# Patient Record
Sex: MALE | Race: WHITE | Employment: OTHER | URBAN - METROPOLITAN AREA
[De-identification: names, ages, dates, MRNs, and addresses within clinical notes are randomized per-mention and may not be internally consistent; named-entity substitution may affect disease eponyms.]

---

## 2017-05-08 ENCOUNTER — OFFICE VISIT (OUTPATIENT)
Dept: FAMILY MEDICINE | Facility: CLINIC | Age: 62
End: 2017-05-08
Payer: COMMERCIAL

## 2017-05-08 VITALS
SYSTOLIC BLOOD PRESSURE: 124 MMHG | HEART RATE: 92 BPM | BODY MASS INDEX: 31.83 KG/M2 | TEMPERATURE: 99.4 F | OXYGEN SATURATION: 97 % | WEIGHT: 228.25 LBS | DIASTOLIC BLOOD PRESSURE: 88 MMHG

## 2017-05-08 DIAGNOSIS — E11.42 DIABETIC POLYNEUROPATHY ASSOCIATED WITH TYPE 2 DIABETES MELLITUS (H): ICD-10-CM

## 2017-05-08 DIAGNOSIS — Z79.4 TYPE 2 DIABETES MELLITUS WITH DIABETIC NEUROPATHY, WITH LONG-TERM CURRENT USE OF INSULIN (H): ICD-10-CM

## 2017-05-08 DIAGNOSIS — Z72.0 TOBACCO ABUSE: ICD-10-CM

## 2017-05-08 DIAGNOSIS — Z00.00 ROUTINE GENERAL MEDICAL EXAMINATION AT A HEALTH CARE FACILITY: Primary | ICD-10-CM

## 2017-05-08 DIAGNOSIS — E11.40 TYPE 2 DIABETES MELLITUS WITH DIABETIC NEUROPATHY, WITH LONG-TERM CURRENT USE OF INSULIN (H): ICD-10-CM

## 2017-05-08 DIAGNOSIS — I10 HYPERTENSION GOAL BP (BLOOD PRESSURE) < 140/80: ICD-10-CM

## 2017-05-08 LAB — HBA1C MFR BLD: 14.3 % (ref 4.3–6)

## 2017-05-08 PROCEDURE — 83036 HEMOGLOBIN GLYCOSYLATED A1C: CPT | Performed by: FAMILY MEDICINE

## 2017-05-08 PROCEDURE — 36415 COLL VENOUS BLD VENIPUNCTURE: CPT | Performed by: FAMILY MEDICINE

## 2017-05-08 PROCEDURE — 99396 PREV VISIT EST AGE 40-64: CPT | Performed by: FAMILY MEDICINE

## 2017-05-08 PROCEDURE — 99214 OFFICE O/P EST MOD 30 MIN: CPT | Mod: 25 | Performed by: FAMILY MEDICINE

## 2017-05-08 PROCEDURE — 80061 LIPID PANEL: CPT | Performed by: FAMILY MEDICINE

## 2017-05-08 RX ORDER — METOPROLOL SUCCINATE 100 MG/1
100 TABLET, EXTENDED RELEASE ORAL DAILY
Qty: 90 TABLET | Refills: 0 | Status: SHIPPED | OUTPATIENT
Start: 2017-05-08 | End: 2017-08-05

## 2017-05-08 NOTE — PATIENT INSTRUCTIONS
Preventive Health Recommendations  Male Ages 50   64    Yearly exam:             See your health care provider every year in order to  o   Review health changes.   o   Discuss preventive care.    o   Review your medicines if your doctor has prescribed any.     Have a cholesterol test every 5 years, or more frequently if you are at risk for high cholesterol/heart disease.     Have a diabetes test (fasting glucose) every three years. If you are at risk for diabetes, you should have this test more often.     Have a colonoscopy at age 50, or have a yearly FIT test (stool test). These exams will check for colon cancer.      Talk with your health care provider about whether or not a prostate cancer screening test (PSA) is right for you.    You should be tested each year for STDs (sexually transmitted diseases), if you re at risk.     Shots: Get a flu shot each year. Get a tetanus shot every 10 years.     Nutrition:    Eat at least 5 servings of fruits and vegetables daily.     Eat whole-grain bread, whole-wheat pasta and brown rice instead of white grains and rice.     Talk to your provider about Calcium and Vitamin D.     Lifestyle    Exercise for at least 150 minutes a week (30 minutes a day, 5 days a week). This will help you control your weight and prevent disease.     Limit alcohol to one drink per day.     No smoking.     Wear sunscreen to prevent skin cancer.     See your dentist every six months for an exam and cleaning.     See your eye doctor every 1 to 2 years.    - please ask if your  if she can come for your appointment with it.  - routine preventative exam (physical) may not be as important at this point than controlling blood sugar. We did your routine physical though.  - your diabetes is really uncontrolled and that is concerning.   - routine use of insulin, trulicity, quitting smoking, diabetic shoes.  - follow up in 2 months.

## 2017-05-08 NOTE — PROGRESS NOTES
SUBJECTIVE:     CC: Cooper Cabral is an 61 year old male who presents for preventative health visit.     Healthy Habits:    Do you get at least three servings of calcium containing foods daily (dairy, green leafy vegetables, etc.)? no    Amount of exercise or daily activities, outside of work: 0 day(s) per week    Problems taking medications regularly Yes does not like taking insulin, afraid of needles. Used past 2 days     Medication side effects: No    Have you had an eye exam in the past two years? yes    Do you see a dentist twice per year? no    Do you have sleep apnea, excessive snoring or daytime drowsiness?yes sleep apnea          Diabetes Follow-up      Patient is checking blood sugars: not at all    Diabetic concerns: None     Symptoms of hypoglycemia (low blood sugar): none     Paresthesias (numbness or burning in feet) or sores: Yes swollen feet, left toe nail came off     Date of last diabetic eye exam: last year      Hyperlipidemia Follow-Up      Rate your low fat/cholesterol diet?: poor    Taking statin?  Yes, no muscle aches from statin    Other lipid medications/supplements?:  none     Hypertension Follow-up      Outpatient blood pressures are being checked at home.  Results are 198/?.    Low Salt Diet: occasional          Today's PHQ-2 Score:   PHQ-2 ( 1999 Pfizer) 5/8/2017 9/12/2016   Q1: Little interest or pleasure in doing things 0 1   Q2: Feeling down, depressed or hopeless 1 1   PHQ-2 Score 1 2       Abuse: Current or Past(Physical, Sexual or Emotional)- No  Do you feel safe in your environment - Yes    Social History   Substance Use Topics     Smoking status: Current Every Day Smoker     Packs/day: 1.00     Years: 20.00     Types: Cigarettes     Smokeless tobacco: Never Used     Alcohol use No     The patient does not drink >3 drinks per day nor >7 drinks per week.    Last PSA:   PSA   Date Value Ref Range Status   09/12/2016 2.11 0 - 4 ug/L Final     Comment:     Assay Method:   Chemiluminescence using Siemens Vista analyzer       Recent Labs   Lab Test  09/12/16   1031  10/14/15   1124  08/13/14   0908   CHOL  139  166  123   HDL  34*  34*  37*   LDL  85  64  63   TRIG  101  339*  117   CHOLHDLRATIO   --   4.9  3.3   NHDL  105   --    --        Reviewed orders with patient. Reviewed health maintenance and updated orders accordingly - Yes    Reviewed and updated as needed this visit by clinical staff  Tobacco  Allergies  Meds  Med Hx  Surg Hx  Fam Hx  Soc Hx      Reviewed and updated as needed this visit by Provider    Restarted insulin 40 units twice per day.   trulicity first time on Sunday.   Out of metformin.   His  wants him to have a physical. MELCHOR mallory.     Has another month worth insulin and trulicity and does not want refill.     Rolling his own tobacco and still smokes. Not ready to quit.     Poor feet sensation. Balance is not great.      ROS: see above.   C: NEGATIVE for fever, chills, change in weight  I: NEGATIVE for worrisome rashes, moles or lesions  E: NEGATIVE for vision changes or irritation  ENT: NEGATIVE for ear, mouth and throat problems  R: NEGATIVE for significant cough or SOB  CV: NEGATIVE for chest pain, palpitations or peripheral edema  GI: NEGATIVE for nausea, abdominal pain, heartburn, or change in bowel habits   male: negative for dysuria, hematuria, decreased urinary stream, erectile dysfunction, urethral discharge  M: NEGATIVE for significant arthralgias or myalgia  N: see above.   E: NEGATIVE for temperature intolerance, skin/hair changes  P: NEGATIVE for changes in mood or affect    Problem list, Medication list, Allergies, and Medical/Social/Surgical histories reviewed in [facility] and updated as appropriate.  OBJECTIVE:     /88 (Cuff Size: Adult Large)  Pulse 92  Temp 99.4  F (37.4  C) (Oral)  Wt 228 lb 4 oz (103.5 kg)  SpO2 97%  BMI 31.83 kg/m2  EXAM:  GENERAL: , alert and no distress  EYES: Eyes grossly normal to inspection,  PERRL and conjunctivae and sclerae normal  HENT: ear canals and TM's normal, nose and mouth without ulcers or lesions  NECK: no adenopathy, no asymmetry, masses, or scars and thyroid normal to palpation  RESP: lungs clear to auscultation - no rales, rhonchi or wheezes  CV: regular rate and rhythm, normal S1 S2, no S3 or S4, no murmur, click or rub, no peripheral edema and peripheral pulses strong  ABDOMEN: soft, nontender, no hepatosplenomegaly, no masses and bowel sounds normal  MS: no gross musculoskeletal defects noted, no edema  SKIN: no suspicious lesions or rashes  NEURO: absent monofilament or fine touch sensation both lower limbs, left great toe - toe nail missing  PSYCH: mentation appears normal, affect normal/bright    ASSESSMENT/PLAN:      (Z00.00) Routine general medical examination at a health care facility  (primary encounter diagnosis)  Comment: we had a discussion about skipping physical with his current uncontrolled health conditions but he was insisting about having routine physical along with his current chronic conditions.   Plan: LIPID REFLEX TO DIRECT LDL PANEL             (E11.40,  Z79.4) Type 2 diabetes mellitus with diabetic neuropathy, with long-term current use of insulin (H)  Comment:  Uncontrolled with A1c over 14.  Just restarted his insulin but previously very poor compliance and I tried to learn from him if there is any concern that prevents his compliance. He does not have any specific answer today. Will resume same medications instead of going up higher on dose.   Plan: Hemoglobin A1c, metFORMIN (GLUCOPHAGE) 1000 MG         tablet, order for DME    (Z72.0) Tobacco abuse  Comment: ongoing. Does not want to quit.   Plan:      (I10) Hypertension goal BP (blood pressure) < 140/80  Comment: Patient is tolerating current medication without any major side effects of concerns and current dose seems reasonable too.  Current medication regime is effective. Continue current treatment without  "any changes.   Plan: metoprolol (TOPROL-XL) 100 MG 24 hr tablet             (E11.42) Diabetic polyneuropathy associated with type 2 diabetes mellitus (H)  Comment:  Feet - very significant neuropathy and minimal or no fine sensation.  Plan: strongly recommend working on his blood sugar and diabetic shoes. He can also see our podiatrist.           COUNSELING:  Reviewed preventive health counseling, as reflected in patient instructions  Special attention given to:        Regular exercise       Healthy diet/nutrition       Vision screening       Hearing screening       Colon cancer screening       Prostate cancer screening         reports that he has been smoking Cigarettes.  He has a 20.00 pack-year smoking history. He has never used smokeless tobacco.  Tobacco Cessation Action Plan: Information offered: Patient not interested at this time  Estimated body mass index is 31.83 kg/(m^2) as calculated from the following:    Height as of 9/12/16: 5' 11\" (1.803 m).    Weight as of this encounter: 228 lb 4 oz (103.5 kg).   Weight management plan: Discussed healthy diet and exercise guidelines and patient will follow up in 2 months in clinic to re-evaluate.    Counseling Resources:  ATP IV Guidelines  Pooled Cohorts Equation Calculator  FRAX Risk Assessment  ICSI Preventive Guidelines  Dietary Guidelines for Americans, 2010  USDA's MyPlate  ASA Prophylaxis  Lung CA Screening    Dani Layton MD  [facility]    Follow up in 2 months for uncontrolled diabetes.   "

## 2017-05-08 NOTE — NURSING NOTE
"Chief Complaint   Patient presents with     Physical     pt is not fasting        Initial /88 (Cuff Size: Adult Large)  Pulse 92  Temp 99.4  F (37.4  C) (Oral)  Wt 228 lb 4 oz (103.5 kg)  SpO2 97%  BMI 31.83 kg/m2 Estimated body mass index is 31.83 kg/(m^2) as calculated from the following:    Height as of 9/12/16: 5' 11\" (1.803 m).    Weight as of this encounter: 228 lb 4 oz (103.5 kg).  Medication Reconciliation: complete     Radha Alfaro, CMA      "

## 2017-05-08 NOTE — MR AVS SNAPSHOT
After Visit Summary   5/8/2017    Cooper Cabral    MRN: [id number]           Patient Information     Date Of Birth          1955        Visit Information        Provider Department      5/8/2017 10:40 AM Dani Layton MD [facility]        Today's Diagnoses     Type 2 diabetes mellitus with diabetic neuropathy, with long-term current use of insulin (H)    -  1    Morbid obesity, unspecified obesity type (H)        Tobacco abuse        Hypertension goal BP (blood pressure) < 140/80        Routine general medical examination at a health care facility        Diabetic polyneuropathy associated with type 2 diabetes mellitus (H)          Care Instructions      Preventive Health Recommendations  Male Ages 50 - 64    Yearly exam:             See your health care provider every year in order to  o   Review health changes.   o   Discuss preventive care.    o   Review your medicines if your doctor has prescribed any.     Have a cholesterol test every 5 years, or more frequently if you are at risk for high cholesterol/heart disease.     Have a diabetes test (fasting glucose) every three years. If you are at risk for diabetes, you should have this test more often.     Have a colonoscopy at age 50, or have a yearly FIT test (stool test). These exams will check for colon cancer.      Talk with your health care provider about whether or not a prostate cancer screening test (PSA) is right for you.    You should be tested each year for STDs (sexually transmitted diseases), if you re at risk.     Shots: Get a flu shot each year. Get a tetanus shot every 10 years.     Nutrition:    Eat at least 5 servings of fruits and vegetables daily.     Eat whole-grain bread, whole-wheat pasta and brown rice instead of white grains and rice.     Talk to your provider about Calcium and Vitamin D.     Lifestyle    Exercise for at least 150 minutes a week (30 minutes a day, 5 days a week). This will  help you control your weight and prevent disease.     Limit alcohol to one drink per day.     No smoking.     Wear sunscreen to prevent skin cancer.     See your dentist every six months for an exam and cleaning.     See your eye doctor every 1 to 2 years.    - please ask if your  if she can come for your appointment with it.  - routine preventative exam (physical) may not be as important at this point than controlling blood sugar. We did your routine physical though.  - your diabetes is really uncontrolled and that is concerning.   - routine use of insulin, trulicity, quitting smoking, diabetic shoes.  - follow up in 2 months.         Follow-ups after your visit        Who to contact     If you have questions or need follow up information about today's clinic visit or your schedule please contact [facility] directly at [phone].  Normal or non-critical lab and imaging results will be communicated to you by [employer]hart, letter or phone within 4 business days after the clinic has received the results. If you do not hear from us within 7 days, please contact the clinic through [employer] or phone. If you have a critical or abnormal lab result, we will notify you by phone as soon as possible.  Submit refill requests through [employer] or call your pharmacy and they will forward the refill request to us. Please allow 3 business days for your refill to be completed.          Additional Information About Your Visit        [employer] Information     [employer] gives you secure access to your electronic health record. If you see a primary care provider, you can also send messages to your care team and make appointments. If you have questions, please call your primary care clinic.  If you do not have a primary care provider, please call [phone] and they will assist you.        Care EveryWhere ID     This is your Care EveryWhere ID. This could be used by other organizations to access your [facility]  records  [id number]        Your Vitals Were     Pulse Temperature Pulse Oximetry BMI (Body Mass Index)          92 99.4  F (37.4  C) (Oral) 97% 31.83 kg/m2         Blood Pressure from Last 3 Encounters:   05/08/17 124/88   09/12/16 114/86   02/03/16 130/88    Weight from Last 3 Encounters:   05/08/17 228 lb 4 oz (103.5 kg)   09/12/16 246 lb (111.6 kg)   02/03/16 247 lb 12 oz (112.4 kg)              We Performed the Following     Hemoglobin A1c     LIPID REFLEX TO DIRECT LDL PANEL          Today's Medication Changes          These changes are accurate as of: 5/8/17 11:32 AM.  If you have any questions, ask your nurse or doctor.               Start taking these medicines.        Dose/Directions    order for DME   Used for:  Type 2 diabetes mellitus with diabetic neuropathy, with long-term current use of insulin (H)   Started by:  Dani Layton MD        Diabetic shoes.   Quantity:  1 Units   Refills:  0            Where to get your medicines      These medications were sent to [facility] [address] [place] [address] AT [facility] & [address] [place] [id number]    Hours:  24-hours Phone:  [phone]     metFORMIN 1000 MG tablet    metoprolol 100 MG 24 hr tablet         Some of these will need a paper prescription and others can be bought over the counter.  Ask your nurse if you have questions.     Bring a paper prescription for each of these medications     order for DME                Primary Care Provider Office Phone # Fax #    Dani Layton [phone]       [facility] [address] [place] [address]        Thank you!     Thank you for choosing [facility]  for your care. Our goal is always to provide you with excellent care. Hearing back from our patients is one way we can continue to improve our services. Please take a few minutes to complete the written survey that you may  receive in the mail after your visit with us. Thank you!             Your Updated Medication List - Protect others around you: Learn how to safely use, store and throw away your medicines at www.disposemymeds.org.          This list is accurate as of: 5/8/17 11:32 AM.  Always use your most recent med list.                   Brand Name Dispense Instructions for use    aspirin 81 MG tablet     100 tablet    Take 1 tablet by mouth daily.       blood glucose monitoring lancets     100    tests twice a day       * blood glucose monitoring test strip    ACCU-CHEK LUISANA    50 each    by In Vitro route 2 times daily (test)       * blood glucose monitoring test strip    ACCU-CHEK LUISANA    50 each    by In Vitro route 2 times daily (test)       * blood glucose monitoring test strip    ACCU-CHEK LUISANA    50 each    by In Vitro route 2 times daily (test)       * blood glucose monitoring test strip    ACCU-CHEK LUISANA    100 strip    by In Vitro route 2 times daily (test)       CALCIUM + D + K PO      Take 1 tablet by mouth daily.       dulaglutide 0.75 MG/0.5ML pen    TRULICITY    2 mL    Inject 0.75 mg Subcutaneous every 7 days       FISH OIL      1 tablet daily Reported on 5/8/2017       insulin pen needle 31G X 8 MM    B-D U/F    90 each    Use once daily or as directed.       LANTUS SOLOSTAR 100 UNIT/ML injection   Generic drug:  insulin glargine     1 Month    Use 42 units every morning and 42 units every evening.       lisinopril-hydrochlorothiazide 20-12.5 MG per tablet    PRINZIDE/ZESTORETIC    60 tablet    Take 2 tablets by mouth daily       metFORMIN 1000 MG tablet    GLUCOPHAGE    180 tablet    Take 1 tablet (1,000 mg) by mouth 2 times daily (with meals)       metoprolol 100 MG 24 hr tablet    TOPROL-XL    90 tablet    Take 1 tablet (100 mg) by mouth daily       MULTI FOR HIM PO      Take 1 capsule by mouth daily.       nicotine 21 MG/24HR 24 hr patch    NICODERM CQ    30    1 PATCH DAILY       NovoLOG FLEXPEN 100  UNIT/ML injection   Generic drug:  insulin aspart      Inject Subcutaneous 3 times daily (with meals)       order for DME     1 Units    Diabetic shoes.       pregabalin 150 MG capsule    LYRICA    90 capsule    Take 1 capsule (150 mg) by mouth 3 times daily       simvastatin 20 MG tablet    ZOCOR    90 tablet    Take 1 tablet (20 mg) by mouth At Bedtime       * Notice:  This list has 4 medication(s) that are the same as other medications prescribed for you. Read the directions carefully, and ask your doctor or other care provider to review them with you.

## 2017-05-09 LAB
CHOLEST SERPL-MCNC: 171 MG/DL
HDLC SERPL-MCNC: 42 MG/DL
LDLC SERPL CALC-MCNC: 104 MG/DL
NONHDLC SERPL-MCNC: 129 MG/DL
TRIGL SERPL-MCNC: 125 MG/DL

## 2017-05-22 ENCOUNTER — TELEPHONE (OUTPATIENT)
Dept: FAMILY MEDICINE | Facility: CLINIC | Age: 62
End: 2017-05-22

## 2017-05-31 RX ORDER — INSULIN GLARGINE 100 [IU]/ML
INJECTION, SOLUTION SUBCUTANEOUS
Qty: 30 ML | Refills: 1 | Status: SHIPPED | OUTPATIENT
Start: 2017-05-31 | End: 2017-09-07

## 2017-05-31 NOTE — TELEPHONE ENCOUNTER
LANTUS SOLOSTAR 100 UNIT/ML soln         Last Written Prescription Date: 9/12/2016  Last Fill Quantity: 1 month, # refills: 0  Last Office Visit with [facility] or [facility] prescribing provider:  5/8/2017        BP Readings from Last 3 Encounters:   05/08/17 124/88   09/12/16 114/86   02/03/16 130/88     Lab Results   Component Value Date    MICROL 324 09/12/2016     Lab Results   Component Value Date    UMALCR 231.43 09/12/2016     Creatinine   Date Value Ref Range Status   09/12/2016 0.82 0.66 - 1.25 mg/dL Final   ]  GFR Estimate   Date Value Ref Range Status   09/12/2016 >90  Non  GFR Calc   >60 mL/min/1.7m2 Final   10/14/2015 80 >60 mL/min/1.7m2 Final     Comment:     Non  GFR Calc   08/13/2014 72 >60 mL/min/1.7m2 Final     Comment:     Non  GFR Calc     GFR Estimate If Black   Date Value Ref Range Status   09/12/2016 >90   GFR Calc   >60 mL/min/1.7m2 Final   10/14/2015 >90   GFR Calc   >60 mL/min/1.7m2 Final   08/13/2014 87 >60 mL/min/1.7m2 Final     Comment:      GFR Calc     Lab Results   Component Value Date    CHOL 171 05/08/2017     Lab Results   Component Value Date    HDL 42 05/08/2017     Lab Results   Component Value Date     05/08/2017     Lab Results   Component Value Date    TRIG 125 05/08/2017     Lab Results   Component Value Date    CHOLHDLRATIO 4.9 10/14/2015     Lab Results   Component Value Date    AST 19 09/12/2016     Lab Results   Component Value Date    ALT 34 09/12/2016     Lab Results   Component Value Date    A1C 14.3 05/08/2017    A1C 12.0 09/12/2016    A1C 14.0 02/03/2016    A1C 13.9 11/10/2015    A1C 14.7 10/14/2015     Potassium   Date Value Ref Range Status   09/12/2016 3.8 3.4 - 5.3 mmol/L Final       Prescription approved per [facility] Refill Protocol.    Signed Prescriptions:                        Disp   Refills    LANTUS SOLOSTAR 100 UNIT/ML soln           30 mL  1        Sig: INJECT  40 UNITS UNDER THE SKIN EVERY MORNING AND 40           UNITS EVERY EVENING  Authorizing Provider: VINNY ALVAREZ  Ordering User: JIMMY FERNANDO    Provider advised 2 month F/U    Closing encounter - no further actions needed at this time    Jimmy Fernando RN

## 2017-06-28 PROCEDURE — G0179 MD RECERTIFICATION HHA PT: HCPCS | Performed by: FAMILY MEDICINE

## 2017-07-14 DIAGNOSIS — I10 HYPERTENSION GOAL BP (BLOOD PRESSURE) < 140/80: ICD-10-CM

## 2017-07-14 RX ORDER — LISINOPRIL AND HYDROCHLOROTHIAZIDE 12.5; 2 MG/1; MG/1
TABLET ORAL
Qty: 60 TABLET | Refills: 0 | Status: SHIPPED | OUTPATIENT
Start: 2017-07-14 | End: 2017-09-07

## 2017-07-14 NOTE — TELEPHONE ENCOUNTER
lisinopril-hydrochlorothiazide (PRINZIDE,ZESTORETIC) 20-12.5 MG per tablet      Last Written Prescription Date: 11/29/2016  Last Fill Quantity: 60, # refills: 0  Last Office Visit with [facility], P or [facility] prescribing provider: 5/8/2017       Potassium   Date Value Ref Range Status   09/12/2016 3.8 3.4 - 5.3 mmol/L Final     Creatinine   Date Value Ref Range Status   09/12/2016 0.82 0.66 - 1.25 mg/dL Final     BP Readings from Last 3 Encounters:   05/08/17 124/88   09/12/16 114/86   02/03/16 130/88       HTN addressed 5/8/2017    Routing refill request to provider for review/approval because:  A break in medication      Thank you,  Anel Sanchez RN

## 2017-08-05 DIAGNOSIS — I10 HYPERTENSION GOAL BP (BLOOD PRESSURE) < 140/80: ICD-10-CM

## 2017-08-05 NOTE — LETTER
[facility]  [address] [place] [address]  Phone: [phone]        August 8, 2017      Cooper Cabral                                                                                                                                [address] [address]            Dear Mr. Cabral,    We are concerned about your health care.  We recently provided you with a medication refill.  Many medications require routine follow-up with your Doctor.      At this time we ask that: You schedule a routine office visit with your physician to follow your diabetes    Your prescription: Has been refilled for 1 month so you may have time for the above noted follow-up.      Thank you,      Dr Layton/ chris

## 2017-08-07 RX ORDER — METOPROLOL SUCCINATE 100 MG/1
TABLET, EXTENDED RELEASE ORAL
Qty: 30 TABLET | Refills: 0 | Status: SHIPPED | OUTPATIENT
Start: 2017-08-07 | End: 2017-09-07

## 2017-08-07 NOTE — TELEPHONE ENCOUNTER
Patient is overdue for diabetes follow up visit.    Patient was contacted on 7/14/17 to schedule diabetes follow up.    Dr. Layton-Please sign if agree.    Team coordinators-Please contact patient to schedule diabetes follow up visit.    Thank you!  VALERI MartinezN, RN

## 2017-08-07 NOTE — TELEPHONE ENCOUNTER
Medication Detail      Disp Refills Start End ANDRES   metoprolol (TOPROL-XL) 100 MG 24 hr tablet 90 tablet 0 5/8/2017  No   Sig: Take 1 tablet (100 mg) by mouth daily       Last Office Visit with [facility], [facility] or [facility] prescribing provider:  5/8/17   Future Office Visit:        BP Readings from Last 3 Encounters:   05/08/17 124/88   09/12/16 114/86   02/03/16 130/88

## 2017-08-08 NOTE — TELEPHONE ENCOUNTER
Patient doesn't check his My Chart messages phone number on file is incorrect so sending patient a letter to schedule his overdue diabetes follow up visit

## 2017-08-16 DIAGNOSIS — I10 HYPERTENSION GOAL BP (BLOOD PRESSURE) < 140/80: ICD-10-CM

## 2017-08-16 NOTE — TELEPHONE ENCOUNTER
Medication Detail      Disp Refills Start End ANDRES   lisinopril-hydrochlorothiazide (PRINZIDE/ZESTORETIC) 20-12.5 MG per tablet 60 tablet 0 7/14/2017  No   Sig: TAKE 2 TABLETS BY MOUTH DAILY       Last Office Visit with [facility], [facility] or [facility] prescribing provider: 2/28/17       Potassium   Date Value Ref Range Status   09/12/2016 3.8 3.4 - 5.3 mmol/L Final     Creatinine   Date Value Ref Range Status   09/12/2016 0.82 0.66 - 1.25 mg/dL Final     BP Readings from Last 3 Encounters:   05/08/17 124/88   09/12/16 114/86   02/03/16 130/88

## 2017-08-17 NOTE — TELEPHONE ENCOUNTER
Last office note from 5/8/17 states:  Follow up in 2 months for uncontrolled diabetes.    No appointment has been scheduled.  1 month refill pended.  Patricia Mortensen RN

## 2017-08-18 RX ORDER — LISINOPRIL AND HYDROCHLOROTHIAZIDE 12.5; 2 MG/1; MG/1
TABLET ORAL
Qty: 60 TABLET | Refills: 0 | Status: SHIPPED | OUTPATIENT
Start: 2017-08-18 | End: 2017-09-07

## 2017-08-29 PROCEDURE — G0179 MD RECERTIFICATION HHA PT: HCPCS | Performed by: FAMILY MEDICINE

## 2017-09-07 ENCOUNTER — OFFICE VISIT (OUTPATIENT)
Dept: INTERNAL MEDICINE | Facility: CLINIC | Age: 62
End: 2017-09-07
Payer: COMMERCIAL

## 2017-09-07 VITALS
DIASTOLIC BLOOD PRESSURE: 76 MMHG | OXYGEN SATURATION: 98 % | SYSTOLIC BLOOD PRESSURE: 118 MMHG | HEIGHT: 71 IN | BODY MASS INDEX: 32 KG/M2 | HEART RATE: 98 BPM | WEIGHT: 228.6 LBS | TEMPERATURE: 98.5 F

## 2017-09-07 DIAGNOSIS — E78.5 HYPERLIPIDEMIA LDL GOAL <100: ICD-10-CM

## 2017-09-07 DIAGNOSIS — Z79.4 TYPE 2 DIABETES MELLITUS WITH DIABETIC NEUROPATHY, WITH LONG-TERM CURRENT USE OF INSULIN (H): ICD-10-CM

## 2017-09-07 DIAGNOSIS — E11.49 OTHER DIABETIC NEUROLOGICAL COMPLICATION ASSOCIATED WITH TYPE 2 DIABETES MELLITUS (H): ICD-10-CM

## 2017-09-07 DIAGNOSIS — Z12.11 COLON CANCER SCREENING: Primary | ICD-10-CM

## 2017-09-07 DIAGNOSIS — I10 HYPERTENSION GOAL BP (BLOOD PRESSURE) < 140/80: ICD-10-CM

## 2017-09-07 DIAGNOSIS — E11.40 TYPE 2 DIABETES MELLITUS WITH DIABETIC NEUROPATHY, WITH LONG-TERM CURRENT USE OF INSULIN (H): ICD-10-CM

## 2017-09-07 LAB
ALBUMIN SERPL-MCNC: 3.6 G/DL (ref 3.4–5)
ALP SERPL-CCNC: 81 U/L (ref 40–150)
ALT SERPL W P-5'-P-CCNC: 42 U/L (ref 0–70)
ANION GAP SERPL CALCULATED.3IONS-SCNC: 8 MMOL/L (ref 3–14)
AST SERPL W P-5'-P-CCNC: 18 U/L (ref 0–45)
BILIRUB SERPL-MCNC: 0.9 MG/DL (ref 0.2–1.3)
BUN SERPL-MCNC: 25 MG/DL (ref 7–30)
CALCIUM SERPL-MCNC: 9.7 MG/DL (ref 8.5–10.1)
CHLORIDE SERPL-SCNC: 101 MMOL/L (ref 94–109)
CHOLEST SERPL-MCNC: 164 MG/DL
CO2 SERPL-SCNC: 29 MMOL/L (ref 20–32)
CREAT SERPL-MCNC: 0.84 MG/DL (ref 0.66–1.25)
GFR SERPL CREATININE-BSD FRML MDRD: >90 ML/MIN/1.7M2
GLUCOSE SERPL-MCNC: 269 MG/DL (ref 70–99)
HBA1C MFR BLD: 11.4 % (ref 4.3–6)
HDLC SERPL-MCNC: 42 MG/DL
LDLC SERPL CALC-MCNC: 99 MG/DL
NONHDLC SERPL-MCNC: 122 MG/DL
POTASSIUM SERPL-SCNC: 4.4 MMOL/L (ref 3.4–5.3)
PROT SERPL-MCNC: 7.3 G/DL (ref 6.8–8.8)
SODIUM SERPL-SCNC: 138 MMOL/L (ref 133–144)
TRIGL SERPL-MCNC: 114 MG/DL
TSH SERPL DL<=0.005 MIU/L-ACNC: 1.21 MU/L (ref 0.4–4)

## 2017-09-07 PROCEDURE — 80053 COMPREHEN METABOLIC PANEL: CPT | Performed by: INTERNAL MEDICINE

## 2017-09-07 PROCEDURE — 83036 HEMOGLOBIN GLYCOSYLATED A1C: CPT | Performed by: INTERNAL MEDICINE

## 2017-09-07 PROCEDURE — 99214 OFFICE O/P EST MOD 30 MIN: CPT | Performed by: INTERNAL MEDICINE

## 2017-09-07 PROCEDURE — 84443 ASSAY THYROID STIM HORMONE: CPT | Performed by: INTERNAL MEDICINE

## 2017-09-07 PROCEDURE — 80061 LIPID PANEL: CPT | Performed by: INTERNAL MEDICINE

## 2017-09-07 PROCEDURE — 36415 COLL VENOUS BLD VENIPUNCTURE: CPT | Performed by: INTERNAL MEDICINE

## 2017-09-07 RX ORDER — METOPROLOL SUCCINATE 100 MG/1
100 TABLET, EXTENDED RELEASE ORAL DAILY
Qty: 90 TABLET | Refills: 3 | Status: ON HOLD | OUTPATIENT
Start: 2017-09-07 | End: 2018-01-29

## 2017-09-07 RX ORDER — PREGABALIN 150 MG/1
150 CAPSULE ORAL 3 TIMES DAILY
Qty: 90 CAPSULE | Refills: 3 | Status: SHIPPED | OUTPATIENT
Start: 2017-09-07 | End: 2017-11-21

## 2017-09-07 RX ORDER — LISINOPRIL AND HYDROCHLOROTHIAZIDE 12.5; 2 MG/1; MG/1
2 TABLET ORAL DAILY
Qty: 180 TABLET | Refills: 3 | Status: ON HOLD | OUTPATIENT
Start: 2017-09-07 | End: 2018-01-29

## 2017-09-07 RX ORDER — SIMVASTATIN 20 MG
20 TABLET ORAL AT BEDTIME
Qty: 90 TABLET | Refills: 3 | Status: ON HOLD | OUTPATIENT
Start: 2017-09-07 | End: 2018-01-29

## 2017-09-07 NOTE — MR AVS SNAPSHOT
After Visit Summary   9/7/2017    Cooper Cabral    MRN: [id number]           Patient Information     Date Of Birth          1955        Visit Information        Provider Department      9/7/2017 1:40 PM Hudson Spring MD [facility]        Today's Diagnoses     Colon cancer screening    -  1    Type 2 diabetes mellitus with diabetic neuropathy, with long-term current use of insulin (H)        Hypertension goal BP (blood pressure) < 140/80        Hyperlipidemia LDL goal <100        Other diabetic neurological complication associated with type 2 diabetes mellitus (H)           Follow-ups after your visit        Additional Services     GASTROENTEROLOGY ADULT REF PROCEDURE ONLY       Last Lab Result: Creatinine (mg/dL)       Date                     Value                 09/12/2016               0.82             ----------  Body mass index is 31.88 kg/(m^2).     Needed:  No  Language:  English    Patient will be contacted to schedule procedure.     Please be aware that coverage of these services is subject to the terms and limitations of your health insurance plan.  Call member services at your health plan with any benefit or coverage questions.  Any procedures must be performed at a [place] facility OR coordinated by your clinic's referral office.    Please bring the following with you to your appointment:    (1) Any X-Rays, CTs or MRIs which have been performed.  Contact the facility where they were done to arrange for  prior to your scheduled appointment.    (2) List of current medications   (3) This referral request   (4) Any documents/labs given to you for this referral                  Follow-up notes from your care team     Return in about 3 months (around 12/7/2017), or if symptoms worsen or fail to improve, for Lab Work, Routine Visit.      Who to contact     If you have questions or need follow up information about today's clinic visit or  "your schedule please contact [facility] directly at [phone].  Normal or non-critical lab and imaging results will be communicated to you by MyChart, letter or phone within 4 business days after the clinic has received the results. If you do not hear from us within 7 days, please contact the clinic through [employer] or phone. If you have a critical or abnormal lab result, we will notify you by phone as soon as possible.  Submit refill requests through [employer] or call your pharmacy and they will forward the refill request to us. Please allow 3 business days for your refill to be completed.          Additional Information About Your Visit        [email or web address] Information     [employer] gives you secure access to your electronic health record. If you see a primary care provider, you can also send messages to your care team and make appointments. If you have questions, please call your primary care clinic.  If you do not have a primary care provider, please call [phone] and they will assist you.        Care EveryWhere ID     This is your Care EveryWhere ID. This could be used by other organizations to access your [place] medical records  [id number]        Your Vitals Were     Pulse Temperature Height Pulse Oximetry BMI (Body Mass Index)       98 98.5  F (36.9  C) (Oral) 5' 11\" (1.803 m) 98% 31.88 kg/m2        Blood Pressure from Last 3 Encounters:   09/07/17 118/76   05/08/17 124/88   09/12/16 114/86    Weight from Last 3 Encounters:   09/07/17 228 lb 9.6 oz (103.7 kg)   05/08/17 228 lb 4 oz (103.5 kg)   09/12/16 246 lb (111.6 kg)              We Performed the Following     Albumin Random Urine Quantitative with Creat Ratio     Comprehensive metabolic panel     GASTROENTEROLOGY ADULT REF PROCEDURE ONLY     Hemoglobin A1c     Lipid Profile     TSH with free T4 reflex          Today's Medication Changes          These changes are accurate as of: 9/7/17  2:21 PM.  If you have any questions, ask " your nurse or doctor.               These medicines have changed or have updated prescriptions.        Dose/Directions    blood glucose monitoring test strip   Commonly known as:  ACCU-CHEK LUISANA   This may have changed:  additional instructions   Used for:  Type 2 diabetes mellitus with diabetic neuropathy, with long-term current use of insulin (H)   Changed by:  Hudson Spring MD        by In Vitro route 1 times daily (test)   Quantity:  100 strip   Refills:  3       dulaglutide 0.75 MG/0.5ML pen   Commonly known as:  TRULICITY   This may have changed:  when to take this   Used for:  Hypertension goal BP (blood pressure) < 140/80        Dose:  0.75 mg   Inject 0.75 mg Subcutaneous every 7 days   Quantity:  6 mL   Refills:  5       insulin glargine 100 UNIT/ML injection   Commonly known as:  LANTUS SOLOSTAR   This may have changed:  See the new instructions.   Used for:  Type 2 diabetes mellitus with diabetic neuropathy, with long-term current use of insulin (H)   Changed by:  Hudson Spring MD        Dose:  42 Units   Inject 42 Units Subcutaneous 2 times daily INJECT 40 UNITS UNDER THE SKIN EVERY MORNING AND 40 UNITS EVERY EVENING   Quantity:  30 mL   Refills:  11       lisinopril-hydrochlorothiazide 20-12.5 MG per tablet   Commonly known as:  PRINZIDE/ZESTORETIC   This may have changed:  See the new instructions.   Used for:  Hypertension goal BP (blood pressure) < 140/80, Type 2 diabetes mellitus with diabetic neuropathy, with long-term current use of insulin (H)   Changed by:  Hudson Spring MD        Dose:  2 tablet   Take 2 tablets by mouth daily   Quantity:  180 tablet   Refills:  3       metoprolol 100 MG 24 hr tablet   Commonly known as:  TOPROL-XL   This may have changed:  See the new instructions.   Used for:  Hypertension goal BP (blood pressure) < 140/80   Changed by:  Hudson Spring MD        Dose:  100 mg   Take 1 tablet (100 mg) by mouth daily   Quantity:  90 tablet   Refills:  3       *  order for DME   This may have changed:  Another medication with the same name was added. Make sure you understand how and when to take each.   Used for:  Type 2 diabetes mellitus with diabetic neuropathy, with long-term current use of insulin (H)   Changed by:  Dani Layton MD        Diabetic shoes.   Quantity:  1 Units   Refills:  0       * order for DME   This may have changed:  You were already taking a medication with the same name, and this prescription was added. Make sure you understand how and when to take each.   Used for:  Type 2 diabetes mellitus with diabetic neuropathy, with long-term current use of insulin (H), Other diabetic neurological complication associated with type 2 diabetes mellitus (H)   Changed by:  Hudson Spring MD        Diabetic shoes due to neuropathy   Quantity:  1 Device   Refills:  0       * Notice:  This list has 2 medication(s) that are the same as other medications prescribed for you. Read the directions carefully, and ask your doctor or other care provider to review them with you.         Where to get your medicines      These medications were sent to [id number]     Phone:  [phone]     blood glucose monitoring test strip    dulaglutide 0.75 MG/0.5ML pen    insulin glargine 100 UNIT/ML injection    lisinopril-hydrochlorothiazide 20-12.5 MG per tablet    metFORMIN 1000 MG tablet    metoprolol 100 MG 24 hr tablet    simvastatin 20 MG tablet         Some of these will need a paper prescription and others can be bought over the counter.  Ask your nurse if you have questions.     Bring a paper prescription for each of these medications     order for DME    pregabalin 150 MG capsule                Primary Care Provider Office Phone # Fax #    Dani Layton [phone] [address] [place] [address]        Equal Access to Services     DANIELLA  GAAR : Hadii aad ku hadlisa Frankali, waaxda luqadaha, qaybta kaalmada adetierney, mark jimboin hayaashital cotto jesse rick rios. So [facility] [phone].    ATENCIÓN: Si habla español, tiene a beltran disposición servicios gratuitos de asistencia lingüística. Llame al [phone].    We comply with applicable federal civil rights laws and [place] laws. We do not discriminate on the basis of race, color, national origin, age, disability sex, sexual orientation or gender identity.            Thank you!     Thank you for choosing [facility]  for your care. Our goal is always to provide you with excellent care. Hearing back from our patients is one way we can continue to improve our services. Please take a few minutes to complete the written survey that you may receive in the mail after your visit with us. Thank you!             Your Updated Medication List - Protect others around you: Learn how to safely use, store and throw away your medicines at www.disposemymeds.org.          This list is accurate as of: 9/7/17  2:21 PM.  Always use your most recent med list.                   Brand Name Dispense Instructions for use Diagnosis    aspirin 81 MG tablet     100 tablet    Take 1 tablet by mouth daily.    Diabetes mellitus, type 2 (H)       blood glucose monitoring lancets     100    tests twice a day    Diabetes mellitus, type 2 (H)       blood glucose monitoring test strip    ACCU-CHEK LUISANA    100 strip    by In Vitro route 1 times daily (test)    Type 2 diabetes mellitus with diabetic neuropathy, with long-term current use of insulin (H)       CALCIUM + D + K PO      Take 1 tablet by mouth daily.        dulaglutide 0.75 MG/0.5ML pen    TRULICITY    6 mL    Inject 0.75 mg Subcutaneous every 7 days    Hypertension goal BP (blood pressure) < 140/80       FISH OIL      1 tablet daily Reported on 5/8/2017        insulin glargine 100 UNIT/ML injection    LANTUS SOLOSTAR    30 mL    Inject 42 Units  Subcutaneous 2 times daily INJECT 40 UNITS UNDER THE SKIN EVERY MORNING AND 40 UNITS EVERY EVENING    Type 2 diabetes mellitus with diabetic neuropathy, with long-term current use of insulin (H)       insulin pen needle 31G X 8 MM    B-D U/F    90 each    Use once daily or as directed.    Uncontrolled diabetes mellitus (H)       lisinopril-hydrochlorothiazide 20-12.5 MG per tablet    PRINZIDE/ZESTORETIC    180 tablet    Take 2 tablets by mouth daily    Hypertension goal BP (blood pressure) < 140/80, Type 2 diabetes mellitus with diabetic neuropathy, with long-term current use of insulin (H)       metFORMIN 1000 MG tablet    GLUCOPHAGE    180 tablet    Take 1 tablet (1,000 mg) by mouth 2 times daily (with meals)    Type 2 diabetes mellitus with diabetic neuropathy, with long-term current use of insulin (H)       metoprolol 100 MG 24 hr tablet    TOPROL-XL    90 tablet    Take 1 tablet (100 mg) by mouth daily    Hypertension goal BP (blood pressure) < 140/80       MULTI FOR HIM PO      Take 1 capsule by mouth daily.        nicotine 21 MG/24HR 24 hr patch    NICODERM CQ    30    1 PATCH DAILY    Tobacco abuse       NovoLOG FLEXPEN 100 UNIT/ML injection   Generic drug:  insulin aspart      Inject Subcutaneous 3 times daily (with meals)        * order for DME     1 Units    Diabetic shoes.    Type 2 diabetes mellitus with diabetic neuropathy, with long-term current use of insulin (H)       * order for DME     1 Device    Diabetic shoes due to neuropathy    Type 2 diabetes mellitus with diabetic neuropathy, with long-term current use of insulin (H), Other diabetic neurological complication associated with type 2 diabetes mellitus (H)       pregabalin 150 MG capsule    LYRICA    90 capsule    Take 1 capsule (150 mg) by mouth 3 times daily    Type 2 diabetes mellitus with diabetic neuropathy, with long-term current use of insulin (H)       simvastatin 20 MG tablet    ZOCOR    90 tablet    Take 1 tablet (20 mg) by mouth At  Bedtime    Hyperlipidemia LDL goal <100       * Notice:  This list has 2 medication(s) that are the same as other medications prescribed for you. Read the directions carefully, and ask your doctor or other care provider to review them with you.

## 2017-09-07 NOTE — NURSING NOTE
"Chief Complaint   Patient presents with     Establish Care       Initial /86  Pulse 98  Temp 98.5  F (36.9  C) (Oral)  Ht 5' 11\" (1.803 m)  Wt 228 lb 9.6 oz (103.7 kg)  SpO2 98%  BMI 31.88 kg/m2 Estimated body mass index is 31.88 kg/(m^2) as calculated from the following:    Height as of this encounter: 5' 11\" (1.803 m).    Weight as of this encounter: 228 lb 9.6 oz (103.7 kg).  Medication Reconciliation: complete   Anais Up, EVIE      "

## 2017-09-07 NOTE — LETTER
[facility]  [address] [address]  [phone]      9/7/2017       Cooper Cabral  [address]  [facility] [address]        Dear Cooper,    Your Hemoglobin A1C is abnormal as we discussed and indicates your blood sugars could be better controlled.  Please follow-up in the clinic to discuss some changes that need to be done after this 3 month period we talked about.    Your cholesterol looks good and I would not change anything at this point but would repeat your labs in 6 months.    In addition, your liver function tests are completely normal and should be rechecked with your next cholesterol level.    Your thyroid function tests also look good and thus I would not change anything at this point.      Sincerely,      Hudson Spring MD  Internal Medicine

## 2017-09-07 NOTE — PROGRESS NOTES
SUBJECTIVE:   Cooper Cabral is a 61 year old male who presents to clinic today for the following health issues:    New patient/ Establish care     Prevnar- DUE, declines     Diabetes Follow-up      Patient is checking blood sugars: rarely.      Diabetic concerns: other - Not using Trulicity weekly and inconsistent with Insulin use. Would like to discuss order for diabetic shoes      Symptoms of hypoglycemia (low blood sugar): none     Paresthesias (numbness or burning in feet) or sores: Yes      Date of last diabetic eye exam: DUE    Hyperlipidemia Follow-Up      Rate your low fat/cholesterol diet?: not monitoring fat    Taking statin?  Yes, no muscle aches from statin    Other lipid medications/supplements?:  none    Hypertension Follow-up      Outpatient blood pressures are not being checked.    Low Salt Diet: not monitoring salt        Amount of exercise or physical activity: None    Problems taking medications regularly: Yes,  Does not like using needles     Medication side effects: none  Diet: regular (no restrictions)      Problem list and histories reviewed & adjusted, as indicated.  Additional history: as documented    Patient Active Problem List   Diagnosis     Hypertension goal BP (blood pressure) < 140/80     Tobacco abuse     Non morbid obesity     Type 2 diabetes mellitus with diabetic neuropathy (H)     Hyperlipidemia LDL goal <100     Advanced directives, counseling/discussion     Severe uncontrolled hypertension     Bell's palsy     History reviewed. No pertinent surgical history.    Social History   Substance Use Topics     Smoking status: Current Every Day Smoker     Packs/day: 1.00     Years: 20.00     Types: Cigarettes     Smokeless tobacco: Never Used     Alcohol use No     Family History   Problem Relation Age of Onset     CANCER Mother      Bone cancer      DIABETES Father      CANCER Father      bone cancer          Current Outpatient Prescriptions   Medication Sig Dispense Refill      lisinopril-hydrochlorothiazide (PRINZIDE/ZESTORETIC) 20-12.5 MG per tablet TAKE 2 TABLETS BY MOUTH DAILY 60 tablet 0     metoprolol (TOPROL-XL) 100 MG 24 hr tablet TAKE 1 TABLET BY MOUTH DAILY 30 tablet 0     metFORMIN (GLUCOPHAGE) 1000 MG tablet Take 1 tablet (1,000 mg) by mouth 2 times daily (with meals) 60 tablet 0     LANTUS SOLOSTAR 100 UNIT/ML soln INJECT 40 UNITS UNDER THE SKIN EVERY MORNING AND 40 UNITS EVERY EVENING (Patient taking differently: INJECT 42 UNITS UNDER THE SKIN EVERY MORNING AND 40 UNITS EVERY EVENING) 30 mL 1     order for DME Diabetic shoes. 1 Units 0     insulin pen needle (B-D U/F) 31G X 8 MM Use once daily or as directed. 90 each 1     Dulaglutide (TRULICITY) 0.75 MG/0.5ML soln Inject 0.75 mg Subcutaneous every 7 days (Patient taking differently: Inject 0.75 mg Subcutaneous every 14 days ) 2 mL 1     simvastatin (ZOCOR) 20 MG tablet Take 1 tablet (20 mg) by mouth At Bedtime 90 tablet 2     glucose blood VI test strips (ACCU-CHEK LUISANA) strip by In Vitro route 2 times daily (test) 100 strip 3     Calcium-Vitamin D-Vitamin K (CALCIUM + D + K PO) Take 1 tablet by mouth daily.       FISH OIL 1 tablet daily Reported on 5/8/2017       Multiple Vitamins-Minerals (MULTI FOR HIM PO) Take 1 capsule by mouth daily.       aspirin 81 MG tablet Take 1 tablet by mouth daily. 100 tablet 3     ACCU-CHEK MULTICLIX LANCETS MISC tests twice a day 100 3     NICOTINE 21 MG/24HR TD PT24 1 PATCH DAILY 30 1     [DISCONTINUED] lisinopril-hydrochlorothiazide (PRINZIDE/ZESTORETIC) 20-12.5 MG per tablet TAKE 2 TABLETS BY MOUTH DAILY 60 tablet 0     pregabalin (LYRICA) 150 MG capsule Take 1 capsule (150 mg) by mouth 3 times daily 90 capsule 5     insulin aspart (NOVOLOG FLEXPEN) 100 UNIT/ML soln Inject Subcutaneous 3 times daily (with meals)       Recent Labs   Lab Test  05/08/17   1102  09/12/16   1031  02/03/16   1444   10/14/15   1124  10/08/14   0937   05/29/13   1117   A1C  14.3*  12.0*  14.0*   < >  14.7*  10.6*  "  < >   --    LDL  104*  85   --    --   64   --    < >   --    HDL  42  34*   --    --   34*   --    < >   --    TRIG  125  101   --    --   339*   --    < >   --    ALT   --   34   --    --   42  84*   < >   --    CR   --   0.82   --    --   0.96   --    < >   --    GFRESTIMATED   --   >90  Non  GFR Calc     --    --   80   --    < >   --    GFRESTBLACK   --   >90   GFR Calc     --    --   >90   GFR Calc     --    < >   --    POTASSIUM   --   3.8   --    --   4.5   --    < >   --    TSH   --    --    --    --   1.59   --    --   1.19    < > = values in this interval not displayed.      BP Readings from Last 3 Encounters:   09/07/17 118/86   05/08/17 124/88   09/12/16 114/86    Wt Readings from Last 3 Encounters:   09/07/17 228 lb 9.6 oz (103.7 kg)   05/08/17 228 lb 4 oz (103.5 kg)   09/12/16 246 lb (111.6 kg)               Reviewed and updated as needed this visit by clinical staffTobacco  Allergies  Med Hx  Surg Hx  Fam Hx  Soc Hx      Reviewed and updated as needed this visit by Provider         ROS:  C: NEGATIVE for fever, chills, change in weight  E/M: NEGATIVE for ear, mouth and throat problems  R: NEGATIVE for significant cough or SOB  CV: NEGATIVE for chest pain, palpitations or peripheral edema  GI: NEGATIVE for nausea, abdominal pain, heartburn, or change in bowel habits  : NEGATIVE for frequency, dysuria, or hematuria  M: NEGATIVE for significant arthralgias or myalgia  H: NEGATIVE for bleeding problems  P: NEGATIVE for changes in mood or affect    OBJECTIVE:                                                    /76  Pulse 98  Temp 98.5  F (36.9  C) (Oral)  Ht 5' 11\" (1.803 m)  Wt 228 lb 9.6 oz (103.7 kg)  SpO2 98%  BMI 31.88 kg/m2  Body mass index is 31.88 kg/(m^2).  GENERAL: healthy, alert and no distress  EYES: Eyes grossly normal to inspection, extraocular movements - intact, and PERRL  HENT: ear canals- normal; TMs- normal; Nose- " normal; Mouth- no ulcers, no lesions  NECK: no tenderness, no adenopathy, no asymmetry, no masses, no stiffness; thyroid- normal to palpation  RESP: lungs clear to auscultation - no rales, no rhonchi, no wheezes  CV: regular rates and rhythm, normal S1 S2, no S3 or S4 and  no click or rub -  ABDOMEN: soft, no tenderness, no  hepatosplenomegaly, no masses, normal bowel sounds  MS: extremities- no gross deformities noted  PSYCH: Alert and oriented times 3; speech- coherent , normal rate and volume; able to articulate logical thoughts, able to abstract reason, no tangential thoughts, no hallucinations or delusions, affect- normal     ASSESSMENT/PLAN:                                                    (Z12.11) Colon cancer screening  (primary encounter diagnosis)  Comment: ADVISED COLONOSCOPY FOR ROUTINE PREVENTATIVE CARE.  Plan: GASTROENTEROLOGY ADULT REF PROCEDURE ONLY            (E11.40,  Z79.4) Type 2 diabetes mellitus with diabetic neuropathy, with long-term current use of insulin (H)  Comment: advised complaince  Plan: metFORMIN (GLUCOPHAGE) 1000 MG tablet,         lisinopril-hydrochlorothiazide         (PRINZIDE/ZESTORETIC) 20-12.5 MG per tablet,         pregabalin (LYRICA) 150 MG capsule, blood         glucose monitoring (ACCU-CHEK LUISANA) test         strip, insulin glargine (LANTUS SOLOSTAR) 100         UNIT/ML injection, Comprehensive metabolic         panel, Hemoglobin A1c, Albumin Random Urine         Quantitative with Creat Ratio, TSH with free T4        reflex, order for DME        Repeat A1c 3 months    (I10) Hypertension goal BP (blood pressure) < 140/80  Comment: stable on therapy  Plan: metoprolol (TOPROL-XL) 100 MG 24 hr tablet,         lisinopril-hydrochlorothiazide         (PRINZIDE/ZESTORETIC) 20-12.5 MG per tablet,         dulaglutide (TRULICITY) 0.75 MG/0.5ML pen,         Comprehensive metabolic panel            (E78.5) Hyperlipidemia LDL goal <100  Comment:   LDL Cholesterol Calculated   Date  Value Ref Range Status   05/08/2017 104 (H) <100 mg/dL Final     Comment:     Above desirable:  100-129 mg/dl   Borderline High:  130-159 mg/dL   High:             160-189 mg/dL   Very high:       >189 mg/dl     ]  Plan: simvastatin (ZOCOR) 20 MG tablet, Lipid Profile        Repeat labs fasting    (E11.49) Other diabetic neurological complication associated with type 2 diabetes mellitus (H)  Comment: if Lyrica not covered as ordered consider Gabapentin trial  Plan: order for DME          See Patient Instructions    Hudson Spring MD  [facility]    THE MEDICATION LIST HAS BEEN FULLY RECONCILED BY THE MRODOLFO AND THE NURSING STAFF.      25 minutes spent with this patient, face to face, discussing treatment options for listed problems above as well as side effects of appropriate medications.  Counseling time extended beyond 50% of the clinic visit.  Medication dosing, treatment plan and follow-up were discussed. Also reviewed need for primary care testing for patient.

## 2017-09-11 ENCOUNTER — TELEPHONE (OUTPATIENT)
Dept: INTERNAL MEDICINE | Facility: CLINIC | Age: 62
End: 2017-09-11

## 2017-09-11 DIAGNOSIS — E11.40 TYPE 2 DIABETES MELLITUS WITH DIABETIC NEUROPATHY, WITH LONG-TERM CURRENT USE OF INSULIN (H): ICD-10-CM

## 2017-09-11 DIAGNOSIS — Z79.4 TYPE 2 DIABETES MELLITUS WITH DIABETIC NEUROPATHY, WITH LONG-TERM CURRENT USE OF INSULIN (H): ICD-10-CM

## 2017-09-11 NOTE — TELEPHONE ENCOUNTER
Please call [facility] with clarification on insulin glargine (LANTUS SOLOSTAR) 100 UNIT/ML injection   Please call [phone]

## 2017-09-11 NOTE — TELEPHONE ENCOUNTER
Patient says he takes 42 units BID of lantus. He was just here 9/7/17 was there any plans to change this? Unsure why sig says both 40 units BID and 42 units BID. He has enough to last him another month.

## 2017-09-11 NOTE — TELEPHONE ENCOUNTER
Order for Lantus prescription reads inject 42 units subcutaneous BID and inject 40 units subcutaneous BID.  Please clarify correct amount for the patient to inject.

## 2017-09-21 ENCOUNTER — TELEPHONE (OUTPATIENT)
Dept: INTERNAL MEDICINE | Facility: CLINIC | Age: 62
End: 2017-09-21

## 2017-09-21 NOTE — TELEPHONE ENCOUNTER
Prior authorization    Medication name lyrica  Insurance medica  Insurance ID number [id number]  Prior authorization faxed through cover my meds

## 2017-10-10 ENCOUNTER — TELEPHONE (OUTPATIENT)
Dept: INTERNAL MEDICINE | Facility: CLINIC | Age: 62
End: 2017-10-10

## 2017-10-10 NOTE — TELEPHONE ENCOUNTER
Reason for call:  Other   Patient called regarding (reason for call): Brother Melissa called wants to get a diabetes pump for patient, as patient is not doing his shots, and brother concerned he will die  Additional comments:Please call melissa at [phone], he says he is his brother caregiver and can k now patients info      Phone number to reach patient:  Other phone number:  [phone], melissa, brothshanon    Best Time:  anytime    Can we leave a detailed message on this number?  YES

## 2017-10-10 NOTE — TELEPHONE ENCOUNTER
No consent to communicate on file. Brother informed to instruct patient to follow up for diabetes and accompany to appointment. Appt scheduled for next week.

## 2017-10-23 DIAGNOSIS — Z53.9 DIAGNOSIS NOT YET DEFINED: Primary | ICD-10-CM

## 2017-10-23 PROCEDURE — G0180 MD CERTIFICATION HHA PATIENT: HCPCS | Performed by: INTERNAL MEDICINE

## 2017-11-21 ENCOUNTER — TELEPHONE (OUTPATIENT)
Dept: INTERNAL MEDICINE | Facility: CLINIC | Age: 62
End: 2017-11-21

## 2017-11-21 ENCOUNTER — OFFICE VISIT (OUTPATIENT)
Dept: INTERNAL MEDICINE | Facility: CLINIC | Age: 62
End: 2017-11-21
Payer: COMMERCIAL

## 2017-11-21 VITALS
TEMPERATURE: 97.4 F | DIASTOLIC BLOOD PRESSURE: 72 MMHG | OXYGEN SATURATION: 96 % | WEIGHT: 232.6 LBS | HEART RATE: 65 BPM | SYSTOLIC BLOOD PRESSURE: 108 MMHG | BODY MASS INDEX: 32.44 KG/M2

## 2017-11-21 DIAGNOSIS — I10 HYPERTENSION GOAL BP (BLOOD PRESSURE) < 140/80: ICD-10-CM

## 2017-11-21 DIAGNOSIS — Z79.4 TYPE 2 DIABETES MELLITUS WITH DIABETIC NEUROPATHY, WITH LONG-TERM CURRENT USE OF INSULIN (H): Primary | ICD-10-CM

## 2017-11-21 DIAGNOSIS — Z72.0 TOBACCO ABUSE: ICD-10-CM

## 2017-11-21 DIAGNOSIS — E11.40 TYPE 2 DIABETES MELLITUS WITH DIABETIC NEUROPATHY, WITH LONG-TERM CURRENT USE OF INSULIN (H): Primary | ICD-10-CM

## 2017-11-21 PROCEDURE — 99214 OFFICE O/P EST MOD 30 MIN: CPT | Performed by: INTERNAL MEDICINE

## 2017-11-21 RX ORDER — PREGABALIN 150 MG/1
150 CAPSULE ORAL 2 TIMES DAILY
Qty: 90 CAPSULE | Refills: 3 | Status: ON HOLD
Start: 2017-11-21 | End: 2018-01-29

## 2017-11-21 NOTE — NURSING NOTE
"Chief Complaint   Patient presents with     Recheck Medication       Initial /72  Pulse 65  Temp 97.4  F (36.3  C) (Oral)  Wt 232 lb 9.6 oz (105.5 kg)  SpO2 96%  BMI 32.44 kg/m2 Estimated body mass index is 32.44 kg/(m^2) as calculated from the following:    Height as of 9/7/17: 5' 11\" (1.803 m).    Weight as of this encounter: 232 lb 9.6 oz (105.5 kg).  Medication Reconciliation: complete   Anais Up CMA      "

## 2017-11-21 NOTE — PROGRESS NOTES
SUBJECTIVE:   Cooper Cabral is a 62 year old male who presents to clinic today for the following health issues:    Reviewed telephone call from brother.    Discussed with patient and he has informed me he has been non-compliant in all respects with his insulin therapy.      Diabetes Follow-up      Patient is checking blood sugars: not at all    Diabetic concerns: See telephone encounter from today. Patient also not using insulin consistently      Symptoms of hypoglycemia (low blood sugar): none     Paresthesias (numbness or burning in feet) or sores: yes      Date of last diabetic eye exam: 2015    Hyperlipidemia Follow-Up      Rate your low fat/cholesterol diet?: not monitoring fat    Taking statin?  Yes, no muscle aches from statin    Other lipid medications/supplements?:  Fish oil/Omega 3, dose without side effects    Hypertension Follow-up      Outpatient blood pressures are being checked at home.  Results are 110/70's.    Low Salt Diet: not monitoring salt      Amount of exercise or physical activity: None    Problems taking medications regularly: Yes,  Does not like to use insulin because of needles     Medication side effects: balance issues with lyrica     Diet: regular (no restrictions)    Other concerns:  1. Patient having balance issues with lyrica use- has had balance issues since restarting medication and has had in past also when used but does help neuropathy.     Problem list and histories reviewed & adjusted, as indicated.  Additional history: as documented    Patient Active Problem List   Diagnosis     Hypertension goal BP (blood pressure) < 140/80     Tobacco abuse     Non morbid obesity     Hyperlipidemia LDL goal <100     Advanced directives, counseling/discussion     Severe uncontrolled hypertension     Bell's palsy     Other diabetic neurological complication associated with type 2 diabetes mellitus (H)     Type 2 diabetes mellitus with diabetic neuropathy, with long-term current use of  insulin (H)     History reviewed. No pertinent surgical history.    Social History   Substance Use Topics     Smoking status: Current Every Day Smoker     Packs/day: 1.00     Years: 20.00     Types: Cigarettes     Smokeless tobacco: Never Used     Alcohol use No     Family History   Problem Relation Age of Onset     CANCER Mother      Bone cancer      DIABETES Father      CANCER Father      bone cancer          Current Outpatient Prescriptions   Medication Sig Dispense Refill     pregabalin (LYRICA) 150 MG capsule Take 1 capsule (150 mg) by mouth 2 times daily 90 capsule 3     insulin glargine (LANTUS SOLOSTAR) 100 UNIT/ML injection Inject 42 Units Subcutaneous 2 times daily 30 mL 11     metFORMIN (GLUCOPHAGE) 1000 MG tablet Take 1 tablet (1,000 mg) by mouth 2 times daily (with meals) 180 tablet 3     metoprolol (TOPROL-XL) 100 MG 24 hr tablet Take 1 tablet (100 mg) by mouth daily 90 tablet 3     lisinopril-hydrochlorothiazide (PRINZIDE/ZESTORETIC) 20-12.5 MG per tablet Take 2 tablets by mouth daily 180 tablet 3     simvastatin (ZOCOR) 20 MG tablet Take 1 tablet (20 mg) by mouth At Bedtime 90 tablet 3     dulaglutide (TRULICITY) 0.75 MG/0.5ML pen Inject 0.75 mg Subcutaneous every 7 days 6 mL 5     blood glucose monitoring (ACCU-CHEK LUISANA) test strip by In Vitro route 1 times daily (test) 100 strip 3     order for DME Diabetic shoes due to neuropathy 1 Device 0     insulin pen needle (B-D U/F) 31G X 8 MM Use once daily or as directed. 90 each 1     Calcium-Vitamin D-Vitamin K (CALCIUM + D + K PO) Take 1 tablet by mouth daily.       FISH OIL 1 tablet daily Reported on 5/8/2017       Multiple Vitamins-Minerals (MULTI FOR HIM PO) Take 1 capsule by mouth daily.       aspirin 81 MG tablet Take 1 tablet by mouth daily. 100 tablet 3     ACCU-CHEK MULTICLIX LANCETS MISC tests twice a day 100 3     NICOTINE 21 MG/24HR TD PT24 1 PATCH DAILY 30 1     [DISCONTINUED] pregabalin (LYRICA) 150 MG capsule Take 1 capsule (150 mg) by  mouth 3 times daily 90 capsule 3     insulin aspart (NOVOLOG FLEXPEN) 100 UNIT/ML soln Inject Subcutaneous 3 times daily (with meals)       No Known Allergies  Recent Labs   Lab Test  09/07/17   1432  05/08/17   1102  09/12/16   1031   10/14/15   1124   A1C  11.4*  14.3*  12.0*   < >  14.7*   LDL  99  104*  85   --   64   HDL  42  42  34*   --   34*   TRIG  114  125  101   --   339*   ALT  42   --   34   --   42   CR  0.84   --   0.82   --   0.96   GFRESTIMATED  >90   --   >90  Non  GFR Calc     --   80   GFRESTBLACK  >90   --   >90   GFR Calc     --   >90   GFR Calc     POTASSIUM  4.4   --   3.8   --   4.5   TSH  1.21   --    --    --   1.59    < > = values in this interval not displayed.      Labs reviewed in EPIC        Reviewed and updated as needed this visit by clinical staffTobacco  Allergies  Med Hx  Surg Hx  Fam Hx  Soc Hx      Reviewed and updated as needed this visit by Provider         ROS:  C: NEGATIVE for fever, chills, change in weight  E/M: NEGATIVE for ear, mouth and throat problems  R: NEGATIVE for significant cough or SOB  CV: NEGATIVE for chest pain, palpitations or peripheral edema  GI: NEGATIVE for nausea, abdominal pain, heartburn, or change in bowel habits  : NEGATIVE for frequency, dysuria, or hematuria  M: NEGATIVE for significant arthralgias or myalgia  H: NEGATIVE for bleeding problems  P: NEGATIVE for changes in mood or affect    OBJECTIVE:                                                    /72  Pulse 65  Temp 97.4  F (36.3  C) (Oral)  Wt 232 lb 9.6 oz (105.5 kg)  SpO2 96%  BMI 32.44 kg/m2  Body mass index is 32.44 kg/(m^2).  GENERAL: alert and no distress  EYES: Eyes grossly normal to inspection, extraocular movements - intact, and PERRL  HENT: ear canals- normal; TMs- normal; Nose- normal; Mouth- no ulcers, no lesions  NECK: no tenderness, no adenopathy, no asymmetry, no masses, no stiffness; thyroid- normal to  palpation  RESP: lungs clear to auscultation - no rales, no rhonchi, no wheezes  CV: regular rates and rhythm, normal S1 S2, no S3 or S4 and no click or rub -  MS: extremities- no gross deformities noted, noted trace edema LE's  NEURO: gait non-ataxic, strength and tone- grossly normal, sensory exam- grossly normal, mentation- intact, speech- normal but slowed  PSYCH: Alert; speech- coherent , able to articulate logical thoughts, able to abstract reason, no tangential thoughts, no hallucinations or delusions, affect- normal     ASSESSMENT/PLAN:                                                      (E11.40,  Z79.4) Type 2 diabetes mellitus with diabetic neuropathy, with long-term current use of insulin (H)  (primary encounter diagnosis)  Comment: Refused Endocrinology referral. Advised compliance with insulin and repeat A1c in 6 weeks  Plan: pregabalin (LYRICA) 150 MG capsule, Hemoglobin         A1c, advised eye exam          (I10) Hypertension goal BP (blood pressure) < 140/80  Comment: stable at goal on therapy, no changes  Plan:     (Z72.0) Tobacco abuse  Comment:   Plan: Smoking cessation was advised and the risks of continued smoking in regards to this patients health history was reiterated. Options of smoking cessation were also discussed. This time extended beyond the routine exam.    Refused colonoscopy, flu and Prevnar vaccination updates and any screening tests today.  Advised to cut back on Lyrica to BID dosing to see if can balance neuropathy symptoms with balance issues.      Hudson Spring MD  [facility]    25 minutes spent with this patient, face to face, discussing treatment options for listed problems above as well as side effects of appropriate medications.  Counseling time extended beyond 50% of the clinic visit.  Medication dosing, treatment plan and follow-up were discussed. Also reviewed need for primary care testing for patient.

## 2017-11-21 NOTE — MR AVS SNAPSHOT
After Visit Summary   11/21/2017    Cooper Cabral    MRN: [id number]           Patient Information     Date Of Birth          1955        Visit Information        Provider Department      11/21/2017 1:40 PM Hudson Spring MD [facility]        Today's Diagnoses     Type 2 diabetes mellitus with diabetic neuropathy, with long-term current use of insulin (H)    -  1    Hypertension goal BP (blood pressure) < 140/80        Tobacco abuse           Follow-ups after your visit        Future tests that were ordered for you today     Open Future Orders        Priority Expected Expires Ordered    Hemoglobin A1c Routine 11/21/2017 2/28/2018 11/21/2017            Who to contact     If you have questions or need follow up information about today's clinic visit or your schedule please contact [facility] directly at [phone].  Normal or non-critical lab and imaging results will be communicated to you by [employer], letter or phone within 4 business days after the clinic has received the results. If you do not hear from us within 7 days, please contact the clinic through [employer] or phone. If you have a critical or abnormal lab result, we will notify you by phone as soon as possible.  Submit refill requests through [employer] or call your pharmacy and they will forward the refill request to us. Please allow 3 business days for your refill to be completed.          Additional Information About Your Visit        MyChart Information     [employer] gives you secure access to your electronic health record. If you see a primary care provider, you can also send messages to your care team and make appointments. If you have questions, please call your primary care clinic.  If you do not have a primary care provider, please call [phone] and they will assist you.        Care EveryWhere ID     This is your Care EveryWhere ID. This could be used by other organizations to  access your [facility] medical records  [id number]        Your Vitals Were     Pulse Temperature Pulse Oximetry BMI (Body Mass Index)          65 97.4  F (36.3  C) (Oral) 96% 32.44 kg/m2         Blood Pressure from Last 3 Encounters:   11/21/17 108/72   09/07/17 118/76   05/08/17 124/88    Weight from Last 3 Encounters:   11/21/17 232 lb 9.6 oz (105.5 kg)   09/07/17 228 lb 9.6 oz (103.7 kg)   05/08/17 228 lb 4 oz (103.5 kg)                 Today's Medication Changes          These changes are accurate as of: 11/21/17  1:59 PM.  If you have any questions, ask your nurse or doctor.               These medicines have changed or have updated prescriptions.        Dose/Directions    pregabalin 150 MG capsule   Commonly known as:  LYRICA   This may have changed:  when to take this   Used for:  Type 2 diabetes mellitus with diabetic neuropathy, with long-term current use of insulin (H)   Changed by:  Hudson Spring MD        Dose:  150 mg   Take 1 capsule (150 mg) by mouth 2 times daily   Quantity:  90 capsule   Refills:  3            Where to get your medicines      Some of these will need a paper prescription and others can be bought over the counter.  Ask your nurse if you have questions.     You don't need a prescription for these medications     pregabalin 150 MG capsule                Primary Care Provider Office Phone # Fax #    Hudson Spring [phone]       [address] [place] [phone]        Equal Access to Services     DANIELLA NATHAN : Hadii aad ku hadasho Soomaali, waaxda luqadaha, qaybta kaalmada adeegyada, waxay idisulma rios. So [facility] [phone].    ATENCIÓN: Si habla español, tiene a beltran disposición servicios gratuitos de asistencia lingüística. Llame al [phone].    We comply with applicable federal civil rights laws and [place] laws. We do not discriminate on the basis of race, color, national origin, age, disability, sex, sexual orientation, or  gender identity.            Thank you!     Thank you for choosing [facility]  for your care. Our goal is always to provide you with excellent care. Hearing back from our patients is one way we can continue to improve our services. Please take a few minutes to complete the written survey that you may receive in the mail after your visit with us. Thank you!             Your Updated Medication List - Protect others around you: Learn how to safely use, store and throw away your medicines at www.disposemymeds.org.          This list is accurate as of: 11/21/17  1:59 PM.  Always use your most recent med list.                   Brand Name Dispense Instructions for use Diagnosis    aspirin 81 MG tablet     100 tablet    Take 1 tablet by mouth daily.    Diabetes mellitus, type 2 (H)       blood glucose monitoring lancets     100    tests twice a day    Diabetes mellitus, type 2 (H)       blood glucose monitoring test strip    ACCU-CHEK LUISANA    100 strip    by In Vitro route 1 times daily (test)    Type 2 diabetes mellitus with diabetic neuropathy, with long-term current use of insulin (H)       CALCIUM + D + K PO      Take 1 tablet by mouth daily.        dulaglutide 0.75 MG/0.5ML pen    TRULICITY    6 mL    Inject 0.75 mg Subcutaneous every 7 days    Hypertension goal BP (blood pressure) < 140/80       FISH OIL      1 tablet daily Reported on 5/8/2017        insulin glargine 100 UNIT/ML injection    LANTUS SOLOSTAR    30 mL    Inject 42 Units Subcutaneous 2 times daily    Type 2 diabetes mellitus with diabetic neuropathy, with long-term current use of insulin (H)       insulin pen needle 31G X 8 MM    B-D U/F    90 each    Use once daily or as directed.    Uncontrolled diabetes mellitus (H)       lisinopril-hydrochlorothiazide 20-12.5 MG per tablet    PRINZIDE/ZESTORETIC    180 tablet    Take 2 tablets by mouth daily    Hypertension goal BP (blood pressure) < 140/80, Type 2 diabetes mellitus with  diabetic neuropathy, with long-term current use of insulin (H)       metFORMIN 1000 MG tablet    GLUCOPHAGE    180 tablet    Take 1 tablet (1,000 mg) by mouth 2 times daily (with meals)    Type 2 diabetes mellitus with diabetic neuropathy, with long-term current use of insulin (H)       metoprolol 100 MG 24 hr tablet    TOPROL-XL    90 tablet    Take 1 tablet (100 mg) by mouth daily    Hypertension goal BP (blood pressure) < 140/80       MULTI FOR HIM PO      Take 1 capsule by mouth daily.        nicotine 21 MG/24HR 24 hr patch    NICODERM CQ    30    1 PATCH DAILY    Tobacco abuse       NovoLOG FLEXPEN 100 UNIT/ML injection   Generic drug:  insulin aspart      Inject Subcutaneous 3 times daily (with meals)        order for DME     1 Device    Diabetic shoes due to neuropathy    Type 2 diabetes mellitus with diabetic neuropathy, with long-term current use of insulin (H), Other diabetic neurological complication associated with type 2 diabetes mellitus (H)       pregabalin 150 MG capsule    LYRICA    90 capsule    Take 1 capsule (150 mg) by mouth 2 times daily    Type 2 diabetes mellitus with diabetic neuropathy, with long-term current use of insulin (H)       simvastatin 20 MG tablet    ZOCOR    90 tablet    Take 1 tablet (20 mg) by mouth At Bedtime    Hyperlipidemia LDL goal <100

## 2017-11-21 NOTE — TELEPHONE ENCOUNTER
Patients brother transferred to nurse line.  Wants to let PCP know that patient is not taking his insulin as prescribed.  The family is wondering pt PCP could talk to patient about compliance and possibly a referral to Endocrine to talk about an insulin pump.  Pt brother is not allowed to come to the appointment with patient today and the patient does not have a consent to communicate.         FYI to PCP.

## 2017-12-21 DIAGNOSIS — Z53.9 DIAGNOSIS NOT YET DEFINED: Primary | ICD-10-CM

## 2017-12-21 PROCEDURE — G0179 MD RECERTIFICATION HHA PT: HCPCS | Performed by: INTERNAL MEDICINE

## 2018-01-12 ENCOUNTER — APPOINTMENT (OUTPATIENT)
Dept: CT IMAGING | Facility: CLINIC | Age: 63
DRG: 065 | End: 2018-01-12
Attending: INTERNAL MEDICINE
Payer: MEDICARE

## 2018-01-12 ENCOUNTER — HOSPITAL ENCOUNTER (INPATIENT)
Facility: CLINIC | Age: 63
LOS: 7 days | Discharge: NURSING, REHAB OR LONG-TERM CARE | DRG: 065 | End: 2018-01-19
Attending: EMERGENCY MEDICINE | Admitting: INTERNAL MEDICINE
Payer: MEDICARE

## 2018-01-12 ENCOUNTER — APPOINTMENT (OUTPATIENT)
Dept: MRI IMAGING | Facility: CLINIC | Age: 63
DRG: 065 | End: 2018-01-12
Attending: INTERNAL MEDICINE
Payer: MEDICARE

## 2018-01-12 ENCOUNTER — APPOINTMENT (OUTPATIENT)
Dept: CT IMAGING | Facility: CLINIC | Age: 63
DRG: 065 | End: 2018-01-12
Attending: EMERGENCY MEDICINE
Payer: MEDICARE

## 2018-01-12 DIAGNOSIS — Z79.4 TYPE 2 DIABETES MELLITUS WITH DIABETIC NEUROPATHY, WITH LONG-TERM CURRENT USE OF INSULIN (H): Primary | ICD-10-CM

## 2018-01-12 DIAGNOSIS — I10 HYPERTENSION GOAL BP (BLOOD PRESSURE) < 140/80: ICD-10-CM

## 2018-01-12 DIAGNOSIS — E11.40 TYPE 2 DIABETES MELLITUS WITH DIABETIC NEUROPATHY, WITH LONG-TERM CURRENT USE OF INSULIN (H): Primary | ICD-10-CM

## 2018-01-12 DIAGNOSIS — R33.8 BENIGN PROSTATIC HYPERPLASIA WITH URINARY RETENTION: ICD-10-CM

## 2018-01-12 DIAGNOSIS — I63.9 CEREBROVASCULAR ACCIDENT (CVA), UNSPECIFIED MECHANISM (H): ICD-10-CM

## 2018-01-12 DIAGNOSIS — R73.9 HYPERGLYCEMIA: ICD-10-CM

## 2018-01-12 DIAGNOSIS — Z72.0 TOBACCO ABUSE: ICD-10-CM

## 2018-01-12 DIAGNOSIS — E87.6 HYPOKALEMIA: ICD-10-CM

## 2018-01-12 DIAGNOSIS — N40.1 BENIGN PROSTATIC HYPERPLASIA WITH URINARY RETENTION: ICD-10-CM

## 2018-01-12 DIAGNOSIS — R53.1 LEFT-SIDED WEAKNESS: ICD-10-CM

## 2018-01-12 PROBLEM — E11.49 OTHER DIABETIC NEUROLOGICAL COMPLICATION ASSOCIATED WITH TYPE 2 DIABETES MELLITUS (H): Status: ACTIVE | Noted: 2017-09-07

## 2018-01-12 LAB
ALBUMIN SERPL-MCNC: 3.3 G/DL (ref 3.4–5)
ALP SERPL-CCNC: 84 U/L (ref 40–150)
ALT SERPL W P-5'-P-CCNC: 33 U/L (ref 0–70)
ANION GAP SERPL CALCULATED.3IONS-SCNC: 9 MMOL/L (ref 3–14)
APTT PPP: 29 SEC (ref 22–37)
AST SERPL W P-5'-P-CCNC: 36 U/L (ref 0–45)
BASOPHILS # BLD AUTO: 0 10E9/L (ref 0–0.2)
BASOPHILS NFR BLD AUTO: 0.2 %
BILIRUB SERPL-MCNC: 1.4 MG/DL (ref 0.2–1.3)
BUN SERPL-MCNC: 30 MG/DL (ref 7–30)
CALCIUM SERPL-MCNC: 8.9 MG/DL (ref 8.5–10.1)
CHLORIDE SERPL-SCNC: 98 MMOL/L (ref 94–109)
CO2 SERPL-SCNC: 28 MMOL/L (ref 20–32)
CREAT SERPL-MCNC: 0.88 MG/DL (ref 0.66–1.25)
CRP SERPL-MCNC: 17.2 MG/L (ref 0–8)
DIFFERENTIAL METHOD BLD: NORMAL
EOSINOPHIL # BLD AUTO: 0 10E9/L (ref 0–0.7)
EOSINOPHIL NFR BLD AUTO: 0.4 %
ERYTHROCYTE [DISTWIDTH] IN BLOOD BY AUTOMATED COUNT: 12.6 % (ref 10–15)
GFR SERPL CREATININE-BSD FRML MDRD: 87 ML/MIN/1.7M2
GLUCOSE BLDC GLUCOMTR-MCNC: 211 MG/DL (ref 70–99)
GLUCOSE SERPL-MCNC: 324 MG/DL (ref 70–99)
HBA1C MFR BLD: 11.9 % (ref 4.3–6)
HCT VFR BLD AUTO: 47 % (ref 40–53)
HGB BLD-MCNC: 16.5 G/DL (ref 13.3–17.7)
IMM GRANULOCYTES # BLD: 0 10E9/L (ref 0–0.4)
IMM GRANULOCYTES NFR BLD: 0.2 %
INR PPP: 1.05 (ref 0.86–1.14)
INTERPRETATION ECG - MUSE: NORMAL
LYMPHOCYTES # BLD AUTO: 1.3 10E9/L (ref 0.8–5.3)
LYMPHOCYTES NFR BLD AUTO: 15.2 %
MCH RBC QN AUTO: 31.8 PG (ref 26.5–33)
MCHC RBC AUTO-ENTMCNC: 35.1 G/DL (ref 31.5–36.5)
MCV RBC AUTO: 91 FL (ref 78–100)
MONOCYTES # BLD AUTO: 0.8 10E9/L (ref 0–1.3)
MONOCYTES NFR BLD AUTO: 9.7 %
NEUTROPHILS # BLD AUTO: 6.3 10E9/L (ref 1.6–8.3)
NEUTROPHILS NFR BLD AUTO: 74.3 %
NRBC # BLD AUTO: 0 10*3/UL
NRBC BLD AUTO-RTO: 0 /100
PLATELET # BLD AUTO: 247 10E9/L (ref 150–450)
POTASSIUM SERPL-SCNC: 3.7 MMOL/L (ref 3.4–5.3)
PROT SERPL-MCNC: 6.6 G/DL (ref 6.8–8.8)
RBC # BLD AUTO: 5.19 10E12/L (ref 4.4–5.9)
SODIUM SERPL-SCNC: 135 MMOL/L (ref 133–144)
TROPONIN I SERPL-MCNC: 0.45 UG/L (ref 0–0.04)
TSH SERPL DL<=0.005 MIU/L-ACNC: 1.07 MU/L (ref 0.4–4)
WBC # BLD AUTO: 8.5 10E9/L (ref 4–11)

## 2018-01-12 PROCEDURE — 85730 THROMBOPLASTIN TIME PARTIAL: CPT | Performed by: EMERGENCY MEDICINE

## 2018-01-12 PROCEDURE — A9585 GADOBUTROL INJECTION: HCPCS | Performed by: INTERNAL MEDICINE

## 2018-01-12 PROCEDURE — 99285 EMERGENCY DEPT VISIT HI MDM: CPT | Mod: 25

## 2018-01-12 PROCEDURE — 83036 HEMOGLOBIN GLYCOSYLATED A1C: CPT | Performed by: EMERGENCY MEDICINE

## 2018-01-12 PROCEDURE — 70553 MRI BRAIN STEM W/O & W/DYE: CPT

## 2018-01-12 PROCEDURE — 25000131 ZZH RX MED GY IP 250 OP 636 PS 637: Performed by: INTERNAL MEDICINE

## 2018-01-12 PROCEDURE — 25000128 H RX IP 250 OP 636: Performed by: INTERNAL MEDICINE

## 2018-01-12 PROCEDURE — 85610 PROTHROMBIN TIME: CPT | Performed by: EMERGENCY MEDICINE

## 2018-01-12 PROCEDURE — 25000132 ZZH RX MED GY IP 250 OP 250 PS 637: Performed by: EMERGENCY MEDICINE

## 2018-01-12 PROCEDURE — 93005 ELECTROCARDIOGRAM TRACING: CPT

## 2018-01-12 PROCEDURE — 80053 COMPREHEN METABOLIC PANEL: CPT | Performed by: EMERGENCY MEDICINE

## 2018-01-12 PROCEDURE — 85025 COMPLETE CBC W/AUTO DIFF WBC: CPT | Performed by: EMERGENCY MEDICINE

## 2018-01-12 PROCEDURE — 86140 C-REACTIVE PROTEIN: CPT | Performed by: EMERGENCY MEDICINE

## 2018-01-12 PROCEDURE — 25000125 ZZHC RX 250: Performed by: INTERNAL MEDICINE

## 2018-01-12 PROCEDURE — 82306 VITAMIN D 25 HYDROXY: CPT | Performed by: EMERGENCY MEDICINE

## 2018-01-12 PROCEDURE — 00000146 ZZHCL STATISTIC GLUCOSE BY METER IP

## 2018-01-12 PROCEDURE — 12000000 ZZH R&B MED SURG/OB

## 2018-01-12 PROCEDURE — 99223 1ST HOSP IP/OBS HIGH 75: CPT | Mod: AI | Performed by: INTERNAL MEDICINE

## 2018-01-12 PROCEDURE — 70498 CT ANGIOGRAPHY NECK: CPT

## 2018-01-12 PROCEDURE — 84484 ASSAY OF TROPONIN QUANT: CPT | Performed by: EMERGENCY MEDICINE

## 2018-01-12 PROCEDURE — 70450 CT HEAD/BRAIN W/O DYE: CPT | Mod: XS

## 2018-01-12 PROCEDURE — 84443 ASSAY THYROID STIM HORMONE: CPT | Performed by: EMERGENCY MEDICINE

## 2018-01-12 PROCEDURE — 25000128 H RX IP 250 OP 636: Performed by: EMERGENCY MEDICINE

## 2018-01-12 RX ORDER — ONDANSETRON 4 MG/1
4 TABLET, ORALLY DISINTEGRATING ORAL EVERY 6 HOURS PRN
Status: DISCONTINUED | OUTPATIENT
Start: 2018-01-12 | End: 2018-01-19 | Stop reason: HOSPADM

## 2018-01-12 RX ORDER — ONDANSETRON 2 MG/ML
4 INJECTION INTRAMUSCULAR; INTRAVENOUS EVERY 6 HOURS PRN
Status: DISCONTINUED | OUTPATIENT
Start: 2018-01-12 | End: 2018-01-19 | Stop reason: HOSPADM

## 2018-01-12 RX ORDER — ACETAMINOPHEN 325 MG/1
650 TABLET ORAL EVERY 4 HOURS PRN
Status: DISCONTINUED | OUTPATIENT
Start: 2018-01-12 | End: 2018-01-19 | Stop reason: HOSPADM

## 2018-01-12 RX ORDER — NICOTINE POLACRILEX 4 MG
15-30 LOZENGE BUCCAL
Status: DISCONTINUED | OUTPATIENT
Start: 2018-01-12 | End: 2018-01-19 | Stop reason: HOSPADM

## 2018-01-12 RX ORDER — BISACODYL 5 MG
10 TABLET, DELAYED RELEASE (ENTERIC COATED) ORAL DAILY PRN
Status: DISCONTINUED | OUTPATIENT
Start: 2018-01-12 | End: 2018-01-19 | Stop reason: HOSPADM

## 2018-01-12 RX ORDER — GADOBUTROL 604.72 MG/ML
11 INJECTION INTRAVENOUS ONCE
Status: COMPLETED | OUTPATIENT
Start: 2018-01-13 | End: 2018-01-12

## 2018-01-12 RX ORDER — BISACODYL 10 MG
10 SUPPOSITORY, RECTAL RECTAL DAILY PRN
Status: DISCONTINUED | OUTPATIENT
Start: 2018-01-12 | End: 2018-01-19 | Stop reason: HOSPADM

## 2018-01-12 RX ORDER — POTASSIUM CL/LIDO/0.9 % NACL 10MEQ/0.1L
10 INTRAVENOUS SOLUTION, PIGGYBACK (ML) INTRAVENOUS
Status: DISCONTINUED | OUTPATIENT
Start: 2018-01-12 | End: 2018-01-19 | Stop reason: HOSPADM

## 2018-01-12 RX ORDER — BISACODYL 5 MG
5 TABLET, DELAYED RELEASE (ENTERIC COATED) ORAL DAILY PRN
Status: DISCONTINUED | OUTPATIENT
Start: 2018-01-12 | End: 2018-01-19 | Stop reason: HOSPADM

## 2018-01-12 RX ORDER — DEXTROSE MONOHYDRATE 25 G/50ML
25-50 INJECTION, SOLUTION INTRAVENOUS
Status: DISCONTINUED | OUTPATIENT
Start: 2018-01-12 | End: 2018-01-19 | Stop reason: HOSPADM

## 2018-01-12 RX ORDER — OXYCODONE HYDROCHLORIDE 5 MG/1
5 TABLET ORAL
Status: DISCONTINUED | OUTPATIENT
Start: 2018-01-12 | End: 2018-01-19 | Stop reason: HOSPADM

## 2018-01-12 RX ORDER — POTASSIUM CHLORIDE 1.5 G/1.58G
20-40 POWDER, FOR SOLUTION ORAL
Status: DISCONTINUED | OUTPATIENT
Start: 2018-01-12 | End: 2018-01-19 | Stop reason: HOSPADM

## 2018-01-12 RX ORDER — LABETALOL HYDROCHLORIDE 5 MG/ML
10-40 INJECTION, SOLUTION INTRAVENOUS EVERY 10 MIN PRN
Status: DISCONTINUED | OUTPATIENT
Start: 2018-01-12 | End: 2018-01-19 | Stop reason: HOSPADM

## 2018-01-12 RX ORDER — ASPIRIN 300 MG/1
300 SUPPOSITORY RECTAL DAILY
Status: DISCONTINUED | OUTPATIENT
Start: 2018-01-13 | End: 2018-01-19 | Stop reason: HOSPADM

## 2018-01-12 RX ORDER — HYDRALAZINE HYDROCHLORIDE 20 MG/ML
10-20 INJECTION INTRAMUSCULAR; INTRAVENOUS
Status: DISCONTINUED | OUTPATIENT
Start: 2018-01-12 | End: 2018-01-19 | Stop reason: HOSPADM

## 2018-01-12 RX ORDER — PREGABALIN 75 MG/1
150 CAPSULE ORAL 2 TIMES DAILY
Status: DISCONTINUED | OUTPATIENT
Start: 2018-01-12 | End: 2018-01-19 | Stop reason: HOSPADM

## 2018-01-12 RX ORDER — SIMVASTATIN 20 MG
20 TABLET ORAL AT BEDTIME
Status: DISCONTINUED | OUTPATIENT
Start: 2018-01-12 | End: 2018-01-19 | Stop reason: HOSPADM

## 2018-01-12 RX ORDER — ASPIRIN 300 MG/1
300 SUPPOSITORY RECTAL ONCE
Status: COMPLETED | OUTPATIENT
Start: 2018-01-12 | End: 2018-01-12

## 2018-01-12 RX ORDER — NALOXONE HYDROCHLORIDE 0.4 MG/ML
.1-.4 INJECTION, SOLUTION INTRAMUSCULAR; INTRAVENOUS; SUBCUTANEOUS
Status: DISCONTINUED | OUTPATIENT
Start: 2018-01-12 | End: 2018-01-19 | Stop reason: HOSPADM

## 2018-01-12 RX ORDER — DOCUSATE SODIUM 100 MG/1
100 CAPSULE, LIQUID FILLED ORAL 2 TIMES DAILY
Status: DISCONTINUED | OUTPATIENT
Start: 2018-01-12 | End: 2018-01-19 | Stop reason: HOSPADM

## 2018-01-12 RX ORDER — POTASSIUM CHLORIDE 7.45 MG/ML
10 INJECTION INTRAVENOUS
Status: DISCONTINUED | OUTPATIENT
Start: 2018-01-12 | End: 2018-01-19 | Stop reason: HOSPADM

## 2018-01-12 RX ORDER — BISACODYL 5 MG
15 TABLET, DELAYED RELEASE (ENTERIC COATED) ORAL DAILY PRN
Status: DISCONTINUED | OUTPATIENT
Start: 2018-01-12 | End: 2018-01-19 | Stop reason: HOSPADM

## 2018-01-12 RX ORDER — MAGNESIUM SULFATE HEPTAHYDRATE 40 MG/ML
4 INJECTION, SOLUTION INTRAVENOUS EVERY 4 HOURS PRN
Status: DISCONTINUED | OUTPATIENT
Start: 2018-01-12 | End: 2018-01-19 | Stop reason: HOSPADM

## 2018-01-12 RX ORDER — SODIUM CHLORIDE 9 MG/ML
INJECTION, SOLUTION INTRAVENOUS CONTINUOUS
Status: DISCONTINUED | OUTPATIENT
Start: 2018-01-12 | End: 2018-01-16

## 2018-01-12 RX ORDER — POTASSIUM CHLORIDE 29.8 MG/ML
20 INJECTION INTRAVENOUS
Status: DISCONTINUED | OUTPATIENT
Start: 2018-01-12 | End: 2018-01-19 | Stop reason: HOSPADM

## 2018-01-12 RX ORDER — POTASSIUM CHLORIDE 1500 MG/1
20-40 TABLET, EXTENDED RELEASE ORAL
Status: DISCONTINUED | OUTPATIENT
Start: 2018-01-12 | End: 2018-01-19 | Stop reason: HOSPADM

## 2018-01-12 RX ORDER — IOPAMIDOL 755 MG/ML
70 INJECTION, SOLUTION INTRAVASCULAR ONCE
Status: COMPLETED | OUTPATIENT
Start: 2018-01-12 | End: 2018-01-12

## 2018-01-12 RX ADMIN — SODIUM CHLORIDE: 9 INJECTION, SOLUTION INTRAVENOUS at 16:16

## 2018-01-12 RX ADMIN — SODIUM CHLORIDE, PRESERVATIVE FREE 100 ML: 5 INJECTION INTRAVENOUS at 16:51

## 2018-01-12 RX ADMIN — ASPIRIN 300 MG: 300 SUPPOSITORY RECTAL at 13:44

## 2018-01-12 RX ADMIN — INSULIN GLARGINE 25 UNITS: 100 INJECTION, SOLUTION SUBCUTANEOUS at 20:38

## 2018-01-12 RX ADMIN — GADOBUTROL 11 ML: 604.72 INJECTION INTRAVENOUS at 23:47

## 2018-01-12 RX ADMIN — SODIUM CHLORIDE 500 ML: 9 INJECTION, SOLUTION INTRAVENOUS at 13:44

## 2018-01-12 RX ADMIN — IOPAMIDOL 70 ML: 755 INJECTION, SOLUTION INTRAVENOUS at 16:52

## 2018-01-12 ASSESSMENT — ENCOUNTER SYMPTOMS
HEADACHES: 0
WEAKNESS: 1
SPEECH DIFFICULTY: 1
FEVER: 0
SHORTNESS OF BREATH: 0
FACIAL ASYMMETRY: 1

## 2018-01-12 ASSESSMENT — VISUAL ACUITY
OU: BLURRED VISION;BASELINE
OU: BLURRED VISION

## 2018-01-12 ASSESSMENT — ACTIVITIES OF DAILY LIVING (ADL)
ADLS_ACUITY_SCORE: 17
ADLS_ACUITY_SCORE: 21

## 2018-01-12 NOTE — ED NOTES
"[facility]  ED Nurse Handoff Report    ED Chief complaint: Fall (multiple recent falls, states \"left leg gives out\" Home health nurse called to have patient evaluated, h/o previous CVA's with L&R sided deficits )      ED Diagnosis:   Final diagnoses:   Cerebrovascular accident (CVA), unspecified mechanism (H)   Left-sided weakness   Hyperglycemia       Code Status: Full Code    Allergies: No Known Allergies    Activity level - Baseline/Home:  Stand with Assist    Activity Level - Current:   Stand with Assist of 2     Needed?: No    Isolation: No  Infection: Not Applicable    Bariatric?: No    Vital Signs:   Vitals:    01/12/18 1109   BP: (!) 139/92   Pulse: 117   Resp: 16   Temp: 98.1  F (36.7  C)   TempSrc: Oral   SpO2: 98%   Weight: 113.4 kg (250 lb)   Height: 1.829 m (6')       Cardiac Rhythm: ,        Pain level: 0-10 Pain Scale: 0    Is this patient confused?: No    Patient Report: Initial Complaint: Patient lives alone and has had multiple recent falls. Patient states falls are d/t his left leg giving out. Patient has h/o CVA's with L&R deficits but states his left leg has not been week. Per EMS- home care RN states patient's BG's are normally 300+  Focused Assessment: disheveled man, incontinent of urine, c/o weakness  Tests Performed: labs, ecg, CT  Abnormal Results: see epic  Treatments provided: NS @125mL/hr, 300mg ASA supp    Family Comments: here alone    OBS brochure/video discussed/provided to patient: N/A    ED Medications:   Medications   0.9% sodium chloride BOLUS (not administered)   aspirin Suppository 300 mg (not administered)       Drips infusing?:  No      ED NURSE PHONE NUMBER: *[phone]         "

## 2018-01-12 NOTE — PHARMACY-ADMISSION MEDICATION HISTORY
Admission medication history interview status for the 1/12/2018  admission is complete. See EPIC admission navigator for prior to admission medications     Medication history source reliability:Moderate    Actions taken by pharmacist (provider contacted, etc): spoke to pt     Additional medication history information not noted on PTA med list :None    Medication reconciliation/reorder completed by provider prior to medication history? No    Time spent in this activity: 15 minutes    Prior to Admission medications    Medication Sig Last Dose Taking? Auth Provider   pregabalin (LYRICA) 150 MG capsule Take 1 capsule (150 mg) by mouth 2 times daily 1/10/2018 Yes Hudson Spring MD   insulin glargine (LANTUS SOLOSTAR) 100 UNIT/ML injection Inject 42 Units Subcutaneous 2 times daily 5 weeks ago Yes Hudson Spring MD   metFORMIN (GLUCOPHAGE) 1000 MG tablet Take 1 tablet (1,000 mg) by mouth 2 times daily (with meals) 1/10/2018 Yes Hudson Spring MD   metoprolol (TOPROL-XL) 100 MG 24 hr tablet Take 1 tablet (100 mg) by mouth daily 1/10/2018 Yes Hudson Spring MD   lisinopril-hydrochlorothiazide (PRINZIDE/ZESTORETIC) 20-12.5 MG per tablet Take 2 tablets by mouth daily 1/10/2018 Yes Hudson Spring MD   simvastatin (ZOCOR) 20 MG tablet Take 1 tablet (20 mg) by mouth At Bedtime 1/10/2018 Yes Hudson Spring MD   dulaglutide (TRULICITY) 0.75 MG/0.5ML pen Inject 0.75 mg Subcutaneous every 7 days Past Month at Unknown time Yes Hudson Spring MD   Calcium-Vitamin D-Vitamin K (CALCIUM + D + K PO) Take 1 tablet by mouth daily. 1/10/2018 Yes Reported, Patient   FISH OIL 1 tablet daily Reported on 5/8/2017 1/10/2018 Yes Reported, Patient   Multiple Vitamins-Minerals (MULTI FOR HIM PO) Take 1 capsule by mouth daily. 1/10/2018 Yes Reported, Patient   aspirin 81 MG tablet Take 1 tablet by mouth daily. 1/10/2018 Yes Dani Layton MD   blood glucose monitoring (ACCU-CHEK LUISANA) test strip by In Vitro route 1 times  daily (test)   Hudson Spring MD   order for DME Diabetic shoes due to neuropathy   Hudson Spring MD   insulin pen needle (B-D U/F) 31G X 8 MM Use once daily or as directed.   Dani Layton MD   insulin aspart (NOVOLOG FLEXPEN) 100 UNIT/ML soln Inject Subcutaneous 3 times daily (with meals)   Reported, Patient   ACCU-CHEK MULTICLIX LANCETS MISC tests twice a day   Félix Flores MD Beth Mulder, PharmD

## 2018-01-12 NOTE — ED NOTES
Bed: ED11  Expected date:   Expected time:   Means of arrival:   Comments:  mark - 518 - 62 M weakness eta 1100

## 2018-01-12 NOTE — PROVIDER NOTIFICATION
Text page to hospitalist regarding critical lab value call of trops 0.45. Awaiting call back/orders.    Physician phoned back. As pt has no complaints of chest pain ok to continue to monitor. Monitor serial trop and for any chest pain. No additional intervention or orders.

## 2018-01-12 NOTE — PROVIDER NOTIFICATION
RECEIVING UNIT ED HANDOFF REVIEW    ED Nurse Handoff Report was reviewed by: Chrissy Hartman on January 12, 2018 at 3:07 PM

## 2018-01-12 NOTE — H&P
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 Patient was not a TPA candidate he was given aspirin and is admitted for further evaluation and management.    Assessment & Plan     Principal Problem:    Ischemic stroke (H) (1/12/2018): currently patient is taking aspirin 81 mg PO daily, no hemoglobin A1c is available to evaluate how well controlled his diabetes is home RN is concerned about patient not able to take care of himself at home CT scan of the head is concerning for right parietal/occipital hyper density concerning for acute stroke.  Active Problems:    Hypertension goal BP (blood pressure) < 140/80 (9/30/2008): currently his home medications include lisinopril hydrochlorothiazide 20/12.5 mg PO daily and Toprol  mg PO daily.    Tobacco abuse (9/30/2008): patient is starting to smoking since the age of 16, told me that he rolls his own cigarettes and has been a smoking for a long time    Non morbid obesity (9/30/2008)    Hyperlipidemia LDL goal <100 (5/9/2010): currently patient is on 20 mg of simvastatin at home.    Other diabetic neurological complication associated with type 2 diabetes mellitus (H) (9/7/2017): patient is currently on Lantus 42 units b.i.d., he is also supposed to be taking NovoLog three times a day which he has not been taking, he is also taking Trulicity 0.75 mg every week. He is also on metformin 1000 mg PO BID    Type 2 diabetes mellitus with diabetic neuropathy,  with long-term current use of insulin (H) (10/23/2017): currently patient is taking Lyrica 150 mg PO BID for his diabetic neuropathy.    --Admit patient to general noodle floor with telemetry  --activity orders with assist, with fall precautions  --NPO and this time until bedside swallow evaluation is done, if patient is able to pass evaluation then probably can be started on moderate carb consistent diet.  --Continue patient on aspirin 325 mg PO daily. Will follow up on neurology recommendation if patient is sharif will check lipid panel, -- hemoglobin A1c Ling aspirin and needs to be on a different anticoagulation.  --will check lipid panel, hemoglobin A1c and TSH in the morning along with BMP and CBC.  --Physical, occupational and speech therapy evaluation.  --Neurology evaluation  --CT scan of the head results were reviewed which are concerning for possible) right to/occipital ischemic stroke, will order MRI of the brain with and without contrast.  --Will also order CTA of head and neck.   --2D echo of the heart with Bubble study  --will monitor patient on telemetry, patient might need Holter monitoring after the discharge to make sure he does not have asymptomatic atrial fibrillation due to his previous history of cerebrovascular disease also.  --From his diabetes point of view, I will start him on Lantus 25 units b.i.d. instead of 42 units b.i.d. which has his home dose. I would hold metformin and Trulicity at this point. Will also start him on meal coverage at 1:10 units and medium sliding scale insulin.  Continue patient simvastatin, lipid panel is ordered for tomorrow morning.  Will hold his home antihypertensive medications, will allow permissive hypertension on the patient symptoms a started 48 hours ago probably his blood pressure medications can be started from tomorrow, will give him normal saline at 100 mL per hour.   --Patient might need  consultation for transitional care unit as patient  is unable to take care of himself at this point and is also at risk of falls.    DVT Prophylaxis: Pneumatic Compression Devices, probably can be started on subq heparin from tomorrow if ok with neurology   Code Status: Full Code    Disposition: Expected discharge in next couple of day , might need TCU on discharge     Clemencia Martino MD,FACP    Primary Care Physician   [place]    Chief Complaint   Multiple falls, left sided weakness going on for couple of days     History is obtained from the patient    Patient Active Problem List   Diagnosis     Hypertension goal BP (blood pressure) < 140/80     Tobacco abuse     Non morbid obesity     Hyperlipidemia LDL goal <100     Advanced directives, counseling/discussion     Bell's palsy     Other diabetic neurological complication associated with type 2 diabetes mellitus (H)     Type 2 diabetes mellitus with diabetic neuropathy, with long-term current use of insulin (H)     Ischemic stroke (H)       History of Present Illness   Cooper Cabral is a 62 year old male with past medical history significant for essential hypertension, non-morbid obesity, hyperlipidemia, type II diabetes mellitus, insulin-dependent diabetic neuropathy and previous history of cerebrovascular disease who presented to the emergency room today with multiple falls and left-sided weakness going on for the last couple of days.  Patient was seen by her home healthcare this morning at home to home he reported about having multiple falls in the last two days and the home healthcare notice that he also had some facial droop and his his speech was take her than usual. He has also noticed some weakness on the left side going on for the last two days which has resulted into 5-6 falls his last fall was this morning.  Unfortunately patient did not seek any help two days ago when the symptoms started. According to the home care and there is concern for patient not able to take care of himself very well at  home. Patient is also diabetic and is currently using Lantus 42 units twice a day and oral hypoglycemic spy told me that he is prescribed NovoLog to take with meals which he has not been taking. He is compliant with his aspirin and statin. Patient otherwise is denying any chest pain or palpitations at this point. He thinks that his left side might be somewhat improved and two days ago. Patient was evaluated in the emergency room where CT scan of the head was concerning for right-sided political/occipital hypo density concerning for possible stroke as patient was outside the TPA window. Aspirin was administered and patient is admitted for further evaluation and management.      Past Medical History    I have reviewed this patient's medical history and updated it with pertinent information if needed.   Past Medical History:   Diagnosis Date     Diabetes (H)      HTN (hypertension)        Past Surgical History   I have reviewed this patient's surgical history and updated it with pertinent information if needed.  History reviewed. No pertinent surgical history.    Prior to Admission Medications   Prior to Admission Medications   Prescriptions Last Dose Informant Patient Reported? Taking?   ACCU-CHEK MULTICLIX LANCETS MISC   No No   Sig: tests twice a day   Calcium-Vitamin D-Vitamin K (CALCIUM + D + K PO)   Yes No   Sig: Take 1 tablet by mouth daily.   FISH OIL   Yes No   Si tablet daily Reported on 2017   Multiple Vitamins-Minerals (MULTI FOR HIM PO)   Yes No   Sig: Take 1 capsule by mouth daily.   NICOTINE 21 MG/24HR TD PT24   No No   Si PATCH DAILY   aspirin 81 MG tablet   No No   Sig: Take 1 tablet by mouth daily.   blood glucose monitoring (ACCU-CHEK LUISANA) test strip   No No   Sig: by In Vitro route 1 times daily (test)   dulaglutide (TRULICITY) 0.75 MG/0.5ML pen   No No   Sig: Inject 0.75 mg Subcutaneous every 7 days   insulin aspart (NOVOLOG FLEXPEN) 100 UNIT/ML soln   Yes No   Sig: Inject  Subcutaneous 3 times daily (with meals)   insulin glargine (LANTUS SOLOSTAR) 100 UNIT/ML injection   No No   Sig: Inject 42 Units Subcutaneous 2 times daily   insulin pen needle (B-D U/F) 31G X 8 MM   No No   Sig: Use once daily or as directed.   lisinopril-hydrochlorothiazide (PRINZIDE/ZESTORETIC) 20-12.5 MG per tablet   No No   Sig: Take 2 tablets by mouth daily   metFORMIN (GLUCOPHAGE) 1000 MG tablet   No No   Sig: Take 1 tablet (1,000 mg) by mouth 2 times daily (with meals)   metoprolol (TOPROL-XL) 100 MG 24 hr tablet   No No   Sig: Take 1 tablet (100 mg) by mouth daily   order for DME   No No   Sig: Diabetic shoes due to neuropathy   pregabalin (LYRICA) 150 MG capsule   No No   Sig: Take 1 capsule (150 mg) by mouth 2 times daily   simvastatin (ZOCOR) 20 MG tablet   No No   Sig: Take 1 tablet (20 mg) by mouth At Bedtime      Facility-Administered Medications: None     Allergies   No Known Allergies    Social History   I have reviewed this patient's social history and updated it with pertinent information if needed. Cooper Cabral  reports that he has been smoking Cigarettes.  He has a 40.00 pack-year smoking history. He has never used smokeless tobacco. He reports that he does not drink alcohol or use illicit drugs.    Family History   I have reviewed this patient's family history and updated it with pertinent information if needed.   Family History   Problem Relation Age of Onset     CANCER Mother      Bone cancer      DIABETES Father      CANCER Father      bone cancer      Hypertension Brother      Hypertension Brother        Review of Systems   CONSTITUTIONAL:  positive for fatigue and generalized weakness.  EYES:  negative for blurred vision and visual disturbance  HEENT:  negative for hoarseness and voice change  RESPIRATORY:  negative for cough with sputum, dyspnea, wheezing and chest pain  CARDIOVASCULAR:  negative for  chest pain, palpitations, orthopnea, exertional chest  pressure/discomfort  GASTROINTESTINAL: Negative for abd pain, nausea , vomiting ,constipation and abdominal pain  GENITOURINARY: Negative for burning /urgency and frequency.  HEMATOLOGIC/LYMPHATIC:  negative  ALLERGIC/IMMUNOLOGIC:  negative for drug reactions  ENDOCRINE:  negative for diabetic symptoms including polyuria, polydipsia and weight loss  MUSCULOSKELETAL:  positive for arthralgias  NEUROLOGICAL: past history of CVD, left sided weakness today   BEHAVIOR/PSYCH: negative    Physical Exam   Temp: 98.1  F (36.7  C) Temp src: Oral BP: (!) 139/92 Pulse: 117 Heart Rate: 102 Resp: 16 SpO2: 98 %      Vital Signs with Ranges  Temp:  [98.1  F (36.7  C)] 98.1  F (36.7  C)  Pulse:  [117] 117  Heart Rate:  [102-117] 102  Resp:  [16] 16  BP: (139)/(92) 139/92  SpO2:  [98 %] 98 %  250 lbs 0 oz    Constitutional: Awake, alert,oriented to time, place and person , cooperative, no apparent distress.Pleasent and cooperative   Eyes: Conjunctiva and pupils examined and normal.  HEENT: Moist mucous membranes, normal dentition.  Respiratory: Clear to auscultation bilaterally, no crackles or wheezing.  Cardiovascular: Regular rate and rhythm, normal S1 and S2, and no murmur noted.  GI: Soft, non-distended, non-tender, normal bowel sounds.  Lymph/Hematologic: No anterior cervical or supraclavicular adenopathy.  Skin: dry flaky skin in bilateral lower extremities.mutiple abrasions and wounds in different stages of healing on lower extremities   Musculoskeletal: No joint swelling, erythema or tenderness.  Neurologic: Cranial nerves 2-12 intact except slight right sided droop , 4/5 stregnt in LLE and LUE ,normal strength on right side , normal sensation  and sensation.gait was not checked, decreased pinprick sensation in both lower extremities , normal [place]  Psychiatric: Alert, oriented to person, place and time, no obvious anxiety or depression.slightly poor insight regarding his medical illnesses    Data   Data reviewed today:   I personally reviewed the EKG tracing showing sinus tachycardia with PVCs , laft ant fesicular block.    Recent Labs  Lab 01/12/18  1135   WBC 8.5   HGB 16.5   MCV 91      INR 1.05      POTASSIUM 3.7   CHLORIDE 98   CO2 28   BUN 30   CR 0.88   ANIONGAP 9   MAINE 8.9   *   ALBUMIN 3.3*   PROTTOTAL 6.6*   BILITOTAL 1.4*   ALKPHOS 84   ALT 33   AST 36       Imaging:  Recent Results (from the past 24 hour(s))   CT Head w/o Contrast    Narrative    CT SCAN OF THE HEAD WITHOUT CONTRAST  1/12/2018 12:29 PM     HISTORY:  Left-sided weakness frequent falls.    TECHNIQUE:  Axial images of the head and coronal reformations without  IV contrast material. Radiation dose for this scan was reduced using  automated exposure control, adjustment of the mA and/or kV according  to patient size, or iterative reconstruction technique.    COMPARISON: None.    FINDINGS: Area of encephalomalacia with cortical volume loss in the  right parietal lobe, probably due to chronic infarct. There is  hypodensity and loss of gray-white matter differentiation in the right  parietal/occipital region. This is age indeterminate. Area of  encephalomalacia within the paramedian left occipital lobe with ex  vacuo dilatation of the occipital horn of the left lateral ventricle,  probably from chronic infarct. Chronic-appearing lacunar infarct in  the right cerebellum. Chronic-appearing infarcts in the right basal  ganglia and subinsular region. Patchy periventricular white matter  hypodensities are nonspecific but likely related to chronic  microvascular ischemic disease. Mild diffuse parenchymal volume loss.    No evidence of acute intracranial hemorrhage. No mass effect or  midline shift. No abnormal extra-axial fluid collection.    Mild mucosal thickening within the visualized paranasal sinuses. The  bony calvarium and bones of the skull base appear intact.       Impression    IMPRESSION:      1. Hypodensity in the right  parietal/occipital region likely  representing an age-indeterminate infarct. Acute ischemia in this area  would be difficult to exclude. Consider further evaluation with brain  MRI.  2. Chronic-appearing areas of encephalomalacia within the right  parietal lobe and paramedian left occipital lobe, likely due to  previous infarct. Chronic-appearing lacunar infarcts in the right  basal ganglia and subinsular region as well as the right cerebellum.  3. Diffuse parenchymal volume loss and white matter changes, likely  due to chronic microvascular ischemic disease.  4. No evidence of acute intracranial hemorrhage, mass, or herniation.     LION VAZ MD

## 2018-01-12 NOTE — IP AVS SNAPSHOT
MRN:[id number]                      After Visit Summary   1/12/2018    Cooper Cabral    MRN: [id number]           Thank you!     Thank you for choosing [facility] for your care. Our goal is always to provide you with excellent care. Hearing back from our patients is one way we can continue to improve our services. Please take a few minutes to complete the written survey that you may receive in the mail after you visit with us. Thank you!        Patient Information     Date Of Birth          1955        Designated Caregiver       Most Recent Value    Caregiver    Will someone help with your care after discharge? -- [unknown]    Name of designated caregiver unknown    Phone number of caregiver unknown    Caregiver address unknown      About your hospital stay     You were admitted on:  January 12, 2018 You last received care in the:  [address] [facility]    You were discharged on:  January 19, 2018        Reason for your hospital stay       Mr. Cabral: You came in to the hospital due to falling and were found to have another stroke. You still have some weakness in your left leg, less so in your left arm, and will need therapy to improve that. You are not safe in going  Home at this time, therapy hopefully will correct that. Your diabetes in not controlled and you  Have to take insulin.  That will have to be further managed when you are out of the hospital.  The smoking is not good for you but you indicated you have no desire to quit smoking. Please reconsider that. You are on aspirin and clopidogrel to help prevent more strokes. You are on Simvastatin due to the cholesterol being high and being a diabetic and having had strokes and on Pregabalin due to the neuropathy from your diabetes.            Reason for your hospital stay       Mr. Cabral:  You came in due to falling and left sided weakness. You have had strokes in the past and you determined that you had a stroke  and the workup here confirmed that.  Your blood pressure was initially allowed to run a bit higher to prevent a spread of the stroke area, but you are now back on it. You have done reasonably well with not smoking and I wish you would continue to not smoke. Your diabetes has been controlled with insulin and you are doing well there.   You are going to rehab to get better, although you have shown improvement here  You have had difficulty emptying your bladder and you have an urinary catheter and were started on Tamsulosin, the first dose being at bedtime on 1/18/2018. Somewhere between 1/25 to 1/28, a trial of removing the catheter should be undertaken.                  Who to Call     For medical emergencies, please call 911.  For non-urgent questions about your medical care, please call your primary care provider or clinic, [phone]          Attending Provider     Provider Specialty    Trigger, Angel Juarez MD Emergency Medicine    Clemencia Martino MD Internal Medicine       Primary Care Provider Office Phone # Fax #    [facility] [phone]      After Care Instructions     Activity - Up with assistive device           Activity - Up with assistive device           Activity - Up with nursing assistance           Activity - Up with nursing assistance           Advance Diet as Tolerated       Follow this diet upon discharge: Orders Placed This Encounter      Combination Diet Regular Diet Adult; 0113-7522 Calories: Moderate Consistent CHO (4-6 CHO units/meal)            Advance Diet as Tolerated       Follow this diet upon discharge: Orders Placed This Encounter      Combination Diet Regular Diet Adult; 6285-2146 Calories: Moderate Consistent CHO (4-6 CHO units/meal)      Advance Diet as Tolerated            Fall precautions           Fall precautions           Hammond catheter       To straight gravity drainage. He should have a voiding trial between 1/25 and 1/28 and if not able to  successfully void he will need the catheter replaced and he will need to see an Urologist.            General info for SNF       Length of Stay Estimate: Short Term Care: Estimated # of Days <30  Condition at Discharge: Stable  Level of care:skilled   Rehabilitation Potential: Good  Admission H&P remains valid and up-to-date: Yes  Recent Chemotherapy: N/A  Use Nursing Home Standing Orders: Yes            General info for SNF       Length of Stay Estimate: Short Term Care: Estimated # of Days <30  Condition at Discharge: Improving  Level of care:skilled   Rehabilitation Potential: Good  Admission H&P remains valid and up-to-date: Yes  Recent Chemotherapy: N/A  Use Nursing Home Standing Orders: Yes            Glucose monitor nursing POCT       Before meals and at bedtime            Glucose monitor nursing POCT       Before meals and at bedtime            Mantoux instructions       Give two-step Mantoux (PPD) Per Facility Policy Yes            Mantoux instructions       Give two-step Mantoux (PPD) Per Facility Policy Yes                  Follow-up Appointments     Follow Up and recommended labs and tests       Follow up with Dr Rolando Ireland on Friday February 16th at 10:45 am. Please check in at 10:30.  [facility]  [phone]  [address], [address]  [place] [address]                  Additional Services     Occupational Therapy Adult Consult       Evaluate and treat as clinically indicated.    Reason:  Left sided stroke            Occupational Therapy Adult Consult       Evaluate and treat as clinically indicated.    Reason:  stroke            Physical Therapy Adult Consult       Evaluate and treat as clinically indicated.    Reason:  Stroke with residual left sided weakness, leg more than arm.  Previous right homonymous hemianopsia and dysphasia, but no dysphagia. He has seen speech and needs no more speech therapy at this time.            Physical Therapy Adult Consult        Evaluate and treat as clinically indicated.    Reason:  Recent right sided stroke with left sided weakness and unstable gait                  Further instructions from your care team       Your risk factors for stroke or TIA (transient ischemic attack):    Your Risk Factors Your Results Normal Ranges   High blood pressure BP Readings from Last 1 Encounters:   01/19/18 123/84    Less than 120/80   Cholesterol              Total Lab Results   Component Value Date    CHOL 147 01/14/2018      Less than 150    Triglycerides   Lab Results   Component Value Date    TRIG 89 01/14/2018    Less than 150   LDL Lab Results   Component Value Date    LDL 92 01/14/2018       Less than 70   HDL Lab Results   Component Value Date    HDL 37 01/14/2018            Greater than 40 (men)  Greater than 50 (women)   Diabetes   Recent Labs  Lab 01/13/18  0747   *    Fasting blood glucose    Smoking/tobacco use  Quit smoking and tobacco   Overweight  Lose 1-2 pounds a week   Lack of exercise  30 minutes moderate activity each day   Other risk factors include carotid (neck) artery disease, atrial fibrillation and stress. You may be on new medicine to treat high blood pressure, cholesterol, diabetes or atrial fibrillation.    Understanding Stroke Booklet given to patient. Please refer to booklet for further information.    Stroke warning signs and symptoms - CALL 911 right away for:  - Sudden numbness or weakness in the face, arm or leg (often on one side of the body).  - Sudden confusion or trouble understanding what is going on.  - Sudden blurred or decreased vision in one or both eyes.  - Sudden trouble speaking, loss of balance, dizziness or problems with coordination.  - Sudden, severe headache for no reason.  - Fainting or seizures.  - Symptoms may go away then come back suddenly.    Pending Results     No orders found from 1/10/2018 to 1/13/2018.            Statement of Approval     Ordered          01/19/18 1208  I have  reviewed and agree with all the recommendations and orders detailed in this document.  EFFECTIVE NOW     Approved and electronically signed by:  Cornell Jacques MD             Admission Information     Date & Time Provider Department Dept. Phone    1/12/2018 Clemencia Martino MD [address] [facility] [phone]      Your Vitals Were     Blood Pressure Pulse Temperature Respirations Height Weight    123/84 (BP Location: Left arm) 78 97.4  F (36.3  C) (Oral) 16 1.829 m (6') 113.4 kg (250 lb)    Pulse Oximetry BMI (Body Mass Index)                96% 33.91 kg/m2          MyChart Information     [facility] [employer] gives you secure access to your electronic health record. If you see a primary care provider, you can also send messages to your care team and make appointments. If you have questions, please call your primary care clinic.  If you do not have a primary care provider, please call [phone] and they will assist you.        Care EveryWhere ID     This is your Care EveryWhere ID. This could be used by other organizations to access your [facility] medical records  [id number]        Equal Access to Services     [facility] : Hadcourtney Palma, kris stacy, mark patterson. So [facility] [phone].    ATENCIÓN: Si habla español, tiene a beltran disposición servicios gratuitos de asistencia lingüística. Simon al [phone].    We comply with applicable federal civil rights laws and [place] laws. We do not discriminate on the basis of race, color, national origin, age, disability, sex, sexual orientation, or gender identity.               Review of your medicines      START taking        Dose / Directions    aspirin 325 MG EC tablet   Used for:  Cerebrovascular accident (CVA), unspecified mechanism (H)   Replaces:  aspirin 81 MG tablet        Dose:  325 mg   Take 1 tablet (325 mg) by mouth daily   Quantity:  60 tablet   Refills:  0        clopidogrel 75 MG tablet   Commonly known as:  PLAVIX   Used for:  Cerebrovascular accident (CVA), unspecified mechanism (H)        Dose:  75 mg   Take 1 tablet (75 mg) by mouth daily   Quantity:  30 tablet   Refills:  0       nicotine 21 MG/24HR 24 hr patch   Commonly known as:  NICODERM CQ   Used for:  Tobacco abuse        Dose:  1 patch   Place 1 patch onto the skin daily   Quantity:  30 patch   Refills:  0       potassium chloride 20 MEQ Packet   Commonly known as:  KLOR-CON   Used for:  Hypokalemia        Dose:  20 mEq   20 mEq by Oral or Feeding Tube route 2 times daily   Quantity:  60 tablet   Refills:  1       tamsulosin 0.4 MG capsule   Commonly known as:  FLOMAX   Used for:  Benign prostatic hyperplasia with urinary retention        Dose:  0.4 mg   Take 1 capsule (0.4 mg) by mouth At Bedtime   Quantity:  60 capsule   Refills:  0         CONTINUE these medicines which may have CHANGED, or have new prescriptions. If we are uncertain of the size of tablets/capsules you have at home, strength may be listed as something that might have changed.        Dose / Directions    insulin aspart 100 UNIT/ML injection   Commonly known as:  NovoLOG PEN   This may have changed:    - how to take this  - when to take this  - additional instructions   Used for:  Type 2 diabetes mellitus with diabetic neuropathy, with long-term current use of insulin (H)        Dose one unit per ten grams of carbohydrate subq with each meal   Quantity:  10 mL   Refills:  3       insulin glargine 100 UNIT/ML injection   Commonly known as:  LANTUS   This may have changed:  how much to take   Used for:  Type 2 diabetes mellitus with diabetic neuropathy, with long-term current use of insulin (H)        Dose:  22 Units   Inject 22 Units Subcutaneous 2 times daily   Quantity:  15 mL   Refills:  0       * lisinopril-hydrochlorothiazide 20-12.5 MG per tablet   Commonly known as:  PRINZIDE/ZESTORETIC   This may have changed:  Another medication with  the same name was added. Make sure you understand how and when to take each.   Used for:  Hypertension goal BP (blood pressure) < 140/80, Type 2 diabetes mellitus with diabetic neuropathy, with long-term current use of insulin (H)        Dose:  2 tablet   Take 2 tablets by mouth daily   Quantity:  180 tablet   Refills:  3       * lisinopril-hydrochlorothiazide 20-12.5 MG per tablet   Commonly known as:  PRINZIDE/ZESTORETIC   This may have changed:  You were already taking a medication with the same name, and this prescription was added. Make sure you understand how and when to take each.   Used for:  Hypertension goal BP (blood pressure) < 140/80        Dose:  2 tablet   Start taking on:  1/20/2018   Take 2 tablets by mouth every morning   Quantity:  30 tablet   Refills:  0       * metoprolol succinate 100 MG 24 hr tablet   Commonly known as:  TOPROL-XL   This may have changed:  Another medication with the same name was added. Make sure you understand how and when to take each.   Used for:  Hypertension goal BP (blood pressure) < 140/80        Dose:  100 mg   Take 1 tablet (100 mg) by mouth daily   Quantity:  90 tablet   Refills:  3       * metoprolol succinate 100 MG 24 hr tablet   Commonly known as:  TOPROL-XL   This may have changed:  You were already taking a medication with the same name, and this prescription was added. Make sure you understand how and when to take each.   Used for:  Hypertension goal BP (blood pressure) < 140/80        Dose:  100 mg   Start taking on:  1/20/2018   Take 1 tablet (100 mg) by mouth every morning   Quantity:  30 tablet   Refills:  0       * Notice:  This list has 4 medication(s) that are the same as other medications prescribed for you. Read the directions carefully, and ask your doctor or other care provider to review them with you.      CONTINUE these medicines which have NOT CHANGED        Dose / Directions    blood glucose monitoring lancets   Used for:  Diabetes mellitus,  type 2 (H)        tests twice a day   Quantity:  100   Refills:  3       blood glucose monitoring test strip   Commonly known as:  ACCU-CHEK LUISANA   Used for:  Type 2 diabetes mellitus with diabetic neuropathy, with long-term current use of insulin (H)        by In Vitro route 1 times daily (test)   Quantity:  100 strip   Refills:  3       insulin pen needle 31G X 8 MM   Commonly known as:  B-D U/F   Used for:  Uncontrolled diabetes mellitus (H)        Use once daily or as directed.   Quantity:  90 each   Refills:  1       MULTI FOR HIM PO        Dose:  1 capsule   Take 1 capsule by mouth daily.   Refills:  0       order for DME   Used for:  Type 2 diabetes mellitus with diabetic neuropathy, with long-term current use of insulin (H), Other diabetic neurological complication associated with type 2 diabetes mellitus (H)        Diabetic shoes due to neuropathy   Quantity:  1 Device   Refills:  0       pregabalin 150 MG capsule   Commonly known as:  LYRICA   Used for:  Type 2 diabetes mellitus with diabetic neuropathy, with long-term current use of insulin (H)        Dose:  150 mg   Take 1 capsule (150 mg) by mouth 2 times daily   Quantity:  90 capsule   Refills:  3       simvastatin 20 MG tablet   Commonly known as:  ZOCOR   Used for:  Hyperlipidemia LDL goal <100        Dose:  20 mg   Take 1 tablet (20 mg) by mouth At Bedtime   Quantity:  90 tablet   Refills:  3         STOP taking     aspirin 81 MG tablet   Replaced by:  aspirin 325 MG EC tablet           CALCIUM + D + K PO           dulaglutide 0.75 MG/0.5ML pen   Commonly known as:  TRULICITY           FISH OIL           metFORMIN 1000 MG tablet   Commonly known as:  GLUCOPHAGE                Where to get your medicines      Some of these will need a paper prescription and others can be bought over the counter. Ask your nurse if you have questions.     You don't need a prescription for these medications     aspirin 325 MG EC tablet    clopidogrel 75 MG tablet     insulin aspart 100 UNIT/ML injection    insulin glargine 100 UNIT/ML injection    lisinopril-hydrochlorothiazide 20-12.5 MG per tablet    metoprolol succinate 100 MG 24 hr tablet    nicotine 21 MG/24HR 24 hr patch    potassium chloride 20 MEQ Packet    tamsulosin 0.4 MG capsule                Protect others around you: Learn how to safely use, store and throw away your medicines at www.disposemymeds.org.             Medication List: This is a list of all your medications and when to take them. Check marks below indicate your daily home schedule. Keep this list as a reference.      Medications           Morning Afternoon Evening Bedtime As Needed    aspirin 325 MG EC tablet   Take 1 tablet (325 mg) by mouth daily   Last time this was given:  325 mg on 1/19/2018  9:57 AM                                   blood glucose monitoring lancets   tests twice a day                                blood glucose monitoring test strip   Commonly known as:  ACCU-CHEK LUISANA   by In Vitro route 1 times daily (test)                                clopidogrel 75 MG tablet   Commonly known as:  PLAVIX   Take 1 tablet (75 mg) by mouth daily   Last time this was given:  75 mg on 1/19/2018  9:58 AM                                   insulin aspart 100 UNIT/ML injection   Commonly known as:  NovoLOG PEN   Dose one unit per ten grams of carbohydrate subq with each meal   Last time this was given:  6 Units on 1/19/2018 12:09 PM                                insulin glargine 100 UNIT/ML injection   Commonly known as:  LANTUS   Inject 22 Units Subcutaneous 2 times daily   Last time this was given:  22 Units on 1/19/2018 10:02 AM                                insulin pen needle 31G X 8 MM   Commonly known as:  B-D U/F   Use once daily or as directed.                                * lisinopril-hydrochlorothiazide 20-12.5 MG per tablet   Commonly known as:  PRINZIDE/ZESTORETIC   Take 2 tablets by mouth daily   Last time this was given:  2  tablets on 1/19/2018  9:57 AM                                      * lisinopril-hydrochlorothiazide 20-12.5 MG per tablet   Commonly known as:  PRINZIDE/ZESTORETIC   Take 2 tablets by mouth every morning   Start taking on:  1/20/2018   Last time this was given:  2 tablets on 1/19/2018  9:57 AM                                * metoprolol succinate 100 MG 24 hr tablet   Commonly known as:  TOPROL-XL   Take 1 tablet (100 mg) by mouth daily   Last time this was given:  100 mg on 1/19/2018  9:57 AM                                   * metoprolol succinate 100 MG 24 hr tablet   Commonly known as:  TOPROL-XL   Take 1 tablet (100 mg) by mouth every morning   Start taking on:  1/20/2018   Last time this was given:  100 mg on 1/19/2018  9:57 AM                                MULTI FOR HIM PO   Take 1 capsule by mouth daily.   Last time this was given:  resume                                nicotine 21 MG/24HR 24 hr patch   Commonly known as:  NICODERM CQ   Place 1 patch onto the skin daily   Last time this was given:  1 patch on 1/19/2018  2:41 AM                         Currently in place on right  deltoid        order for DME   Diabetic shoes due to neuropathy                                potassium chloride 20 MEQ Packet   Commonly known as:  KLOR-CON   20 mEq by Oral or Feeding Tube route 2 times daily   Last time this was given:  resume                                pregabalin 150 MG capsule   Commonly known as:  LYRICA   Take 1 capsule (150 mg) by mouth 2 times daily   Last time this was given:  150 mg on 1/19/2018  9:57 AM                                      simvastatin 20 MG tablet   Commonly known as:  ZOCOR   Take 1 tablet (20 mg) by mouth At Bedtime   Last time this was given:  20 mg on 1/18/2018  9:27 PM                                   tamsulosin 0.4 MG capsule   Commonly known as:  FLOMAX   Take 1 capsule (0.4 mg) by mouth At Bedtime   Last time this was given:  0.4 mg on 1/18/2018  9:27 PM                                    * Notice:  This list has 4 medication(s) that are the same as other medications prescribed for you. Read the directions carefully, and ask your doctor or other care provider to review them with you.              More Information        Clopidogrel Bisulfate Oral tablet  What is this medicine?  CLOPIDOGREL (kloh PID oh grel) helps to prevent blood clots. This medicine is used to prevent heart attack, stroke, or other vascular events in people who are at high risk.  This medicine may be used for other purposes; ask your health care provider or pharmacist if you have questions.  What should I tell my health care provider before I take this medicine?  They need to know if you have any of the following conditions:    bleeding disorder    bleeding in the brain    planned surgery    stomach or intestinal ulcers    stroke or transient ischemic attack    an unusual or allergic reaction to clopidogrel, other medicines, foods, dyes, or preservatives    pregnant or trying to get pregnant    breast-feeding  How should I use this medicine?  Take this medicine by mouth with a drink of water. Follow the directions on the prescription label. You may take this medicine with or without food. Take your medicine at regular intervals. Do not take your medicine more often than directed.  Talk to your pediatrician regarding the use of this medicine in children. Special care may be needed.  Overdosage: If you think you have taken too much of this medicine contact a poison control center or emergency room at once.  NOTE: This medicine is only for you. Do not share this medicine with others.  What if I miss a dose?  If you miss a dose, take it as soon as you can. If it is almost time for your next dose, take only that dose. Do not take double or extra doses.  What may interact with this medicine?    aspirin    blood thinners like cilostazol, enoxaparin, ticlopidine, and warfarin    certain medicines for depression like  citalopram, fluoxetine, and fluvoxamine    certain medicines for fungal infections like ketoconazole, fluconazole, and voriconazole    certain medicines for HIV infection like delavirdine, efavirenz, and etravirine    certain medicines for seizures like felbamate, oxcarbazepine, and phenytoin    chloramphenicol    fluvastatin    isoniazid, INH    medicines for inflammation like ibuprofen and naproxen    modafinil    nicardipine    over-the counter supplements like echinacea, feverfew, fish oil, garlic, maico, ginkgo, green tea, horse chestnut    quinine    stomach acid blockers like cimetidine, omeprazole, and esomeprazole    tamoxifen    tolbutamide    topiramate    torsemide  This list may not describe all possible interactions. Give your health care provider a list of all the medicines, herbs, non-prescription drugs, or dietary supplements you use. Also tell them if you smoke, drink alcohol, or use illegal drugs. Some items may interact with your medicine.  What should I watch for while using this medicine?  Visit your doctor or health care professional for regular check ups. Do not stop taking your medicine unless your doctor tells you to.  Notify your doctor or health care professional and seek emergency treatment if you develop breathing problems; changes in vision; chest pain; severe, sudden headache; pain, swelling, warmth in the leg; trouble speaking; sudden numbness or weakness of the face, arm or leg. These can be signs that your condition has gotten worse.  If you are going to have surgery or dental work, tell your doctor or health care professional that you are taking this medicine.  Certain genetic factors may reduce the effect of this medicine. Your doctor may use genetic tests to determine treatment.  What side effects may I notice from receiving this medicine?  Side effects that you should report to your doctor or health care professional as soon as possible:    allergic reactions like skin rash,  itching or hives, swelling of the face, lips, or tongue    breathing problems    changes in vision    fever    signs and symptoms of bleeding such as bloody or black, tarry stools; red or dark-brown urine; spitting up blood or brown material that looks like coffee grounds; red spots on the skin; unusual bruising or bleeding from the eye, gums, or nose    sudden weakness    unusual bleeding or bruising  Side effects that usually do not require medical attention (report to your doctor or health care professional if they continue or are bothersome):    constipation or diarrhea    headache    pain in back or joints    stomach upset  This list may not describe all possible side effects. Call your doctor for medical advice about side effects. You may report side effects to FDA at 0-512-FDA-2380.  Where should I keep my medicine?  Keep out of the reach of children.  Store at room temperature of 59 to 86 degrees F (15 to 30 degrees C). Throw away any unused medicine after the expiration date.  NOTE:This sheet is a summary. It may not cover all possible information. If you have questions about this medicine, talk to your doctor, pharmacist, or health care provider. Copyright  2016 Gold Standard

## 2018-01-12 NOTE — ED PROVIDER NOTES
History     Chief Complaint:  Multiple falls    HPI   Cooper Cabral is a 62 year old male smoker with a history of hyperlipidemia, hypertension, CVA and diabetes who presents to the ED for evaluation of multiple falls. He reports that his left leg is weak and has been giving out. His has some facial droop and is speech is thicker than usual. He mentions he hit his head, but has not lost consciousness. He denies any headaches, numbness or tingling. His home care nurse reported to staff that the patient does not take care of himself well at home.    Allergies:  No known drug allergies    Medications:    Lyrica  Insulin  Metformin  Metoprolol  Zocor  Lisinopril  Aspirin  Nicotine  Calcium Vitamin D Vitamin K  Fish Oil  Multivitamins  Insulin     Past Medical History:    Hyperlipidemia  Hypertension  Diabetes  Bell's palsy    Past Surgical History:    The patient does not have any pertinent past surgical history.    Family History:    Bone cancer- mother, father  Diabetes- father    Social History:  The patient was brought to the ED by ambulance.  Smoking Status: Current smoker (20 yrs)  Smokeless Tobacco: Never  Alcohol Use: No  Marital Status:  Single      Review of Systems   Constitutional: Negative for fever.   Respiratory: Negative for shortness of breath.    Cardiovascular: Negative for chest pain.   Musculoskeletal: Positive for gait problem.   Neurological: Positive for facial asymmetry, speech difficulty and weakness. Negative for headaches.   All other systems reviewed and are negative.    Physical Exam   First Vitals:  BP: (!) 139/92  Pulse: 117  Heart Rate: 117  Temp: 98.1  F (36.7  C)  Resp: 16  Height: 182.9 cm (6')  Weight: 113.4 kg (250 lb)  SpO2: 98 %    Physical Exam  General: Alert, interactive in mild distress  Head:  Scalp is atraumatic  Eyes:  No scleral icterus, Right eye decreased vision from previous CVI   ENT:      Nose:  The external nose is normal  Ears:  External ears are  normal  Mouth/Throat: The oropharynx is normal    Mucus membranes are moist       Neck:  Normal range of motion.      There is no rigidity.    Trachea is in the midline         CV:  Regular rate and rhythm    No murmur   Resp:  Breath sounds are clear bilaterally    Non-labored, no retractions or accessory muscle use      GI:  Abdomen is soft, no distension, no tenderness.       MS:  Slight left sided upper and lower extremity weakness compared to the right.     No midline cervical, thoracic, or lumbar tenderness  Skin:  Multiple abrasions on the extremities and scalp.    Warm and dry, No rash noted.  Neuro:    National Institutes of [employer] Stroke Scale (Baseline)  Time Performed: 11:30   Score    Level of consciousness: (0)   Alert, keenly responsive    LOC questions: (0)   Answers both questions correctly    LOC commands: (0)   Performs both tasks correctly    Best gaze: (0)   Normal    Visual: (0)   Partial hemianopia Chronic from old CVI    Facial palsy: (1)   Minor paralysis (flat nasolabial fold, smile asymmetry)    Motor arm (left): (1)   Drift    Motor arm (right): (0)   No drift    Motor leg (left): (1)   Drift    Motor leg (right): (0)   No drift    Limb ataxia: (0)   Absent    Sensory: (0)   Normal- no sensory loss    Best language: (0)   Normal- no aphasia    Dysarthria: (1)   Mild to moderate dysarthria    Extinction and inattention: (0)   No abnormality        Total Score:  4     Psych:  Awake. Alert.  Normal affect.      Appropriate interactions.    Emergency Department Course   ECG done at 1141. ECG read at 1146. Indication: Multiple falls  Rate 103 bpm. NM interval 174. QRS duration 86. QT/QTc 370/484. P-R-T axes 35 -62 73.  Sinus tachycardia with occasional premature ventricular complexes and fusion complexes.   Left anterior fascicular block.  Anteroseptal infarct, age undetermined.  Abnormal ECG.    Imaging:  Radiographic findings were communicated with the patient who voiced understanding of the  findings.    CT Head w/o Contrast:   IMPRESSION:      1. Hypodensity in the right parietal/occipital region likely  representing an age-indeterminate infarct. Acute ischemia in this area  would be difficult to exclude. Consider further evaluation with brain  MRI.  2. Chronic-appearing areas of encephalomalacia within the right  parietal lobe and paramedian left occipital lobe, likely due to  previous infarct. Chronic-appearing lacunar infarcts in the right  basal ganglia and subinsular region as well as the right cerebellum.  3. Diffuse parenchymal volume loss and white matter changes, likely  due to chronic microvascular ischemic disease.  4. No evidence of acute intracranial hemorrhage, mass, or herniation.   Per Radiology.    Laboratory:  CBC: WBC 8.5, HGB 16.5,   CMP: Glucose 324(H), Bilirubin 1.4(H), Albumin 3.3(L), Protein 6.6(L) o/w WNL (Creat 0.88)    INR: 1.05  PTT: 29    Interventions:  1138 NS Bolus IV   mg TX    Emergency Department Course:  Nursing notes and vitals reviewed.  I performed an exam of the patient as documented above.     1254 Findings and plan explained to the patient who consents to admission. Discussed the patient with Dr. Martino, who will admit the patient to a Neuro bed for further monitoring, evaluation, and treatment.    Impression & Plan      Medical Decision Making:  Cooper Cabral is a 62 year old male who presents with left sided weakness and frequent falls for the past 72 hours. The above work up was undertaken here with abnormalities on the right side of his brain on CT scan. I think this likely represents a cerebrovascular infarction. He is well outside the window for thrombolytics or interventional radiology procedures. He received Asprin as well as IV fluids here. I spoke with Dr. Martino of the hospitalist service, who is in agreement with admission. He will need a Neurology consultation. He may well need TCU placement given his frequent falls and inability to  care for himself at home. There are no signs of an acute traumatic injury or ACS.    Diagnosis:    ICD-10-CM    1. Cerebrovascular accident (CVA), unspecified mechanism (H) I63.9    2. Left-sided weakness R53.1    3. Hyperglycemia R73.9      Disposition:  The patient was admitted to the hospital.    1/12/2018    EMERGENCY DEPARTMENT    I, Carline Gonsalves, am serving as a scribe at 1125 on January 12, 2018 to document services personally performed by Dr. Fine based on my observations and the provider's statements to me.         Angel Fine MD  01/12/18 0883

## 2018-01-13 ENCOUNTER — APPOINTMENT (OUTPATIENT)
Dept: OCCUPATIONAL THERAPY | Facility: CLINIC | Age: 63
DRG: 065 | End: 2018-01-13
Attending: INTERNAL MEDICINE
Payer: MEDICARE

## 2018-01-13 ENCOUNTER — APPOINTMENT (OUTPATIENT)
Dept: CARDIOLOGY | Facility: CLINIC | Age: 63
DRG: 065 | End: 2018-01-13
Attending: INTERNAL MEDICINE
Payer: MEDICARE

## 2018-01-13 ENCOUNTER — APPOINTMENT (OUTPATIENT)
Dept: SPEECH THERAPY | Facility: CLINIC | Age: 63
DRG: 065 | End: 2018-01-13
Attending: INTERNAL MEDICINE
Payer: MEDICARE

## 2018-01-13 LAB
ALBUMIN SERPL-MCNC: 3.1 G/DL (ref 3.4–5)
ALP SERPL-CCNC: 79 U/L (ref 40–150)
ALT SERPL W P-5'-P-CCNC: 31 U/L (ref 0–70)
ANION GAP SERPL CALCULATED.3IONS-SCNC: 8 MMOL/L (ref 3–14)
AST SERPL W P-5'-P-CCNC: 29 U/L (ref 0–45)
BILIRUB SERPL-MCNC: 1.5 MG/DL (ref 0.2–1.3)
BUN SERPL-MCNC: 25 MG/DL (ref 7–30)
CALCIUM SERPL-MCNC: 8.8 MG/DL (ref 8.5–10.1)
CHLORIDE SERPL-SCNC: 104 MMOL/L (ref 94–109)
CO2 SERPL-SCNC: 27 MMOL/L (ref 20–32)
CREAT SERPL-MCNC: 0.76 MG/DL (ref 0.66–1.25)
ERYTHROCYTE [DISTWIDTH] IN BLOOD BY AUTOMATED COUNT: 12.7 % (ref 10–15)
GFR SERPL CREATININE-BSD FRML MDRD: >90 ML/MIN/1.7M2
GLUCOSE BLDC GLUCOMTR-MCNC: 160 MG/DL (ref 70–99)
GLUCOSE BLDC GLUCOMTR-MCNC: 164 MG/DL (ref 70–99)
GLUCOSE BLDC GLUCOMTR-MCNC: 166 MG/DL (ref 70–99)
GLUCOSE BLDC GLUCOMTR-MCNC: 182 MG/DL (ref 70–99)
GLUCOSE BLDC GLUCOMTR-MCNC: 185 MG/DL (ref 70–99)
GLUCOSE BLDC GLUCOMTR-MCNC: 242 MG/DL (ref 70–99)
GLUCOSE SERPL-MCNC: 155 MG/DL (ref 70–99)
HCT VFR BLD AUTO: 46.7 % (ref 40–53)
HGB BLD-MCNC: 16.3 G/DL (ref 13.3–17.7)
MCH RBC QN AUTO: 31.7 PG (ref 26.5–33)
MCHC RBC AUTO-ENTMCNC: 34.9 G/DL (ref 31.5–36.5)
MCV RBC AUTO: 91 FL (ref 78–100)
PLATELET # BLD AUTO: 219 10E9/L (ref 150–450)
POTASSIUM SERPL-SCNC: 3.1 MMOL/L (ref 3.4–5.3)
POTASSIUM SERPL-SCNC: 3.6 MMOL/L (ref 3.4–5.3)
PROT SERPL-MCNC: 6.6 G/DL (ref 6.8–8.8)
RBC # BLD AUTO: 5.15 10E12/L (ref 4.4–5.9)
SODIUM SERPL-SCNC: 139 MMOL/L (ref 133–144)
TROPONIN I SERPL-MCNC: 0.49 UG/L (ref 0–0.04)
WBC # BLD AUTO: 7.8 10E9/L (ref 4–11)

## 2018-01-13 PROCEDURE — 25000132 ZZH RX MED GY IP 250 OP 250 PS 637: Mod: GY | Performed by: INTERNAL MEDICINE

## 2018-01-13 PROCEDURE — 36415 COLL VENOUS BLD VENIPUNCTURE: CPT | Performed by: INTERNAL MEDICINE

## 2018-01-13 PROCEDURE — 00000146 ZZHCL STATISTIC GLUCOSE BY METER IP

## 2018-01-13 PROCEDURE — 84484 ASSAY OF TROPONIN QUANT: CPT | Performed by: INTERNAL MEDICINE

## 2018-01-13 PROCEDURE — 97165 OT EVAL LOW COMPLEX 30 MIN: CPT | Mod: GO

## 2018-01-13 PROCEDURE — 93306 TTE W/DOPPLER COMPLETE: CPT | Mod: 26 | Performed by: INTERNAL MEDICINE

## 2018-01-13 PROCEDURE — 25000131 ZZH RX MED GY IP 250 OP 636 PS 637: Performed by: INTERNAL MEDICINE

## 2018-01-13 PROCEDURE — 85027 COMPLETE CBC AUTOMATED: CPT | Performed by: INTERNAL MEDICINE

## 2018-01-13 PROCEDURE — 84132 ASSAY OF SERUM POTASSIUM: CPT | Performed by: INTERNAL MEDICINE

## 2018-01-13 PROCEDURE — 97112 NEUROMUSCULAR REEDUCATION: CPT | Mod: GO

## 2018-01-13 PROCEDURE — A9270 NON-COVERED ITEM OR SERVICE: HCPCS | Mod: GY | Performed by: INTERNAL MEDICINE

## 2018-01-13 PROCEDURE — 99232 SBSQ HOSP IP/OBS MODERATE 35: CPT | Performed by: INTERNAL MEDICINE

## 2018-01-13 PROCEDURE — 40000225 ZZH STATISTIC SLP WARD VISIT

## 2018-01-13 PROCEDURE — 99222 1ST HOSP IP/OBS MODERATE 55: CPT | Performed by: INTERNAL MEDICINE

## 2018-01-13 PROCEDURE — 40000133 ZZH STATISTIC OT WARD VISIT

## 2018-01-13 PROCEDURE — 12000000 ZZH R&B MED SURG/OB

## 2018-01-13 PROCEDURE — 92610 EVALUATE SWALLOWING FUNCTION: CPT | Mod: GN

## 2018-01-13 PROCEDURE — 25000128 H RX IP 250 OP 636: Performed by: INTERNAL MEDICINE

## 2018-01-13 PROCEDURE — 97530 THERAPEUTIC ACTIVITIES: CPT | Mod: GO

## 2018-01-13 PROCEDURE — 93306 TTE W/DOPPLER COMPLETE: CPT

## 2018-01-13 PROCEDURE — 80053 COMPREHEN METABOLIC PANEL: CPT | Performed by: INTERNAL MEDICINE

## 2018-01-13 RX ORDER — CLOPIDOGREL BISULFATE 75 MG/1
75 TABLET ORAL DAILY
Status: DISCONTINUED | OUTPATIENT
Start: 2018-01-13 | End: 2018-01-19 | Stop reason: HOSPADM

## 2018-01-13 RX ADMIN — DOCUSATE SODIUM 100 MG: 100 CAPSULE, LIQUID FILLED ORAL at 19:59

## 2018-01-13 RX ADMIN — CLOPIDOGREL 75 MG: 75 TABLET, FILM COATED ORAL at 15:39

## 2018-01-13 RX ADMIN — POTASSIUM CHLORIDE 40 MEQ: 1500 TABLET, EXTENDED RELEASE ORAL at 13:21

## 2018-01-13 RX ADMIN — SODIUM CHLORIDE: 9 INJECTION, SOLUTION INTRAVENOUS at 10:01

## 2018-01-13 RX ADMIN — POTASSIUM CHLORIDE 20 MEQ: 1500 TABLET, EXTENDED RELEASE ORAL at 15:39

## 2018-01-13 RX ADMIN — INSULIN ASPART 6 UNITS: 100 INJECTION, SOLUTION INTRAVENOUS; SUBCUTANEOUS at 18:47

## 2018-01-13 RX ADMIN — SIMVASTATIN 20 MG: 20 TABLET, FILM COATED ORAL at 22:40

## 2018-01-13 RX ADMIN — INSULIN GLARGINE 25 UNITS: 100 INJECTION, SOLUTION SUBCUTANEOUS at 12:34

## 2018-01-13 RX ADMIN — SODIUM CHLORIDE: 9 INJECTION, SOLUTION INTRAVENOUS at 20:08

## 2018-01-13 RX ADMIN — INSULIN GLARGINE 25 UNITS: 100 INJECTION, SOLUTION SUBCUTANEOUS at 20:00

## 2018-01-13 RX ADMIN — ASPIRIN 325 MG: 325 TABLET, DELAYED RELEASE ORAL at 12:45

## 2018-01-13 RX ADMIN — PREGABALIN 150 MG: 75 CAPSULE ORAL at 12:44

## 2018-01-13 RX ADMIN — PREGABALIN 150 MG: 75 CAPSULE ORAL at 19:59

## 2018-01-13 ASSESSMENT — ACTIVITIES OF DAILY LIVING (ADL)
DRESS: 0-->INDEPENDENT
BATHING: 0-->INDEPENDENT
TOILETING: 0-->INDEPENDENT
WHICH_OF_THE_ABOVE_FUNCTIONAL_RISKS_HAD_A_RECENT_ONSET_OR_CHANGE?: AMBULATION;TRANSFERRING
TRANSFERRING: 0-->INDEPENDENT
NUMBER_OF_TIMES_PATIENT_HAS_FALLEN_WITHIN_LAST_SIX_MONTHS: 6
SWALLOWING: 0-->SWALLOWS FOODS/LIQUIDS WITHOUT DIFFICULTY
COGNITION: 0 - NO COGNITION ISSUES REPORTED
RETIRED_EATING: 0-->INDEPENDENT
FALL_HISTORY_WITHIN_LAST_SIX_MONTHS: YES
ADLS_ACUITY_SCORE: 21
ADLS_ACUITY_SCORE: 21
ADLS_ACUITY_SCORE: 15
ADLS_ACUITY_SCORE: 15
RETIRED_COMMUNICATION: 0-->UNDERSTANDS/COMMUNICATES WITHOUT DIFFICULTY
AMBULATION: 0-->INDEPENDENT
ADLS_ACUITY_SCORE: 15
ADLS_ACUITY_SCORE: 15

## 2018-01-13 ASSESSMENT — VISUAL ACUITY
OU: BASELINE;BLURRED VISION
OU: BASELINE;BLURRED VISION
OU: BLURRED VISION;BASELINE
OU: BASELINE;BLURRED VISION
OU: BLURRED VISION;BASELINE
OU: BASELINE;BLURRED VISION

## 2018-01-13 NOTE — PROVIDER NOTIFICATION
Spoke with Dr. Barros from radiology regarding patient's head CT and CTA. Paged on call neurologist Dr. Cheung regarding abnormal head CT-stenosis of the arteries and aneurysm noted. Per Neurology, continue to monitor patient and notify if any neuro changes occur. Per Dr. Cheung keep SBP less than 160 and notify if below 120.

## 2018-01-13 NOTE — PLAN OF CARE
Problem: Patient Care Overview  Goal: Plan of Care/Patient Progress Review  Outcome: No Change  Arrived from ED at 1500. A&O. Neuros intact except SS, Left facial droop and left sided hemiparesis ( deficits of R blurry vision from previous stroke). VSS. Tele.NPO-failed screen. Bedrest. Incontinent at times. BGM checks. Skin with multiple bruises and scabs,feet dry and flaky. Elevated MD kimberly aware.  Denies pain. Plan for MRI. Continue to monitor.

## 2018-01-13 NOTE — PLAN OF CARE
Problem: Patient Care Overview  Goal: Plan of Care/Patient Progress Review  Discharge Planner SLP   Patient plan for discharge: TBD  Current status: Bedside swallow eval completed. Mild L sided labial droop from previous CVA. Pt reports being at baseline for speech and swallowing. Trials of thins, purees and cracker given with no overt s/sx of aspiration. Min labial residue on the L that pt independently cleared with his tongue. RECS: Regular diet and thins. Standard aspiration precautions. No further ST needs at this time.   Barriers to return to prior living situation: Acuity of illness  Recommendations for discharge: Pending progress from other therapies  Rationale for recommendations: TBD       Entered by: Blanca Reynolds 01/13/2018 12:20 PM

## 2018-01-13 NOTE — PLAN OF CARE
Problem: Patient Care Overview  Goal: Plan of Care/Patient Progress Review  PT: Order received; Per chart review, CT indicated an aneurysm; pending MRI results; patient currently on bedrest; Will await neurology input prior to evaluation.

## 2018-01-13 NOTE — PLAN OF CARE
Problem: Patient Care Overview  Goal: Plan of Care/Patient Progress Review  Discharge Planner OT   Patient plan for discharge: Home  Current status: OT orders received, eval completed.   Pt participated bed mobility training requiring step by step instruction for technique.  Participated in EOB sitting activity for balance.  Pt perception of an activity is that he is unable/too weak to complete.  With step by step instruction, he is more successful.   Barriers to return to prior living situation: Pt lives alone in a mobile home that is not accessible.  His bathroom is too small to accommodate a walker.  His brother can come to assist; however living with him or brother staying with pt is not an option.  Recommendations for discharge: TCU  Rationale for recommendations: Pt needs continued rehab to improve safety and independence.       Entered by: Luis Guillermo 01/13/2018 5:08 PM

## 2018-01-13 NOTE — PROGRESS NOTES
Writer rounded with Hospitalist. Notified of occasional tachy strips on tele monitor, asymtompatic. Patient normally takes metoprolol, allowing for permissive hypertension at this time. Continue to monitor. No new interventions or orders

## 2018-01-13 NOTE — PLAN OF CARE
Problem: Patient Care Overview  Goal: Plan of Care/Patient Progress Review  Outcome: No Change  A&O. Neuros intact except hemiparesis on LLE, LUE very slightly weaker than R. VSS except tachy at times (MD aware). SBP goal of 120-160. Tele NSR. Mod carb diet. BGM covered appropriately. Up to bsc A2 w/gb. Voiding in urinal. Multiple scabs and bruises. K replaced for level 3.1, recheck ordered.  Denies pain.     Addendum: Neuros remain unchanged. K lab draw for 1945.Started on plavix.  Up A2/gb to bsc. Denies pain. Follow up with neuro intervention as OP.

## 2018-01-13 NOTE — CONSULTS
Neurology Stroke Consult    Cooper Cabral  [id number]  1955       ASSESSMENT & RECOMMENDATIONS     62 year old man with multiple uncontrolled vascular risk factors as mentioned below with new punctate left medullary infarct, who also has encephalomalacia from his prior right posterior border zone and left occipital infarcts. His vessel imaging is remarkable for extensive intracranial atherosclerosis. Right M1 stenosis is very high grade with possible fusiform vessel dilation which is post stenotic.     His stroke etiology is likely intracranial atherosclerosis in addition to small vessel disease due to vascular risk factors.    Plan:   - Start patient on ASA 325mg and Plavix 75  - Continue statin and vascular risk factor management   - For right MCA stenosis and post stenotic fusiform dilation patient can follow up with neuro intervention as outpatient. No acute intervention necessary at this time since it is not symptomatic. Discussed with Neurointerventionist on call.        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 He does have history of ischemic strokes in the past and currently he was taking 81 mg aspirin for stroke prevention. Patient denies of any worsening of symptoms since admission.      Pertinent Past Medical/Surgical History    Active Ambulatory Problems     Diagnosis Date Noted     Hypertension goal BP (blood pressure) < 140/80 09/30/2008     Tobacco abuse 09/30/2008     Non morbid obesity 09/30/2008     Hyperlipidemia LDL goal <100 05/09/2010     Advanced directives, counseling/discussion 05/29/2013     Bell's palsy 03/02/2016     Other diabetic neurological complication associated with type 2 diabetes  mellitus (H) 09/07/2017     Type 2 diabetes mellitus with diabetic neuropathy, with long-term current use of insulin (H) 10/23/2017     Resolved Ambulatory Problems     Diagnosis Date Noted     Severe uncontrolled hypertension 05/29/2013     Past Medical History:   Diagnosis Date     Diabetes (H)      HTN (hypertension)      MENTAL STATUS:  Alert, oriented x3.  Speech fluent with normal naming, repetition, comprehension - mild dysarthria.  Good right-left orientation, body part naming Pupils are equal, round, reactive to light.  Extraocular movements full. Narrow right palpebral fissure. Right homonymous hemianopsia.  Facial sensation, movement normal.  Palate moves symmetrically.  Tongue midline.   NEUROLOGIC:  Motor 5/5 RUE RLE, Subtle LUE LLE weakness (4+) .  Reflexes 2/4.  Toe signs downgoing.  Mildly dysmetric on LUE LLE, sensation to light touch, position sense and double simultaneous stimulation.    Medications:   Current Facility-Administered Medications   Medication     sodium chloride (PF) 0.9% PF flush 10 mL     pregabalin (LYRICA) capsule 150 mg     simvastatin (ZOCOR) tablet 20 mg     glucose 40 % gel 15-30 g    Or     dextrose 50 % injection 25-50 mL    Or     glucagon injection 1 mg     naloxone (NARCAN) injection 0.1-0.4 mg     Medication Instruction     insulin glargine (LANTUS) injection 25 Units     insulin aspart (NovoLOG) inj (RAPID ACTING)     insulin aspart (NovoLOG) inj (RAPID ACTING)     insulin aspart (NovoLOG) inj (RAPID ACTING)     0.9% sodium chloride infusion     potassium chloride SA (K-DUR/KLOR-CON M) CR tablet 20-40 mEq     potassium chloride (KLOR-CON) Packet 20-40 mEq     potassium chloride 10 mEq in 100 mL sterile water intermittent infusion (premix)     potassium chloride 10 mEq in 100 mL intermittent infusion with 10 mg lidocaine     potassium chloride 20 mEq in 50 mL intermittent infusion     magnesium sulfate 4 g in 100 mL sterile water (premade)     acetaminophen (TYLENOL)  tablet 650 mg     oxyCODONE IR (ROXICODONE) tablet 5 mg     melatonin tablet 1 mg     docusate sodium (COLACE) capsule 100 mg     bisacodyl (DULCOLAX) EC tablet 5 mg    Or     bisacodyl (DULCOLAX) EC tablet 10 mg    Or     bisacodyl (DULCOLAX) EC tablet 15 mg     magnesium hydroxide (MILK OF MAGNESIA) suspension 30 mL     bisacodyl (DULCOLAX) Suppository 10 mg     ondansetron (ZOFRAN-ODT) ODT tab 4 mg    Or     ondansetron (ZOFRAN) injection 4 mg     aspirin EC EC tablet 325 mg    Or     aspirin Suppository 300 mg     labetalol (NORMODYNE/TRANDATE) injection 10-40 mg     hydrALAZINE (APRESOLINE) injection 10-20 mg   .    Allergies: No Known Allergies.    Family History:   Family History   Problem Relation Age of Onset     CANCER Mother      Bone cancer      DIABETES Father      CANCER Father      bone cancer      Hypertension Brother      Hypertension Brother    .    Social History:   Social History   Substance Use Topics     Smoking status: Current Every Day Smoker     Packs/day: 1.00     Years: 40.00     Types: Cigarettes     Smokeless tobacco: Never Used     Alcohol use No   .      ROS:  The 10 point Review of Systems is negative other than noted in the HPI or here.     PHYSICAL EXAMINATION  Vital Signs:  B/P: 111/67,  T: 97.9,  P: Data Unavailable,  R: 24            Labs  Most Recent 3 CBC's:  Recent Labs   Lab Test  01/13/18   0747  01/12/18   1135   WBC  7.8  8.5   HGB  16.3  16.5   MCV  91  91   PLT  219  247      Most Recent 3 BMP's:  Recent Labs   Lab Test  01/13/18   0747  01/12/18   1135  09/07/17   1432   NA  139  135  138   POTASSIUM  3.1*  3.7  4.4   CHLORIDE  104  98  101   CO2  27  28  29   BUN  25  30  25   CR  0.76  0.88  0.84   ANIONGAP  8  9  8   MAINE  8.8  8.9  9.7   GLC  155*  324*  269*     Most Recent 2 LFT's:  Recent Labs   Lab Test  01/13/18   0747  01/12/18   1135   AST  29  36   ALT  31  33   ALKPHOS  79  84   BILITOTAL  1.5*  1.4*     Most Recent INR's and Anticoagulation Dosing  History:  Anticoagulation Dose History     Recent Dosing and Labs Latest Ref Rng & Units 1/12/2018    INR 0.86 - 1.14 1.05        Most Recent 3 Troponin's:  Recent Labs   Lab Test  01/13/18   0040  01/12/18   1135   TROPI  0.488*  0.452*     Most Recent Cholesterol Panel:  Recent Labs   Lab Test  09/07/17   1432   CHOL  164   LDL  99   HDL  42   TRIG  114     Most Recent 6 Bacteria Isolates From Any Culture (See EPIC Reports for Culture Details):No lab results found.  Most Recent TSH, T4 and A1c Labs:  Recent Labs   Lab Test  01/12/18   1135   TSH  1.07   A1C  11.9*     Results for orders placed or performed during the hospital encounter of 01/12/18   CT Head w/o Contrast    Narrative    CT SCAN OF THE HEAD WITHOUT CONTRAST  1/12/2018 12:29 PM     HISTORY:  Left-sided weakness frequent falls.    TECHNIQUE:  Axial images of the head and coronal reformations without  IV contrast material. Radiation dose for this scan was reduced using  automated exposure control, adjustment of the mA and/or kV according  to patient size, or iterative reconstruction technique.    COMPARISON: None.    FINDINGS: Area of encephalomalacia with cortical volume loss in the  right parietal lobe, probably due to chronic infarct. There is  hypodensity and loss of gray-white matter differentiation in the right  parietal/occipital region. This is age indeterminate. Area of  encephalomalacia within the paramedian left occipital lobe with ex  vacuo dilatation of the occipital horn of the left lateral ventricle,  probably from chronic infarct. Chronic-appearing lacunar infarct in  the right cerebellum. Chronic-appearing infarcts in the right basal  ganglia and subinsular region. Patchy periventricular white matter  hypodensities are nonspecific but likely related to chronic  microvascular ischemic disease. Mild diffuse parenchymal volume loss.    No evidence of acute intracranial hemorrhage. No mass effect or  midline shift. No abnormal extra-axial  fluid collection.    Mild mucosal thickening within the visualized paranasal sinuses. The  bony calvarium and bones of the skull base appear intact.       Impression    IMPRESSION:      1. Hypodensity in the right parietal/occipital region likely  representing an age-indeterminate infarct. Acute ischemia in this area  would be difficult to exclude. Consider further evaluation with brain  MRI.  2. Chronic-appearing areas of encephalomalacia within the right  parietal lobe and paramedian left occipital lobe, likely due to  previous infarct. Chronic-appearing lacunar infarcts in the right  basal ganglia and subinsular region as well as the right cerebellum.  3. Diffuse parenchymal volume loss and white matter changes, likely  due to chronic microvascular ischemic disease.  4. No evidence of acute intracranial hemorrhage, mass, or herniation.     LION VAZ MD   MRI Brain w & w/o contrast    Narrative    MRI BRAIN WITHOUT AND WITH CONTRAST January 13, 2018 12:09 AM    HISTORY: Acute CVI.      TECHNIQUE: Multiplanar, multisequence MRI of the brain without and  with 11 mL Gadavist.    COMPARISON: Head CT from earlier the same day.    FINDINGS: Area of restricted diffusion within the left lateral medulla  measuring 0.5 mm with associated T2 hyperintensity concerning for  acute area of ischemia. No other areas of restricted diffusion. Areas  of encephalomalacia and gliosis within the right frontal lobe,  paramedian left occipital lobe, and right parietal occipital region  are probably due to previous infarcts. Multiple chronic appearing  lacunar infarcts within the cerebellum, brainstem, and basal ganglia.  Diffuse parenchymal volume loss. Patchy periventricular white matter  T2 hyperintensities likely related to chronic microvascular ischemic  disease. There is ex vacuo dilatation of the occipital horn of the  left lateral ventricle. Ventricles are proportional to the cerebral  sulci without evidence of  hydrocephalus.    No abnormal intracranial enhancement.    Mild mucosal thickening in the inferior right maxillary sinus.       Impression    IMPRESSION:    1. Acute infarct within the lateral left medulla without associated  hemorrhagic transformation. No other acute areas of ischemia.  2. Other areas of encephalomalacia and gliosis within the right  frontal lobe, paramedian left occipital lobe, and right  parietal/occipital region presumably due to previous infarcts.  Multiple chronic appearing lacunar infarcts also within the  cerebellum, brainstem, and bilateral basal ganglia.  3. Diffuse parenchymal volume loss and white matter changes likely due  to chronic microvascular ischemic disease.     LION VAZ MD   CT Head Neck Angio w/o & w Contrast    Narrative    CTA ANGIOGRAM HEAD NECK  1/12/2018 8:47 PM     HISTORY: ischemic stroke .;     TECHNIQUE:  Precontrast localizing scans were followed by CT  angiography with an injection of 70 mL Isovue -370 IV contrast with  scans through the head and neck.  Images were transferred to a  separate 3-D workstation where multiplanar reformations and 3-D images  were created.  Estimates of carotid stenoses are made relative to the  distal internal carotid artery diameters except as noted. Radiation  dose for this scan was reduced using automated exposure control,  adjustment of the mA and/or kV according to patient size, or iterative  reconstruction technique.    COMPARISON: None.    FINDINGS: Estimates of stenosis at the carotid bifurcations are  relative to the distal internal carotids.    Arch: There is a small amount of calcified atherosclerotic plaque in  the arch of the aorta. There is normal arch anatomy.    Neck:  Right Carotid:  The right common carotid artery is normal.  The right  carotid bifurcation appears normal.  Right internal carotid artery is  tortuous..     Left Carotid:  The left common carotid artery is unremarkable.  Small  amount of calcified  atherosclerotic plaque is seen at the left carotid  bifurcation. No stenosis.. Left internal carotid artery is tortuous..       Vertebrals:  The vertebral arteries are normal.    Head:  Right Carotid:No occluded vessels are seen. The M1 segment of the  right middle cerebral is abnormal with focal areas of significant,  probably greater than 70% stenosis. Between the areas of stenosis  there is a more focal area of aneurysmal dilatation of the M1 segment  of the right middle cerebral. This focal dilatation measures about 5  mm in diameter.     Left Carotid:  No occluded vessels are identified.  Mild  atherosclerotic disease with mild stenosis at the origin of several M2  branches.    Basilar:  The basilar artery and its branches appear normal.     Miscellaneous: Venous sinuses are patent..      Impression    IMPRESSION: Abnormal M1 segment of the right middle cerebral with a  high-grade stenosis in the M1 segment of the right middle cerebral and  more focal, 5 mm aneurysmal dilatation of the right middle cerebral.  The etiology of this pattern is uncertain. It could be due to  atherosclerotic disease but a mycotic aneurysm could also have this  appearance. Correlate with the patient's clinical symptoms.    Report called to the patient's nurse on the seventh floor at [facility].    Report called to patient's nurse.    GORDO RUBIO MD     Plan discussed with staff Dr. Jonatan Duffy MD   Pager: [phone]

## 2018-01-13 NOTE — PROGRESS NOTES
01/13/18 1100   General Information   Onset Date 01/12/18   Start of Care Date 01/13/18   Referring Physician Clemencia Martino MD   Patient/Family Goals Statement to eat and drink   Swallowing Evaluation Bedside swallow evaluation   Behaviorial Observations WFL (within functional limits)   Mode of current nutrition NPO   Comments Cooper Cabral is a 62 year old male with past medical history significant for essential hypertension, non-morbid obesity, hyperlipidemia, type II diabetes mellitus, insulin-dependent diabetic neuropathy and previous history of cerebrovascular disease who presented to the emergency room today with multiple falls and left-sided weakness going on for the last couple of days.   Clinical Swallow Evaluation   Oral Musculature generally intact   Structural Abnormalities none present   Dentition present and adequate   Mucosal Quality good   Mandibular Strength and Mobility intact   Oral Labial Strength and Mobility impaired seal  (droop L side)   Lingual Strength and Mobility WFL   Velar Elevation intact   Buccal Strength and Mobility impaired   Clinical Swallow Eval: Thin Liquid Texture Trial   Mode of Presentation, Thin Liquids cup   Volume of Liquid or Food Presented 1/4 c   Oral Phase of Swallow WFL   Pharyngeal Phase of Swallow intact   Diagnostic Statement no deficits   Clinical Swallow Eval: Pudding Thick Liquid Texture Trial   Mode of Presentation, Pudding spoon   Volume of Pudding Presented 2 TB   Oral Phase, Pudding WFL   Pharyngeal Phase, Pudding intact   Diagnostic Statement no deficits   Clinical Swallow Eval: Solid Food Texture Trial   Mode of Presentation, Solid fed by clinician   Volume of Solid Food Presented 1 cracker   Oral Phase, Solid WFL   Pharyngeal Phase, Solid intact   Diagnostic Statement no deficits   Esophageal Phase of Swallow   Patient reports or presents with symptoms of esophageal dysphagia No   Swallow Eval: Clinical Impressions   Skilled Criteria for Therapy  Intervention No problems identified which require skilled intervention   Functional Assessment Scale (FAS) 7   Diet texture recommendations Regular diet;Thin liquids   Recommended Feeding/Eating Techniques small sips/bites;maintain upright posture during/after eating for 30 mins   Anticipated Discharge Disposition (pending progress with other therapies)   Risks and Benefits of Treatment have been explained. Yes   Patient, family and/or staff in agreement with Plan of Care Yes   Clinical Impression Comments Bedside swallow eval completed. Mild L sided labial droop from previous CVA. Pt reports being at baseline for speech and swallowing. Trials of thins, purees and cracker given with no overt s/sx of aspiration. Min labial residue on the L that pt independently cleared with his tongue. RECS: Regular diet and thins. Standard aspiration precautions. No further ST needs at this time.    Total Evaluation Time   Total Evaluation Time (Minutes) 15

## 2018-01-13 NOTE — PROGRESS NOTES
[facility]  Hospitalist Progress Note          Assessment and Plan:     Cooper Cabral is a 62 year old male with past medical history significant for insulin-dependent type II diabetes mellitus with neuropathy, essential hypertension, hyperlipidemia, previous two episodes of cerebrovascular accident and tobacco abuse who was admitted today with left sided weakness going on for two days, and CT scan in the emergency room was concerning for right parietal/occipital hyper density concerning for possible stroke. Patient was not a TPA candidate he was given aspirin and is admitted for further evaluation and management.            #Recurrent strokes  -Was on asa at home but unsure how compliant he was  -Initial CT of head hypodensity right parietal/occipital region unsure of age of stroke; chronic appearing encephalomalacia  -MRI brain done showed acute infarct lateral left medulla  -CTA showed 5 mm aneurysmal dilatation of right middle cerebral.    -Plavix added to ASA  -Risk factor modification, although patient is noncompliant to meds and doesn't seem overly motivated to start    #Aneurysmal dilatation  On CTA, 5 mm, right middle cerebral  Neurologist note mentions discussion of [facility] and plan is to follow up as out     #Diabetes with neuropathy:  -Home meds are lantus 42 BID, novolog with meals and trulicity once a week and metformin  -He doesn't like shots and has not been taking any insulin for over 4 weeks  -A1c about 11%  -Discussed importance of compliance  -Considered oral options in him but will need to be addressed as an outpatient. If he's not compliant then glipizide could cause hypoglycemia. He's on social security and unsure if other meds will be covered  -In hospital he's on 25 units lantus BID with good sugar control    # Hypertension goal BP   -currently his home medications include lisinopril hydrochlorothiazide 20/12.5 mg PO daily and Toprol  mg PO daily. These  "are on hold for permissive hypertension.    #Tachycardia  -Tele shows short lived runs of tachycardia.  -It could be from holding the Toprol Xl  -Will just continue to monitor      #Tobacco abuse (9/30/2008): patient is starting to smoking since the age of 16, told me that he rolls his own cigarettes and has been a smoking for a long time    Non morbid obesity (9/30/2008)     # Hyperlipidemia LDL goal <100 (5/9/2010): currently patient is on 20 mg of simvastatin at home.                      Interval History:   Feels well, understands what's been happening. Knows he has to take better care of himself but \"not sure if I'm going to. I have to be honest\"                  Medications:       sodium chloride (PF)  10 mL Intravenous Once     pregabalin  150 mg Oral BID     simvastatin  20 mg Oral At Bedtime     insulin glargine  25 Units Subcutaneous BID     insulin aspart   Subcutaneous TID w/meals     insulin aspart  1-7 Units Subcutaneous TID AC     insulin aspart  1-5 Units Subcutaneous At Bedtime     docusate sodium  100 mg Oral BID     aspirin EC  325 mg Oral Daily    Or     aspirin  300 mg Rectal Daily     glucose **OR** dextrose **OR** glucagon, naloxone, - MEDICATION INSTRUCTIONS -, potassium chloride, potassium chloride, potassium chloride, potassium chloride with lidocaine, potassium chloride, magnesium sulfate, acetaminophen, oxyCODONE IR, melatonin, bisacodyl **OR** bisacodyl **OR** bisacodyl, magnesium hydroxide, bisacodyl, ondansetron **OR** ondansetron, labetalol, hydrALAZINE               Physical Exam:   Blood pressure (!) 149/95, pulse 87, temperature 97.6  F (36.4  C), temperature source Oral, resp. rate 16, height 1.829 m (6'), weight 113.4 kg (250 lb), SpO2 95 %.  Temp:  [97.2  F (36.2  C)-98  F (36.7  C)] 97.6  F (36.4  C)  Pulse:  [87] 87  Heart Rate:  [88-95] 88  Resp:  [14-16] 16  BP: (125-149)/(77-95) 149/95  SpO2:  [92 %-96 %] 95 %    Wt Readings from Last 4 Encounters:   01/12/18 113.4 kg (250 " lb)   11/21/17 105.5 kg (232 lb 9.6 oz)   09/07/17 103.7 kg (228 lb 9.6 oz)   05/08/17 103.5 kg (228 lb 4 oz)         Intake/Output Summary (Last 24 hours) at 01/13/18 1319  Last data filed at 01/13/18 1116   Gross per 24 hour   Intake                0 ml   Output              350 ml   Net             -350 ml       Constitutional:   Appears well; in no apparent distress     Lungs:   Good air entry; Clear to auscultation     Cardiovascular:   S1, S2 heard;     Abdomen:   Soft, nontender, no organomegaly, bowel sounds normal     Neurologic:   Alert and oriented X 3, Decrease in power left side                Data:   Lab Results   Component Value Date     01/13/2018     01/12/2018     09/07/2017    Lab Results   Component Value Date    CHLORIDE 104 01/13/2018    CHLORIDE 98 01/12/2018    CHLORIDE 101 09/07/2017    Lab Results   Component Value Date    BUN 25 01/13/2018    BUN 30 01/12/2018    BUN 25 09/07/2017      Lab Results   Component Value Date    POTASSIUM 3.1 01/13/2018    POTASSIUM 3.7 01/12/2018    POTASSIUM 4.4 09/07/2017    Lab Results   Component Value Date    CO2 27 01/13/2018    CO2 28 01/12/2018    CO2 29 09/07/2017    Lab Results   Component Value Date    CR 0.76 01/13/2018    CR 0.88 01/12/2018    CR 0.84 09/07/2017        Recent Labs   Lab Test  01/13/18   0747  01/12/18   1135   WBC  7.8  8.5   HGB  16.3  16.5   HCT  46.7  47.0   MCV  91  91   PLT  219  247     Recent Labs   Lab Test  01/13/18   0747  01/12/18   1135  09/07/17   1432   GLC  155*  324*  269*

## 2018-01-13 NOTE — PROGRESS NOTES
01/13/18 1400   Quick Adds   Type of Visit Initial Occupational Therapy Evaluation   Living Environment   Lives With alone   Living Arrangements mobile home   Home Accessibility house is not wheelchair accessible;stairs to enter home   Number of Stairs to Enter Home 4   Number of Stairs Within Home 0   Stair Railings at Home outside, present on right side   Transportation Available family or friend will provide  (bus)   Self-Care   Dominant Hand right   Usual Activity Tolerance good   Current Activity Tolerance poor   Regular Exercise no   Equipment Currently Used at Home grab bar   Activity/Exercise/Self-Care Comment states bathroom is very small   Functional Level Prior   Ambulation 0-->independent   Transferring 0-->independent   Toileting 0-->independent   Bathing 0-->independent   Dressing 0-->independent   Eating 0-->independent   Communication 0-->understands/communicates without difficulty   Swallowing 0-->swallows foods/liquids without difficulty   Cognition 0 - no cognition issues reported   Fall history within last six months yes   Number of times patient has fallen within last six months 6   Which of the above functional risks had a recent onset or change? ambulation;transferring   Prior Functional Level Comment falls occurred recently in past week   General Information   Onset of Illness/Injury or Date of Surgery - Date 01/12/18   Referring Physician Clemencia Martino MD   Patient/Family Goals Statement Go home and watch football   Additional Occupational Profile Info/Pertinent History of Current Problem Cooper Cabral is a 62 year old male with past medical history significant for insulin-dependent type II diabetes mellitus with neuropathy, essential hypertension, hyperlipidemia, previous two episodes of cerebrovascular accident and tobacco abuse who was admitted today with left sided weakness going on for two days, and CT scan in the emergency room was concerning for right parietal/occipital hyper  density concerning for possible stroke. Patient was not a TPA candidate he was given aspirin and is admitted for further evaluation and management.   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  4/5 in L   Coordination   Fine Motor Coordination L slower   Functional Limitations Decreased speed   Transfer Skill: Sit to Stand   Level of [place]: Sit/Stand stand-by assist   Transfer Skill: Toilet Transfer   Level of [place]: Toilet contact guard   Toilet Transfer Skill Comments requires step by step cues   Bathing   Level of [place] moderate assist (50% patients effort)   Upper Body Dressing   Level of [place]: Dress Upper Body minimum assist (75% patients effort)   Lower Body Dressing   Level of [place]: Dress Lower Body moderate assist (50% patients effort)   Toileting   Level of [place]: Toilet minimum assist (75% patients effort)   Grooming   Level of [place]: Grooming stand-by assist   Eating/Self Feeding   Level of [place]: Eating stand-by assist   Instrumental Activities of Daily Living (IADL)   Previous Responsibilities housekeeping;meal prep;laundry;shopping;medication management;finances;yardwork   Activities of Daily Living Analysis   Impairments Contributing to Impaired Activities of Daily Living balance impaired;coordination impaired;flexibility decreased;postural  "control impaired;sensory feedback impaired;strength decreased   General Therapy Interventions   Planned Therapy Interventions ADL retraining;IADL retraining;neuromuscular re-education;motor coordination training;fine motor coordination training;bed mobility training;balance training   Clinical Impression   Criteria for Skilled Therapeutic Interventions Met yes, treatment indicated   OT Diagnosis decreased ADLs and IADLs   Assessment of Occupational Performance 3-5 Performance Deficits   Identified Performance Deficits community integration, bathing, dressing, home management, meal prep   Clinical Decision Making (Complexity) Low complexity   Therapy Frequency daily   Predicted Duration of Therapy Intervention (days/wks) 2 weeks   Anticipated Discharge Disposition Transitional Care Facility   Risks and Benefits of Treatment have been explained. Yes   Patient, Family & other staff in agreement with plan of care Yes   [facility] AM-PAC  \"6 Clicks\" Daily Activity Inpatient Short Form   1. Putting on and taking off regular lower body clothing? 2 - A Lot   2. Bathing (including washing, rinsing, drying)? 2 - A Lot   3. Toileting, which includes using toilet, bedpan or urinal? 3 - A Little   4. Putting on and taking off regular upper body clothing? 3 - A Little   5. Taking care of personal grooming such as brushing teeth? 3 - A Little   6. Eating meals? 3 - A Little   Daily Activity Raw Score (Score out of 24.Lower scores equate to lower levels of function) 16   Total Evaluation Time   Total Evaluation Time (Minutes) 5     "

## 2018-01-13 NOTE — PLAN OF CARE
Problem: Patient Care Overview  Goal: Plan of Care/Patient Progress Review  Outcome: No Change  A&Ox4, forgetful. Neuros- R field cut/blurry vision, L arm hemiparesis, slurred speech, L facial droop. Baseline numbness in BLE. VSS. Tele NSR. NPO pending SLP. Up with A2. Denies pain. Abrasion on R arm from fall prior to admission. MRI pending. Plan for Neurology and therapies to see pt today.

## 2018-01-14 ENCOUNTER — APPOINTMENT (OUTPATIENT)
Dept: PHYSICAL THERAPY | Facility: CLINIC | Age: 63
DRG: 065 | End: 2018-01-14
Attending: INTERNAL MEDICINE
Payer: MEDICARE

## 2018-01-14 ENCOUNTER — APPOINTMENT (OUTPATIENT)
Dept: OCCUPATIONAL THERAPY | Facility: CLINIC | Age: 63
DRG: 065 | End: 2018-01-14
Payer: MEDICARE

## 2018-01-14 LAB
CHOLEST SERPL-MCNC: 147 MG/DL
GLUCOSE BLDC GLUCOMTR-MCNC: 113 MG/DL (ref 70–99)
GLUCOSE BLDC GLUCOMTR-MCNC: 125 MG/DL (ref 70–99)
GLUCOSE BLDC GLUCOMTR-MCNC: 151 MG/DL (ref 70–99)
GLUCOSE BLDC GLUCOMTR-MCNC: 153 MG/DL (ref 70–99)
GLUCOSE BLDC GLUCOMTR-MCNC: 178 MG/DL (ref 70–99)
HDLC SERPL-MCNC: 37 MG/DL
LDLC SERPL CALC-MCNC: 92 MG/DL
NONHDLC SERPL-MCNC: 110 MG/DL
POTASSIUM SERPL-SCNC: 3.3 MMOL/L (ref 3.4–5.3)
TRIGL SERPL-MCNC: 89 MG/DL

## 2018-01-14 PROCEDURE — 97112 NEUROMUSCULAR REEDUCATION: CPT | Mod: GP | Performed by: PHYSICAL THERAPIST

## 2018-01-14 PROCEDURE — 12000000 ZZH R&B MED SURG/OB

## 2018-01-14 PROCEDURE — 25000128 H RX IP 250 OP 636: Performed by: INTERNAL MEDICINE

## 2018-01-14 PROCEDURE — 97530 THERAPEUTIC ACTIVITIES: CPT | Mod: GP | Performed by: PHYSICAL THERAPIST

## 2018-01-14 PROCEDURE — 99232 SBSQ HOSP IP/OBS MODERATE 35: CPT | Performed by: INTERNAL MEDICINE

## 2018-01-14 PROCEDURE — A9270 NON-COVERED ITEM OR SERVICE: HCPCS | Mod: GY | Performed by: INTERNAL MEDICINE

## 2018-01-14 PROCEDURE — 40000133 ZZH STATISTIC OT WARD VISIT

## 2018-01-14 PROCEDURE — 97112 NEUROMUSCULAR REEDUCATION: CPT | Mod: GO

## 2018-01-14 PROCEDURE — 00000146 ZZHCL STATISTIC GLUCOSE BY METER IP

## 2018-01-14 PROCEDURE — 25000132 ZZH RX MED GY IP 250 OP 250 PS 637: Mod: GY | Performed by: INTERNAL MEDICINE

## 2018-01-14 PROCEDURE — 40000193 ZZH STATISTIC PT WARD VISIT: Performed by: PHYSICAL THERAPIST

## 2018-01-14 PROCEDURE — 84132 ASSAY OF SERUM POTASSIUM: CPT | Performed by: INTERNAL MEDICINE

## 2018-01-14 PROCEDURE — 97161 PT EVAL LOW COMPLEX 20 MIN: CPT | Mod: GP | Performed by: PHYSICAL THERAPIST

## 2018-01-14 PROCEDURE — 80061 LIPID PANEL: CPT | Performed by: INTERNAL MEDICINE

## 2018-01-14 PROCEDURE — 99406 BEHAV CHNG SMOKING 3-10 MIN: CPT

## 2018-01-14 PROCEDURE — 25000131 ZZH RX MED GY IP 250 OP 636 PS 637: Performed by: INTERNAL MEDICINE

## 2018-01-14 PROCEDURE — 36415 COLL VENOUS BLD VENIPUNCTURE: CPT | Performed by: INTERNAL MEDICINE

## 2018-01-14 RX ORDER — NICOTINE 21 MG/24HR
1 PATCH, TRANSDERMAL 24 HOURS TRANSDERMAL DAILY
Status: DISCONTINUED | OUTPATIENT
Start: 2018-01-14 | End: 2018-01-19 | Stop reason: HOSPADM

## 2018-01-14 RX ADMIN — SODIUM CHLORIDE: 9 INJECTION, SOLUTION INTRAVENOUS at 17:17

## 2018-01-14 RX ADMIN — NICOTINE 1 PATCH: 21 PATCH, EXTENDED RELEASE TRANSDERMAL at 09:58

## 2018-01-14 RX ADMIN — INSULIN ASPART 6 UNITS: 100 INJECTION, SOLUTION INTRAVENOUS; SUBCUTANEOUS at 10:02

## 2018-01-14 RX ADMIN — SIMVASTATIN 20 MG: 20 TABLET, FILM COATED ORAL at 22:28

## 2018-01-14 RX ADMIN — BISACODYL 10 MG: 5 TABLET, COATED ORAL at 09:57

## 2018-01-14 RX ADMIN — INSULIN GLARGINE 25 UNITS: 100 INJECTION, SOLUTION SUBCUTANEOUS at 22:29

## 2018-01-14 RX ADMIN — POTASSIUM CHLORIDE 20 MEQ: 1500 TABLET, EXTENDED RELEASE ORAL at 13:37

## 2018-01-14 RX ADMIN — DOCUSATE SODIUM 100 MG: 100 CAPSULE, LIQUID FILLED ORAL at 09:15

## 2018-01-14 RX ADMIN — PREGABALIN 150 MG: 75 CAPSULE ORAL at 09:14

## 2018-01-14 RX ADMIN — SODIUM CHLORIDE: 9 INJECTION, SOLUTION INTRAVENOUS at 06:06

## 2018-01-14 RX ADMIN — INSULIN ASPART 8 UNITS: 100 INJECTION, SOLUTION INTRAVENOUS; SUBCUTANEOUS at 14:39

## 2018-01-14 RX ADMIN — PREGABALIN 150 MG: 75 CAPSULE ORAL at 22:28

## 2018-01-14 RX ADMIN — ASPIRIN 325 MG: 325 TABLET, DELAYED RELEASE ORAL at 09:15

## 2018-01-14 RX ADMIN — CLOPIDOGREL 75 MG: 75 TABLET, FILM COATED ORAL at 09:15

## 2018-01-14 RX ADMIN — INSULIN ASPART 1 UNITS: 100 INJECTION, SOLUTION INTRAVENOUS; SUBCUTANEOUS at 18:27

## 2018-01-14 RX ADMIN — INSULIN GLARGINE 25 UNITS: 100 INJECTION, SOLUTION SUBCUTANEOUS at 09:15

## 2018-01-14 RX ADMIN — DOCUSATE SODIUM 100 MG: 100 CAPSULE, LIQUID FILLED ORAL at 22:28

## 2018-01-14 RX ADMIN — POTASSIUM CHLORIDE 40 MEQ: 1500 TABLET, EXTENDED RELEASE ORAL at 09:57

## 2018-01-14 ASSESSMENT — VISUAL ACUITY
OU: BASELINE;BLURRED VISION

## 2018-01-14 ASSESSMENT — ACTIVITIES OF DAILY LIVING (ADL)
ADLS_ACUITY_SCORE: 15
ADLS_ACUITY_SCORE: 15
ADLS_ACUITY_SCORE: 14
ADLS_ACUITY_SCORE: 15

## 2018-01-14 NOTE — PLAN OF CARE
Problem: Patient Care Overview  Goal: Plan of Care/Patient Progress Review  Outcome: Improving  A/o4, forgetful at times. Neuros Lside weakness; [place] blurred vision/field cuts is baseline; Baseline neuropathy in feet. VSS. Tele sinus dysrhythmia w/PAC. Voiding in urinal. Scabs/bruising on left arm KATARINA from previous fall(s).  Tolerating MCHO. Denies pain. D/c pending md.

## 2018-01-14 NOTE — PLAN OF CARE
Problem: Patient Care Overview  Goal: Plan of Care/Patient Progress Review  Outcome: Improving  A&Ox4, forgetful. Neuros- Left sided hemiparesis, leg more noticeably weak. Baseline R eye blurry/field cut from previous CVA. Slurred speech. VSS, except tachy. Tele NSR. Diabetic diet. Up with A2. Denies pain. Using urinal at bedside, urinary frequency. Echo results pending. Discharge plan pending.

## 2018-01-14 NOTE — CONSULTS
Neurology Stroke Consult    Cooper Cabral  [id number]  1955       ASSESSMENT & RECOMMENDATIONS     62 year old man with multiple uncontrolled vascular risk factors as mentioned below with new punctate left medullary infarct, who also has encephalomalacia from his prior right posterior border zone and left occipital infarcts. His vessel imaging is remarkable for extensive intracranial atherosclerosis. Right M1 stenosis is very high grade with possible fusiform vessel dilation which is post stenotic.     His stroke etiology is likely intracranial atherosclerosis in addition to small vessel disease due to vascular risk factors.    Recommendations:   - Start patient on ASA 325mg and Plavix 75  - Continue statin and vascular risk factor management   - For right MCA stenosis and post stenotic fusiform dilation patient can follow up with neuro intervention as outpatient. No acute intervention necessary at this time since it is not symptomatic. Discussed with Neurointerventionist on call.    - NO FURTHER RECOMMENDATIONS ---- WE WILL SIGN OFF.       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 He does have history of ischemic strokes in the past and currently he was taking 81 mg aspirin for stroke prevention. Patient denies of any worsening of symptoms since admission.      Pertinent Past Medical/Surgical History    Active Ambulatory Problems     Diagnosis Date Noted     Hypertension goal BP (blood pressure) < 140/80 09/30/2008     Tobacco abuse 09/30/2008     Non morbid obesity 09/30/2008     Hyperlipidemia LDL goal <100 05/09/2010     Advanced directives, counseling/discussion 05/29/2013     Bell's palsy 03/02/2016     Other  diabetic neurological complication associated with type 2 diabetes mellitus (H) 09/07/2017     Type 2 diabetes mellitus with diabetic neuropathy, with long-term current use of insulin (H) 10/23/2017     Resolved Ambulatory Problems     Diagnosis Date Noted     Severe uncontrolled hypertension 05/29/2013     Past Medical History:   Diagnosis Date     Diabetes (H)      HTN (hypertension)      MENTAL STATUS:  Alert, oriented x3.  Speech fluent with normal naming, repetition, comprehension - mild dysarthria.  Good right-left orientation, body part naming Pupils are equal, round, reactive to light.  Extraocular movements full. Narrow right palpebral fissure. Right homonymous hemianopsia.  Facial sensation, movement normal.  Palate moves symmetrically.  Tongue midline.   NEUROLOGIC:  Motor 5/5 RUE RLE, Subtle LUE LLE weakness (4+) .  Reflexes 2/4.  Toe signs downgoing.  Mildly dysmetric on LUE LLE, sensation to light touch, position sense and double simultaneous stimulation.    Medications:   Current Facility-Administered Medications   Medication     nicotine Patch in Place     [START ON 1/15/2018] nicotine patch REMOVAL     nicotine (NICODERM CQ) 21 MG/24HR 24 hr patch 1 patch     sodium chloride (PF) 0.9% PF flush 10 mL     clopidogrel (PLAVIX) tablet 75 mg     pregabalin (LYRICA) capsule 150 mg     simvastatin (ZOCOR) tablet 20 mg     glucose 40 % gel 15-30 g    Or     dextrose 50 % injection 25-50 mL    Or     glucagon injection 1 mg     naloxone (NARCAN) injection 0.1-0.4 mg     Medication Instruction     insulin glargine (LANTUS) injection 25 Units     insulin aspart (NovoLOG) inj (RAPID ACTING)     insulin aspart (NovoLOG) inj (RAPID ACTING)     insulin aspart (NovoLOG) inj (RAPID ACTING)     0.9% sodium chloride infusion     potassium chloride SA (K-DUR/KLOR-CON M) CR tablet 20-40 mEq     potassium chloride (KLOR-CON) Packet 20-40 mEq     potassium chloride 10 mEq in 100 mL sterile water intermittent infusion  (premix)     potassium chloride 10 mEq in 100 mL intermittent infusion with 10 mg lidocaine     potassium chloride 20 mEq in 50 mL intermittent infusion     magnesium sulfate 4 g in 100 mL sterile water (premade)     acetaminophen (TYLENOL) tablet 650 mg     oxyCODONE IR (ROXICODONE) tablet 5 mg     melatonin tablet 1 mg     docusate sodium (COLACE) capsule 100 mg     bisacodyl (DULCOLAX) EC tablet 5 mg    Or     bisacodyl (DULCOLAX) EC tablet 10 mg    Or     bisacodyl (DULCOLAX) EC tablet 15 mg     magnesium hydroxide (MILK OF MAGNESIA) suspension 30 mL     bisacodyl (DULCOLAX) Suppository 10 mg     ondansetron (ZOFRAN-ODT) ODT tab 4 mg    Or     ondansetron (ZOFRAN) injection 4 mg     aspirin EC EC tablet 325 mg    Or     aspirin Suppository 300 mg     labetalol (NORMODYNE/TRANDATE) injection 10-40 mg     hydrALAZINE (APRESOLINE) injection 10-20 mg   .    Allergies: No Known Allergies.    Family History:   Family History   Problem Relation Age of Onset     CANCER Mother      Bone cancer      DIABETES Father      CANCER Father      bone cancer      Hypertension Brother      Hypertension Brother    .    Social History:   Social History   Substance Use Topics     Smoking status: Current Every Day Smoker     Packs/day: 1.00     Years: 40.00     Types: Cigarettes     Smokeless tobacco: Never Used     Alcohol use No   .      ROS:  The 10 point Review of Systems is negative other than noted in the HPI or here.     PHYSICAL EXAMINATION  Vital Signs:  B/P: 111/67,  T: 97.9,  P: Data Unavailable,  R: 24            Labs  Most Recent 3 CBC's:  Recent Labs   Lab Test  01/13/18   0747  01/12/18   1135   WBC  7.8  8.5   HGB  16.3  16.5   MCV  91  91   PLT  219  247      Most Recent 3 BMP's:  Recent Labs   Lab Test  01/14/18   0908  01/13/18   1950  01/13/18   0747  01/12/18   1135  09/07/17   1432   NA   --    --   139  135  138   POTASSIUM  3.3*  3.6  3.1*  3.7  4.4   CHLORIDE   --    --   104  98  101   CO2   --    --   27  28   29   BUN   --    --   25  30  25   CR   --    --   0.76  0.88  0.84   ANIONGAP   --    --   8  9  8   MAINE   --    --   8.8  8.9  9.7   GLC   --    --   155*  324*  269*     Most Recent 2 LFT's:  Recent Labs   Lab Test  01/13/18   0747  01/12/18   1135   AST  29  36   ALT  31  33   ALKPHOS  79  84   BILITOTAL  1.5*  1.4*     Most Recent INR's and Anticoagulation Dosing History:  Anticoagulation Dose History     Recent Dosing and Labs Latest Ref Rng & Units 1/12/2018    INR 0.86 - 1.14 1.05        Most Recent 3 Troponin's:  Recent Labs   Lab Test  01/13/18   0040  01/12/18   1135   TROPI  0.488*  0.452*     Most Recent Cholesterol Panel:  Recent Labs   Lab Test  01/14/18   0908   CHOL  147   LDL  92   HDL  37*   TRIG  89     Most Recent 6 Bacteria Isolates From Any Culture (See EPIC Reports for Culture Details):No lab results found.  Most Recent TSH, T4 and A1c Labs:  Recent Labs   Lab Test  01/12/18   1135   TSH  1.07   A1C  11.9*     Results for orders placed or performed during the hospital encounter of 01/12/18   CT Head w/o Contrast    Narrative    CT SCAN OF THE HEAD WITHOUT CONTRAST  1/12/2018 12:29 PM     HISTORY:  Left-sided weakness frequent falls.    TECHNIQUE:  Axial images of the head and coronal reformations without  IV contrast material. Radiation dose for this scan was reduced using  automated exposure control, adjustment of the mA and/or kV according  to patient size, or iterative reconstruction technique.    COMPARISON: None.    FINDINGS: Area of encephalomalacia with cortical volume loss in the  right parietal lobe, probably due to chronic infarct. There is  hypodensity and loss of gray-white matter differentiation in the right  parietal/occipital region. This is age indeterminate. Area of  encephalomalacia within the paramedian left occipital lobe with ex  vacuo dilatation of the occipital horn of the left lateral ventricle,  probably from chronic infarct. Chronic-appearing lacunar infarct in  the  right cerebellum. Chronic-appearing infarcts in the right basal  ganglia and subinsular region. Patchy periventricular white matter  hypodensities are nonspecific but likely related to chronic  microvascular ischemic disease. Mild diffuse parenchymal volume loss.    No evidence of acute intracranial hemorrhage. No mass effect or  midline shift. No abnormal extra-axial fluid collection.    Mild mucosal thickening within the visualized paranasal sinuses. The  bony calvarium and bones of the skull base appear intact.       Impression    IMPRESSION:      1. Hypodensity in the right parietal/occipital region likely  representing an age-indeterminate infarct. Acute ischemia in this area  would be difficult to exclude. Consider further evaluation with brain  MRI.  2. Chronic-appearing areas of encephalomalacia within the right  parietal lobe and paramedian left occipital lobe, likely due to  previous infarct. Chronic-appearing lacunar infarcts in the right  basal ganglia and subinsular region as well as the right cerebellum.  3. Diffuse parenchymal volume loss and white matter changes, likely  due to chronic microvascular ischemic disease.  4. No evidence of acute intracranial hemorrhage, mass, or herniation.     LION VAZ MD   MRI Brain w & w/o contrast    Narrative    MRI BRAIN WITHOUT AND WITH CONTRAST January 13, 2018 12:09 AM    HISTORY: Acute CVI.      TECHNIQUE: Multiplanar, multisequence MRI of the brain without and  with 11 mL Gadavist.    COMPARISON: Head CT from earlier the same day.    FINDINGS: Area of restricted diffusion within the left lateral medulla  measuring 0.5 mm with associated T2 hyperintensity concerning for  acute area of ischemia. No other areas of restricted diffusion. Areas  of encephalomalacia and gliosis within the right frontal lobe,  paramedian left occipital lobe, and right parietal occipital region  are probably due to previous infarcts. Multiple chronic appearing  lacunar infarcts  within the cerebellum, brainstem, and basal ganglia.  Diffuse parenchymal volume loss. Patchy periventricular white matter  T2 hyperintensities likely related to chronic microvascular ischemic  disease. There is ex vacuo dilatation of the occipital horn of the  left lateral ventricle. Ventricles are proportional to the cerebral  sulci without evidence of hydrocephalus.    No abnormal intracranial enhancement.    Mild mucosal thickening in the inferior right maxillary sinus.       Impression    IMPRESSION:    1. Acute infarct within the lateral left medulla without associated  hemorrhagic transformation. No other acute areas of ischemia.  2. Other areas of encephalomalacia and gliosis within the right  frontal lobe, paramedian left occipital lobe, and right  parietal/occipital region presumably due to previous infarcts.  Multiple chronic appearing lacunar infarcts also within the  cerebellum, brainstem, and bilateral basal ganglia.  3. Diffuse parenchymal volume loss and white matter changes likely due  to chronic microvascular ischemic disease.     LION VAZ MD   CT Head Neck Angio w/o & w Contrast    Narrative    CTA ANGIOGRAM HEAD NECK  1/12/2018 8:47 PM     HISTORY: ischemic stroke .;     TECHNIQUE:  Precontrast localizing scans were followed by CT  angiography with an injection of 70 mL Isovue -370 IV contrast with  scans through the head and neck.  Images were transferred to a  separate 3-D workstation where multiplanar reformations and 3-D images  were created.  Estimates of carotid stenoses are made relative to the  distal internal carotid artery diameters except as noted. Radiation  dose for this scan was reduced using automated exposure control,  adjustment of the mA and/or kV according to patient size, or iterative  reconstruction technique.    COMPARISON: None.    FINDINGS: Estimates of stenosis at the carotid bifurcations are  relative to the distal internal carotids.    Arch: There is a small amount  of calcified atherosclerotic plaque in  the arch of the aorta. There is normal arch anatomy.    Neck:  Right Carotid:  The right common carotid artery is normal.  The right  carotid bifurcation appears normal.  Right internal carotid artery is  tortuous..     Left Carotid:  The left common carotid artery is unremarkable.  Small  amount of calcified atherosclerotic plaque is seen at the left carotid  bifurcation. No stenosis.. Left internal carotid artery is tortuous..       Vertebrals:  The vertebral arteries are normal.    Head:  Right Carotid:No occluded vessels are seen. The M1 segment of the  right middle cerebral is abnormal with focal areas of significant,  probably greater than 70% stenosis. Between the areas of stenosis  there is a more focal area of aneurysmal dilatation of the M1 segment  of the right middle cerebral. This focal dilatation measures about 5  mm in diameter.     Left Carotid:  No occluded vessels are identified.  Mild  atherosclerotic disease with mild stenosis at the origin of several M2  branches.    Basilar:  The basilar artery and its branches appear normal.     Miscellaneous: Venous sinuses are patent..      Impression    IMPRESSION: Abnormal M1 segment of the right middle cerebral with a  high-grade stenosis in the M1 segment of the right middle cerebral and  more focal, 5 mm aneurysmal dilatation of the right middle cerebral.  The etiology of this pattern is uncertain. It could be due to  atherosclerotic disease but a mycotic aneurysm could also have this  appearance. Correlate with the patient's clinical symptoms.    Report called to the patient's nurse on the seventh floor at [facility].    Report called to patient's nurse.    GORDO RUBIO MD     [facility]  Neuro ICU Attending  [phone]  Cell: [phone]

## 2018-01-14 NOTE — PLAN OF CARE
Problem: Patient Care Overview  Goal: Plan of Care/Patient Progress Review  Outcome: Improving  L medulla infract. Pt A&O x4, forgetful. VSS, on RA. LS clear. Tele NSR. BLE baseline numbness, feet, otherwise CMS intact. Neuros: LUE/LLE weakness, patient has baseline slurred speech, slight facial droop, R field cut from previous stroke. Up A1 w/ GB and walker. Denies pain. BUE/BLE bruises, abrasions, scabs, KATARINA. Nicotine patch, L deltoid. Voiding adequately. +BS, passing flatus, oral dulcolax given, no BM. Mod CHO diet. , 178, SSI and 1 unit given for 10 grams of carbs consumed, see MAR. ARU at time of discharge. Nrsg will continue to monitor.

## 2018-01-14 NOTE — PROGRESS NOTES
"   01/14/18 1400   General Information   Patient is receptive to smoking cessation at this time No   Packs Per Day 0.5 PPD   Years Smoked (#) 42 yrs   Cigarette Pack Years 21   Stage of Behavior Change   Patient's Stage of Behavior Change Pre-contemplation \"I won't\"   Processes of Change   The following interventions were used (smoking cessation) Increase awareness of effects of smoking on health   Motivation to Quit Scale (1-10) 1   Confidence to Quit Scale (1-10) 1   Education/Recommendations   Education QUIT PLAN phone line   Treatment Time (Minutes) 5 minutes   Total Evaluation Time   Total Evaluation Time (Minutes) 5     "

## 2018-01-14 NOTE — PROGRESS NOTES
Care Transition Initial Assessment -   Reason For Consult: discharge planning  Met with: Patient    Principal Problem:    Ischemic stroke (H)  Active Problems:    Hypertension goal BP (blood pressure) < 140/80    Tobacco abuse    Non morbid obesity    Hyperlipidemia LDL goal <100    Other diabetic neurological complication associated with type 2 diabetes mellitus (H)    Type 2 diabetes mellitus with diabetic neuropathy, with long-term current use of insulin (H)    Left-sided weakness         DATA  Lives With: alone  Living Arrangements: mobile home  Description of Support System: Supportive, Involved  Who is your support system?: Sibling(s), PCA, Other (specify) (SN visits every biweekly)  Support Assessment: Adequate family and caregiver support. Pt is on a CADI waiver through [facility]. Pt receives [employer] 7 days a week. Pt's sister in law, Rachel, is his PCA and he receives an hour of help every other day. Pt also has nursing visits biweekly for vitals assessment and assistance with medication set-up.   Identified issues/concerns regarding health management: Pt lives in a mobile home with 4 steps leading up to his front door and tight bathroom and pt is unable to shower (this is normal for pt- does on a monthly basis).    Quality Of Family Relationships: supportive, involved  Transportation Available: family or friend will provide, Pt does have medical assistance.    ASSESSMENT  Cognitive Status:  awake, alert and oriented  Concerns to be addressed: Per social service protocol for discharge planning. Patient was admitted 1/12/18 with left sided weakness. The tentative date of discharge is 1/15/18 or 1/16/18. Reviewed chart and spoke with the patient regarding discharge plans. Discussed that an acute rehab referral will be made to assess pt. ARU will review and request prior authorization on Monday, 1/15.      PLAN  Financial costs for the patient includes None assessed at this time. Pt has [employer]  Access ability.  Patient given options and choices for discharge. Explained recommendations for Acute Rehab. Pt was agreeable to the [facility] location. Also, discussed TCU options should that be recommended and pt and pt's brother would prefer the [place] area with choices including, [facility], [facility], and [facility]. No referrals placed will wait ARU response.    Patient/family is agreeable to the plan?  Yes: Pt denies any questions. Pt wanted  to update pt's brother Chris Alvarenga is agreeable with the plan and will be able to support pt upon discharge to home. Pt's brother lives near by and manages pt's bills and owns his mobile home. Very supportive.  Patient Goals and Preferences: Pt would like to rehab quickly and return home.  Patient anticipates discharging to:  ARU .    KRUNAL Elam

## 2018-01-14 NOTE — PLAN OF CARE
"Problem: Patient Care Overview  Goal: Plan of Care/Patient Progress Review  PT: Eval completed and treatment initiated. Pt lives alone in mobile home with 4 stairs to enter, indep without AD for gait and transfers. Has PCA assist \"every couple of days\" for an hour, per pt report.    Discharge Planner PT   Patient plan for discharge: agreeable to rehab at discharge  Current status: requires CGA-min A for supine > sit EOB with cuing. Min A for balance and step by step sequencing for sit <> stand and stand pivot transfers using FWW, several balance checks during transfers requiring min A to correct for LOB. Ambulated forward/backward x 3 ft, 2 reps at EOB requiring min A and heavy verbal and tactile cuing for weight shift to prevent overt LOB due to moderate balance impairments.   Barriers to return to prior living situation: lives alone, high fall risk, stairs to enter, new use of AD  Recommendations for discharge: ARU vs. TCU  Rationale for recommendations: Pt previously indep with functional mobility, significantly below baseline currently and would benefit from continued therapies to address balance, coordination, strength and endurance for return to safe, indep household mobility.        Entered by: Irene Caban 01/14/2018 10:01 AM           "

## 2018-01-14 NOTE — PROGRESS NOTES
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 Patient was not a TPA candidate he was given aspirin and is admitted for further evaluation and management.      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    -Plavix added to ASA  -Risk factor modification, although patient is noncompliant to meds and doesn't seem overly motivated to start     #Cerebral neurysmal dilatation  On CTA, 5 mm, right middle cerebral  Neurologist note mentions discussion of [facility] and plan is to follow up as out    #Aortic dilatation  Moderate dilatation of ascending aorta seen on Echocardiogram.   Can be followed as outpatient      #Diabetes with neuropathy:  -Home meds are lantus 42 BID, novolog with meals and trulicity once a week and metformin  -He doesn't like shots and has not been taking any insulin for over 4 weeks  -A1c about 11%  -Discussed importance of compliance  -Considered oral options in him but will need to be addressed as an outpatient. If he's not compliant then glipizide could cause hypoglycemia. He's on social security and unsure if other meds will be covered  -In hospital he's on 25 units lantus BID  with good sugar control but will have to adjust lantus as he starts to eat well again     # Hypertension goal BP   -currently his home medications include lisinopril hydrochlorothiazide 20/12.5 mg PO daily and Toprol  mg PO daily. These are on hold for permissive hypertension.     #Tachycardia  -Tele shows short lived runs of tachycardia 1/13/18  -It could be from holding the Toprol Xl  -Tele overnight okay         #Tobacco abuse (9/30/2008): patient is starting to smoking since the age of 16, told me that he rolls his own cigarettes and has been a smoking for a long time    Non morbid obesity   Start nicotine patch      # Hyperlipidemia LDL goal <100 (5/9/2010): currently patient is on 20 mg of simvastatin at home    #D/C planning  Per neurologist's recommendation             Interval History:   Had a good night. No pain or discomfort. No BM yet.                   Medications:       sodium chloride (PF)  10 mL Intravenous Once     clopidogrel  75 mg Oral Daily     pregabalin  150 mg Oral BID     simvastatin  20 mg Oral At Bedtime     insulin glargine  25 Units Subcutaneous BID     insulin aspart   Subcutaneous TID w/meals     insulin aspart  1-7 Units Subcutaneous TID AC     insulin aspart  1-5 Units Subcutaneous At Bedtime     docusate sodium  100 mg Oral BID     aspirin EC  325 mg Oral Daily    Or     aspirin  300 mg Rectal Daily     glucose **OR** dextrose **OR** glucagon, naloxone, - MEDICATION INSTRUCTIONS -, potassium chloride, potassium chloride, potassium chloride, potassium chloride with lidocaine, potassium chloride, magnesium sulfate, acetaminophen, oxyCODONE IR, melatonin, bisacodyl **OR** bisacodyl **OR** bisacodyl, magnesium hydroxide, bisacodyl, ondansetron **OR** ondansetron, labetalol, hydrALAZINE               Physical Exam:   Blood pressure 142/88, pulse 87, temperature 97.9  F (36.6  C), temperature source Oral, resp. rate 18, height 1.829 m (6'), weight 113.4 kg (250 lb), SpO2 96  %.  Temp:  [97.6  F (36.4  C)-98.4  F (36.9  C)] 97.9  F (36.6  C)  Heart Rate:  [] 82  Resp:  [14-18] 18  BP: (137-152)/(88-99) 142/88  SpO2:  [91 %-96 %] 96 %    Wt Readings from Last 4 Encounters:   01/12/18 113.4 kg (250 lb)   11/21/17 105.5 kg (232 lb 9.6 oz)   09/07/17 103.7 kg (228 lb 9.6 oz)   05/08/17 103.5 kg (228 lb 4 oz)         Intake/Output Summary (Last 24 hours) at 01/14/18 0913  Last data filed at 01/1955   Gross per 24 hour   Intake              490 ml   Output              275 ml   Net              215 ml       Constitutional:   Appears well; in no apparent distress     Lungs:   Good air entry; Clear to auscultation     Cardiovascular:   S1, S2 heard; no S3 and no murmur     Abdomen:   Soft, nontender, no organomegaly, bowel sounds normal     Neurologic:   Alert and oriented X 3, weakness left side                Data:   Lab Results   Component Value Date     01/13/2018     01/12/2018     09/07/2017    Lab Results   Component Value Date    CHLORIDE 104 01/13/2018    CHLORIDE 98 01/12/2018    CHLORIDE 101 09/07/2017    Lab Results   Component Value Date    BUN 25 01/13/2018    BUN 30 01/12/2018    BUN 25 09/07/2017      Lab Results   Component Value Date    POTASSIUM 3.6 01/13/2018    POTASSIUM 3.1 01/13/2018    POTASSIUM 3.7 01/12/2018    Lab Results   Component Value Date    CO2 27 01/13/2018    CO2 28 01/12/2018    CO2 29 09/07/2017    Lab Results   Component Value Date    CR 0.76 01/13/2018    CR 0.88 01/12/2018    CR 0.84 09/07/2017        Recent Labs   Lab Test  01/13/18   0747  01/12/18   1135   WBC  7.8  8.5   HGB  16.3  16.5   HCT  46.7  47.0   MCV  91  91   PLT  219  247     Recent Labs   Lab Test  01/13/18   0747  01/12/18   1135  09/07/17   1432   GLC  155*  324*  269*

## 2018-01-14 NOTE — PLAN OF CARE
Problem: Patient Care Overview  Goal: Plan of Care/Patient Progress Review  Discharge Planner OT   Patient plan for discharge: home   Current status: Patient seated in bed upon therapist's arrival, agreeable to treatment. Patient transferred sit-stand SBA. transferred from chair-EOB SBA with FWW with x1 cue for hand placement. sit-stand SBA w/ FWW. ambulated in room ~ 5 steps leaning to L with mod assist to correct. returned to chair. standing with chair behind patient, patient completed x2 sets of B UE AROM x10 reps standing with Aby to maintain dynamic standing balance. tolerated well. chair alarm on, call light within reach.   Barriers to return to prior living situation: decreased balance, falls risk, decreased safety awareness, lives alone   Recommendations for discharge: ARU   Rationale for recommendations: Patient would benefit from intense daily therapy to increase independence in ADLs/IADLs.        Entered by: Marybeth Gomez 01/14/2018 3:50 PM

## 2018-01-14 NOTE — PROGRESS NOTES
" 01/14/18 0915   Quick Adds   Type of Visit Initial PT Evaluation   Living Environment   Lives With alone   Living Arrangements mobile home   Home Accessibility stairs to enter home   Number of Stairs to Enter Home 4   Number of Stairs Within Home 0   Stair Railings at Home outside, present on right side   Transportation Available family or friend will provide  (brother typically drives for pt)   Self-Care   Dominant Hand right   Usual Activity Tolerance good   Current Activity Tolerance poor   Regular Exercise no   Equipment Currently Used at Home grab bar   Activity/Exercise/Self-Care Comment Pt reports is mostly homebound and sedentary at baseline. Does not drive due to visual impairment from previous stroke.    Functional Level Prior   Ambulation 0-->independent   Transferring 0-->independent   Toileting 0-->independent   Bathing 0-->independent   Dressing 0-->independent   Eating 0-->independent   Communication 0-->understands/communicates without difficulty   Swallowing 0-->swallows foods/liquids without difficulty   Cognition 0 - no cognition issues reported   Fall history within last six months yes   Number of times patient has fallen within last six months 7  (since last wednesday)   Which of the above functional risks had a recent onset or change? 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, essential hypertension, hyperlipidemia, previous two episodes of cerebrovascular accident and tobacco abuse who was admitted today with left sided weakness going on for two days, and CT scan in the emergency room was concerning for right " "parietal/occipital hyper density concerning for possible stroke.\"   Precautions/Limitations fall precautions   General Info Comments Activity: up with assist   Cognitive Status Examination   Orientation orientation to person, place and time   Level of Consciousness alert   Follows Commands and Answers Questions 100% of the time   Personal Safety and Judgment intact   Memory intact   Pain Assessment   Patient Currently in Pain No   Posture    Posture Forward head position;Protracted shoulders   Range of Motion (ROM)   ROM Comment Grossly WFL in B UEs/LEs with AROM assessment   Strength   Strength Comments LLE 4/5 hip flexion, 3+/5 hip abduction, 4/5 hip adduction, 4+/5 knee extension and ankle DF; RLE 5/5 in hip flexion, knee extension, 4+/5 in hip abduction, 5/5 in ankle DF   Bed Mobility   Bed Mobility Comments CGA-min A for supine > sit EOB with cuing   Transfer Skills   Transfer Comments Min A for balance and step by step sequencing for sit <> stand with FWW   Gait   Gait Comments min A for gait with FWW, lateral and posterior balance checks with wide MICHELE, short step lengths and decreased foot clearance   Balance   Balance Comments Impaired seated balance EOB requiring CGA-min A wtih balance challenges, min A for dyanmic balance in standing and gait with B UE support   Sensory Examination   Sensory Perception Comments reports unchanged from baseline, not formally assessed today   Coordination   Coordination Comments difficulty with rapid foot tap bilaterally, slowed heel on shin with R LE, unable to complete on L due to weakness; difficulty with gross motor coodination for safe gait with AD   General Therapy Interventions   Planned Therapy Interventions balance training;bed mobility training;gait training;neuromuscular re-education;strengthening;transfer training;risk factor education;home program guidelines;progressive activity/exercise;motor coordination training   Clinical Impression   Criteria for Skilled " "Therapeutic Intervention yes, treatment indicated   PT Diagnosis unsteady gait and trasfers   Influenced by the following impairments balance impairments, incoordination, weakness, postural deficits, fatigue   Functional limitations due to impairments unsteady gait and trasfers, high fall risk with functional mobility   Clinical Presentation Stable/Uncomplicated   Clinical Presentation Rationale see above   Clinical Decision Making (Complexity) Low complexity   Therapy Frequency` 2 times/day   Predicted Duration of Therapy Intervention (days/wks) 4 days   Anticipated Discharge Disposition Acute Rehabilitation Facility   Risk & Benefits of therapy have been explained Yes   Patient, Family & other staff in agreement with plan of care Yes   [facility] TM \"6 Clicks\"   2016, Trustees of [facility], under license to [employer].  All rights reserved.   6 Clicks Short Forms Basic Mobility Inpatient Short Form   [facility]  \"6 Clicks\" V.2 Basic Mobility Inpatient Short Form   1. Turning from your back to your side while in a flat bed without using bedrails? 3 - A Little   2. Moving from lying on your back to sitting on the side of a flat bed without using bedrails? 3 - A Little   3. Moving to and from a bed to a chair (including a wheelchair)? 3 - A Little   4. Standing up from a chair using your arms (e.g., wheelchair, or bedside chair)? 3 - A Little   5. To walk in hospital room? 3 - A Little   6. Climbing 3-5 steps with a railing? 2 - A Lot   Basic Mobility Raw Score (Score out of 24.Lower scores equate to lower levels of function) 17   Total Evaluation Time   Total Evaluation Time (Minutes) 13     "

## 2018-01-15 ENCOUNTER — APPOINTMENT (OUTPATIENT)
Dept: OCCUPATIONAL THERAPY | Facility: CLINIC | Age: 63
DRG: 065 | End: 2018-01-15
Payer: MEDICARE

## 2018-01-15 ENCOUNTER — APPOINTMENT (OUTPATIENT)
Dept: PHYSICAL THERAPY | Facility: CLINIC | Age: 63
DRG: 065 | End: 2018-01-15
Payer: MEDICARE

## 2018-01-15 LAB
DEPRECATED CALCIDIOL+CALCIFEROL SERPL-MC: 17 UG/L (ref 20–75)
GLUCOSE BLDC GLUCOMTR-MCNC: 110 MG/DL (ref 70–99)
GLUCOSE BLDC GLUCOMTR-MCNC: 119 MG/DL (ref 70–99)
GLUCOSE BLDC GLUCOMTR-MCNC: 141 MG/DL (ref 70–99)
GLUCOSE BLDC GLUCOMTR-MCNC: 142 MG/DL (ref 70–99)
GLUCOSE BLDC GLUCOMTR-MCNC: 232 MG/DL (ref 70–99)
POTASSIUM SERPL-SCNC: 3.7 MMOL/L (ref 3.4–5.3)

## 2018-01-15 PROCEDURE — 99239 HOSP IP/OBS DSCHRG MGMT >30: CPT | Performed by: INTERNAL MEDICINE

## 2018-01-15 PROCEDURE — 25000131 ZZH RX MED GY IP 250 OP 636 PS 637: Performed by: INTERNAL MEDICINE

## 2018-01-15 PROCEDURE — 97535 SELF CARE MNGMENT TRAINING: CPT | Mod: GO | Performed by: OCCUPATIONAL THERAPY ASSISTANT

## 2018-01-15 PROCEDURE — A9270 NON-COVERED ITEM OR SERVICE: HCPCS | Mod: GY | Performed by: INTERNAL MEDICINE

## 2018-01-15 PROCEDURE — 97530 THERAPEUTIC ACTIVITIES: CPT | Mod: GO | Performed by: OCCUPATIONAL THERAPY ASSISTANT

## 2018-01-15 PROCEDURE — 97530 THERAPEUTIC ACTIVITIES: CPT | Mod: GP | Performed by: PHYSICAL THERAPY ASSISTANT

## 2018-01-15 PROCEDURE — 25000132 ZZH RX MED GY IP 250 OP 250 PS 637: Mod: GY | Performed by: INTERNAL MEDICINE

## 2018-01-15 PROCEDURE — 97116 GAIT TRAINING THERAPY: CPT | Mod: GP | Performed by: PHYSICAL THERAPY ASSISTANT

## 2018-01-15 PROCEDURE — 00000146 ZZHCL STATISTIC GLUCOSE BY METER IP

## 2018-01-15 PROCEDURE — 25000128 H RX IP 250 OP 636: Performed by: INTERNAL MEDICINE

## 2018-01-15 PROCEDURE — 12000000 ZZH R&B MED SURG/OB

## 2018-01-15 PROCEDURE — 40000133 ZZH STATISTIC OT WARD VISIT: Performed by: OCCUPATIONAL THERAPY ASSISTANT

## 2018-01-15 PROCEDURE — 36415 COLL VENOUS BLD VENIPUNCTURE: CPT | Performed by: INTERNAL MEDICINE

## 2018-01-15 PROCEDURE — 99207 ZZC CDG-CODE CATEGORY CHANGED: CPT | Performed by: INTERNAL MEDICINE

## 2018-01-15 PROCEDURE — 84132 ASSAY OF SERUM POTASSIUM: CPT | Performed by: INTERNAL MEDICINE

## 2018-01-15 PROCEDURE — 40000193 ZZH STATISTIC PT WARD VISIT: Performed by: PHYSICAL THERAPY ASSISTANT

## 2018-01-15 RX ORDER — ASPIRIN 325 MG
325 TABLET, DELAYED RELEASE (ENTERIC COATED) ORAL DAILY
Qty: 60 TABLET | Refills: 0 | DISCHARGE
Start: 2018-01-15 | End: 2019-09-06 | Stop reason: DRUGHIGH

## 2018-01-15 RX ORDER — CLOPIDOGREL BISULFATE 75 MG/1
75 TABLET ORAL DAILY
Qty: 30 TABLET | Status: ON HOLD | DISCHARGE
Start: 2018-01-15 | End: 2018-01-29

## 2018-01-15 RX ORDER — NICOTINE 21 MG/24HR
1 PATCH, TRANSDERMAL 24 HOURS TRANSDERMAL DAILY
Qty: 30 PATCH | Refills: 0 | Status: ON HOLD | DISCHARGE
Start: 2018-01-15 | End: 2018-01-29

## 2018-01-15 RX ORDER — POTASSIUM CHLORIDE 1.5 G/1.58G
20 POWDER, FOR SOLUTION ORAL 2 TIMES DAILY
Qty: 60 TABLET | Refills: 1 | Status: ON HOLD | DISCHARGE
Start: 2018-01-15 | End: 2018-01-29

## 2018-01-15 RX ADMIN — NICOTINE 1 PATCH: 21 PATCH, EXTENDED RELEASE TRANSDERMAL at 08:55

## 2018-01-15 RX ADMIN — INSULIN GLARGINE 25 UNITS: 100 INJECTION, SOLUTION SUBCUTANEOUS at 21:38

## 2018-01-15 RX ADMIN — CLOPIDOGREL 75 MG: 75 TABLET, FILM COATED ORAL at 08:51

## 2018-01-15 RX ADMIN — SIMVASTATIN 20 MG: 20 TABLET, FILM COATED ORAL at 21:39

## 2018-01-15 RX ADMIN — INSULIN ASPART 4 UNITS: 100 INJECTION, SOLUTION INTRAVENOUS; SUBCUTANEOUS at 08:51

## 2018-01-15 RX ADMIN — PREGABALIN 150 MG: 75 CAPSULE ORAL at 21:39

## 2018-01-15 RX ADMIN — SODIUM CHLORIDE: 9 INJECTION, SOLUTION INTRAVENOUS at 14:11

## 2018-01-15 RX ADMIN — ASPIRIN 325 MG: 325 TABLET, DELAYED RELEASE ORAL at 08:51

## 2018-01-15 RX ADMIN — INSULIN ASPART 2 UNITS: 100 INJECTION, SOLUTION INTRAVENOUS; SUBCUTANEOUS at 18:07

## 2018-01-15 RX ADMIN — INSULIN ASPART 3 UNITS: 100 INJECTION, SOLUTION INTRAVENOUS; SUBCUTANEOUS at 12:25

## 2018-01-15 RX ADMIN — SODIUM CHLORIDE: 9 INJECTION, SOLUTION INTRAVENOUS at 03:01

## 2018-01-15 RX ADMIN — PREGABALIN 150 MG: 75 CAPSULE ORAL at 08:51

## 2018-01-15 RX ADMIN — INSULIN GLARGINE 25 UNITS: 100 INJECTION, SOLUTION SUBCUTANEOUS at 08:52

## 2018-01-15 ASSESSMENT — ACTIVITIES OF DAILY LIVING (ADL)
ADLS_ACUITY_SCORE: 15
ADLS_ACUITY_SCORE: 11
ADLS_ACUITY_SCORE: 11
ADLS_ACUITY_SCORE: 15
ADLS_ACUITY_SCORE: 15
ADLS_ACUITY_SCORE: 11

## 2018-01-15 ASSESSMENT — VISUAL ACUITY
OU: BASELINE;BLURRED VISION
OU: BASELINE;BLURRED VISION
OU: NORMAL ACUITY
OU: BASELINE;BLURRED VISION

## 2018-01-15 NOTE — PLAN OF CARE
Problem: Patient Care Overview  Goal: Plan of Care/Patient Progress Review  Discharge Planner OT   Patient plan for discharge: home  Current status: Pt completed supine to sit EOB SBA, encouragement needed to amb to bathroom for toileting and ADLS. Pt amb with FWW min/mod A, pt c/o BLE weakness, toilet transfer with Aby, cues needed for walker safety during transfers.  Pt stood at sink for ADL task min A x 4 mins, increase c/o fatigue.   Barriers to return to prior living situation: decreased balance, falls risk, decreased safety awareness, lives alone   Recommendations for discharge: ARU per plan established by the Occupational therapist  Rationale for recommendations: pt would benefit from daily therapy to return to PLOF       Entered by: Marta Hernandez 01/15/2018 10:23 AM

## 2018-01-15 NOTE — PLAN OF CARE
"Problem: Patient Care Overview  Goal: Plan of Care/Patient Progress Review  Outcome: Improving  Pt A&O. neuros at baseline w/ slurred speech, R field cut, Reye blurred vision. L droop. BP elevated but within parameters, all other VSS. Tele NSR. Mod carb diet, SSI given. Stroke education given to pt. Pt verbalizes desire to smoke a cigarette and states \"I've lived long enough, I just want to smoke and watch TV at home.\" Nicotine patch on R deltoid. Plan to d/c to ARU tomorrow.      "

## 2018-01-15 NOTE — PLAN OF CARE
Problem: Patient Care Overview  Goal: Plan of Care/Patient Progress Review  A&O but forgetful. VSS on room air. Up with assist x1 with walker and gait belt, bed alarm on ,No neuro changes from previous shift, few baseline deficiencies. Tele  SR. Denies pain and SOB, CMS intact Discharge plan pending, likely to ARU.

## 2018-01-15 NOTE — PLAN OF CARE
Problem: Patient Care Overview  Goal: Plan of Care/Patient Progress Review  Outcome: Improving  A&O, forgetful. VSS on room air. Up with assist x1 with walker and gait belt. Neuros intact except left side weakness, and baseline right field cut, slurred speech and facial droop. Tele monitored ST with PACs vs. SD. Blood glucose 151, coverage given per orders. Discharge plan pending, likely to ARU.

## 2018-01-15 NOTE — PROVIDER NOTIFICATION
"txt pg to hospitalist, \"FYI: pt vit D level 17, would you like to start a supplement? Thanks!\"  "

## 2018-01-15 NOTE — PLAN OF CARE
Problem: Patient Care Overview  Goal: Plan of Care/Patient Progress Review    Discharge Planner PT   Patient plan for discharge: agreeable to rehab at discharge  Current status: Pt performed bed mobility with min assist and sit to/from stand transfers with min assist.  Pt is unsteady when initial stance and requires assist to prevent falling backwards.  Pt performed gait training x 150 ft using wheeled walker and min assist for balance.  Pt is unsteady during gait.  LE's buckle slightly at times.  Pt intermittently veers to the left. Pt c/o left LE weakness. Had a w/c follow and 2nd person present for safety as pt moves impulsively.  Barriers to return to prior living situation: lives alone, high fall risk, stairs to enter, new use of AD  Recommendations for discharge: ARU per plan established by the PT.   Rationale for recommendations: Pt previously indep with functional mobility, significantly below baseline currently and would benefit from continued therapies to address balance, coordination, strength and endurance for return to safe, indep household mobility.        Entered by: Carline Mckeon 01/15/2018 3:42 PM

## 2018-01-15 NOTE — PROGRESS NOTES
Spoke w/ Dr Jacques and Elizabeth Gr NP. Pt is ready for dc today and felt to be appropriate for [facility]. Discussed with Marg[facility] Rodas [facility] liaison. They need insurance auth before they are able to accept pt but [facility] is not open today d/t Tonio Tetonia Chang day.  They did not have a bed available today at any rate. They will start auth tomorrow. Updated Dr Jacques and bedside RN who will notify pt of delay.

## 2018-01-15 NOTE — PLAN OF CARE
Problem: Patient Care Overview  Goal: Plan of Care/Patient Progress Review  Outcome: Improving  Pt is A&OX4. VSS on room air. Neuros intact from baseline. Pt is up with assist of of 1. Pt does have some neuro deficit  residual form previous stroke Slurred speech, r eye blurred, facial droop, r side field cut. Pt is voiding in urinal small amount. Tele is NSR. .  Pt does have abrasions that are scabbed over from fall. Continue to monitor.

## 2018-01-15 NOTE — DISCHARGE SUMMARY
[facility]    Discharge Summary  Hospitalist    Date of Admission:  1/12/2018  Date of Discharge:  1/15/2018  Discharging Provider: Cornell Jacques MD  Date of Service (when I saw the patient): 01/15/18    Discharge Diagnoses   Left lateral medullary stroke  Right Middle Cerebral Artery stenosis with post stenotic fusiform dilation, needs to follow up with neuro intervention as an outpatient, see Dr. Cheung's note of 1/14/2018  Moderate aortic dilation of ascending aorta, needs long term follow up  Type II Diabetes Mellitus, insulin requiring, not using insulin at home. Needs long term care with his primary MD  Diabetic neuropathy in feet, on Pregabalin  Hyperlipidemia, treated with Simvastatin. An argument can be made for high dose atorvastatin given the most recent lipid guidelines. This can be addressed with his primary MD.  Hypertension, treated with Lisinopril/HCTZ and Metoprolol with good control in the hospital, needs long term follow up  Tobacco Use Disorder, indicates he has no desire at this time to quit  History of tachycardia in the hospital, seems to be resolved and was mild and likely reactive  History of hypokalemia,  Needs a recheck this week.          History of Present Illness   Cooper Cabral is an 62 year old male who presented with multiple episodes of falling to the left due to development of left sided weakness    Hospital Course   Cooper Cabral was admitted on 1/12/2018.  The following problems were addressed during his hospitalization:    Principal Problem:    Ischemic stroke (H)  Active Problems:    Hypertension goal BP (blood pressure) < 140/80    Tobacco abuse    Non morbid obesity    Hyperlipidemia LDL goal <100    Other diabetic neurological complication associated with type 2 diabetes mellitus (H)    Type 2 diabetes mellitus with diabetic neuropathy, with long-term current use of insulin (H)    Left-sided weakness      Cornell Jacques MD    Significant  Results and Procedures   See the Imaging, H&P, consults and labs    Pending Results   These results will be followed up by Dr. Hudson Spring  Unresulted Labs Ordered in the Past 30 Days of this Admission     Date and Time Order Name Status Description    1/15/2018 0019 Potassium In process     1/12/2018 1135 Vitamin D Deficiency In process           Code Status   Full Code       Primary Care Physician   Hudson Spring    Physical Exam   Temp: 97.4  F (36.3  C) Temp src: Oral BP: (!) 166/98   Heart Rate: 94 Resp: 16 SpO2: 93 % O2 Device: None (Room air)    Vitals:    01/12/18 1109   Weight: 113.4 kg (250 lb)     Vital Signs with Ranges  Temp:  [97.4  F (36.3  C)-98.3  F (36.8  C)] 97.4  F (36.3  C)  Heart Rate:  [85-98] 94  Resp:  [16-19] 16  BP: (129-166)/(85-99) 166/98  SpO2:  [93 %-98 %] 93 %  I/O last 3 completed shifts:  In: 1630 [P.O.:840; I.V.:790]  Out: 100 [Urine:100]    Constitutional: alert, cooperative  Eyes: clear  ENT: not examined  Respiratory: clear to A&P  Cardiovascular: Regular rhythm, normal S1 and S2, no S3 or murmurs, no edema, calves not tender  GI: normal bowel sounds, not tender  Lymph/Hematologic: not examined  Genitourinary: not examined  Skin: abrasions on left arm, clean and healing. Less so on left knee  Musculoskeletal: no signs of wasting  Neurologic: right homonymous hemianopsia, some slurring of speech but facial strength seems to be intact, harder to evaluate due to thick beard, Arm and hand strength intact. Left leg weaker than right.   Neuropsychiatric: alert, cooperative, appropriate    Discharge Disposition   Discharged to short-term care facility  Condition at discharge: Stable    Consultations This Hospital Stay   PHYSICAL THERAPY ADULT IP CONSULT  OCCUPATIONAL THERAPY ADULT IP CONSULT  SPEECH LANGUAGE PATH ADULT IP CONSULT  SWALLOW EVAL SPEECH PATH AT BEDSIDE IP CONSULT  SMOKING CESSATION PROGRAM IP CONSULT  NEUROLOGY IP CONSULT  SOCIAL WORK IP CONSULT  PHYSICAL THERAPY  ADULT IP CONSULT  OCCUPATIONAL THERAPY ADULT IP CONSULT    Time Spent on this Encounter   I, Cornell Jacques, personally saw the patient today and spent greater than 30 minutes discharging this patient.    Discharge Orders     General info for SNF   Length of Stay Estimate: Short Term Care: Estimated # of Days <30  Condition at Discharge: Stable  Level of care:skilled   Rehabilitation Potential: Good  Admission H&P remains valid and up-to-date: Yes  Recent Chemotherapy: N/A  Use Nursing Home Standing Orders: Yes     Mantoux instructions   Give two-step Mantoux (PPD) Per Facility Policy Yes     Reason for your hospital stay   Mr. Cabral: You came in to the hospital due to falling and were found to have another stroke. You still have some weakness in your left leg, less so in your left arm, and will need therapy to improve that. You are not safe in going  Home at this time, therapy hopefully will correct that. Your diabetes in not controlled and you  Have to take insulin.  That will have to be further managed when you are out of the hospital.  The smoking is not good for you but you indicated you have no desire to quit smoking. Please reconsider that. You are on aspirin and clopidogrel to help prevent more strokes. You are on Simvastatin due to the cholesterol being high and being a diabetic and having had strokes and on Pregabalin due to the neuropathy from your diabetes.     Glucose monitor nursing POCT   Before meals and at bedtime     Activity - Up with nursing assistance     Activity - Up with assistive device     Physical Therapy Adult Consult   Evaluate and treat as clinically indicated.    Reason:  Stroke with residual left sided weakness, leg more than arm.  Previous right homonymous hemianopsia and dysphasia, but no dysphagia. He has seen speech and needs no more speech therapy at this time.     Occupational Therapy Adult Consult   Evaluate and treat as clinically indicated.    Reason:  Left sided stroke      Fall precautions     Advance Diet as Tolerated   Follow this diet upon discharge: Orders Placed This Encounter     Combination Diet Regular Diet Adult; 2848-8685 Calories: Moderate Consistent CHO (4-6 CHO units/meal)       Discharge Medications   Current Discharge Medication List      START taking these medications    Details   aspirin  MG EC tablet Take 1 tablet (325 mg) by mouth daily  Qty: 60 tablet, Refills: 0    Associated Diagnoses: Cerebrovascular accident (CVA), unspecified mechanism (H)      clopidogrel (PLAVIX) 75 MG tablet Take 1 tablet (75 mg) by mouth daily  Qty: 30 tablet    Associated Diagnoses: Cerebrovascular accident (CVA), unspecified mechanism (H)      nicotine (NICODERM CQ) 21 MG/24HR 24 hr patch Place 1 patch onto the skin daily  Qty: 30 patch, Refills: 0    Associated Diagnoses: Tobacco abuse         CONTINUE these medications which have CHANGED    Details   insulin aspart (NOVOLOG PEN) 100 UNIT/ML injection Dose one unit per ten grams of carbohydrate subq with each meal  Qty: 10 mL, Refills: 3    Associated Diagnoses: Type 2 diabetes mellitus with diabetic neuropathy, with long-term current use of insulin (H)      insulin glargine (LANTUS) 100 UNIT/ML injection Inject 25 Units Subcutaneous 2 times daily  Qty: 30 mL, Refills: 1    Associated Diagnoses: Type 2 diabetes mellitus with diabetic neuropathy, with long-term current use of insulin (H)         CONTINUE these medications which have NOT CHANGED    Details   pregabalin (LYRICA) 150 MG capsule Take 1 capsule (150 mg) by mouth 2 times daily  Qty: 90 capsule, Refills: 3    Associated Diagnoses: Type 2 diabetes mellitus with diabetic neuropathy, with long-term current use of insulin (H)      metoprolol (TOPROL-XL) 100 MG 24 hr tablet Take 1 tablet (100 mg) by mouth daily  Qty: 90 tablet, Refills: 3    Comments: Profile Rx: patient will contact pharmacy when needed  Associated Diagnoses: Hypertension goal BP (blood pressure) < 140/80       lisinopril-hydrochlorothiazide (PRINZIDE/ZESTORETIC) 20-12.5 MG per tablet Take 2 tablets by mouth daily  Qty: 180 tablet, Refills: 3    Comments: Profile Rx: patient will contact pharmacy when needed  Associated Diagnoses: Hypertension goal BP (blood pressure) < 140/80; Type 2 diabetes mellitus with diabetic neuropathy, with long-term current use of insulin (H)      simvastatin (ZOCOR) 20 MG tablet Take 1 tablet (20 mg) by mouth At Bedtime  Qty: 90 tablet, Refills: 3    Comments: Profile Rx: patient will contact pharmacy when needed  Associated Diagnoses: Hyperlipidemia LDL goal <100      Multiple Vitamins-Minerals (MULTI FOR HIM PO) Take 1 capsule by mouth daily.      blood glucose monitoring (ACCU-CHEK LUISANA) test strip by In Vitro route 1 times daily (test)  Qty: 100 strip, Refills: 3    Comments: Profile Rx: patient will contact pharmacy when needed  Associated Diagnoses: Type 2 diabetes mellitus with diabetic neuropathy, with long-term current use of insulin (H)      order for DME Diabetic shoes due to neuropathy  Qty: 1 Device, Refills: 0    Associated Diagnoses: Type 2 diabetes mellitus with diabetic neuropathy, with long-term current use of insulin (H); Other diabetic neurological complication associated with type 2 diabetes mellitus (H)      insulin pen needle (B-D U/F) 31G X 8 MM Use once daily or as directed.  Qty: 90 each, Refills: 1    Associated Diagnoses: Uncontrolled diabetes mellitus (H)      ACCU-CHEK MULTICLIX LANCETS MISC tests twice a day  Qty: 100, Refills: 3    Associated Diagnoses: Diabetes mellitus, type 2 (H)         STOP taking these medications       metFORMIN (GLUCOPHAGE) 1000 MG tablet Comments:   Reason for Stopping:         dulaglutide (TRULICITY) 0.75 MG/0.5ML pen Comments:   Reason for Stopping:         Calcium-Vitamin D-Vitamin K (CALCIUM + D + K PO) Comments:   Reason for Stopping:         FISH OIL Comments:   Reason for Stopping:         aspirin 81 MG tablet Comments:    Reason for Stopping:             Allergies   No Known Allergies  Data   Most Recent 3 CBC's:  Recent Labs   Lab Test  01/13/18   0747  01/12/18   1135   WBC  7.8  8.5   HGB  16.3  16.5   MCV  91  91   PLT  219  247      Most Recent 3 BMP's:  Recent Labs   Lab Test  01/14/18   0908  01/13/18   1950  01/13/18   0747  01/12/18   1135  09/07/17   1432   NA   --    --   139  135  138   POTASSIUM  3.3*  3.6  3.1*  3.7  4.4   CHLORIDE   --    --   104  98  101   CO2   --    --   27  28  29   BUN   --    --   25  30  25   CR   --    --   0.76  0.88  0.84   ANIONGAP   --    --   8  9  8   MAINE   --    --   8.8  8.9  9.7   GLC   --    --   155*  324*  269*     Most Recent 2 LFT's:  Recent Labs   Lab Test  01/13/18   0747  01/12/18   1135   AST  29  36   ALT  31  33   ALKPHOS  79  84   BILITOTAL  1.5*  1.4*     Most Recent INR's and Anticoagulation Dosing History:  Anticoagulation Dose History     Recent Dosing and Labs Latest Ref Rng & Units 1/12/2018    INR 0.86 - 1.14 1.05        Most Recent 3 Troponin's:  Recent Labs   Lab Test  01/13/18   0040  01/12/18   1135   TROPI  0.488*  0.452*     Most Recent Cholesterol Panel:  Recent Labs   Lab Test  01/14/18   0908   CHOL  147   LDL  92   HDL  37*   TRIG  89     Most Recent 6 Bacteria Isolates From Any Culture (See EPIC Reports for Culture Details):No lab results found.  Most Recent TSH, T4 and A1c Labs:  Recent Labs   Lab Test  01/12/18   1135   TSH  1.07   A1C  11.9*     Results for orders placed or performed during the hospital encounter of 01/12/18   CT Head w/o Contrast    Narrative    CT SCAN OF THE HEAD WITHOUT CONTRAST  1/12/2018 12:29 PM     HISTORY:  Left-sided weakness frequent falls.    TECHNIQUE:  Axial images of the head and coronal reformations without  IV contrast material. Radiation dose for this scan was reduced using  automated exposure control, adjustment of the mA and/or kV according  to patient size, or iterative reconstruction technique.    COMPARISON:  None.    FINDINGS: Area of encephalomalacia with cortical volume loss in the  right parietal lobe, probably due to chronic infarct. There is  hypodensity and loss of gray-white matter differentiation in the right  parietal/occipital region. This is age indeterminate. Area of  encephalomalacia within the paramedian left occipital lobe with ex  vacuo dilatation of the occipital horn of the left lateral ventricle,  probably from chronic infarct. Chronic-appearing lacunar infarct in  the right cerebellum. Chronic-appearing infarcts in the right basal  ganglia and subinsular region. Patchy periventricular white matter  hypodensities are nonspecific but likely related to chronic  microvascular ischemic disease. Mild diffuse parenchymal volume loss.    No evidence of acute intracranial hemorrhage. No mass effect or  midline shift. No abnormal extra-axial fluid collection.    Mild mucosal thickening within the visualized paranasal sinuses. The  bony calvarium and bones of the skull base appear intact.       Impression    IMPRESSION:      1. Hypodensity in the right parietal/occipital region likely  representing an age-indeterminate infarct. Acute ischemia in this area  would be difficult to exclude. Consider further evaluation with brain  MRI.  2. Chronic-appearing areas of encephalomalacia within the right  parietal lobe and paramedian left occipital lobe, likely due to  previous infarct. Chronic-appearing lacunar infarcts in the right  basal ganglia and subinsular region as well as the right cerebellum.  3. Diffuse parenchymal volume loss and white matter changes, likely  due to chronic microvascular ischemic disease.  4. No evidence of acute intracranial hemorrhage, mass, or herniation.     LION VAZ MD   MRI Brain w & w/o contrast    Narrative    MRI BRAIN WITHOUT AND WITH CONTRAST January 13, 2018 12:09 AM    HISTORY: Acute CVI.      TECHNIQUE: Multiplanar, multisequence MRI of the brain without and  with 11 mL  Gadavist.    COMPARISON: Head CT from earlier the same day.    FINDINGS: Area of restricted diffusion within the left lateral medulla  measuring 0.5 mm with associated T2 hyperintensity concerning for  acute area of ischemia. No other areas of restricted diffusion. Areas  of encephalomalacia and gliosis within the right frontal lobe,  paramedian left occipital lobe, and right parietal occipital region  are probably due to previous infarcts. Multiple chronic appearing  lacunar infarcts within the cerebellum, brainstem, and basal ganglia.  Diffuse parenchymal volume loss. Patchy periventricular white matter  T2 hyperintensities likely related to chronic microvascular ischemic  disease. There is ex vacuo dilatation of the occipital horn of the  left lateral ventricle. Ventricles are proportional to the cerebral  sulci without evidence of hydrocephalus.    No abnormal intracranial enhancement.    Mild mucosal thickening in the inferior right maxillary sinus.       Impression    IMPRESSION:    1. Acute infarct within the lateral left medulla without associated  hemorrhagic transformation. No other acute areas of ischemia.  2. Other areas of encephalomalacia and gliosis within the right  frontal lobe, paramedian left occipital lobe, and right  parietal/occipital region presumably due to previous infarcts.  Multiple chronic appearing lacunar infarcts also within the  cerebellum, brainstem, and bilateral basal ganglia.  3. Diffuse parenchymal volume loss and white matter changes likely due  to chronic microvascular ischemic disease.     LION VAZ MD   CT Head Neck Angio w/o & w Contrast    Narrative    CTA ANGIOGRAM HEAD NECK  1/12/2018 8:47 PM     HISTORY: ischemic stroke .;     TECHNIQUE:  Precontrast localizing scans were followed by CT  angiography with an injection of 70 mL Isovue -370 IV contrast with  scans through the head and neck.  Images were transferred to a  separate 3-D workstation where multiplanar  reformations and 3-D images  were created.  Estimates of carotid stenoses are made relative to the  distal internal carotid artery diameters except as noted. Radiation  dose for this scan was reduced using automated exposure control,  adjustment of the mA and/or kV according to patient size, or iterative  reconstruction technique.    COMPARISON: None.    FINDINGS: Estimates of stenosis at the carotid bifurcations are  relative to the distal internal carotids.    Arch: There is a small amount of calcified atherosclerotic plaque in  the arch of the aorta. There is normal arch anatomy.    Neck:  Right Carotid:  The right common carotid artery is normal.  The right  carotid bifurcation appears normal.  Right internal carotid artery is  tortuous..     Left Carotid:  The left common carotid artery is unremarkable.  Small  amount of calcified atherosclerotic plaque is seen at the left carotid  bifurcation. No stenosis.. Left internal carotid artery is tortuous..       Vertebrals:  The vertebral arteries are normal.    Head:  Right Carotid:No occluded vessels are seen. The M1 segment of the  right middle cerebral is abnormal with focal areas of significant,  probably greater than 70% stenosis. Between the areas of stenosis  there is a more focal area of aneurysmal dilatation of the M1 segment  of the right middle cerebral. This focal dilatation measures about 5  mm in diameter.     Left Carotid:  No occluded vessels are identified.  Mild  atherosclerotic disease with mild stenosis at the origin of several M2  branches.    Basilar:  The basilar artery and its branches appear normal.     Miscellaneous: Venous sinuses are patent..      Impression    IMPRESSION: Abnormal M1 segment of the right middle cerebral with a  high-grade stenosis in the M1 segment of the right middle cerebral and  more focal, 5 mm aneurysmal dilatation of the right middle cerebral.  The etiology of this pattern is uncertain. It could be due  to  atherosclerotic disease but a mycotic aneurysm could also have this  appearance. Correlate with the patient's clinical symptoms.    Report called to the patient's nurse on the seventh floor at [facility].    Report called to patient's nurse.    GORDO RUBIO MD

## 2018-01-16 ENCOUNTER — APPOINTMENT (OUTPATIENT)
Dept: PHYSICAL THERAPY | Facility: CLINIC | Age: 63
DRG: 065 | End: 2018-01-16
Payer: MEDICARE

## 2018-01-16 LAB
GLUCOSE BLDC GLUCOMTR-MCNC: 112 MG/DL (ref 70–99)
GLUCOSE BLDC GLUCOMTR-MCNC: 119 MG/DL (ref 70–99)
GLUCOSE BLDC GLUCOMTR-MCNC: 149 MG/DL (ref 70–99)
GLUCOSE BLDC GLUCOMTR-MCNC: 165 MG/DL (ref 70–99)

## 2018-01-16 PROCEDURE — 40000193 ZZH STATISTIC PT WARD VISIT: Performed by: PHYSICAL THERAPIST

## 2018-01-16 PROCEDURE — 25000128 H RX IP 250 OP 636: Performed by: INTERNAL MEDICINE

## 2018-01-16 PROCEDURE — A9270 NON-COVERED ITEM OR SERVICE: HCPCS | Mod: GY | Performed by: INTERNAL MEDICINE

## 2018-01-16 PROCEDURE — 97530 THERAPEUTIC ACTIVITIES: CPT | Mod: GP | Performed by: PHYSICAL THERAPIST

## 2018-01-16 PROCEDURE — 25000131 ZZH RX MED GY IP 250 OP 636 PS 637: Performed by: INTERNAL MEDICINE

## 2018-01-16 PROCEDURE — 12000000 ZZH R&B MED SURG/OB

## 2018-01-16 PROCEDURE — 25000132 ZZH RX MED GY IP 250 OP 250 PS 637: Mod: GY | Performed by: INTERNAL MEDICINE

## 2018-01-16 PROCEDURE — 00000146 ZZHCL STATISTIC GLUCOSE BY METER IP

## 2018-01-16 PROCEDURE — 99232 SBSQ HOSP IP/OBS MODERATE 35: CPT | Performed by: INTERNAL MEDICINE

## 2018-01-16 PROCEDURE — 97116 GAIT TRAINING THERAPY: CPT | Mod: GP | Performed by: PHYSICAL THERAPIST

## 2018-01-16 RX ORDER — METOPROLOL SUCCINATE 100 MG/1
100 TABLET, EXTENDED RELEASE ORAL EVERY MORNING
Status: DISCONTINUED | OUTPATIENT
Start: 2018-01-16 | End: 2018-01-19 | Stop reason: HOSPADM

## 2018-01-16 RX ORDER — LISINOPRIL AND HYDROCHLOROTHIAZIDE 12.5; 2 MG/1; MG/1
2 TABLET ORAL EVERY MORNING
Status: DISCONTINUED | OUTPATIENT
Start: 2018-01-16 | End: 2018-01-19 | Stop reason: HOSPADM

## 2018-01-16 RX ADMIN — NICOTINE 1 PATCH: 21 PATCH, EXTENDED RELEASE TRANSDERMAL at 01:14

## 2018-01-16 RX ADMIN — LISINOPRIL AND HYDROCHLOROTHIAZIDE 2 TABLET: 12.5; 2 TABLET ORAL at 09:04

## 2018-01-16 RX ADMIN — METOPROLOL SUCCINATE 100 MG: 100 TABLET, EXTENDED RELEASE ORAL at 09:05

## 2018-01-16 RX ADMIN — INSULIN ASPART 5 UNITS: 100 INJECTION, SOLUTION INTRAVENOUS; SUBCUTANEOUS at 16:34

## 2018-01-16 RX ADMIN — LABETALOL HYDROCHLORIDE 10 MG: 5 INJECTION, SOLUTION INTRAVENOUS at 01:14

## 2018-01-16 RX ADMIN — SIMVASTATIN 20 MG: 20 TABLET, FILM COATED ORAL at 20:31

## 2018-01-16 RX ADMIN — INSULIN GLARGINE 25 UNITS: 100 INJECTION, SOLUTION SUBCUTANEOUS at 20:31

## 2018-01-16 RX ADMIN — PREGABALIN 150 MG: 75 CAPSULE ORAL at 20:31

## 2018-01-16 RX ADMIN — ASPIRIN 325 MG: 325 TABLET, DELAYED RELEASE ORAL at 08:07

## 2018-01-16 RX ADMIN — CLOPIDOGREL 75 MG: 75 TABLET, FILM COATED ORAL at 08:07

## 2018-01-16 RX ADMIN — LABETALOL HYDROCHLORIDE 10 MG: 5 INJECTION, SOLUTION INTRAVENOUS at 08:07

## 2018-01-16 RX ADMIN — PREGABALIN 150 MG: 75 CAPSULE ORAL at 08:07

## 2018-01-16 RX ADMIN — INSULIN GLARGINE 25 UNITS: 100 INJECTION, SOLUTION SUBCUTANEOUS at 09:06

## 2018-01-16 RX ADMIN — INSULIN ASPART 3 UNITS: 100 INJECTION, SOLUTION INTRAVENOUS; SUBCUTANEOUS at 13:18

## 2018-01-16 ASSESSMENT — VISUAL ACUITY
OU: OTHER (SEE COMMENT)
OU: NORMAL ACUITY
OU: NORMAL ACUITY
OU: OTHER (SEE COMMENT)
OU: OTHER (SEE COMMENT)
OU: NORMAL ACUITY

## 2018-01-16 ASSESSMENT — ACTIVITIES OF DAILY LIVING (ADL)
ADLS_ACUITY_SCORE: 10
ADLS_ACUITY_SCORE: 11

## 2018-01-16 NOTE — PROVIDER NOTIFICATION
"Text page sent to hospitalist: Pt's /133 HR 88. \"Pt received prn IV antihypertensive med NOC. Pt requesting to restart PTA antihypertensive medication. Please advise. Thank you.\"     /110 after receiving 10 mg IV labetalol. Pt refusing telemetry monitoring and IVF - RN will update MD.  "

## 2018-01-16 NOTE — PLAN OF CARE
"Problem: Patient Care Overview  Goal: Plan of Care/Patient Progress Review  Outcome: Improving  Pt A/O x4. Up with 1 assist, unsteady gait. Tolerating mod carb diet. Adequate I/O. Refused IVF, demanded for IV fluids to stop. Refused and took tele monitor off. Refused for staff to put it back on. Angry, agitated, and frustrated,Bp elevated to 219/135, refused to let staff recheck BP stated \" give me something now or don't give me anything at all,\"IV Labetolol given, Bp recheck @ 162/102. Neuros: left side weakness, left hand  weak, Right field cut/ neglect, Baseline neuropathy in LE. 95%RA. Education given on why tele is needed as well as IVF,  Told about possible delay of d/c if given bp meds, pt continued to refuse and requested something anyway for bp. Denies pain, d/c to ARU pending.      "

## 2018-01-16 NOTE — PLAN OF CARE
Problem: Patient Care Overview  Goal: Plan of Care/Patient Progress Review  Outcome: No Change  Patient is A & O times four. Vitals stable, except BP run high, however, he did not met the para-meter to give him his PRN med. Speech has been clear this shift. Mild left side weakness and right peripheral vision field cut. Otherwise, neuro intact. Tele SD. BGM was done, and he is on both SS and Lantus insulin. Voids per urinal. Assist of one with care. Continue to monitor.

## 2018-01-16 NOTE — PLAN OF CARE
Problem: Patient Care Overview  Goal: Plan of Care/Patient Progress Review  OT: pt /119, outside parameters, nursing notified, will defer OT at this time

## 2018-01-16 NOTE — PLAN OF CARE
Problem: Patient Care Overview  Goal: Plan of Care/Patient Progress Review  PT: First attempt, pt eating. Second attempt, pt /115. PT deferred until pm.

## 2018-01-16 NOTE — PLAN OF CARE
Problem: Patient Care Overview  Goal: Plan of Care/Patient Progress Review  Discharge Planner PT   Patient plan for discharge: ARU  Current status: Pt continues to have high BP. PT was held in AM. Per nursing, MD requesting movement even with elevated BP in pm,  so Pt was ambulated in pm using walker and Aby with wheelchair following. Pt has ataxia and decreased proprioception and coordination on right LE. Pt is able to converse during gait for 125ft and then seated rest before return. Pt reuqires min/modA for LOB with gait. Pt has some insight into deficits.   Barriers to return to prior living situation: needs to be independent  Recommendations for discharge: ARU  Rationale for recommendations: Pt has deficits resulting in functional mobility below baseilne. Pt has multiple therapy needs and would benefit from skilled therapy to reutrn to independent mobility.       Entered by: Jojo Chow 01/16/2018 2:53 PM

## 2018-01-16 NOTE — PROGRESS NOTES
[facility] admissions: Met with patient per PT/OT recommendations for ARC upon discharge from hospital. Educated the patient in location of unit, as well as benefits of intensive therapies, close medical management, and rehabilitative nursing care. Educated the patient on the goal of ARC to ensure safe return to community living with increased assist/services. The patient reports this is his goal as well. Pt in agreement with ARC upon discharge per report.     Thank you for the referral, we will continue to follow this patient for post acute placement.     Determination of admission is based upon the patient's need for an intensive, interdisciplinary approach to rehabilitation, their ability to progress, their ability to tolerate intensive therapies, their need for daily physician supervision, their need for twenty four hour nursing assistance, and their ability and willingness to participate in such a program.    Ethan Cohen CM  Rehab Liaison/  [facility]  1/16/2018    11:22 AM

## 2018-01-16 NOTE — PROGRESS NOTES
Appt made with Dr Ireland at [facility] for one month f/u.  Entered onto AVS.   Pt's BP out of parameters and received IV PRN meds.  Unable to dc to [facility] today even if they get insurance auth.  Discussed with Ethan Cohen [facility] liaison. He will pursue auth for dc tomorrow.  Bedside RN is in touch with Dr Jacques about BP meds. CM will continue to assist w/ dc planning.

## 2018-01-16 NOTE — PROGRESS NOTES
[facility]    Hospitalist Progress Note    Assessment & Plan   Cooper Cabral is a 62 year old male who was admitted on 1/12/2018. With falling and left sided weakness, leg more than the arm    Problem 1:Left lateral medullary stroke. Stable, will need rehab    Problem 2:Right middle Cerebral Artery Stenosis with post stenotic fusiform dilation, will be addressed post discharge , see Dr. Cheung's note of 1/14/2018    Problem 3: Moderate Aortic dilation, of the ascending thoracic aorta, needs long term follow up    Problem 4: Hypertension, initially not treated due to the stroke and the desire to have permissive hypertension. Now it is no longer needed and his blood pressure is too high for discharge. Will resume his prior to admission antihypertensive.    Problem 5: Tobacco use, he is not even remotely interested in stopping   He says rolling his own saves him large amounts of money and makes the habit affordable    Problem 6: Hyperlipidemia, on Simvastatin, the recent guidelines would favor moderate to high dose Atorvastatin or Rovustatin.  That will be something he can address with his Primary doctor as this is a longer term issue    Problem 7: TIIDM, controlled here, but he doesn't take his insulin at home, again this needs long term manaagement    Problem  8: Anger and emotional lability.  He expresses a lot of anger over the blood pressure alarms and his treatment and the IV site    Problem 9: Diabetic neuropathy, distal, in his feet, on Pregabalin with some success.    DVT Prophylaxis: Pneumatic Compression Devices  Code Status: Full Code    Disposition: Expected discharge in 1-2 days once his blood pressure is lower and acceptable for discharge to a TCU.    Cornell Jacques MD    Interval History   Mr. Cabral is not happy today.  He is upset about the blood pressure alarms and his medicines. He is less angry now and says he now understands.  He denies head, chest or abdominal pain.  He  feels like he needs to have a BM but so far hasn't today. Voiding ok, no dyspnea, nausea or vomiting. Eating alright    -Data reviewed today: I reviewed all new labs and imaging results over the last 24 hours. I personally reviewed no images or EKG's today.    Physical Exam   Temp: 98.1  F (36.7  C) Temp src: Oral BP: (!) 162/98 Pulse: 83 Heart Rate: 90 Resp: 16 SpO2: 97 % O2 Device: None (Room air)    Vitals:    01/12/18 1109   Weight: 113.4 kg (250 lb)     Vital Signs with Ranges  Temp:  [97.1  F (36.2  C)-98.5  F (36.9  C)] 98.1  F (36.7  C)  Pulse:  [83] 83  Heart Rate:  [] 90  Resp:  [16-20] 16  BP: (162-219)/() 162/98  SpO2:  [93 %-97 %] 97 %  I/O last 3 completed shifts:  In: 930 [P.O.:720; I.V.:210]  Out: 1400 [Urine:1400]    Constitutional: Alert, sitting  upright  Respiratory: Clear to A&P  Cardiovascular: normal S1 and  S2, S4 is present, no S3 or murmurs and no edema  GI: normal bowel sounds, not tender  Skin/Integumen: abrasion on the left arm and to a lesser extent, the left knee  Other:  Cranial nerves II  Through XII are intact  , arms equal in strength and no arm drift, normal finger to nose. Left leg weaker than the right, more distal than proximal.  Heel to shin a bit unsteady on the left    Medications     - MEDICATION INSTRUCTIONS -       NaCl Stopped (01/16/18 0000)       lisinopril-hydrochlorothiazide  2 tablet Oral QAM     metoprolol succinate  100 mg Oral QAM     influenza quadrivalent (PF) vacc age 3 yrs and older  0.5 mL Intramuscular Prior to discharge     nicotine   Transdermal Q8H     nicotine   Transdermal Daily     nicotine  1 patch Transdermal Daily     sodium chloride (PF)  10 mL Intravenous Once     clopidogrel  75 mg Oral Daily     pregabalin  150 mg Oral BID     simvastatin  20 mg Oral At Bedtime     insulin glargine  25 Units Subcutaneous BID     insulin aspart   Subcutaneous TID w/meals     insulin aspart  1-7 Units Subcutaneous TID AC     insulin aspart  1-5 Units  Subcutaneous At Bedtime     docusate sodium  100 mg Oral BID     aspirin EC  325 mg Oral Daily    Or     aspirin  300 mg Rectal Daily       Data     Recent Labs  Lab 01/15/18  0825 01/14/18  0908 01/13/18  1950 01/13/18  0747 01/13/18  0040 01/12/18  1135   WBC  --   --   --  7.8  --  8.5   HGB  --   --   --  16.3  --  16.5   MCV  --   --   --  91  --  91   PLT  --   --   --  219  --  247   INR  --   --   --   --   --  1.05   NA  --   --   --  139  --  135   POTASSIUM 3.7 3.3* 3.6 3.1*  --  3.7   CHLORIDE  --   --   --  104  --  98   CO2  --   --   --  27  --  28   BUN  --   --   --  25  --  30   CR  --   --   --  0.76  --  0.88   ANIONGAP  --   --   --  8  --  9   MAINE  --   --   --  8.8  --  8.9   GLC  --   --   --  155*  --  324*   ALBUMIN  --   --   --  3.1*  --  3.3*   PROTTOTAL  --   --   --  6.6*  --  6.6*   BILITOTAL  --   --   --  1.5*  --  1.4*   ALKPHOS  --   --   --  79  --  84   ALT  --   --   --  31  --  33   AST  --   --   --  29  --  36   TROPI  --   --   --   --  0.488* 0.452*       Imaging:   No results found for this or any previous visit (from the past 24 hour(s)).

## 2018-01-17 ENCOUNTER — APPOINTMENT (OUTPATIENT)
Dept: OCCUPATIONAL THERAPY | Facility: CLINIC | Age: 63
DRG: 065 | End: 2018-01-17
Payer: MEDICARE

## 2018-01-17 ENCOUNTER — APPOINTMENT (OUTPATIENT)
Dept: PHYSICAL THERAPY | Facility: CLINIC | Age: 63
DRG: 065 | End: 2018-01-17
Payer: MEDICARE

## 2018-01-17 LAB
GLUCOSE BLDC GLUCOMTR-MCNC: 113 MG/DL (ref 70–99)
GLUCOSE BLDC GLUCOMTR-MCNC: 114 MG/DL (ref 70–99)
GLUCOSE BLDC GLUCOMTR-MCNC: 114 MG/DL (ref 70–99)
GLUCOSE BLDC GLUCOMTR-MCNC: 135 MG/DL (ref 70–99)
GLUCOSE BLDC GLUCOMTR-MCNC: 148 MG/DL (ref 70–99)
GLUCOSE BLDC GLUCOMTR-MCNC: 86 MG/DL (ref 70–99)

## 2018-01-17 PROCEDURE — 12000000 ZZH R&B MED SURG/OB

## 2018-01-17 PROCEDURE — A9270 NON-COVERED ITEM OR SERVICE: HCPCS | Mod: GY | Performed by: INTERNAL MEDICINE

## 2018-01-17 PROCEDURE — 00000146 ZZHCL STATISTIC GLUCOSE BY METER IP

## 2018-01-17 PROCEDURE — 40000133 ZZH STATISTIC OT WARD VISIT: Performed by: OCCUPATIONAL THERAPY ASSISTANT

## 2018-01-17 PROCEDURE — 97116 GAIT TRAINING THERAPY: CPT | Mod: GP

## 2018-01-17 PROCEDURE — 97530 THERAPEUTIC ACTIVITIES: CPT | Mod: GO | Performed by: OCCUPATIONAL THERAPY ASSISTANT

## 2018-01-17 PROCEDURE — 25000132 ZZH RX MED GY IP 250 OP 250 PS 637: Mod: GY | Performed by: INTERNAL MEDICINE

## 2018-01-17 PROCEDURE — 97750 PHYSICAL PERFORMANCE TEST: CPT | Mod: GP

## 2018-01-17 PROCEDURE — 25000131 ZZH RX MED GY IP 250 OP 636 PS 637: Performed by: INTERNAL MEDICINE

## 2018-01-17 PROCEDURE — 97535 SELF CARE MNGMENT TRAINING: CPT | Mod: GO | Performed by: OCCUPATIONAL THERAPY ASSISTANT

## 2018-01-17 PROCEDURE — 99232 SBSQ HOSP IP/OBS MODERATE 35: CPT | Performed by: INTERNAL MEDICINE

## 2018-01-17 PROCEDURE — 97530 THERAPEUTIC ACTIVITIES: CPT | Mod: GP

## 2018-01-17 PROCEDURE — 40000193 ZZH STATISTIC PT WARD VISIT

## 2018-01-17 RX ADMIN — CLOPIDOGREL 75 MG: 75 TABLET, FILM COATED ORAL at 08:33

## 2018-01-17 RX ADMIN — INSULIN ASPART 4 UNITS: 100 INJECTION, SOLUTION INTRAVENOUS; SUBCUTANEOUS at 12:34

## 2018-01-17 RX ADMIN — METOPROLOL SUCCINATE 100 MG: 100 TABLET, EXTENDED RELEASE ORAL at 08:33

## 2018-01-17 RX ADMIN — ASPIRIN 325 MG: 325 TABLET, DELAYED RELEASE ORAL at 08:33

## 2018-01-17 RX ADMIN — SIMVASTATIN 20 MG: 20 TABLET, FILM COATED ORAL at 21:32

## 2018-01-17 RX ADMIN — INSULIN GLARGINE 25 UNITS: 100 INJECTION, SOLUTION SUBCUTANEOUS at 08:58

## 2018-01-17 RX ADMIN — LISINOPRIL AND HYDROCHLOROTHIAZIDE 2 TABLET: 12.5; 2 TABLET ORAL at 08:33

## 2018-01-17 RX ADMIN — INSULIN ASPART 1 UNITS: 100 INJECTION, SOLUTION INTRAVENOUS; SUBCUTANEOUS at 18:01

## 2018-01-17 RX ADMIN — PREGABALIN 150 MG: 75 CAPSULE ORAL at 21:32

## 2018-01-17 RX ADMIN — NICOTINE 1 PATCH: 21 PATCH, EXTENDED RELEASE TRANSDERMAL at 23:49

## 2018-01-17 RX ADMIN — INSULIN ASPART 5 UNITS: 100 INJECTION, SOLUTION INTRAVENOUS; SUBCUTANEOUS at 07:48

## 2018-01-17 RX ADMIN — NICOTINE 1 PATCH: 21 PATCH, EXTENDED RELEASE TRANSDERMAL at 00:18

## 2018-01-17 RX ADMIN — PREGABALIN 150 MG: 75 CAPSULE ORAL at 08:33

## 2018-01-17 ASSESSMENT — ACTIVITIES OF DAILY LIVING (ADL)
ADLS_ACUITY_SCORE: 10

## 2018-01-17 ASSESSMENT — VISUAL ACUITY
OU: NORMAL ACUITY
OU: BASELINE
OU: NORMAL ACUITY
OU: NORMAL ACUITY;OTHER (SEE COMMENT)
OU: NORMAL ACUITY
OU: NORMAL ACUITY;BASELINE

## 2018-01-17 NOTE — PROGRESS NOTES
[facility]    Hospitalist Progress Note    Assessment & Plan   Cooper Cabral is a 62 year old male who was admitted on 1/12/2018. He fell 7 times and injured his left arm with abrasions and noted left leg weakness    Problem 1: Left lateral medullary stroke.  Stable at this time and awaiting Acute Rehab bed availability.  Tentative plan is for tomorrow  - This is one in a number of recurrent strokes, the tendency is exacerbated by his medication compliance and lifestyle-     Problem 2: Right middle Cerebral artery stenosis with post stenotic fusiform ilation, will be addressed post discharge,  Please see Dr. Cheung's note of  1/14/2018  -     Problem 3: Hypertension, initially home blood pressure medicines were held due to the desirability of permissive hypertension  - now back on PTA doses of antihypertensive medicine with fairly good but not great control today  - will monitor while he is here and adjust as needed    Problem 4: Mderate Aortic Dilation of the ascending thoracic aorta, will need long term follow up.    Problem 5: tobacco use, doing well with the nicotine patch but indicates that he will go back to rolling his own when he is back home.    Problem 6: Simvastatin continued for Hyperlipidemia. Recent guidelines would favor Aorvastatin or Rovustatin. That can be addressed with his primary doctor    Problem 7: TIIDM, well controlled here but he doesn't take his insulin regularly at home.     Problem 8: Diabetic peripheral neuropathy, treated with Pregabalin with success    Problem 9: Some problems starting his urinary stream.  Could be due to BPH or a neurogenic bladder due to previous strokes. Tamsulosin could be tried in the future if needed.     DVT Prophylaxis: Pneumatic Compression Devices  Code Status: Full Code    Disposition: Expected discharge tomorrow once Acute Rehab bed is available.    Cornell Jacques MD    Interval History   Mr. Cabral is doing well today.  He has  walked the length if the halls with the walker. He still has the left leg weakness. He has had a shower. No BM in the last two days, but not uncomfortable.  Voiding is slow, but adequate at this time. No dysuria or hematuria.  Breathing is stable, he feels it is compromised by his smoking history. No headache, light headedness, chest or abdominal pains. He slept well last PM    -Data reviewed today: I reviewed all new labs and imaging results over the last 24 hours. I personally reviewed no images or EKG's today.    Physical Exam   Temp: 98.3  F (36.8  C) Temp src: Oral BP: 129/77 Pulse: 87 Heart Rate: 76 Resp: 18 SpO2: 95 % O2 Device: None (Room air)    Vitals:    01/12/18 1109   Weight: 113.4 kg (250 lb)     Vital Signs with Ranges  Temp:  [97.9  F (36.6  C)-98.6  F (37  C)] 98.3  F (36.8  C)  Pulse:  [85-87] 87  Heart Rate:  [76-90] 76  Resp:  [16-20] 18  BP: (114-179)/() 129/77  SpO2:  [94 %-95 %] 95 %  I/O last 3 completed shifts:  In: -   Out: 1750 [Urine:1750]    Constitutional: Alert, cooperative  Respiratory: Clear to A&P  Cardiovascular: regular rhythm, normal S1 and S2, no murmurs or gallops, no edema  GI: normal bowel sounds, not tender  Skin/Integumen: ecchymoses on the left arm and left knee are resolving  Other:  Alert, cooperative. Not ambulated but the left leg weakness is less than 2 days ago. Finger to nose and heel to shin intact. Arm strength is symmetric, no arm drift.     Medications     - MEDICATION INSTRUCTIONS -         lisinopril-hydrochlorothiazide  2 tablet Oral QAM     metoprolol succinate  100 mg Oral QAM     influenza quadrivalent (PF) vacc age 3 yrs and older  0.5 mL Intramuscular Prior to discharge     nicotine   Transdermal Q8H     nicotine   Transdermal Daily     nicotine  1 patch Transdermal Daily     sodium chloride (PF)  10 mL Intravenous Once     clopidogrel  75 mg Oral Daily     pregabalin  150 mg Oral BID     simvastatin  20 mg Oral At Bedtime     insulin glargine  25  Units Subcutaneous BID     insulin aspart   Subcutaneous TID w/meals     insulin aspart  1-7 Units Subcutaneous TID AC     insulin aspart  1-5 Units Subcutaneous At Bedtime     docusate sodium  100 mg Oral BID     aspirin EC  325 mg Oral Daily    Or     aspirin  300 mg Rectal Daily       Data     Recent Labs  Lab 01/15/18  0825 01/14/18  0908 01/13/18  1950 01/13/18  0747 01/13/18  0040 01/12/18  1135   WBC  --   --   --  7.8  --  8.5   HGB  --   --   --  16.3  --  16.5   MCV  --   --   --  91  --  91   PLT  --   --   --  219  --  247   INR  --   --   --   --   --  1.05   NA  --   --   --  139  --  135   POTASSIUM 3.7 3.3* 3.6 3.1*  --  3.7   CHLORIDE  --   --   --  104  --  98   CO2  --   --   --  27  --  28   BUN  --   --   --  25  --  30   CR  --   --   --  0.76  --  0.88   ANIONGAP  --   --   --  8  --  9   MAINE  --   --   --  8.8  --  8.9   GLC  --   --   --  155*  --  324*   ALBUMIN  --   --   --  3.1*  --  3.3*   PROTTOTAL  --   --   --  6.6*  --  6.6*   BILITOTAL  --   --   --  1.5*  --  1.4*   ALKPHOS  --   --   --  79  --  84   ALT  --   --   --  31  --  33   AST  --   --   --  29  --  36   TROPI  --   --   --   --  0.488* 0.452*       Imaging:   No results found for this or any previous visit (from the past 24 hour(s)).

## 2018-01-17 NOTE — PLAN OF CARE
Problem: Patient Care Overview  Goal: Plan of Care/Patient Progress Review  Discharge Planner OT   Patient plan for discharge: home  Current status: Pt BP at rest 106/73 post activity 113/72, pt completed sit to stands CGA, amb with CGA/DIA to/from bathroom, stood at toilet x 2 to urinate, pt had 1 LOB backward when backing away from toilet required min/mod A to correct. Stood at sink for ADLS CGA. Pt fatigues with task.   Barriers to return to prior living situation: decreased balance, falls risk, decreased safety awareness, lives alone   Recommendations for discharge: ARU per plan established by the Occupational Therapist  Rationale for recommendations: Pt would benefit from daily therapy to return to PLOF       Entered by: Marta Hernandez 01/17/2018 10:24 AM

## 2018-01-17 NOTE — PLAN OF CARE
Problem: Patient Care Overview  Goal: Plan of Care/Patient Progress Review  Outcome: Improving  Pt alert and oriented x4. Up with 1 standby assist. VSS ex. Bp elevated but no IV meds needed. LS clear.  Tolerating mod carb diet. Voiding adequately. CMS intact. Neuros: L.side weakness with L. Pronator drift. Some R. Facial drooping,  R. Field cut, speech is garbled at times. IV sL. BGM tolerated with medication.  Will continue to monitor.

## 2018-01-17 NOTE — PLAN OF CARE
Problem: Patient Care Overview  Goal: Plan of Care/Patient Progress Review  Outcome: No Change  Pt alert and oriented x4. BP elevated - received IV metoprolol x1 in the a.m. PO antihypertensives restarted. Pt's BP within ordered parameters when pt sitting or lying still in bed and encouraged not to talk while BP being checked. Tele d/vanda. Neuros stable with baseline R field cut, slight L facial droop and L sided hemiparesis. CMS intact. Lung sounds clear. Tolerating mod carb diet. BG well controlled, with current medication regiment . Ambulating with asst of GB and walker. Plan to d/c to ARU.

## 2018-01-17 NOTE — PROGRESS NOTES
01/17/18 1400   Signing Clinician's Name / Credentials   Signing clinician's name / credentials Patricia Cruz PT   Pagan Balance Scale (PAGAN DAMON, SCARLET S, SAVAGE WILLS, DASIA MALLORY: MEASURING BALANCE IN THE ELDERLY: VALIDATION OF AN INSTRUMENT. CAN. J. PUB. HEALTH, JULY/AUGUST SUPPLEMENT 2:S7-11, 1992.)   Sit To Stand 3   Standing Unsupported 3   Sitting Unsupported 4   Stand to Sit 3   Transfers 1   Standing with Eyes Closed 2   Standing Unsupported, Feet Together 0   Reach Forward With Outstretched Arm 2   Retrieve Object From Floor 0   Turning to Look Behind 1   Turn 360 Degrees 0   Placing Alternate Foot on Stool (4-6 inches) 0   Unsupported Tandem Stand (Demonstrate to Subject) 0   One Leg Stand 0   Total Score (A score of 45 or less has been correlated with an increased risk of falls)   Total Score (out of 56) 19   Pagan Balance Scale (BBS) Cutoff Scores: A score of ? 45/56 indicates an increased risk for falls.   Patient Score: 19/56    The BBS is a measure of static and dynamic standing balance that has been validated in community dwelling elderly individuals and individuals who have Parkinson's Disease, MS, and those who are s/p CVA and TBI. The test is administered without an assistive device. Scores from the Pagan are used to determine the probability of falling based on the patient's previous history of falls and their test performance.     Minimal Detectable Change = 6.5   & Minimal Detectable Change (Parkinson's Disease) = 5 according to Miguel Ángel & Anthonyey 2008    Assessment (rationale for performing, application to patient s function & care plan): Impaired balance  Minutes billed as physical performance test: 10

## 2018-01-17 NOTE — PLAN OF CARE
Problem: Patient Care Overview  Goal: Plan of Care/Patient Progress Review  Discharge Planner PT   Patient plan for discharge: rehab  Current status: Pt was up in a chair upon PT arrival. Seated /77. Pt requires Aby for sit<>stand transfers and to use the toilet in a standing position. Gait training 200' x 2 with a FWW and Aby to correct 2 LOB, modA to correct 2 LOB all to his L side. Seated BP after gait 129/77.  Barriers to return to prior living situation: level of assist required, fall risk, impulsivity, ataxic gait pattern  Recommendations for discharge: ARU  Rationale for recommendations: Pt will benefit from PT at ARU to progress balance and mobility so he can return home. Very motivated to participate and demonstrate good potential for recovery.       Entered by: Patricia Cruz 01/17/2018 10:39 AM

## 2018-01-17 NOTE — PLAN OF CARE
Problem: Patient Care Overview  Goal: Plan of Care/Patient Progress Review  Outcome: Improving  Pt A/Ox4. Up with 1 SBA. VSS. Tolerating mod carb diet. Voiding adequately. CMS intact. Neuros: L.side weakness with L. Pronator drift. Slight L. Facial drooping,  R. Field cut, speech is garbled at times. IV SL. BG were 114 & 148. Denied pain.

## 2018-01-18 ENCOUNTER — APPOINTMENT (OUTPATIENT)
Dept: OCCUPATIONAL THERAPY | Facility: CLINIC | Age: 63
DRG: 065 | End: 2018-01-18
Payer: MEDICARE

## 2018-01-18 ENCOUNTER — APPOINTMENT (OUTPATIENT)
Dept: PHYSICAL THERAPY | Facility: CLINIC | Age: 63
DRG: 065 | End: 2018-01-18
Payer: MEDICARE

## 2018-01-18 LAB
GLUCOSE BLDC GLUCOMTR-MCNC: 108 MG/DL (ref 70–99)
GLUCOSE BLDC GLUCOMTR-MCNC: 124 MG/DL (ref 70–99)
GLUCOSE BLDC GLUCOMTR-MCNC: 138 MG/DL (ref 70–99)
GLUCOSE BLDC GLUCOMTR-MCNC: 163 MG/DL (ref 70–99)
GLUCOSE BLDC GLUCOMTR-MCNC: 204 MG/DL (ref 70–99)
MAGNESIUM SERPL-MCNC: 1.9 MG/DL (ref 1.6–2.3)
POTASSIUM SERPL-SCNC: 4.5 MMOL/L (ref 3.4–5.3)

## 2018-01-18 PROCEDURE — A9270 NON-COVERED ITEM OR SERVICE: HCPCS | Mod: GY | Performed by: INTERNAL MEDICINE

## 2018-01-18 PROCEDURE — 25000132 ZZH RX MED GY IP 250 OP 250 PS 637: Performed by: INTERNAL MEDICINE

## 2018-01-18 PROCEDURE — 40000193 ZZH STATISTIC PT WARD VISIT

## 2018-01-18 PROCEDURE — 25000125 ZZHC RX 250: Performed by: INTERNAL MEDICINE

## 2018-01-18 PROCEDURE — 97530 THERAPEUTIC ACTIVITIES: CPT | Mod: GP

## 2018-01-18 PROCEDURE — 84132 ASSAY OF SERUM POTASSIUM: CPT | Performed by: INTERNAL MEDICINE

## 2018-01-18 PROCEDURE — 83735 ASSAY OF MAGNESIUM: CPT | Performed by: INTERNAL MEDICINE

## 2018-01-18 PROCEDURE — 99232 SBSQ HOSP IP/OBS MODERATE 35: CPT | Performed by: INTERNAL MEDICINE

## 2018-01-18 PROCEDURE — 25000131 ZZH RX MED GY IP 250 OP 636 PS 637: Performed by: INTERNAL MEDICINE

## 2018-01-18 PROCEDURE — 12000000 ZZH R&B MED SURG/OB

## 2018-01-18 PROCEDURE — 97116 GAIT TRAINING THERAPY: CPT | Mod: GP

## 2018-01-18 PROCEDURE — 40000133 ZZH STATISTIC OT WARD VISIT: Performed by: OCCUPATIONAL THERAPY ASSISTANT

## 2018-01-18 PROCEDURE — 97535 SELF CARE MNGMENT TRAINING: CPT | Mod: GO | Performed by: OCCUPATIONAL THERAPY ASSISTANT

## 2018-01-18 PROCEDURE — 25000132 ZZH RX MED GY IP 250 OP 250 PS 637: Mod: GY | Performed by: INTERNAL MEDICINE

## 2018-01-18 PROCEDURE — 25000125 ZZHC RX 250

## 2018-01-18 PROCEDURE — 00000146 ZZHCL STATISTIC GLUCOSE BY METER IP

## 2018-01-18 PROCEDURE — 97530 THERAPEUTIC ACTIVITIES: CPT | Mod: GO | Performed by: OCCUPATIONAL THERAPY ASSISTANT

## 2018-01-18 PROCEDURE — 36415 COLL VENOUS BLD VENIPUNCTURE: CPT | Performed by: INTERNAL MEDICINE

## 2018-01-18 RX ORDER — TAMSULOSIN HYDROCHLORIDE 0.4 MG/1
0.4 CAPSULE ORAL AT BEDTIME
Status: DISCONTINUED | OUTPATIENT
Start: 2018-01-18 | End: 2018-01-19 | Stop reason: HOSPADM

## 2018-01-18 RX ADMIN — LISINOPRIL AND HYDROCHLOROTHIAZIDE 2 TABLET: 12.5; 2 TABLET ORAL at 08:26

## 2018-01-18 RX ADMIN — CLOPIDOGREL 75 MG: 75 TABLET, FILM COATED ORAL at 08:27

## 2018-01-18 RX ADMIN — LIDOCAINE HYDROCHLORIDE 10 ML: 20 JELLY TOPICAL at 14:13

## 2018-01-18 RX ADMIN — PREGABALIN 150 MG: 75 CAPSULE ORAL at 21:27

## 2018-01-18 RX ADMIN — INSULIN ASPART 1 UNITS: 100 INJECTION, SOLUTION INTRAVENOUS; SUBCUTANEOUS at 08:28

## 2018-01-18 RX ADMIN — INSULIN GLARGINE 25 UNITS: 100 INJECTION, SOLUTION SUBCUTANEOUS at 08:27

## 2018-01-18 RX ADMIN — SIMVASTATIN 20 MG: 20 TABLET, FILM COATED ORAL at 21:27

## 2018-01-18 RX ADMIN — LIDOCAINE HYDROCHLORIDE 10 ML: 20 JELLY TOPICAL at 08:28

## 2018-01-18 RX ADMIN — PREGABALIN 150 MG: 75 CAPSULE ORAL at 08:27

## 2018-01-18 RX ADMIN — METOPROLOL SUCCINATE 100 MG: 100 TABLET, EXTENDED RELEASE ORAL at 08:26

## 2018-01-18 RX ADMIN — DOCUSATE SODIUM 100 MG: 100 CAPSULE, LIQUID FILLED ORAL at 08:27

## 2018-01-18 RX ADMIN — TAMSULOSIN HYDROCHLORIDE 0.4 MG: 0.4 CAPSULE ORAL at 21:27

## 2018-01-18 RX ADMIN — INSULIN ASPART 8 UNITS: 100 INJECTION, SOLUTION INTRAVENOUS; SUBCUTANEOUS at 18:04

## 2018-01-18 RX ADMIN — INSULIN GLARGINE 22 UNITS: 100 INJECTION, SOLUTION SUBCUTANEOUS at 21:27

## 2018-01-18 RX ADMIN — INSULIN ASPART 6 UNITS: 100 INJECTION, SOLUTION INTRAVENOUS; SUBCUTANEOUS at 13:25

## 2018-01-18 RX ADMIN — ASPIRIN 325 MG: 325 TABLET, DELAYED RELEASE ORAL at 08:27

## 2018-01-18 ASSESSMENT — VISUAL ACUITY
OU: OTHER (SEE COMMENT)
OU: BASELINE
OU: BASELINE
OU: NORMAL ACUITY;BASELINE
OU: NORMAL ACUITY;BASELINE
OU: BASELINE

## 2018-01-18 ASSESSMENT — ACTIVITIES OF DAILY LIVING (ADL)
ADLS_ACUITY_SCORE: 10
ADLS_ACUITY_SCORE: 10
ADLS_ACUITY_SCORE: 9
ADLS_ACUITY_SCORE: 10
ADLS_ACUITY_SCORE: 9
ADLS_ACUITY_SCORE: 10

## 2018-01-18 NOTE — PLAN OF CARE
Problem: Stroke (Ischemic) (Adult)  Goal: Signs and Symptoms of Listed Potential Problems Will be Absent, Minimized or Managed (Stroke)  Signs and symptoms of listed potential problems will be absent, minimized or managed by discharge/transition of care (reference Stroke (Ischemic) (Adult) CPG).   Outcome: Improving  A&O x4, sleeping through cares, pleasant. Neuros: slightly slurred speech noted at times, L facial droop and field cut, L sided hemiparesis, baseline neuropathies to feet. VSS. Mod carb diet, BGM. Up with 1, GB&W. Denies pain. Plan for PT/OT, probable d/c to [place] [facility] when bed becomes available.

## 2018-01-18 NOTE — PROGRESS NOTES
Informed by [facility] liaison that they are not taking any admits today d/t acuity. They may or may not have a bed for pt tomorrow.  Informed SW and bedside RN.  Will pursue other ARU facilities.    Addendum: Updated Dr Jacques.

## 2018-01-18 NOTE — PLAN OF CARE
Problem: Patient Care Overview  Goal: Plan of Care/Patient Progress Review  Outcome: Improving  Pt alert and oriented x4. VSS. Neuros stable with slight L facial droop, slurred speech and L sided hemiparesis. CMS with baseline neuropathy in BLEs and trace edema on ankles and feet. Lung sounds clear. Ambulating with asst of 1 GB and walker. [facility]

## 2018-01-18 NOTE — PLAN OF CARE
Problem: Patient Care Overview  Goal: Plan of Care/Patient Progress Review  Discharge Planner OT   Patient plan for discharge: rehab  Current status: Pt SBA supine to sit EOB, CGA sit to stand, amb with FWW to/from bathroom CGA, pt had 1 LOB when turning to complete toilet transfer required mod A to stabilize. Stood at sink CGA for ADLS. Pt fatigue with activity.   Barriers to return to prior living situation: decreased balance, falls risk, decreased safety awareness, lives alone   Recommendations for discharge: ARU per plan established by the Occupational Therapist  Rationale for recommendations: Pt would benefit from daily therapy to return to PLOF       Entered by: Marta Hernandez 01/18/2018 11:34 AM

## 2018-01-18 NOTE — PROGRESS NOTES
CM    I:  JUNAID received an update that [email or web address] will not have a bed available today.  Pt is reported as ready for discharge since 1/17 ([email or web address] had no bed available at that time as well).  JNUAID updated patient who requested additional referrals be sent to additional ARU's in [facility] area.  JUNAID left  for [facility] @ [facility] and spoke w/admissions at [facility] who stated they are full today but may have openings tomorrow, 1/19.  SW awaiting their fax sheet which will indicate what information they require.      P:  Continue to assist with discharge planning as needed.    KRUNAL Carmen    UPDATE@6224: Received requested information sheet from [facility].  JUNAID printed info requested and faxed to:  [phone], Attn:  Gisela.    UPDATE@0963:  [facility] has received information and providing it to their physician for review for possible admission tomorrow, 1/19.  Awaiting final confirmation.     UPDATE@3337:  JUNAID received call from [facility], stating they anticipate taking patient tomorrow, asking for a w/c ride set up for 1315 (1:15), which was set up.

## 2018-01-18 NOTE — PROVIDER NOTIFICATION
Pt c/o of sensation of full bladder and difficulty voiding. Encouraged to stand when urinating. Had small unmeasured void. Noted to have soiled (incontinent/urine) brief. Bladder scanned for > 999 ml. Straight cathed for 1700 ml. MD notified. Plan to schedule voiding attempts q 1-2 hrs and will repeat PVRs.

## 2018-01-18 NOTE — PLAN OF CARE
Problem: Patient Care Overview  Goal: Plan of Care/Patient Progress Review  Outcome: No Change  Pt alert and oriented x4. VSS. Neuros stable with baseline R field cut, L facial droop, L hemiparesis and slurred speech. Pt retaining urine. Hammond placed per order. Lung sounds clear. +BS. Ambulating with asst of 1 GB and walker. Tolerating mod carb diet. Plan to DC to ARU when bed is available.

## 2018-01-18 NOTE — PLAN OF CARE
Problem: Patient Care Overview  Goal: Plan of Care/Patient Progress Review  Patient plan for discharge: rehab  Current status: Pt was up in a chair upon PT arrival. Sit to/from stand with FWW and CGA/Aby from lower surfaces. Static standing at toilet to try and urinate with 1 UE support on FWW and CGA for balance. Gait training 250' x 2 with FWW and CGA/Aby to for moderate unsteadiness; no significant LOB this session. Pt needed ~5 minute seated rest break between gait bouts due to fatigue in LEs. Pt returned supine with SBA at end of session. All needs in reach and bed alarm on upon PT exit.  Barriers to return to prior living situation: level of assist required, fall risk, impulsivity, ataxic gait pattern  Recommendations for discharge: ARU per the plan established by the Physical Therapist   Rationale for recommendations: Pt will benefit from PT at ARU to progress balance and mobility so he can return home. Very motivated to participate and demonstrates good potential for recovery.

## 2018-01-18 NOTE — PROGRESS NOTES
[facility]    Hospitalist Progress Note    Assessment & Plan   Cooper Cabral is a 62 year old male who was admitted on 1/12/2018. 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 Walking with the walker, but tends to go to the left and he can correct that  - On appropriate medical therapy of asa and clopidogrel.  - receiving PT and OT  Awaiting placement    Problem 2: Smoker  - unlikely to quit.   - admits it is hard to not smoke but getting by with the nicotine patch      Problem 3: Urinary retension  - is this due to a neurogenic bladder or due to BPH  - he reports nocturia X3/night  - says he had more problems with this in the past, but better control of his diabetes helped  Will add Tamsulosin 0.4mg qhs    Problem 4: Hypertension, better but still not great.   The addition of Tamsulosin at HS may help that    Problem 5: Hyperlipidmeia, treated with Simvastatin, see previous notes    Problem  6: Diabetic peripheral neuropathy, treated    Problem 7: Right middle cerebral artery stenosis with post stenotic fusiform dilation, will be addressed at discharge, see Dr. Cheung's note of  1/14//2018.    Problem  8: TIIDM, does well with a controlled diet and regular insulin  I've reduced his Glargine from 25 to 22 units bid due to glucose in the low 100s.    DVT Prophylaxis: Pneumatic Compression Devices, he is also up a lot and likely low risk due to that  Code Status: Full Code    Disposition: Expected discharge in 1 days once Rehab bed is available.    Cornell Jacques MD    Interval History   Mr. Cabral is feeling better after being cathed for over 1000 ccs of urine. He has noted  Urinary frequency in the past that improved with using his insulin.  He still has nocturia 3 times a night.   No headaches, tolerating not smoking but he finds it hard. The patch helps.  No chest pain, and his breathing is reduced but stable. Eating ok.  No abdominal pain  and no  Headaches. Left arm is healing.      -Data reviewed today: I reviewed all new labs and imaging results over the last 24 hours. I personally reviewed no images or EKG's today.    Physical Exam   Temp: 97.7  F (36.5  C) Temp src: Oral BP: (!) 136/97 Pulse: 78 Heart Rate: 89 Resp: 16 SpO2: 97 % O2 Device: None (Room air)    Vitals:    01/12/18 1109   Weight: 113.4 kg (250 lb)     Vital Signs with Ranges  Temp:  [97.3  F (36.3  C)-98.8  F (37.1  C)] 97.7  F (36.5  C)  Pulse:  [78] 78  Heart Rate:  [] 89  Resp:  [14-18] 16  BP: (120-162)/(63-99) 136/97  SpO2:  [94 %-98 %] 97 %  I/O last 3 completed shifts:  In: -   Out: 1400 [Urine:1400]    Constitutional: Alert,  Walks well with the walker  Respiratory: Clear to A&P  Cardiovascular: Regular rhythm, normal S1 and S2, no murmurs or edema, no S3  GI: not tender, bowel sounds are normal  Skin/Integumen: abrasions on left arm and left scalp are healing  Other:  alert, speech is good, 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  Cranial nerves otherwise intact. Strength in the left and right arms is symmetric, finger to nose intact. Left hip flexor strength is reduced, heel to shin intact bilaterlly.    Medications     - MEDICATION INSTRUCTIONS -         tamsulosin  0.4 mg Oral At Bedtime     insulin glargine  22 Units Subcutaneous BID     lisinopril-hydrochlorothiazide  2 tablet Oral QAM     metoprolol succinate  100 mg Oral QAM     influenza quadrivalent (PF) vacc age 3 yrs and older  0.5 mL Intramuscular Prior to discharge     nicotine   Transdermal Q8H     nicotine   Transdermal Daily     nicotine  1 patch Transdermal Daily     sodium chloride (PF)  10 mL Intravenous Once     clopidogrel  75 mg Oral Daily     pregabalin  150 mg Oral BID     simvastatin  20 mg Oral At Bedtime     insulin aspart   Subcutaneous TID w/meals     insulin aspart  1-7 Units Subcutaneous TID AC     insulin aspart  1-5 Units Subcutaneous At Bedtime     docusate sodium  100 mg Oral BID      aspirin EC  325 mg Oral Daily    Or     aspirin  300 mg Rectal Daily       Data     Recent Labs  Lab 01/15/18  0825 01/14/18  0908 01/13/18  1950 01/13/18  0747 01/13/18  0040 01/12/18  1135   WBC  --   --   --  7.8  --  8.5   HGB  --   --   --  16.3  --  16.5   MCV  --   --   --  91  --  91   PLT  --   --   --  219  --  247   INR  --   --   --   --   --  1.05   NA  --   --   --  139  --  135   POTASSIUM 3.7 3.3* 3.6 3.1*  --  3.7   CHLORIDE  --   --   --  104  --  98   CO2  --   --   --  27  --  28   BUN  --   --   --  25  --  30   CR  --   --   --  0.76  --  0.88   ANIONGAP  --   --   --  8  --  9   MAINE  --   --   --  8.8  --  8.9   GLC  --   --   --  155*  --  324*   ALBUMIN  --   --   --  3.1*  --  3.3*   PROTTOTAL  --   --   --  6.6*  --  6.6*   BILITOTAL  --   --   --  1.5*  --  1.4*   ALKPHOS  --   --   --  79  --  84   ALT  --   --   --  31  --  33   AST  --   --   --  29  --  36   TROPI  --   --   --   --  0.488* 0.452*       Imaging:   No results found for this or any previous visit (from the past 24 hour(s)).

## 2018-01-19 ENCOUNTER — HOSPITAL ENCOUNTER (INPATIENT)
Facility: CLINIC | Age: 63
LOS: 10 days | Discharge: HOME HEALTH | DRG: 057 | End: 2018-01-29
Attending: PHYSICAL MEDICINE & REHABILITATION | Admitting: PHYSICAL MEDICINE & REHABILITATION
Payer: MEDICARE

## 2018-01-19 ENCOUNTER — APPOINTMENT (OUTPATIENT)
Dept: OCCUPATIONAL THERAPY | Facility: CLINIC | Age: 63
DRG: 065 | End: 2018-01-19
Payer: MEDICARE

## 2018-01-19 ENCOUNTER — APPOINTMENT (OUTPATIENT)
Dept: PHYSICAL THERAPY | Facility: CLINIC | Age: 63
DRG: 065 | End: 2018-01-19
Payer: MEDICARE

## 2018-01-19 VITALS
OXYGEN SATURATION: 96 % | DIASTOLIC BLOOD PRESSURE: 84 MMHG | RESPIRATION RATE: 16 BRPM | HEIGHT: 72 IN | HEART RATE: 78 BPM | SYSTOLIC BLOOD PRESSURE: 123 MMHG | TEMPERATURE: 97.4 F | BODY MASS INDEX: 33.86 KG/M2 | WEIGHT: 250 LBS

## 2018-01-19 DIAGNOSIS — E11.40 TYPE 2 DIABETES MELLITUS WITH DIABETIC NEUROPATHY, WITH LONG-TERM CURRENT USE OF INSULIN (H): ICD-10-CM

## 2018-01-19 DIAGNOSIS — I63.9 CEREBROVASCULAR ACCIDENT (CVA), UNSPECIFIED MECHANISM (H): ICD-10-CM

## 2018-01-19 DIAGNOSIS — N40.1 BENIGN PROSTATIC HYPERPLASIA WITH URINARY RETENTION: ICD-10-CM

## 2018-01-19 DIAGNOSIS — R33.8 BENIGN PROSTATIC HYPERPLASIA WITH URINARY RETENTION: ICD-10-CM

## 2018-01-19 DIAGNOSIS — R33.9 URINARY RETENTION: Primary | ICD-10-CM

## 2018-01-19 DIAGNOSIS — I10 HYPERTENSION GOAL BP (BLOOD PRESSURE) < 140/80: ICD-10-CM

## 2018-01-19 DIAGNOSIS — E78.5 HYPERLIPIDEMIA LDL GOAL <100: ICD-10-CM

## 2018-01-19 DIAGNOSIS — Z79.4 TYPE 2 DIABETES MELLITUS WITH DIABETIC NEUROPATHY, WITH LONG-TERM CURRENT USE OF INSULIN (H): ICD-10-CM

## 2018-01-19 LAB
GLUCOSE BLDC GLUCOMTR-MCNC: 127 MG/DL (ref 70–99)
GLUCOSE BLDC GLUCOMTR-MCNC: 130 MG/DL (ref 70–99)
GLUCOSE BLDC GLUCOMTR-MCNC: 134 MG/DL (ref 70–99)
GLUCOSE BLDC GLUCOMTR-MCNC: 184 MG/DL (ref 70–99)
GLUCOSE BLDC GLUCOMTR-MCNC: 84 MG/DL (ref 70–99)

## 2018-01-19 PROCEDURE — 25000132 ZZH RX MED GY IP 250 OP 250 PS 637: Performed by: STUDENT IN AN ORGANIZED HEALTH CARE EDUCATION/TRAINING PROGRAM

## 2018-01-19 PROCEDURE — 25000131 ZZH RX MED GY IP 250 OP 636 PS 637: Performed by: INTERNAL MEDICINE

## 2018-01-19 PROCEDURE — A9270 NON-COVERED ITEM OR SERVICE: HCPCS | Mod: GY | Performed by: STUDENT IN AN ORGANIZED HEALTH CARE EDUCATION/TRAINING PROGRAM

## 2018-01-19 PROCEDURE — A9270 NON-COVERED ITEM OR SERVICE: HCPCS | Mod: GY | Performed by: INTERNAL MEDICINE

## 2018-01-19 PROCEDURE — 40000193 ZZH STATISTIC PT WARD VISIT

## 2018-01-19 PROCEDURE — 00000146 ZZHCL STATISTIC GLUCOSE BY METER IP

## 2018-01-19 PROCEDURE — 25000132 ZZH RX MED GY IP 250 OP 250 PS 637: Mod: GY | Performed by: INTERNAL MEDICINE

## 2018-01-19 PROCEDURE — 97116 GAIT TRAINING THERAPY: CPT | Mod: GP

## 2018-01-19 PROCEDURE — 12800006 ZZH R&B REHAB

## 2018-01-19 PROCEDURE — 97535 SELF CARE MNGMENT TRAINING: CPT | Mod: GO | Performed by: OCCUPATIONAL THERAPY ASSISTANT

## 2018-01-19 PROCEDURE — 25000132 ZZH RX MED GY IP 250 OP 250 PS 637: Performed by: INTERNAL MEDICINE

## 2018-01-19 PROCEDURE — 99232 SBSQ HOSP IP/OBS MODERATE 35: CPT | Performed by: INTERNAL MEDICINE

## 2018-01-19 PROCEDURE — 25000131 ZZH RX MED GY IP 250 OP 636 PS 637: Performed by: STUDENT IN AN ORGANIZED HEALTH CARE EDUCATION/TRAINING PROGRAM

## 2018-01-19 PROCEDURE — 40000133 ZZH STATISTIC OT WARD VISIT: Performed by: OCCUPATIONAL THERAPY ASSISTANT

## 2018-01-19 PROCEDURE — 97530 THERAPEUTIC ACTIVITIES: CPT | Mod: GO | Performed by: OCCUPATIONAL THERAPY ASSISTANT

## 2018-01-19 RX ORDER — PREGABALIN 75 MG/1
150 CAPSULE ORAL 2 TIMES DAILY
Status: DISCONTINUED | OUTPATIENT
Start: 2018-01-19 | End: 2018-01-29 | Stop reason: HOSPADM

## 2018-01-19 RX ORDER — SIMVASTATIN 20 MG
20 TABLET ORAL AT BEDTIME
Status: DISCONTINUED | OUTPATIENT
Start: 2018-01-19 | End: 2018-01-29 | Stop reason: HOSPADM

## 2018-01-19 RX ORDER — NICOTINE POLACRILEX 4 MG
15-30 LOZENGE BUCCAL
Status: DISCONTINUED | OUTPATIENT
Start: 2018-01-19 | End: 2018-01-29 | Stop reason: HOSPADM

## 2018-01-19 RX ORDER — AMOXICILLIN 250 MG
1-2 CAPSULE ORAL 2 TIMES DAILY PRN
Status: DISCONTINUED | OUTPATIENT
Start: 2018-01-19 | End: 2018-01-29 | Stop reason: HOSPADM

## 2018-01-19 RX ORDER — CLOPIDOGREL BISULFATE 75 MG/1
75 TABLET ORAL DAILY
Status: DISCONTINUED | OUTPATIENT
Start: 2018-01-20 | End: 2018-01-29 | Stop reason: HOSPADM

## 2018-01-19 RX ORDER — METOPROLOL SUCCINATE 50 MG/1
100 TABLET, EXTENDED RELEASE ORAL DAILY
Status: DISCONTINUED | OUTPATIENT
Start: 2018-01-20 | End: 2018-01-29 | Stop reason: HOSPADM

## 2018-01-19 RX ORDER — POLYETHYLENE GLYCOL 3350 17 G/17G
17 POWDER, FOR SOLUTION ORAL DAILY PRN
Status: DISCONTINUED | OUTPATIENT
Start: 2018-01-19 | End: 2018-01-29 | Stop reason: HOSPADM

## 2018-01-19 RX ORDER — LISINOPRIL AND HYDROCHLOROTHIAZIDE 12.5; 2 MG/1; MG/1
2 TABLET ORAL DAILY
Status: DISCONTINUED | OUTPATIENT
Start: 2018-01-20 | End: 2018-01-29 | Stop reason: HOSPADM

## 2018-01-19 RX ORDER — TAMSULOSIN HYDROCHLORIDE 0.4 MG/1
0.4 CAPSULE ORAL AT BEDTIME
Status: DISCONTINUED | OUTPATIENT
Start: 2018-01-19 | End: 2018-01-29 | Stop reason: HOSPADM

## 2018-01-19 RX ORDER — METOPROLOL SUCCINATE 100 MG/1
100 TABLET, EXTENDED RELEASE ORAL EVERY MORNING
Qty: 30 TABLET | Status: ON HOLD | DISCHARGE
Start: 2018-01-20 | End: 2018-01-29

## 2018-01-19 RX ORDER — NICOTINE 21 MG/24HR
1 PATCH, TRANSDERMAL 24 HOURS TRANSDERMAL DAILY
Status: DISCONTINUED | OUTPATIENT
Start: 2018-01-19 | End: 2018-01-29 | Stop reason: HOSPADM

## 2018-01-19 RX ORDER — ACETAMINOPHEN 325 MG/1
325-975 TABLET ORAL EVERY 6 HOURS PRN
Status: DISCONTINUED | OUTPATIENT
Start: 2018-01-19 | End: 2018-01-29 | Stop reason: HOSPADM

## 2018-01-19 RX ORDER — TAMSULOSIN HYDROCHLORIDE 0.4 MG/1
0.4 CAPSULE ORAL AT BEDTIME
Qty: 60 CAPSULE | Status: ON HOLD | DISCHARGE
Start: 2018-01-19 | End: 2018-01-29

## 2018-01-19 RX ORDER — DEXTROSE MONOHYDRATE 25 G/50ML
25-50 INJECTION, SOLUTION INTRAVENOUS
Status: DISCONTINUED | OUTPATIENT
Start: 2018-01-19 | End: 2018-01-29 | Stop reason: HOSPADM

## 2018-01-19 RX ORDER — LISINOPRIL AND HYDROCHLOROTHIAZIDE 12.5; 2 MG/1; MG/1
2 TABLET ORAL EVERY MORNING
Qty: 30 TABLET | Status: ON HOLD | DISCHARGE
Start: 2018-01-20 | End: 2018-01-29

## 2018-01-19 RX ADMIN — METOPROLOL SUCCINATE 100 MG: 100 TABLET, EXTENDED RELEASE ORAL at 09:57

## 2018-01-19 RX ADMIN — INSULIN GLARGINE 22 UNITS: 100 INJECTION, SOLUTION SUBCUTANEOUS at 21:59

## 2018-01-19 RX ADMIN — CLOPIDOGREL 75 MG: 75 TABLET, FILM COATED ORAL at 09:58

## 2018-01-19 RX ADMIN — INSULIN ASPART 6 UNITS: 100 INJECTION, SOLUTION INTRAVENOUS; SUBCUTANEOUS at 10:04

## 2018-01-19 RX ADMIN — LISINOPRIL AND HYDROCHLOROTHIAZIDE 2 TABLET: 12.5; 2 TABLET ORAL at 09:57

## 2018-01-19 RX ADMIN — NICOTINE 1 PATCH: 21 PATCH, EXTENDED RELEASE TRANSDERMAL at 02:41

## 2018-01-19 RX ADMIN — PREGABALIN 150 MG: 75 CAPSULE ORAL at 21:01

## 2018-01-19 RX ADMIN — TAMSULOSIN HYDROCHLORIDE 0.4 MG: 0.4 CAPSULE ORAL at 21:01

## 2018-01-19 RX ADMIN — PREGABALIN 150 MG: 75 CAPSULE ORAL at 09:57

## 2018-01-19 RX ADMIN — ASPIRIN 325 MG: 325 TABLET, DELAYED RELEASE ORAL at 09:57

## 2018-01-19 RX ADMIN — INSULIN GLARGINE 22 UNITS: 100 INJECTION, SOLUTION SUBCUTANEOUS at 10:02

## 2018-01-19 RX ADMIN — INSULIN ASPART 5 UNITS: 100 INJECTION, SOLUTION INTRAVENOUS; SUBCUTANEOUS at 12:09

## 2018-01-19 RX ADMIN — SIMVASTATIN 20 MG: 20 TABLET, FILM COATED ORAL at 21:01

## 2018-01-19 ASSESSMENT — ACTIVITIES OF DAILY LIVING (ADL)
ADLS_ACUITY_SCORE: 9

## 2018-01-19 ASSESSMENT — VISUAL ACUITY
OU: OTHER (SEE COMMENT)

## 2018-01-19 NOTE — IP AVS SNAPSHOT
Cooper Cabral #[id number] (CSN: [id number])  (62 year old M)  (Adm: 18)     [id number]               [facility]: [phone]            Patient Demographics     Patient Name Sex          Age SSN Address Phone    Cooper Cabral Male 1955 (62 year old)  [address]  [facility] [address] [phone] (Home)      PCP and Center    Primary Care Provider  Phone [place]     Hudson Spring [phone] [address] [facility] [address]        Emergency Contact(s)     Name Relation Home Work Mobile    CLEVELAND CABRAL Brother [phone]      Kwabena Cabral Brother [phone]      Harrison Cabral Brother [phone]      Lyle Cabral Brother [phone]        Admission Information     Attending Provider Admitting Provider Admission Type Admission Date/Time     Shira Antunez MD Urgent 18  1350    Discharge Date Hospital Service Auth/Cert Status Service Area    18 Acute IP Rehab Incomplete [facility]    Unit Room/Bed Admission Status       [facility] [id number] Discharged (Confirmed)       Admission     Complaint    01.1 Acute infarct lateral left medulla, with L sided weakness, CVA (cerebral vascular accident) (H)      Hospital Account     Name Acct ID Class Status Primary Coverage    Cooper Cabral [id number] Acute Inpatient Rehab Billed MEDICA - MEDICA ACCESS ABILITY [place]            Guarantor Account (for Hospital Account #[id number])     Name Relation to Pt Service Area Active? Acct Type    Cooper Cabral  FCS Yes Personal/Family    Address Phone          [address]  [place] [address] [phone](H)              Coverage Information (for Hospital Account #[id number])     F/O Payor/Plan Precert #    MEDICA/MEDICA ACCESS ABILITY [place]     Subscriber Subscriber #    Cooper Cabral [id number]    Address Phone    [address] [address]  [place] [address] [phone]                                                 INTERAGENCY TRANSFER FORM - PHYSICIAN ORDERS   1/19/2018                       [facility]: [phone]            Attending Provider: (none)    Allergies:  No Known Allergies    Infection:  None   Service:  ACUTE IP JUANITO    Ht:  1.829 m (6')   Wt:  96.6 kg (213 lb)   Admission Wt:  96.6 kg (213 lb)    BMI:  28.89 kg/m 2   BSA:  2.22 m 2            ED Clinical Impression     Diagnosis Description Comment Added By Time Added    Cerebrovascular accident (CVA), unspecified mechanism (H) [I63.9] Cerebrovascular accident (CVA), unspecified mechanism (H) [I63.9]  Ruth Mendieta PA 1/29/2018  9:13 AM    Urinary retention [R33.9] Urinary retention [R33.9]  Ruth Mendieta PA 1/29/2018  9:24 AM    Type 2 diabetes mellitus with diabetic neuropathy, with long-term current use of insulin (H) [E11.40, Z79.4] Type 2 diabetes mellitus with diabetic neuropathy, with long-term current use of insulin (H) [E11.40, Z79.4]  Ruth Mendieta PA 1/29/2018  9:24 AM    Hypertension goal BP (blood pressure) < 140/80 [I10] Hypertension goal BP (blood pressure) < 140/80 [I10]  Ruth Mendieta PA 1/29/2018  9:24 AM    Hyperlipidemia LDL goal <100 [E78.5] Hyperlipidemia LDL goal <100 [E78.5]  Ruth Mendieta PA 1/29/2018  9:25 AM    Benign prostatic hyperplasia with urinary retention [N40.1, R33.8] Benign prostatic hyperplasia with urinary retention [N40.1, R33.8]  Ruth Mendieta PA 1/29/2018  9:25 AM      Hospital Problems as of 1/29/2018              Priority Class Noted POA    CVA (cerebral vascular accident) (H) Medium  1/19/2018 Yes      Non-Hospital Problems as of 1/29/2018              Priority Class Noted    Hypertension goal BP (blood pressure) < 140/80 Medium  9/30/2008    Tobacco abuse Medium  9/30/2008    Non morbid obesity Medium  9/30/2008    Hyperlipidemia LDL goal <100 Medium  5/9/2010    Advanced directives, counseling/discussion  Medium  5/29/2013    Bell's palsy Medium  3/2/2016    Other diabetic neurological complication associated with type 2 diabetes mellitus (H) Medium  9/7/2017    Type 2 diabetes mellitus with diabetic neuropathy, with long-term current use of insulin (H) Medium  10/23/2017    Ischemic stroke (H) High Acute 1/12/2018    Left-sided weakness Medium  1/12/2018      Code Status History     Date Active Date Inactive Code Status Order ID Comments User Context    1/19/2018  1:52 PM 1/29/2018  6:06 PM Full Code [id number]  Macrelina Zaldivar MD Inpatient    1/12/2018  3:54 PM 1/19/2018  1:52 PM Full Code [id number]  Clemencia Martino MD Inpatient      Current Code Status     Date Active Code Status Order ID Comments User Context       Prior      Summary of Visit     Reason for your hospital stay       Admitted to rehab following a stroke.                Medication Review      START taking        Dose / Directions Comments    finasteride 5 MG tablet   Commonly known as:  PROSCAR   Used for:  Urinary retention        Dose:  5 mg   Take 1 tablet (5 mg) by mouth daily   Quantity:  30 tablet   Refills:  0          CONTINUE these medications which may have CHANGED, or have new prescriptions. If we are uncertain of the size of tablets/capsules you have at home, strength may be listed as something that might have changed.        Dose / Directions Comments    insulin glargine 100 UNIT/ML injection   Commonly known as:  LANTUS   This may have changed:  how much to take   Used for:  Type 2 diabetes mellitus with diabetic neuropathy, with long-term current use of insulin (H)        Dose:  20 Units   Inject 20 Units Subcutaneous 2 times daily   Quantity:  15 mL   Refills:  0        lisinopril-hydrochlorothiazide 20-12.5 MG per tablet   Commonly known as:  PRINZIDE/ZESTORETIC   This may have changed:  Another medication with the same name was removed. Continue taking this medication, and follow the directions you see here.   Used for:   Hypertension goal BP (blood pressure) < 140/80, Type 2 diabetes mellitus with diabetic neuropathy, with long-term current use of insulin (H)        Dose:  2 tablet   Take 2 tablets by mouth daily   Quantity:  60 tablet   Refills:  0        metoprolol succinate 100 MG 24 hr tablet   Commonly known as:  TOPROL-XL   This may have changed:  Another medication with the same name was removed. Continue taking this medication, and follow the directions you see here.   Used for:  Hypertension goal BP (blood pressure) < 140/80        Dose:  100 mg   Take 1 tablet (100 mg) by mouth daily   Quantity:  30 tablet   Refills:  0          CONTINUE these medications which have NOT CHANGED        Dose / Directions Comments    aspirin 325 MG EC tablet   Used for:  Cerebrovascular accident (CVA), unspecified mechanism (H)        Dose:  325 mg   Take 1 tablet (325 mg) by mouth daily   Quantity:  60 tablet   Refills:  0        blood glucose monitoring lancets   Used for:  Diabetes mellitus, type 2 (H)        tests twice a day   Quantity:  100   Refills:  3        blood glucose monitoring test strip   Commonly known as:  ACCU-CHEK LUISANA   Used for:  Type 2 diabetes mellitus with diabetic neuropathy, with long-term current use of insulin (H)        by In Vitro route 1 times daily (test)   Quantity:  100 strip   Refills:  3    Profile Rx: patient will contact pharmacy when needed       clopidogrel 75 MG tablet   Commonly known as:  PLAVIX   Used for:  Cerebrovascular accident (CVA), unspecified mechanism (H)        Dose:  75 mg   Take 1 tablet (75 mg) by mouth daily   Quantity:  30 tablet   Refills:  0        insulin pen needle 31G X 8 MM   Commonly known as:  B-D U/F   Used for:  Uncontrolled diabetes mellitus (H)        Use once daily or as directed.   Quantity:  90 each   Refills:  1        MULTI FOR HIM PO        Dose:  1 capsule   Take 1 capsule by mouth daily.   Refills:  0        order for DME   Used for:  Type 2 diabetes mellitus  with diabetic neuropathy, with long-term current use of insulin (H), Other diabetic neurological complication associated with type 2 diabetes mellitus (H)        Diabetic shoes due to neuropathy   Quantity:  1 Device   Refills:  0        pregabalin 150 MG capsule   Commonly known as:  LYRICA   Used for:  Type 2 diabetes mellitus with diabetic neuropathy, with long-term current use of insulin (H)        Dose:  150 mg   Take 1 capsule (150 mg) by mouth 2 times daily   Quantity:  60 capsule   Refills:  0        simvastatin 20 MG tablet   Commonly known as:  ZOCOR   Used for:  Hyperlipidemia LDL goal <100        Dose:  20 mg   Take 1 tablet (20 mg) by mouth At Bedtime   Quantity:  30 tablet   Refills:  0        tamsulosin 0.4 MG capsule   Commonly known as:  FLOMAX   Used for:  Benign prostatic hyperplasia with urinary retention        Dose:  0.4 mg   Take 1 capsule (0.4 mg) by mouth At Bedtime   Quantity:  30 capsule   Refills:  0          STOP taking     insulin aspart 100 UNIT/ML injection   Commonly known as:  NovoLOG PEN           nicotine 21 MG/24HR 24 hr patch   Commonly known as:  NICODERM CQ           potassium chloride 20 MEQ Packet   Commonly known as:  KLOR-CON                   After Care     Activity       Your activity upon discharge: up with walker no driving.       Diet       Follow this diet upon discharge: Please follow a diabetic diet.  Limit sugars, sodas, syrups etc.      Combination Diet Regular Diet Adult; 8589-9610 Calories: Moderate Consistent CHO (4-6 CHO units/meal)       Monitor and record       blood glucose 4 times a day, before meals and at bedtime               Further instructions from your care team       Follow up appointments:    You are scheduled to see your primary provider, Dr. Hudson Spring  February 12th, 2018 at 11:40am    Address  [address]   Phone   [phone]    -- Neurology  "to follow-up stroke   Dr Rolando Ireland on Friday February 16th at 10:45 am. Please check in at 10:30.                         Address  [facility]; [address]    [address]    [place] [address]  Phone             [phone]    You have an appointment with Urology at [facility] [phone]  February 13th,2018 at 8:00am [facility] [address]  [place] [address]    -----------------------------------------------    To reduce the risk of subsequent stroke there are several important factors including optimal management of anticoagulants, blood pressure, cholesterol, diabetes and smoking abstinence.    Anticoagulation:  You are on Aspirin and Clopidogrel    Blood Pressure:  Keeping your blood pressures less than 130/80 has been shown to reduce risk of recurrent stroke. Recording your blood pressure and heart rate once daily in a log book can help you and your providers make decisions on optimal management. You are encouraged to bring your log book with you to your primary physician and/or cardiology doctor visits.    You are currently on metoprolol, lisinopril-hydrochlorothiazide to help control your blood pressure. Several lifestyle modifications have been associated with blood pressure reduction and are an important part of a comprehensive plan. These include: weight loss; a diet low in salt and cholesterol and rich in fruits and vegetables; regular aerobic physical activity and limited alcohol consumption.    Diabetes:  Optimal management of diabetes not only reduces risk of another stroke, it can help with healing process from your recent stroke. Blood glucose levels measure how well you're controlling your diabetes. An additional test \"hemoglobin A1C\" reflects how you have been overall with glucose control in the prior few months. This should be less than 7 though even lower below 6 is considered normal. " "Your recent Hgb A1C was very elevated, 11.9 consistent with the report of not recently taking your insulin.  Remains very important to manage your diabetes is a key risk factor for recurrent stroke, heart disease and many other problems. This should be followed up with your primary provider and/or endocrinologist.    Your present plan is to check blood glucose consistently Twice Daily. These should be recorded along with date/time and any relevant notes such as food consumed, insulin/medication taken etc. This helps you and your providers make decisions on optimal management. You're encouraged to bring you log book with to your primary and/or endocrinology doctor visits.  Your current medications include Insulin Glargine (Lantus) and metformin. Specific doses and frequencies listed elsewhere.     Diet:  Diet and exercise are very important for diabetes regardless of being on medication or not. Be aware of and moderate your carbohydrate intake as instructed by doctor, dietitian or nurse.  Regular physical activity may be more difficult since your stroke though doing what you can on a daily basis is helpful.     Cholesterol:  Traditional target levels for LDL cholesterol or \"bad cholesterol\" is less than 130 however once you have had a stroke, your target LDL level is now less than 70. Additional recommendations such as increasing your HDL or \"good\" cholesterol and lowering your triglyceride level can also be important.    Your most recent lipid panel was   Lab Results   Component Value Date    CHOL 147 01/14/2018     Lab Results   Component Value Date    HDL 37 01/14/2018     Lab Results   Component Value Date    LDL 92 01/14/2018     Lab Results   Component Value Date    TRIG 89 01/14/2018     Lab Results   Component Value Date    CHOLHDLRATIO 4.9 10/14/2015       This should be followed up in 2-3 months with your primary provider.    Smoking:  Finally one of the most important modifiable risk factors is to not " smoke.  Strongly encourage you to reconsider quitting or at least cutting back.  If you are interested in patch, gum or other options, please talk with your primary provider. Support through counseling, nicotine replacement, and oral smoking-cessation medications may all be helpful.       HOME CARE  [facility] [phone] will provide RN, PT, OT, and Home Health Aide. They will call you after you discharge to schedule the first visit.       Referrals     Home Care OT Referral for Hospital Discharge       OT to eval and treat    Your provider has ordered home care - occupational therapy. If you have not been contacted within 2 days of your discharge please call the department phone number listed on the top of this document.       Home Care PT Referral for Hospital Discharge       PT to eval and treat    Your provider has ordered home care - physical therapy. If you have not been contacted within 2 days of your discharge please call the department phone number listed on the top of this document.       Home care nursing referral       RN skilled nursing visit. RN to assess home safety.  RN to teach medication management.  Home health aide to assist with bathing    Your provider has ordered home care nursing services. If you have not been contacted within 2 days of your discharge please call the inpatient department phone number at [phone] .       UROLOGY ADULT REFERRAL       Your provider has referred you to: [facility]: [facility] [phone]   [email or web address]    Please be aware that coverage of these services is subject to the terms and limitations of your health insurance plan.  Call member services at your health plan with any benefit or coverage questions.      Please bring the following with you to your appointment:    (1) Any X-Rays, CTs or MRIs which have been performed.  Contact the facility where they were done  to arrange for  prior to your scheduled appointment.    (2) List of current medications  (3) This referral request   (4) Any documents/labs given to you for this referral             Follow-Up Appointment Instructions     [facility]ult [facility] Follow-up and recommended labs and tests       Follow up with Primary care (follow up hospitalization, diabetes, htn) , Neurology (follow up stroke), Urology (urinary retention).     Appointments on [place] and/or [facility] (with [facility] or [facility] provider or service). Call [phone] if you haven't heard regarding these appointments within 7 days of discharge.             Your next 10 appointments already scheduled     Feb 12, 2018 11:40 AM CST   [facility] with Hudson Spring MD   [facility]    [address] [address] [phone]           Bring a current list of meds and any records pertaining to this visit. For Physicals, please bring immunization records and any forms needing to be filled out. Please arrive 10 minutes early to complete paperwork.            Feb 13, 2018  8:00 AM CST   New Patient Visit with Camilo Castellanos MD   [facility] ([facility])    [address]  [address]  [facility] [address] [phone]               MD face to face encounter       Documentation of Face to Face and Certification for Home Health Services    I certify that patient: Cooper Cabral is under my care and that I, or a nurse practitioner or physician's assistant working with me, had a face-to-face encounter that meets the physician face-to-face encounter requirements with this patient on: 1/29/2018.    This encounter with the patient was in whole, or in part, for the following medical condition, which is the primary reason for home health care: s/p stroke with impaired mobility.    I certify that,  based on my findings, the following services are medically necessary home health services: Nursing, Occupational Therapy and Physical Therapy.    My clinical findings support the need for the above services because: Nurse is needed: To provide assessment and oversight required in the home to assure adherence to the medical plan due to: past medical non complianc,recurrent stroke.., Occupational Therapy Services are needed to assess and treat cognitive ability and address ADL safety due to impairment in functional mobility. and Physical Therapy Services are needed to assess and treat the following functional impairments: balance, mobility.    Further, I certify that my clinical findings support that this patient is homebound (i.e. absences from home require considerable and taxing effort and are for medical reasons or Faith services or infrequently or of short duration when for other reasons) because: Requires assistance of another person or specialized equipment to access medical services because patient requires supervision for safety, transportation.     Based on the above findings. I certify that this patient is confined to the home and needs intermittent skilled nursing care, physical therapy and/or speech therapy.  The patient is under my care, and I have initiated the establishment of the plan of care.  This patient will be followed by a physician who will periodically review the plan of care.  Physician/Provider to provide follow up care: Hudson Spring    Attending hospital physician (the Medicare certified [facility] provider): Guille Acosta MD  Physician Signature: See electronic signature associated with these discharge orders.  Date: 1/29/2018                  Statement of Approval     Ordered          01/29/18 1252  I have reviewed and agree with all the recommendations and orders detailed in this document.  EFFECTIVE NOW     Approved and electronically signed by:  Guille Acosta MD                                                  INTERAGENCY TRANSFER FORM - NURSING   1/19/2018                       [facility]: [phone]            Attending Provider: (none)    Allergies:  No Known Allergies    Infection:  None   Service:  ACUTE IP JUANITO    Ht:  1.829 m (6')   Wt:  96.6 kg (213 lb)   Admission Wt:  96.6 kg (213 lb)    BMI:  28.89 kg/m 2   BSA:  2.22 m 2            Advance Directives        Scanned docmt in ACP Activity?           No        Immunizations     Name Date      Influenza (IIV3) PF 10/15/08     TDAP Vaccine (Adacel) 09/30/08       ASSESSMENT     Discharge Profile Flowsheet     EXPECTED DISCHARGE     COMMUNICATION ASSESSMENT      Expected Discharge Date  01/29/18 01/23/18 1321   Patient's communication style  spoken language (English or Bilingual) 01/19/18 1629    DISCHARGE NEEDS ASSESSMENT     SKIN      Concerns To Be Addressed  discharge planning concerns 01/18/18 1111   Inspection of bony prominences  Full 01/29/18 1006    Concerns Comments  needs prior auth 01/15/18 1022   Full except areas not inspected   -- 01/25/18 1007    Equipment Currently Used at Home  grab bar 01/21/18 2325   Inspection under devices  Full 01/28/18 2316    Transportation Available  family or friend will provide 01/21/18 2325   Skin WDL  ex 01/29/18 1006    # of Referrals Placed by CTS  Post Acute Facilities;Communication hand-offs to next level of Care Providers 01/18/18 1111   Skin Integrity  scab(s) 01/29/18 1006    GASTROINTESTINAL (ADULT,PEDIATRIC,OB)     SAFETY      GI WDL  WDL 01/29/18 1006   Safety WDL  WDL 01/29/18 1006    All Quadrants Bowel Sounds  audible and active in all quadrants 01/28/18 2257   All Alarms  none present 01/29/18 1006    Last Bowel Movement  -- 01/28/18 1554                      Assessment WDL (Within Defined Limits) Definitions           Safety WDL     Effective: 09/28/15    Row Information: <b>WDL Definition:</b> Bed in low position, wheels locked; call light in reach; upper side  "rails up x 2; ID band on<br> <font color=\"gray\"><i>Item=AS safety wdl>>List=AS safety wdl>>Version=F14</i></font>      Skin WDL     Effective: 09/28/15    Row Information: <b>WDL Definition:</b> Warm; dry; intact; elastic; without discoloration; pressure points without redness<br> <font color=\"gray\"><i>Item=AS skin wdl>>List=AS skin wdl>>Version=F14</i></font>      Vitals     Vital Signs Flowsheet     VITAL SIGNS     Height Method  Stated 01/19/18 1412    Temp  96.6  F (35.9  C) 01/29/18 0747   Weight  96.6 kg (213 lb) 01/19/18 1352    Temp src  Oral 01/29/18 0747   Weight Method  Standing scale 01/19/18 1352    Resp  16 01/29/18 0747   BSA (Calculated - sq m)  2.22 01/19/18 1412    Pulse  86 01/29/18 1232   BMI (Calculated)  28.95 01/19/18 1412    Heart Rate  69 01/24/18 1635   POSITIONING      Pulse/Heart Rate Source  Monitor;Brachial 01/29/18 1232   Body Position  side-lying, left 01/29/18 0629    BP  112/69 01/29/18 1232   Head of Bed (HOB)  HOB at 20-30 degrees 01/28/18 2316    BP Location  Left arm 01/29/18 1232   Positioning/Transfer Devices  pillows 01/28/18 2316    OXYGEN THERAPY     Chair  Upright in chair 01/27/18 1515    SpO2  92 % 01/29/18 0747   DAILY CARE      O2 Device  None (Room air) 01/29/18 0747   Activity Management  activity adjusted per tolerance 01/29/18 1006    PAIN/COMFORT     Activity Assistance Provided  independent 01/29/18 1006    Patient Currently in Pain  denies 01/29/18 0756   Assistive Device Utilized  walker 01/29/18 1006    Preferred Pain Scale  CAPA (Clinically Aligned Pain Assessment) ([facility], [facility] Adults Only) 01/29/18 0629   Symptoms Noted During/After Activity  none 01/29/18 1006    HEIGHT AND WEIGHT     Elimination Assistance  up to toilet 01/29/18 1006    Height  1.829 m (6') 01/19/18 1412                 Patient Lines/Drains/Airways Status    Active LINES/DRAINS/AIRWAYS     Name: Placement date: Placement time: Site: Days: Last dressing change:    Urethral " Catheter Coude 14 fr 01/26/18   1400   Coude   9     Wound 01/12/18 Left Elbow Abrasion(s) 01/12/18   1530   Elbow   23     Wound 01/19/18 Left Elbow Abrasion(s) scabbed area 01/19/18 2127   Elbow   16     Wound 01/19/18 Right Foot Abrasion(s) scabbed areas on ankle, big toe, fourth toe, and below knee on R leg 01/19/18 2129   Foot   16     Wound 01/19/18 2013 Left ankle abrasion 01/19/18 2013    16             Patient Lines/Drains/Airways Status    Active PICC/CVC     None            Intake/Output Detail Report     None      Case Management/Discharge Planning     Case Management/Discharge Planning Flowsheet     REFERRAL INFORMATION     Expected Discharge Date  01/29/18 01/23/18 1321    Arrived From  admitted as an inpatient 01/14/18 1058   ASSESSMENT/CONCERNS TO BE ADDRESSED      # of Referrals Placed by CTS  Post Acute Facilities;Communication hand-offs to next level of Care Providers 01/18/18 1111   Concerns To Be Addressed  discharge planning concerns 01/18/18 1111    [facility] Name  [facility] 01/16/18 1048   Concerns Comments  needs prior auth 01/15/18 1022    Primary Care MD Name  [facility] 01/16/18 1048   DISCHARGE PLANNING      LIVING ENVIRONMENT     Transportation Available  family or friend will provide 01/21/18 2325    Lives With  alone 01/21/18 2159   FINAL RESOURCES      Living Arrangements  mobile home 01/21/18 2325   Equipment Currently Used at Home  grab bar 01/21/18 2325    Able to Return to Prior Living Arrangements  yes 01/21/18 2325   ABUSE RISK SCREEN      HOME SAFETY     QUESTION TO PATIENT:  Has a member of your family or a partner(now or in the past) intimidated, hurt, manipulated, or controlled you in any way?  no 01/19/18 1629    Patient Feels Safe Living in Home?  yes 01/21/18 2325   QUESTION TO PATIENT: Do you feel safe going back to the place where you are living?  yes 01/19/18 1629    COPING/STRESS     OBSERVATION: Is there reason to believe there has been maltreatment  of a vulnerable adult (ie. Physical/Sexual/Emotional abuse, self neglect, lack of adequate food, shelter, medical care, or financial exploitation)?  no 01/19/18 1629    Major Change/Loss/Stressor  denies 01/19/18 1629   OTHER      EXPECTED DISCHARGE     Are you depressed or being treated for depression?  No 01/19/18 1632                  [facility]: [id number]            Medication Administration Report for Cooper Cabral as of 02/05/18 1148   No medications to display             INTERAGENCY TRANSFER FORM - NOTES (H&P, Discharge Summary, Consults, Procedures, Therapies)   1/19/2018                       [facility]: [id number]               History & Physicals      H&P by Shira Antunez MD at 1/19/2018  9:14 AM     Author:  Shira Antunez MD Service:  Physical Medicine and Rehabilitation Author Type:  Physician    Filed:  1/19/2018  3:28 PM Date of Service:  1/19/2018  9:14 AM Creation Time:  1/19/2018  8:11 AM    Status:  Signed :  Shira Antunez MD (Physician)         HPI:[KV1.1]  Cooper Cabral[KV1.2] is a 61 yo RHD M with PMH significant for DM II with neuropathy, HTN, HLD, tobacco abuse and 2 prior CVAs who was admitted on 1/12/18 with left sided weakness of 2 days duration and CT scan showing right pareital/occipital hyper density concerning for ischemic CVA. No tPA administered. 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 MRI with acute infarct within the lateral left medulla and multiple chronic lacunar infarcts within the cerebellum, brainstem, and bilateral basal ganglia. Echo with negative bubble study and LVEF of 40-45%. LDL 92. HgbA1c 11.9. CVA etiology believed to be 2/2 intracranial atherosclerosis and small vessel disease. He is currently on ASA and plavix. He has been compliant with smoking cessation recommendations since hospitalized with help of  "nicotine patch.     Post-CVA course has been complicated by urinary retention. He was cathed for 1000cc's of urine on 1/17 and nocturia. Flomax 0.4mg qhs started on 1/18.[KV1.1] Hammond in place.[KV1.3] BPs WNL. Glucose well controlled with range 120s-200s. Glargine recuded from 25-->22units on 1/18 given glucose in the low 100s.[KV1.1] Mild tachycardia, now resolved suspect reactive. Hypokalemia.[KV1.4]    Current function:  OT note on 1/19, \"Pt SBA to Aby for transfers, cues needed to problem solve with LE dressing and being able to thread catheter in pant leg after education and visual demonstration. Toilet transfer with Aby and cues for hand placement.\"  PT note on 1/17, \" Pt was up in a chair upon PT arrival. Seated /77. Pt requires Aby for sit<>stand transfers and to use the toilet in a standing position. Gait training 200' x 2 with a FWW and Aby to correct 2 LOB, modA to correct 2 LOB all to his L side. Seated BP after gait 129/77.\" BBS 19/56 on 1/17.   On regular diet and thins. No further ST needs noted on 1/13.     Prior Function:[KV1.1]   Independent with mobility and ADLs. He was mostly homebound and sedentary at baseline. Not driving 2/2 visual impairment related to prior CVA.[KV1.4]    PMH:[KV1.1]  Active Ambulatory Problems     Diagnosis Date Noted     Hypertension goal BP (blood pressure) < 140/80 09/30/2008     Tobacco abuse 09/30/2008     Non morbid obesity 09/30/2008     Hyperlipidemia LDL goal <100 05/09/2010     Advanced directives, counseling/discussion 05/29/2013     Bell's palsy 03/02/2016     Other diabetic neurological complication associated with type 2 diabetes mellitus (H) 09/07/2017     Type 2 diabetes mellitus with diabetic neuropathy, with long-term current use of insulin (H) 10/23/2017     Ischemic stroke (H) 01/12/2018     Left-sided weakness 01/12/2018     Resolved Ambulatory Problems     Diagnosis Date Noted     Severe uncontrolled hypertension 05/29/2013     Past Medical " History:   Diagnosis Date     Diabetes (H)      HTN (hypertension)[KV1.5]      Social Hx:[KV1.1]  Social History     Social History     Marital status: Single     Spouse name: N/A     Number of children: N/A     Years of education: N/A     Occupational History     Not on file.     Social History Main Topics     Smoking status: Current Every Day Smoker     Packs/day: 1.00     Years: 40.00     Types: Cigarettes     Smokeless tobacco: Never Used     Alcohol use No     Drug use: No     Sexual activity: Yes     Partners: Female     Other Topics Concern     Parent/Sibling W/ Cabg, Mi Or Angioplasty Before 65f 55m? No     Social History Narrative[KV1.5]   Patient lives alone in a mobile home. Home is not wc accessible and has 4 ERNIE.     Family Hx:[KV1.1]    Family History   Problem Relation Age of Onset     CANCER Mother      Bone cancer      DIABETES Father      CANCER Father      bone cancer      Hypertension Brother      Hypertension Brother[KV1.5]        ROS:   General:[KV1.1] denies[KV1.6] fevers, chills, weight loss  Ears:[KV1.1] denies[KV1.6] changes in hearing, tinnitus  Eyes:[KV1.1] denies[KV1.6] changes in vision, diplopia  CV:[KV1.1] denies[KV1.6] chest pain, palpitations  Resp:[KV1.1] denies[KV1.6] roblems breathing, cough  :[KV1.1] riley in place[KV1.6]  GI:[KV1.1] denies[KV1.6] diarrhea, constipation  Skin:[KV1.1] denies[KV1.6] rash, pruritis  Neuro:[KV1.1] denies[KV1.6] headache, weakness, paresthesias.     Medications:[KV1.1]  No current facility-administered medications for this encounter.      Current Outpatient Prescriptions   Medication     aspirin  MG EC tablet     insulin aspart (NOVOLOG PEN) 100 UNIT/ML injection     insulin glargine (LANTUS) 100 UNIT/ML injection     clopidogrel (PLAVIX) 75 MG tablet     nicotine (NICODERM CQ) 21 MG/24HR 24 hr patch     potassium chloride (KLOR-CON) 20 MEQ Packet     Facility-Administered Medications Ordered in Other Encounters   Medication     tamsulosin  (FLOMAX) capsule 0.4 mg     insulin glargine (LANTUS) injection 22 Units     lisinopril-hydrochlorothiazide (PRINZIDE/ZESTORETIC) 20-12.5 MG per tablet 2 tablet     metoprolol succinate (TOPROL-XL) 24 hr tablet 100 mg     influenza quadrivalent (PF) vacc age 3 yrs and older (FLUZONE or Flulaval) injection 0.5 mL     nicotine Patch in Place     nicotine patch REMOVAL     nicotine (NICODERM CQ) 21 MG/24HR 24 hr patch 1 patch     sodium chloride (PF) 0.9% PF flush 10 mL     clopidogrel (PLAVIX) tablet 75 mg     pregabalin (LYRICA) capsule 150 mg     simvastatin (ZOCOR) tablet 20 mg     glucose 40 % gel 15-30 g    Or     dextrose 50 % injection 25-50 mL    Or     glucagon injection 1 mg     naloxone (NARCAN) injection 0.1-0.4 mg     Medication Instruction     insulin aspart (NovoLOG) inj (RAPID ACTING)     insulin aspart (NovoLOG) inj (RAPID ACTING)     insulin aspart (NovoLOG) inj (RAPID ACTING)     potassium chloride SA (K-DUR/KLOR-CON M) CR tablet 20-40 mEq     potassium chloride (KLOR-CON) Packet 20-40 mEq     potassium chloride 10 mEq in 100 mL sterile water intermittent infusion (premix)     potassium chloride 10 mEq in 100 mL intermittent infusion with 10 mg lidocaine     potassium chloride 20 mEq in 50 mL intermittent infusion     magnesium sulfate 4 g in 100 mL sterile water (premade)     acetaminophen (TYLENOL) tablet 650 mg     oxyCODONE IR (ROXICODONE) tablet 5 mg     melatonin tablet 1 mg     docusate sodium (COLACE) capsule 100 mg     bisacodyl (DULCOLAX) EC tablet 5 mg    Or     bisacodyl (DULCOLAX) EC tablet 10 mg    Or     bisacodyl (DULCOLAX) EC tablet 15 mg     magnesium hydroxide (MILK OF MAGNESIA) suspension 30 mL     bisacodyl (DULCOLAX) Suppository 10 mg     ondansetron (ZOFRAN-ODT) ODT tab 4 mg    Or     ondansetron (ZOFRAN) injection 4 mg     aspirin EC EC tablet 325 mg    Or     aspirin Suppository 300 mg     labetalol (NORMODYNE/TRANDATE) injection 10-40 mg     hydrALAZINE (APRESOLINE)  injection 10-20 mg[KV1.2]       Allergies:[KV1.1]   No Known Allergies[KV1.2]    Physical Exam:[KV1.1]  B/P: 120/79, T: 96.9, P: 79, R: 16[KV1.6]  General:[KV1.1] NAD, pleasant and cooperative[KV1.6]  HEENT:[KV1.1] NC/AT, right facial droop[KV1.6]  CV:[KV1.1] RRR[KV1.6]  RESP:[KV1.1] Breathing easily on RA[KV1.6]  :[KV1.1] Hammond in place draining dark yellow urine.[KV1.6]   MSK:[KV1.1] Full AROM/PROM of BUE and BLE[KV1.6]  Neuro:   Mental Status[KV1.1]: Patient is appropriate and able to recall new medications. He is oriented to situation.    Speech: dysarthria, normal rate of speech[KV1.6]   CN:[KV1.1] Right facial droop, EOMI, PERRL, hearing intact to conversation, tongue extends to right, shoulder shrug and SCM intact bilaterally.[KV1.6]    MMT[KV1.1]: 5/5 on right BUE, LUE with 4++/5 with shoulder abduction, elbow flexion/extension, wrist extension, finger flexion and finger abduction. LLE with 4+/5 with hip flexion, knee extension/flexion, dorsiflexion and plantarflexion.[KV1.6]    Reflexes[KV1.1]: symmetric at the patella, achilles, and biceps[KV1.6]   Tone[KV1.1]: NO clonus at the ankles. Normal throughout.[KV1.6]    Coordination[KV1.1]: Ataxia with FNF on left[KV1.6]   Sensation[KV1.1]: intact to light touch throughout.   Ext: No BLE edema. No tenderness with palpation of calf bilaterally  Skin: ecchymosis and superficial abrasions scattered over BUE.[KV1.6]     Labs:   Reviewed[KV1.1]   1/18 K 4.5[KV1.3]    Imaging:[KV1.1]   Reviewed[KV1.4]    Assessment:[KV1.1]  Cooper Cabral[KV1.2] is a 61 yo RHD M with PMH significant for DM II with neuropathy, HTN, HLD, tobacco abuse and 2 prior CVAs who was admitted on 1/12/18 with left sided weakness of 2 days duration[KV1.1] with[KV1.4] MRI with acute infarct within the lateral left medulla and multiple chronic lacunar infarcts within the cerebellum, brainstem, and bilateral basal ganglia[KV1.1]. He presents with new ataxia, decreased balance, impulsivity,  decreased safety awareness 2/2 above resulting in new impairments with mobility and ADLs. He is admitted to the ARU on 1/19/18 for ongoing medical management and PT/OT.[KV1.4]    Plan:  Rehab  --PT for 90 minutes daily to work on neuromuscular re-education focusing on strength, balance, coordination, and endurance.     --OT for 90 minutes daily to work on ADL re-training such as grooming, self cares and bathing. [KV1.1] OT for cognitive eval as well.[KV1.6]   --Rehab RN for med administration, VS monitoring, bowel regimen[KV1.1], and bladder monitoring.[KV1.4]     Medicine[KV1.1]  ##Left medullary ischemic CVA:  -- 325mg ASA and plavix qday  -- PT and OT as above  -- Stroke education  -- Optimization of stroke risk factors including: DM II, HTN, HLD, and tobacco abuse as outlined below.  -- Stat HCT with any focal neuro changes    ##HTN: Goal BP <140/80 long term. Currently well controlled.[KV1.4] Monitor closely for orthostatic hypotension given tamsulosin added recently.[KV1.7]   --lisinopril/HCTZ 40-25mg, qday  --metoprolol 100mg qday    ##HLD:[KV1.4] LDL 92[KV1.1], goal <70.   --20mg simvastatin qday    ##Tobacco Abuse: not interested in quitting at this time  --tobacco cessation education  --21mg/24 hr nicotiene patch[KV1.4]   --patient counseled regarding smoking cessation, patient expresses understanding of CVA risk, not interested in quitting at this time.[KV1.6]     ##DM II with neuropathy: HgbA1c 11.9[KV1.4], not at goal[KV1.8]  --Glargine 22 units bid (recently decreased from 25 units)  --[KV1.4]1 U[KV1.8]Aspart[KV1.4] per 10gm carb tid with meals  --SSI  --Hypoglycemia protocol  --DM education as patient was not using insulin prior to admission[KV1.8]  --150mg lyrica bid    ##Urinary Retention: Suspect 2/2 BPH. Patient with 1L requiring IC on 1/18[KV1.4]. Now with Hammond in place.[KV1.3]   --0.4mg tamsulosin qhs  --PVRs x3 on admission  --[KV1.4]Hammond cares  --Monitor UOP  --Voiding trial around  1/25[KV1.3]    ## Right MCA stenosis with post stenotic fusiform dilation, asymptomatic  --No acute interventions needed at this time  --Follow up with neuro intervention as an outpatient    #Hypokalemia: K[KV1.4] 4.5[KV1.3] on 1/1[KV1.4]8[KV1.3]/18[KV1.4]. Patient is on[KV1.8] HCTZ. Per review of MAR, not getting daily KCl replacement. Will hold on scheduling for now. May need regular supplementation.[KV1.3]   --Recheck on 1/20/18[KV1.4] and 1/22/18[KV1.6] - will have primary team assess whether this needs to be monitored further.[SS1.1]     Discharge Goals:  1. Estimated Length of Stay:[KV1.1] 1-2 weeks[KV1.4]  2. Prognosis:[KV1.1] good[KV1.4]  3. Decision Maker:[KV1.1] self[KV1.4]  4. Code Status:[KV1.1] full[KV1.4]  5. Anticipated D/C Destination and functional outcome:[KV1.1] home with family support, mod I with ambulation and ADLs[KV1.4]  6. Follow up Appointments on Discharge:[KV1.1]   1. Stroke Clinic 2-3 months  2. Neuro intervention after discharge  3. PM&R  4. PCP 1 week[KV1.4]    Patient was seen and examined with[KV1.1] Dr. Antunez[KV1.4]    --------------------    I, Shira Antunez, personally examined and evaluated this patient on 1/19/18. I discussed the patient with my resident Dr. Zaldivar and care team, and agree with the assessment and plan of care as documented in her note.    I personally reviewed the chart (vitals signs, medications, labs and imaging).     My key findings and key management decisions made by me: Patient had a[SS1.1] lateral left medulla and multiple chronic lacunar infarcts within the cerebellum, brainstem, and bilateral basal ganglia[KV1.1]. He presents with new ataxia, decreased balance, impulsivity, decreased safety awareness[KV1.4]. He has a history of 2 prior CVAs in the past, was not managing his stroke risk factors well (HTN, HLD, DM, tobacco abuse). Goal will be for patient to be able to return home, with likely increased support from PCA.       I spent a total  of 120 minutes face-to-face and managing the care of Cooper Cabral. Over 50% of my time on the unit was spent counseling the patient and coordinating care. See note for details.[SS1.1]        Revision History        User Key Date/Time User Provider Type Action    > SS1.1 1/19/2018  3:28 PM Shira Antunez MD Physician Sign     KV1.7 1/19/2018  2:20 PM Marcelina Zaldivar MD Resident Sign     KV1.6 1/19/2018  2:17 PM Marcelina Zaldivar MD Resident Sign     KV1.3 1/19/2018  1:43 PM Marcelina Zaldivar MD Resident      KV1.8 1/19/2018 12:41 PM Marcelina Zaldivar MD Resident      KV1.4 1/19/2018 11:56 AM Marcelina Zaldivar MD Resident      KV1.5 1/19/2018  9:05 AM Marcelina Zaldivar MD Resident      KV1.2 1/19/2018  8:12 AM Marcelina Zaldivar MD Resident      KV1.1 1/19/2018  8:11 AM Marcelina Zaldivar MD Resident             H&P by Shira Antunez MD at 1/19/2018  1:52 PM     Author:  Shira Antunez MD Service:  Physical Medicine and Rehabilitation Author Type:  Physician    Filed:  1/19/2018  2:16 PM Date of Service:  1/19/2018  1:52 PM Creation Time:  1/19/2018  1:52 PM    Status:  Signed :  Shira Antunez MD (Physician)         Post Admission Physician Evaluation:    I have compared Cooper Cabral's condition on admission to acute rehabilitation to that outlined in the preadmission screen. History and physical exam performed by me. No significant differences are identified and the patient remains appropriate for an inpatient rehabilitation facility level of care to manage medical issues and address functional impairments due to left ischemic medullary stroke.    Comorbid medical conditions being managed: Hypertension, hyperlipidemia, tobacco abuse, DM II, diabetic neuropathy, urinary retention, hypokalemia    Prior functional level: Previously independent with mobility without an AD. Had previous right visual field cut and did not drive for this  reason. Had a PCA come in every 2 days to assist with meal prep, cleaning and laundry.     Present function:  OT: Pt SBA to Aby for transfers, cues needed to problem solve with LE dressing and being able to thread catheter in pant leg after education and visual demonstration. Toilet transfer with Aby and cues for hand placement.  PT: Pt was up in a chair upon PT arrival. Seated /77. Pt requires Aby for sit<>stand transfers and to use the toilet in a standing position. Gait training 200' x 2 with a FWW and Aby to correct 2 LOB, modA to correct 2 LOB all to his L side. Seated BP after gait 129/77. BBS 19/56 on .     Anticipated rehabilitation course: Anticipate that he will progress to modified independent with mobility, ADLs and safety awareness so that he can return home safely.     Will benefit from intensive rehabilitation includin minutes each of PT and OT  Rehabilitation nursing  Close management by physiatry    Prognosis: Good rehab potential, although long-term prognosis a bit guarded given his history of non-compliance regarding medication management and smoking cessation, which places him at a higher risk for repeat stroke.      Estimated length of stay: 1-2 weeks[SS1.1]       Revision History        User Key Date/Time User Provider Type Action    > SS1.1 2018  2:16 PM Shira Antunez MD Physician Sign                     Discharge Summaries      Discharge Summaries by Ruth Mendieta PA at 2018  2:00 PM     Author:  Ruth Mendieta PA Service:  Acute IP Rehab Author Type:  Physician Assistant    Filed:  2018  6:05 PM Date of Service:  2018  2:00 PM Creation Time:  2018  5:53 PM    Status:  Attested :  Ruth Mendieta PA (Physician Assistant)    Cosigner:  Guille Acosta MD at 2018  6:24 PM        Attestation signed by Guille Acosta MD at 2018  6:24 PM        Attestation:  Physician Attestation   Guille AMADO  Karla, have reviewed and discussed with the advanced practice provider their discharge plan for Cooper Cabral. I did not participate in a shared visit by interviewing or examining the patient and this should be billed as an advanced practice provider only discharge.    Guille Acosta  Date of Service (when I saw the patient): I did not personally see this patient today.                                   [facility]   Acute Rehabilitation Unit  Discharge summary     Date of Admission: 1/19/2018  Date of Discharge: 1/29/2018  Disposition: home  Primary Care Physician: [facility]  Attending physician: Dr. Guille Acosta        discharge diagnosis  Left medullary ischemic cva  Dm type 2 with hyperglycemia, neuropathy  htn  Tobacco use disorder  Urinary retention      brief summary  Mr. Cooper Cabral is a 63 yo man with PMH significant for DM II with neuropathy, HTN, HLD, tobacco abuse and 2 prior CVAs who was admitted on 1/12/18 with left sided weakness of 2 days duration with MRI with acute infarct within the lateral left medulla and multiple chronic lacunar infarcts within the cerebellum, brainstem, and bilateral basal ganglia. He presents with new ataxia, decreased balance, impulsivity, decreased safety awareness 2/2 above resulting in new impairments with mobility and ADLs. He is admitted to the ARU on 1/19/18 for ongoing medical management and PT/OT.      rehabilitation course  Rehab Diagnosis: stroke     Active Medical Co-morbidities/Prognosis: poorly controlled diabetes, tobacco use disorder with no intent to quit, hypertension, hyperlipidemia, urine retention.     Safety: Modified Independent in the room, using walker to ambulate. Able to call appropriately for help. Alert and oriented. No alarms on.      Pain: Denies any pain.     Medications, Skin, Tubes/Lines: Able to take medications whole with water. Mepilex on left elbow for abrasion. No tubes or lines.       Swallowing/Nutrition:     Bowel/Bladder: BM this morning on 1/26 per pt report. Pt stated it was formed, denies any diarrhea or incontinence of bowel. Bladder scan done at 0445 showed >999cc. Pt stated he had gotten up to the bathroom and voided a couple times during the night but only voided in small amounts. Straight cath'd 1400cc at 0500.      Psychosocial: Pt resides alone. Pts sister provides PCA support. Goal to return home with continued support and ongoing PCA services. Team working towards a safe d/c plan.      ADLs/IADLs: Pt making progress with ADLs. Pt is Mod IND UB/LB dressing, IND standing g/h, and Mod IND toilet transfer/toilet hygiene during day, receiving supervision overnight. Plan to progress to complete Mod IND today pending safety and consistency. Pt requiring SBA TB bathing and shower transfer, recommend initial oversight with HHA. Recommending shower chair, however pt reports that shower is equipt with grab bars and too small for shower chair. Will need to simulate bathroom mobility without AD prior to discharge. Pt needing cues to complete tasks slowly to maintain balance. Pt SBA/Mod IND simple meal prep (microwave meals) and SBA household tasks. Recommend HH OT following discharge.     Mobility: improving balance. Still recommending walker. Working on endurance.     Cognition/Language: Not a barrier to discharge. Pt has PCA support every other day and check in support from family. Pt is minimally involved with IADLs. Pt also has RN support for med admin/BG checks.      Decision maker: self        Fall Precautions: continue     Patient/Family input to goals: insisting he'll not quit smoking. Previously daily support from his sister in law is his PCA and home RN every few weeks.     Overall plan for the patient: Cooper has shown some nice functional gains. Urine retention not looking good with trial of void and will replace indwelling riley and teach him to do cares. Targeting home  "Monday with some initial in home OT, PT and HHA. He already has established PCA but not helping with bathing. Has home RN.     mEDICAL COURSE  Left medullary ischemic CVA: with history of multiple past CVAs.  -- 325mg ASA and plavix qday  -- PT and OT  -- Stroke education  -- Optimization of : DM II, HTN, HLD, and tobacco abuse as outlined below. Patient educated on risk factors and management throughout ARU stay.      DM II with neuropathy: HgbA1c 11.9% non complaint with insulin, trulicity, BG management, reportedly dislikes needles. Glucose last 24 hours . States he  eats less food and more healthy during hospital stay and he will try to do better at home with that but states \"I will not be moving anywhere near this much\".    -continue Metformin 1000 mg bid  -- keep dose same am and pm- continue Lantus 20 units bid  -encouraged qid accuchecks though patient states he will do once \"maybe twice at most\" a day  --continue  lyrica for neuropathy  -follow up with primary care-      HTN: Goal BP <140/80 long term.  Well controlled.   --lisinopril/HCTZ 40-25mg, qday  --metoprolol XL 100mg qday      HLD: LDL 92, goal <70.   --20mg simvastatin qday      Tobacco Use: verbalizing strong opposition to cessation, regular education.  Verbalizes plan to continue to smoke.      Urinary Retention: Suspect 2/2 BPH. Riley initially removed 1/18 with retention up to 1 liter, riley placed started on flomax, removed 1/25 with ongoing retention was replaced and trained on [facility].  -continue flomax, finasteride.   -follow up with urology       Right MCA stenosis- with post stenotic fusiform dilation, asymptomatic  --No acute interventions needed at this time  --Follow up with neuro intervention as an outpatient    dISCHARGE MEDICATIONS  Discharge Medication List as of 1/29/2018  1:18 PM      START taking these medications    Details   finasteride (PROSCAR) 5 MG tablet Take 1 tablet (5 mg) by mouth daily, Disp-30 tablet, R-0, " E-Prescribe         CONTINUE these medications which have CHANGED    Details   insulin glargine (LANTUS) 100 UNIT/ML injection Inject 20 Units Subcutaneous 2 times daily, Disp-15 mL, R-0, E-Prescribe      lisinopril-hydrochlorothiazide (PRINZIDE/ZESTORETIC) 20-12.5 MG per tablet Take 2 tablets by mouth daily, Disp-60 tablet, R-0, E-Prescribe      metoprolol succinate (TOPROL-XL) 100 MG 24 hr tablet Take 1 tablet (100 mg) by mouth daily, Disp-30 tablet, R-0, E-Prescribe      pregabalin (LYRICA) 150 MG capsule Take 1 capsule (150 mg) by mouth 2 times daily, Disp-60 capsule, R-0, Local Print      simvastatin (ZOCOR) 20 MG tablet Take 1 tablet (20 mg) by mouth At Bedtime, Disp-30 tablet, R-0, E-Prescribe      tamsulosin (FLOMAX) 0.4 MG capsule Take 1 capsule (0.4 mg) by mouth At Bedtime, Disp-30 capsule, R-0, E-Prescribe      clopidogrel (PLAVIX) 75 MG tablet Take 1 tablet (75 mg) by mouth daily, Disp-30 tablet, R-0, E-Prescribe         CONTINUE these medications which have NOT CHANGED    Details   aspirin  MG EC tablet Take 1 tablet (325 mg) by mouth daily, Disp-60 tablet, R-0, Transitional      blood glucose monitoring (ACCU-CHEK LUISANA) test strip by In Vitro route 1 times daily (test), Disp-100 strip, R-3, E-PrescribeProfile Rx: patient will contact pharmacy when needed      order for DME Diabetic shoes due to neuropathyDisp-1 Device, R-0, Local Print      insulin pen needle (B-D U/F) 31G X 8 MM Use once daily or as directed.Disp-90 each, [id number]      Multiple Vitamins-Minerals (MULTI FOR HIM PO) Take 1 capsule by mouth daily., 1 capsule, Oral, DAILY, Until Discontinued, Historical      ACCU-CHEK MULTICLIX LANCETS MISC tests twice a day, Does not apply, Disp-100, R-3, Fax         STOP taking these medications       insulin aspart (NOVOLOG PEN) 100 UNIT/ML injection Comments:   Reason for Stopping:         nicotine (NICODERM CQ) 21 MG/24HR 24 hr patch Comments:   Reason for Stopping:         potassium  chloride (KLOR-CON) 20 MEQ Packet Comments:   Reason for Stopping:                 DISCHARGE INSTRUCTIONS AND FOLLOW UP    Discharge Procedure Orders  UROLOGY ADULT REFERRAL   Referral Type: Consultation     Home care nursing referral   Referral Type: Home Health Therapies & Aides     Home Care PT Referral for Hospital Discharge   Referral Type: Home Health Therapies & Aides     Home Care OT Referral for Hospital Discharge     Reason for your hospital stay   Order Comments: Admitted to rehab following a stroke.     Adult [facility] Follow-up and recommended labs and tests   Order Comments: Follow up with Primary care (follow up hospitalization, diabetes, htn) , Neurology (follow up stroke), Urology (urinary retention).     Appointments on [facility] and/or [facility] (with [facility] or [facility] provider or service). Call [phone] if you haven't heard regarding these appointments within 7 days of discharge.     Activity   Order Comments: Your activity upon discharge: up with walker no driving.   Order Specific Question Answer Comments   Is discharge order? Yes      Monitor and record   Order Comments: blood glucose 4 times a day, before meals and at bedtime     MD face to face encounter   Order Comments: Documentation of Face to Face and Certification for Home Health Services    I certify that patient: Cooper Cabral is under my care and that I, or a nurse practitioner or physician's assistant working with me, had a face-to-face encounter that meets the physician face-to-face encounter requirements with this patient on: 1/29/2018.    This encounter with the patient was in whole, or in part, for the following medical condition, which is the primary reason for home health care: s/p stroke with impaired mobility.    I certify that, based on my findings, the following services are medically necessary home health services: Nursing, Occupational Therapy and Physical Therapy.    My clinical findings support the need for  the above services because: Nurse is needed: To provide assessment and oversight required in the home to assure adherence to the medical plan due to: past medical non complianc,recurrent stroke.., Occupational Therapy Services are needed to assess and treat cognitive ability and address ADL safety due to impairment in functional mobility. and Physical Therapy Services are needed to assess and treat the following functional impairments: balance, mobility.    Further, I certify that my clinical findings support that this patient is homebound (i.e. absences from home require considerable and taxing effort and are for medical reasons or [facility] services or infrequently or of short duration when for other reasons) because: Requires assistance of another person or specialized equipment to access medical services because patient requires supervision for safety, transportation.     Based on the above findings. I certify that this patient is confined to the home and needs intermittent skilled nursing care, physical therapy and/or speech therapy.  The patient is under my care, and I have initiated the establishment of the plan of care.  This patient will be followed by a physician who will periodically review the plan of care.  Physician/Provider to provide follow up care: Hudson Spring    Attending hospital physician (the Medicare certified [place] provider): Guille Acosta MD  Physician Signature: See electronic signature associated with these discharge orders.  Date: 1/29/2018     Diet   Order Comments: Follow this diet upon discharge: Please follow a diabetic diet.  Limit sugars, sodas, syrups etc.     Combination Diet Regular Diet Adult; [id number] Calories: Moderate Consistent CHO (4-6 CHO units/meal)   Order Specific Question Answer Comments   Is discharge order? Yes             physical examination    Most recent Vital Signs:   Vitals:    01/28/18 1217 01/28/18 1544 01/29/18 0747 01/29/18 1232   BP: 116/64 118/67  103/47 112/69   BP Location: Right arm Left arm Left arm Left arm   Pulse: 83 76 71 86   Resp:  16 16    Temp:  97.5  F (36.4  C) 96.6  F (35.9  C)    TempSrc:  Oral Oral    SpO2:  97% 92%    Weight:       Height:       General: alert NAD  CV: rrr  Pulm: non labored clear on room air  Ab: soft non distended non tender  Neuro: alert oriented, speech clear, right visual deficit, strength 5/5 throughout.  Ambulating with walker.       50 minutes spent in discharge, including >50% in counseling and coordination of care, medication review and plan of care recommended on follow up.     Patient was evaluated on day of discharge by attending physician, No att. providers found, who agrees with plan of care.    Discharge summary was forwarded to Hudson Spring (PCP) at the time of discharge, so as to bridge from hospital to outpatient care.     It was our pleasure to care for Cooper Cabral during this hospitalization. Please do not hesitate to contact me should there be questions regarding the hospital course or discharge plan.          Ruth Mendieta PA-C   Rehab Medicine Service  [phone][LB1.1]         Revision History        User Key Date/Time User Provider Type Action    > LB1.1 1/29/2018  6:05 PM Ruth Mendieta PA Physician Assistant Sign                  Consult Notes     No notes of this type exist for this encounter.      Progress Notes - Physician (Notes for yesterday and today)     No notes of this type exist for this encounter.      Procedure Notes     No notes of this type exist for this encounter.      Progress Notes - Therapies (Notes from 02/02/18 through 02/05/18)     No notes of this type exist for this encounter.                                          INTERAGENCY TRANSFER FORM - LAB / IMAGING / EKG / EMG RESULTS   1/19/2018                       [facility]: [id number]            Unresulted Labs     None      Encounter-Level Documents:     There are no encounter-level  documents.      Order-Level Documents:     There are no order-level documents.

## 2018-01-19 NOTE — PROGRESS NOTES
BRIEF NUTRITION ASSESSMENT      REASON FOR ASSESSMENT:  LOS    NUTRITION HISTORY:  Pt lives alone. He states he just eats a regular diet, doesn't count carbs.  He gets 7 meals/week delivered, not [employer] but he can't remember the name of the agency/company.  He doesn't cook, these meals are microwavable.    CURRENT DIET AND INTAKE:  Diet:  Mod carb  Pt has a good appetite, eating 100%.              ANTHROPOMETRICS:  Height: 6'  Weight: 113.4 kg  BMI: 33.91 kg/m2  IBW:  81 kg +/- 10%  Weight Status: Obesity Grade I BMI 30-34.9  %IBW: 140%  Weight History:   Wt Readings from Last 10 Encounters:   01/12/18 113.4 kg (250 lb)   11/21/17 105.5 kg (232 lb 9.6 oz)   09/07/17 103.7 kg (228 lb 9.6 oz)   05/08/17 103.5 kg (228 lb 4 oz)   09/12/16 111.6 kg (246 lb)   02/03/16 112.4 kg (247 lb 12 oz)   11/10/15 115.9 kg (255 lb 8 oz)   10/14/15 108.9 kg (240 lb)   10/08/14 125.6 kg (277 lb)   08/13/14 124.7 kg (275 lb)       LABS:  Labs noted    MALNUTRITION:  Patient does not meet two of the following criteria necessary for diagnosing malnutrition: significant weight loss, reduced intake, subcutaneous fat loss, muscle loss or fluid retention    NUTRITION INTERVENTION:  Nutrition Diagnosis:  No nutrition diagnosis at this time.    Implementation:  Nutrition Education:  Per Provider order if indicated    FOLLOW UP/MONITORING:   Will re-evaluate in 7 - 10 days, or sooner, if re-consulted.    Purnima Toussaint RD  Pager [phone] (M-F)            [phone] (W/E & Hol)

## 2018-01-19 NOTE — IP AVS SNAPSHOT
[facility]: [phone]                                              INTERAGENCY TRANSFER FORM - PHYSICIAN ORDERS   2018                    Hospital Admission Date: 2018  YAMILET GIRON   : 1955  Sex: Male        Attending Provider: (none)    Allergies:  No Known Allergies    Infection:  None   Service:  ACUTE IP JUANITO    Ht:  1.829 m (6')   Wt:  96.6 kg (213 lb)   Admission Wt:  96.6 kg (213 lb)    BMI:  28.89 kg/m 2   BSA:  2.22 m 2            Patient PCP Information     Provider PCP Type    Hudson Spring MD General      ED Clinical Impression     Diagnosis Description Comment Added By Time Added    Cerebrovascular accident (CVA), unspecified mechanism (H) [I63.9] Cerebrovascular accident (CVA), unspecified mechanism (H) [I63.9]  Ruth Mendieta PA 2018  9:13 AM    Urinary retention [R33.9] Urinary retention [R33.9]  Ruth Mendieta PA 2018  9:24 AM    Type 2 diabetes mellitus with diabetic neuropathy, with long-term current use of insulin (H) [E11.40, Z79.4] Type 2 diabetes mellitus with diabetic neuropathy, with long-term current use of insulin (H) [E11.40, Z79.4]  Ruth Mendieta PA 2018  9:24 AM    Hypertension goal BP (blood pressure) < 140/80 [I10] Hypertension goal BP (blood pressure) < 140/80 [I10]  Ruth Mendieta PA 2018  9:24 AM    Hyperlipidemia LDL goal <100 [E78.5] Hyperlipidemia LDL goal <100 [E78.5]  Ruth Mendieta PA 2018  9:25 AM    Benign prostatic hyperplasia with urinary retention [N40.1, R33.8] Benign prostatic hyperplasia with urinary retention [N40.1, R33.8]  Ruth Mendieta PA 2018  9:25 AM      Hospital Problems as of 2018              Priority Class Noted POA    CVA (cerebral vascular accident) (H) Medium  2018 Yes      Non-Hospital Problems as of 2018              Priority Class Noted    Hypertension goal BP (blood pressure) < 140/80 Medium   9/30/2008    Tobacco abuse Medium  9/30/2008    Non morbid obesity Medium  9/30/2008    Hyperlipidemia LDL goal <100 Medium  5/9/2010    Advanced directives, counseling/discussion Medium  5/29/2013    Bell's palsy Medium  3/2/2016    Other diabetic neurological complication associated with type 2 diabetes mellitus (H) Medium  9/7/2017    Type 2 diabetes mellitus with diabetic neuropathy, with long-term current use of insulin (H) Medium  10/23/2017    Ischemic stroke (H) High Acute 1/12/2018    Left-sided weakness Medium  1/12/2018      Code Status History     Date Active Date Inactive Code Status Order ID Comments User Context    1/19/2018  1:52 PM 1/29/2018  6:06 PM Full Code [id number]  Marcelina Zaldivar MD Inpatient    1/12/2018  3:54 PM 1/19/2018  1:52 PM Full Code [id number]  Clemencia Martino MD Inpatient         Medication Review      START taking        Dose / Directions Comments    finasteride 5 MG tablet   Commonly known as:  PROSCAR   Used for:  Urinary retention        Dose:  5 mg   Take 1 tablet (5 mg) by mouth daily   Quantity:  30 tablet   Refills:  0          CONTINUE these medications which may have CHANGED, or have new prescriptions. If we are uncertain of the size of tablets/capsules you have at home, strength may be listed as something that might have changed.        Dose / Directions Comments    insulin glargine 100 UNIT/ML injection   Commonly known as:  LANTUS   This may have changed:  how much to take   Used for:  Type 2 diabetes mellitus with diabetic neuropathy, with long-term current use of insulin (H)        Dose:  20 Units   Inject 20 Units Subcutaneous 2 times daily   Quantity:  15 mL   Refills:  0        lisinopril-hydrochlorothiazide 20-12.5 MG per tablet   Commonly known as:  PRINZIDE/ZESTORETIC   This may have changed:  Another medication with the same name was removed. Continue taking this medication, and follow the directions you see here.   Used for:  Hypertension goal BP (blood  pressure) < 140/80, Type 2 diabetes mellitus with diabetic neuropathy, with long-term current use of insulin (H)        Dose:  2 tablet   Take 2 tablets by mouth daily   Quantity:  60 tablet   Refills:  0        metoprolol succinate 100 MG 24 hr tablet   Commonly known as:  TOPROL-XL   This may have changed:  Another medication with the same name was removed. Continue taking this medication, and follow the directions you see here.   Used for:  Hypertension goal BP (blood pressure) < 140/80        Dose:  100 mg   Take 1 tablet (100 mg) by mouth daily   Quantity:  30 tablet   Refills:  0          CONTINUE these medications which have NOT CHANGED        Dose / Directions Comments    aspirin 325 MG EC tablet   Used for:  Cerebrovascular accident (CVA), unspecified mechanism (H)        Dose:  325 mg   Take 1 tablet (325 mg) by mouth daily   Quantity:  60 tablet   Refills:  0        blood glucose monitoring lancets   Used for:  Diabetes mellitus, type 2 (H)        tests twice a day   Quantity:  100   Refills:  3        blood glucose monitoring test strip   Commonly known as:  ACCU-CHEK LUISANA   Used for:  Type 2 diabetes mellitus with diabetic neuropathy, with long-term current use of insulin (H)        by In Vitro route 1 times daily (test)   Quantity:  100 strip   Refills:  3    Profile Rx: patient will contact pharmacy when needed       clopidogrel 75 MG tablet   Commonly known as:  PLAVIX   Used for:  Cerebrovascular accident (CVA), unspecified mechanism (H)        Dose:  75 mg   Take 1 tablet (75 mg) by mouth daily   Quantity:  30 tablet   Refills:  0        insulin pen needle 31G X 8 MM   Commonly known as:  B-D U/F   Used for:  Uncontrolled diabetes mellitus (H)        Use once daily or as directed.   Quantity:  90 each   Refills:  1        MULTI FOR HIM PO        Dose:  1 capsule   Take 1 capsule by mouth daily.   Refills:  0        order for DME   Used for:  Type 2 diabetes mellitus with diabetic neuropathy, with  long-term current use of insulin (H), Other diabetic neurological complication associated with type 2 diabetes mellitus (H)        Diabetic shoes due to neuropathy   Quantity:  1 Device   Refills:  0        pregabalin 150 MG capsule   Commonly known as:  LYRICA   Used for:  Type 2 diabetes mellitus with diabetic neuropathy, with long-term current use of insulin (H)        Dose:  150 mg   Take 1 capsule (150 mg) by mouth 2 times daily   Quantity:  60 capsule   Refills:  0        simvastatin 20 MG tablet   Commonly known as:  ZOCOR   Used for:  Hyperlipidemia LDL goal <100        Dose:  20 mg   Take 1 tablet (20 mg) by mouth At Bedtime   Quantity:  30 tablet   Refills:  0        tamsulosin 0.4 MG capsule   Commonly known as:  FLOMAX   Used for:  Benign prostatic hyperplasia with urinary retention        Dose:  0.4 mg   Take 1 capsule (0.4 mg) by mouth At Bedtime   Quantity:  30 capsule   Refills:  0          STOP taking     insulin aspart 100 UNIT/ML injection   Commonly known as:  NovoLOG PEN           nicotine 21 MG/24HR 24 hr patch   Commonly known as:  NICODERM CQ           potassium chloride 20 MEQ Packet   Commonly known as:  KLOR-CON                     Further instructions from your care team       Follow up appointments:    You are scheduled to see your primary provider, Dr. Hudson Spring  February 12th, 2018 at 11:40am    Address  [address], [place]   Phone   [phone]    -- Neurology to follow-up stroke   Dr Rolando Ireland on Friday February 16th at 10:45 am. Please check in at 10:30.                         Address  [facility]; [address]    [address]    [place] [address]  Phone             [phone]    You have an appointment with Urology at [facility] [phone]  February 13th,2018 at 8:00am with Dr. Zabell [address],  "[address]  [place] [address]    -----------------------------------------------    To reduce the risk of subsequent stroke there are several important factors including optimal management of anticoagulants, blood pressure, cholesterol, diabetes and smoking abstinence.    Anticoagulation:  You are on Aspirin and Clopidogrel    Blood Pressure:  Keeping your blood pressures less than 130/80 has been shown to reduce risk of recurrent stroke. Recording your blood pressure and heart rate once daily in a log book can help you and your providers make decisions on optimal management. You are encouraged to bring your log book with you to your primary physician and/or cardiology doctor visits.    You are currently on metoprolol, lisinopril-hydrochlorothiazide to help control your blood pressure. Several lifestyle modifications have been associated with blood pressure reduction and are an important part of a comprehensive plan. These include: weight loss; a diet low in salt and cholesterol and rich in fruits and vegetables; regular aerobic physical activity and limited alcohol consumption.    Diabetes:  Optimal management of diabetes not only reduces risk of another stroke, it can help with healing process from your recent stroke. Blood glucose levels measure how well you're controlling your diabetes. An additional test \"hemoglobin A1C\" reflects how you have been overall with glucose control in the prior few months. This should be less than 7 though even lower below 6 is considered normal. Your recent Hgb A1C was very elevated, 11.9 consistent with the report of not recently taking your insulin.  Remains very important to manage your diabetes is a key risk factor for recurrent stroke, heart disease and many other problems. This should be followed up with your primary provider and/or endocrinologist.    Your present plan is to check blood glucose consistently Twice Daily. These should be recorded along with date/time and any " "relevant notes such as food consumed, insulin/medication taken etc. This helps you and your providers make decisions on optimal management. You're encouraged to bring you log book with to your primary and/or endocrinology doctor visits.  Your current medications include Insulin Glargine (Lantus) and metformin. Specific doses and frequencies listed elsewhere.     Diet:  Diet and exercise are very important for diabetes regardless of being on medication or not. Be aware of and moderate your carbohydrate intake as instructed by doctor, dietitian or nurse.  Regular physical activity may be more difficult since your stroke though doing what you can on a daily basis is helpful.     Cholesterol:  Traditional target levels for LDL cholesterol or \"bad cholesterol\" is less than 130 however once you have had a stroke, your target LDL level is now less than 70. Additional recommendations such as increasing your HDL or \"good\" cholesterol and lowering your triglyceride level can also be important.    Your most recent lipid panel was   Lab Results   Component Value Date    CHOL 147 01/14/2018     Lab Results   Component Value Date    HDL 37 01/14/2018     Lab Results   Component Value Date    LDL 92 01/14/2018     Lab Results   Component Value Date    TRIG 89 01/14/2018     Lab Results   Component Value Date    CHOLHDLRATIO 4.9 10/14/2015       This should be followed up in 2-3 months with your primary provider.    Smoking:  Finally one of the most important modifiable risk factors is to not smoke.  Strongly encourage you to reconsider quitting or at least cutting back.  If you are interested in patch, gum or other options, please talk with your primary provider. Support through counseling, nicotine replacement, and oral smoking-cessation medications may all be helpful.       HOME CARE  [facility] [phone] will provide RN, PT, OT, and Home Health Aide. They will call you after you discharge to schedule the first visit. "       Summary of Visit     Reason for your hospital stay       Admitted to rehab following a stroke.             After Care     Activity       Your activity upon discharge: up with walker no driving.       Diet       Follow this diet upon discharge: Please follow a diabetic diet.  Limit sugars, sodas, syrups etc.      Combination Diet Regular Diet Adult; 8623-5577 Calories: Moderate Consistent CHO (4-6 CHO units/meal)       Monitor and record       blood glucose 4 times a day, before meals and at bedtime             Referrals     Home Care OT Referral for Hospital Discharge       OT to eval and treat    Your provider has ordered home care - occupational therapy. If you have not been contacted within 2 days of your discharge please call the department phone number listed on the top of this document.       Home Care PT Referral for Hospital Discharge       PT to eval and treat    Your provider has ordered home care - physical therapy. If you have not been contacted within 2 days of your discharge please call the department phone number listed on the top of this document.       Home care nursing referral       RN skilled nursing visit. RN to assess home safety.  RN to teach medication management.  Home health aide to assist with bathing    Your provider has ordered home care nursing services. If you have not been contacted within 2 days of your discharge please call the inpatient department phone number at [phone] .       UROLOGY ADULT REFERRAL       Your provider has referred you to: [facility]: [facility] [phone]   [email or web address]    Please be aware that coverage of these services is subject to the terms and limitations of your health insurance plan.  Call member services at your health plan with any benefit or coverage questions.      Please bring the following with you to your appointment:    (1) Any X-Rays, CTs or  MRIs which have been performed.  Contact the facility where they were done to arrange for  prior to your scheduled appointment.    (2) List of current medications  (3) This referral request   (4) Any documents/labs given to you for this referral              MD face to face encounter       Documentation of Face to Face and Certification for Home Health Services    I certify that patient: Cooper Cabral is under my care and that I, or a nurse practitioner or physician's assistant working with me, had a face-to-face encounter that meets the physician face-to-face encounter requirements with this patient on: 1/29/2018.    This encounter with the patient was in whole, or in part, for the following medical condition, which is the primary reason for home health care: s/p stroke with impaired mobility.    I certify that, based on my findings, the following services are medically necessary home health services: Nursing, Occupational Therapy and Physical Therapy.    My clinical findings support the need for the above services because: Nurse is needed: To provide assessment and oversight required in the home to assure adherence to the medical plan due to: past medical non complianc,recurrent stroke.., Occupational Therapy Services are needed to assess and treat cognitive ability and address ADL safety due to impairment in functional mobility. and Physical Therapy Services are needed to assess and treat the following functional impairments: balance, mobility.    Further, I certify that my clinical findings support that this patient is homebound (i.e. absences from home require considerable and taxing effort and are for medical reasons or [employer] services or infrequently or of short duration when for other reasons) because: Requires assistance of another person or specialized equipment to access medical services because patient requires supervision for safety, transportation.     Based on the above findings. I  certify that this patient is confined to the home and needs intermittent skilled nursing care, physical therapy and/or speech therapy.  The patient is under my care, and I have initiated the establishment of the plan of care.  This patient will be followed by a physician who will periodically review the plan of care.  Physician/Provider to provide follow up care: Hudson Spring    Attending hospital physician (the Medicare certified [facility] provider): Guille Acosta MD  Physician Signature: See electronic signature associated with these discharge orders.  Date: 1/29/2018                  Your next 10 appointments already scheduled     Feb 12, 2018 11:40 AM CST   Office Visit with Hudson Spring MD   [facility]    [address] [address] [phone]           Bring a current list of meds and any records pertaining to this visit. For Physicals, please bring immunization records and any forms needing to be filled out. Please arrive 10 minutes early to complete paperwork.            Feb 13, 2018  8:00 AM CST   New Patient Visit with Camilo Castellanos MD   [facility] ([facility])    [address]  [address]  [facility] [address] [phone]              Follow-Up Appointment Instructions     Future Labs/Procedures    Adult [facility] Follow-up and recommended labs and tests     Comments:    Follow up with Primary care (follow up hospitalization, diabetes, htn) , Neurology (follow up stroke), Urology (urinary retention).     Appointments on [place] and/or [facility] (with [facility] or [facility] provider or service). Call [phone] if you haven't heard regarding these appointments within 7 days of discharge.      Follow-Up Appointment Instructions     Adult [facility] Follow-up and recommended labs and tests       Follow up with Primary care  (follow up hospitalization, diabetes, htn) , Neurology (follow up stroke), Urology (urinary retention).     Appointments on [place] and/or [facility] (with [facility] or [facility] provider or service). Call [phone] if you haven't heard regarding these appointments within 7 days of discharge.             Statement of Approval     Ordered          01/29/18 3846  I have reviewed and agree with all the recommendations and orders detailed in this document.  EFFECTIVE NOW     Approved and electronically signed by:  Guille Acosta MD

## 2018-01-19 NOTE — PLAN OF CARE
Problem: Patient Care Overview  Goal: Plan of Care/Patient Progress Review  Pt a/o x 4, slurred speech. VSS. Denies pain. Neuros intact, R vision cut, slight L facial droop, L hemiparesis at baseline. Hammond placed for retention, was unable to void earlier today.  Tolerating mod CHO diet, up with A1. Plan to d/c once bed available at ARU.

## 2018-01-19 NOTE — PLAN OF CARE
Problem: Patient Care Overview  Goal: Plan of Care/Patient Progress Review  Outcome: No Change  A&Ox4. Neuros slurred speech at times, Right vision cut baseline per pt, slight Left sided facial droop. Left sided hemiparesis baseline per pt. Bruising (Left eye, left arm) and scabs (knees, arm) on skin but skin intact. VSS. Denies pain. Assist of 1 with GB and walker. Hammond in place due to retention. Mod carb diet. Plan: Discharge to ARU once a bed is available.

## 2018-01-19 NOTE — IP AVS SNAPSHOT
MRN:[id number]                      After Visit Summary   1/19/2018    Cooper Cabral    MRN: [id number]           Thank you!     Thank you for choosing [facility] for your care. Our goal is always to provide you with excellent care. Hearing back from our patients is one way we can continue to improve our services. Please take a few minutes to complete the written survey that you may receive in the mail after you visit with us. Thank you!        Patient Information     Date Of Birth          1955        About your hospital stay     You were admitted on:  January 19, 2018 You last received care in the:   [facility]    You were discharged on:  January 29, 2018        Reason for your hospital stay       Admitted to rehab following a stroke.                  Who to Call     For medical emergencies, please call 911.  For non-urgent questions about your medical care, please call your primary care provider or clinic, [phone]          Attending Provider     Provider Specialty    Klaudia Traylor MD Physical Medicine and Rehab    Guille Acosta MD Physical Medicine and Rehab       Primary Care Provider Office Phone # Fax #    H[place] Spring [phone]      After Care Instructions     Activity       Your activity upon discharge: up with walker no driving.            Diet       Follow this diet upon discharge: Please follow a diabetic diet.  Limit sugars, sodas, syrups etc.      Combination Diet Regular Diet Adult; 9081-1709 Calories: Moderate Consistent CHO (4-6 CHO units/meal)            Monitor and record       blood glucose 4 times a day, before meals and at bedtime                  Follow-up Appointments     Adult [facility] Follow-up and recommended labs and tests       Follow up with Primary care (follow up hospitalization, diabetes, htn) , Neurology (follow up stroke), Urology (urinary retention).     Appointments on [place] and/or [facility] (with [facility] or  [facility] provider or service). Call [phone] if you haven't heard regarding these appointments within 7 days of discharge.                  Your next 10 appointments already scheduled     Feb 12, 2018 11:40 AM CST   [facility]    [address] [id number] [phone]           Bring a current list of meds and any records pertaining to this visit. For Physicals, please bring immunization records and any forms needing to be filled out. Please arrive 10 minutes early to complete paperwork.            Feb 13, 2018  8:00 AM CST   New Patient Visit with Camilo Castellanos MD   [facility] ([facility])    [address]  [address]  [facility] [id number] [phone]              Additional Services     Home Care OT Referral for Hospital Discharge       OT to eval and treat    Your provider has ordered home care - occupational therapy. If you have not been contacted within 2 days of your discharge please call the department phone number listed on the top of this document.            Home Care PT Referral for Hospital Discharge       PT to eval and treat    Your provider has ordered home care - physical therapy. If you have not been contacted within 2 days of your discharge please call the department phone number listed on the top of this document.            Home care nursing referral       RN skilled nursing visit. RN to assess home safety.  RN to teach medication management.  Home health aide to assist with bathing    Your provider has ordered home care nursing services. If you have not been contacted within 2 days of your discharge please call the inpatient department phone number at [phone] .            UROLOGY ADULT REFERRAL       Your provider has referred you to: [facility]: [facility] -  [place] [phone]   [email or web address]    Please be aware that coverage of these services is subject to the terms and limitations of your health insurance plan.  Call member services at your health plan with any benefit or coverage questions.      Please bring the following with you to your appointment:    (1) Any X-Rays, CTs or MRIs which have been performed.  Contact the facility where they were done to arrange for  prior to your scheduled appointment.    (2) List of current medications  (3) This referral request   (4) Any documents/labs given to you for this referral                  Further instructions from your care team       Follow up appointments:    You are scheduled to see your primary provider, Dr. Hudson Spring  February 12th, 2018 at 11:40am    Address  [address], [place]   Phone   [phone]    -- Neurology to follow-up stroke   Dr Rolando Ireland on Friday February 16th at 10:45 am. Please check in at 10:30.                         Address  [facility]; [address]    [address]    [place] [address]  Phone             [phone]    You have an appointment with Urology at [facility] [phone]  February 13th,2018 at 8:00am with Dr. Zabell [address] [facility], [address]  [place] [address]    -----------------------------------------------    To reduce the risk of subsequent stroke there are several important factors including optimal management of anticoagulants, blood pressure, cholesterol, diabetes and smoking abstinence.    Anticoagulation:  You are on Aspirin and Clopidogrel    Blood Pressure:  Keeping your blood pressures less than 130/80 has been shown to reduce risk of recurrent stroke. Recording your blood pressure and heart rate once daily in a log  "book can help you and your providers make decisions on optimal management. You are encouraged to bring your log book with you to your primary physician and/or cardiology doctor visits.    You are currently on metoprolol, lisinopril-hydrochlorothiazide to help control your blood pressure. Several lifestyle modifications have been associated with blood pressure reduction and are an important part of a comprehensive plan. These include: weight loss; a diet low in salt and cholesterol and rich in fruits and vegetables; regular aerobic physical activity and limited alcohol consumption.    Diabetes:  Optimal management of diabetes not only reduces risk of another stroke, it can help with healing process from your recent stroke. Blood glucose levels measure how well you're controlling your diabetes. An additional test \"hemoglobin A1C\" reflects how you have been overall with glucose control in the prior few months. This should be less than 7 though even lower below 6 is considered normal. Your recent Hgb A1C was very elevated, 11.9 consistent with the report of not recently taking your insulin.  Remains very important to manage your diabetes is a key risk factor for recurrent stroke, heart disease and many other problems. This should be followed up with your primary provider and/or endocrinologist.    Your present plan is to check blood glucose consistently Twice Daily. These should be recorded along with date/time and any relevant notes such as food consumed, insulin/medication taken etc. This helps you and your providers make decisions on optimal management. You're encouraged to bring you log book with to your primary and/or endocrinology doctor visits.  Your current medications include Insulin Glargine (Lantus) and metformin. Specific doses and frequencies listed elsewhere.     Diet:  Diet and exercise are very important for diabetes regardless of being on medication or not. Be aware of and moderate your carbohydrate " "intake as instructed by doctor, dietitian or nurse.  Regular physical activity may be more difficult since your stroke though doing what you can on a daily basis is helpful.     Cholesterol:  Traditional target levels for LDL cholesterol or \"bad cholesterol\" is less than 130 however once you have had a stroke, your target LDL level is now less than 70. Additional recommendations such as increasing your HDL or \"good\" cholesterol and lowering your triglyceride level can also be important.    Your most recent lipid panel was   Lab Results   Component Value Date    CHOL 147 01/14/2018     Lab Results   Component Value Date    HDL 37 01/14/2018     Lab Results   Component Value Date    LDL 92 01/14/2018     Lab Results   Component Value Date    TRIG 89 01/14/2018     Lab Results   Component Value Date    CHOLHDLRATIO 4.9 10/14/2015       This should be followed up in 2-3 months with your primary provider.    Smoking:  Finally one of the most important modifiable risk factors is to not smoke.  Strongly encourage you to reconsider quitting or at least cutting back.  If you are interested in patch, gum or other options, please talk with your primary provider. Support through counseling, nicotine replacement, and oral smoking-cessation medications may all be helpful.       HOME CARE  [facility] [phone] will provide RN, PT, OT, and Home Health Aide. They will call you after you discharge to schedule the first visit.       Pending Results     No orders found from 1/17/2018 to 1/20/2018.            Statement of Approval     Ordered          01/29/18 1252  I have reviewed and agree with all the recommendations and orders detailed in this document.  EFFECTIVE NOW     Approved and electronically signed by:  Guille Acosta MD             Admission Information     Date & Time Department Dept. Phone    1/19/2018  [facility] [phone]      Your Vitals Were     Blood Pressure Pulse Temperature Respirations Height " Weight    112/69 (BP Location: Left arm) 86 96.6  F (35.9  C) (Oral) 16 1.829 m (6') 96.6 kg (213 lb)    Pulse Oximetry BMI (Body Mass Index)                92% 28.89 kg/m2          [employer] Information     [employer] gives you secure access to your electronic health record. If you see a primary care provider, you can also send messages to your care team and make appointments. If you have questions, please call your primary care clinic.  If you do not have a primary care provider, please call [phone] and they will assist you.        Care EveryWhere ID     This is your Care EveryWhere ID. This could be used by other organizations to access your [facility] medical records  [id number]        Equal Access to Services     DANIELLA NATHAN : Kelly Palma, kris stacy, regan yao, mark rios. So [facility] [phone].    ATENCIÓN: Si habla español, tiene a beltran disposición servicios gratuitos de asistencia lingüística. [facility] [phone].    We comply with applicable federal civil rights laws and [place] laws. We do not discriminate on the basis of race, color, national origin, age, disability, sex, sexual orientation, or gender identity.               Review of your medicines      START taking        Dose / Directions    finasteride 5 MG tablet   Commonly known as:  PROSCAR   Used for:  Urinary retention        Dose:  5 mg   Take 1 tablet (5 mg) by mouth daily   Quantity:  30 tablet   Refills:  0         CONTINUE these medicines which may have CHANGED, or have new prescriptions. If we are uncertain of the size of tablets/capsules you have at home, strength may be listed as something that might have changed.        Dose / Directions    insulin glargine 100 UNIT/ML injection   Commonly known as:  LANTUS   This may have changed:  how much to take   Used for:  Type 2 diabetes mellitus with diabetic neuropathy, with long-term current use of insulin (H)        Dose:   20 Units   Inject 20 Units Subcutaneous 2 times daily   Quantity:  15 mL   Refills:  0       lisinopril-hydrochlorothiazide 20-12.5 MG per tablet   Commonly known as:  PRINZIDE/ZESTORETIC   This may have changed:  Another medication with the same name was removed. Continue taking this medication, and follow the directions you see here.   Used for:  Hypertension goal BP (blood pressure) < 140/80, Type 2 diabetes mellitus with diabetic neuropathy, with long-term current use of insulin (H)        Dose:  2 tablet   Take 2 tablets by mouth daily   Quantity:  60 tablet   Refills:  0       metoprolol succinate 100 MG 24 hr tablet   Commonly known as:  TOPROL-XL   This may have changed:  Another medication with the same name was removed. Continue taking this medication, and follow the directions you see here.   Used for:  Hypertension goal BP (blood pressure) < 140/80        Dose:  100 mg   Take 1 tablet (100 mg) by mouth daily   Quantity:  30 tablet   Refills:  0         CONTINUE these medicines which have NOT CHANGED        Dose / Directions    aspirin 325 MG EC tablet   Used for:  Cerebrovascular accident (CVA), unspecified mechanism (H)        Dose:  325 mg   Take 1 tablet (325 mg) by mouth daily   Quantity:  60 tablet   Refills:  0       blood glucose monitoring lancets   Used for:  Diabetes mellitus, type 2 (H)        tests twice a day   Quantity:  100   Refills:  3       blood glucose monitoring test strip   Commonly known as:  ACCU-CHEK LUISANA   Used for:  Type 2 diabetes mellitus with diabetic neuropathy, with long-term current use of insulin (H)        by In Vitro route 1 times daily (test)   Quantity:  100 strip   Refills:  3       clopidogrel 75 MG tablet   Commonly known as:  PLAVIX   Used for:  Cerebrovascular accident (CVA), unspecified mechanism (H)        Dose:  75 mg   Take 1 tablet (75 mg) by mouth daily   Quantity:  30 tablet   Refills:  0       insulin pen needle 31G X 8 MM   Commonly known as:  B-D U/F    Used for:  Uncontrolled diabetes mellitus (H)        Use once daily or as directed.   Quantity:  90 each   Refills:  1       MULTI FOR HIM PO        Dose:  1 capsule   Take 1 capsule by mouth daily.   Refills:  0       order for DME   Used for:  Type 2 diabetes mellitus with diabetic neuropathy, with long-term current use of insulin (H), Other diabetic neurological complication associated with type 2 diabetes mellitus (H)        Diabetic shoes due to neuropathy   Quantity:  1 Device   Refills:  0       pregabalin 150 MG capsule   Commonly known as:  LYRICA   Used for:  Type 2 diabetes mellitus with diabetic neuropathy, with long-term current use of insulin (H)        Dose:  150 mg   Take 1 capsule (150 mg) by mouth 2 times daily   Quantity:  60 capsule   Refills:  0       simvastatin 20 MG tablet   Commonly known as:  ZOCOR   Used for:  Hyperlipidemia LDL goal <100        Dose:  20 mg   Take 1 tablet (20 mg) by mouth At Bedtime   Quantity:  30 tablet   Refills:  0       tamsulosin 0.4 MG capsule   Commonly known as:  FLOMAX   Used for:  Benign prostatic hyperplasia with urinary retention        Dose:  0.4 mg   Take 1 capsule (0.4 mg) by mouth At Bedtime   Quantity:  30 capsule   Refills:  0         STOP taking     insulin aspart 100 UNIT/ML injection   Commonly known as:  NovoLOG PEN           nicotine 21 MG/24HR 24 hr patch   Commonly known as:  NICODERM CQ           potassium chloride 20 MEQ Packet   Commonly known as:  KLOR-CON                Where to get your medicines      These medications were sent to [facility] [place], [place] - [address]  [address] [address]     Phone:  [phone]     clopidogrel 75 MG tablet    finasteride 5 MG tablet    insulin glargine 100 UNIT/ML injection    lisinopril-hydrochlorothiazide 20-12.5 MG per tablet    metoprolol succinate 100 MG 24 hr tablet    simvastatin 20 MG tablet    tamsulosin 0.4 MG capsule         Some of these  will need a paper prescription and others can be bought over the counter. Ask your nurse if you have questions.     Bring a paper prescription for each of these medications     pregabalin 150 MG capsule                Protect others around you: Learn how to safely use, store and throw away your medicines at www.disposemymeds.org.             Medication List: This is a list of all your medications and when to take them. Check marks below indicate your daily home schedule. Keep this list as a reference.      Medications           Morning Afternoon Evening Bedtime As Needed    aspirin 325 MG EC tablet   Take 1 tablet (325 mg) by mouth daily   Last time this was given:  325 mg on 1/29/2018  8:09 AM                                   blood glucose monitoring lancets   tests twice a day                                blood glucose monitoring test strip   Commonly known as:  ACCU-CHEK LUISANA   by In Vitro route 1 times daily (test)                                clopidogrel 75 MG tablet   Commonly known as:  PLAVIX   Take 1 tablet (75 mg) by mouth daily   Last time this was given:  75 mg on 1/29/2018  8:09 AM                                   finasteride 5 MG tablet   Commonly known as:  PROSCAR   Take 1 tablet (5 mg) by mouth daily   Last time this was given:  5 mg on 1/29/2018  8:14 AM                                   insulin glargine 100 UNIT/ML injection   Commonly known as:  LANTUS   Inject 20 Units Subcutaneous 2 times daily   Last time this was given:  20 Units on 1/29/2018  8:17 AM            8:00am               8:00pm           insulin pen needle 31G X 8 MM   Commonly known as:  B-D U/F   Use once daily or as directed.                                lisinopril-hydrochlorothiazide 20-12.5 MG per tablet   Commonly known as:  PRINZIDE/ZESTORETIC   Take 2 tablets by mouth daily   Last time this was given:  2 tablets on 1/28/2018  8:20 AM                                   metoprolol succinate 100 MG 24 hr tablet    Commonly known as:  TOPROL-XL   Take 1 tablet (100 mg) by mouth daily   Last time this was given:  100 mg on 1/28/2018  8:19 AM                                   MULTI FOR HIM PO   Take 1 capsule by mouth daily.                                   order for DME   Diabetic shoes due to neuropathy                                pregabalin 150 MG capsule   Commonly known as:  LYRICA   Take 1 capsule (150 mg) by mouth 2 times daily   Last time this was given:  150 mg on 1/29/2018  8:07 AM            8:00am               8:00pm           simvastatin 20 MG tablet   Commonly known as:  ZOCOR   Take 1 tablet (20 mg) by mouth At Bedtime   Last time this was given:  20 mg on 1/28/2018  9:44 PM                                   tamsulosin 0.4 MG capsule   Commonly known as:  FLOMAX   Take 1 capsule (0.4 mg) by mouth At Bedtime   Last time this was given:  0.4 mg on 1/28/2018  9:44 PM

## 2018-01-19 NOTE — H&P
"HPI:  Cooper Cabral is a 61 yo RHD M with PMH significant for DM II with neuropathy, HTN, HLD, tobacco abuse and 2 prior CVAs who was admitted on 1/12/18 with left sided weakness of 2 days duration and CT scan showing right pareital/occipital hyper density concerning for ischemic CVA. No tPA administered. 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 MRI with acute infarct within the lateral left medulla and multiple chronic lacunar infarcts within the cerebellum, brainstem, and bilateral basal ganglia. Echo with negative bubble study and LVEF of 40-45%. LDL 92. HgbA1c 11.9. CVA etiology believed to be 2/2 intracranial atherosclerosis and small vessel disease. He is currently on ASA and plavix. He has been compliant with smoking cessation recommendations since hospitalized with help of nicotine patch.     Post-CVA course has been complicated by urinary retention. He was cathed for 1000cc's of urine on 1/17 and nocturia. Flomax 0.4mg qhs started on 1/18. Hammond in place. BPs WNL. Glucose well controlled with range 120s-200s. Glargine recuded from 25-->22units on 1/18 given glucose in the low 100s. Mild tachycardia, now resolved suspect reactive. Hypokalemia.    Current function:  OT note on 1/19, \"Pt SBA to Aby for transfers, cues needed to problem solve with LE dressing and being able to thread catheter in pant leg after education and visual demonstration. Toilet transfer with Aby and cues for hand placement.\"  PT note on 1/17, \" Pt was up in a chair upon PT arrival. Seated /77. Pt requires Aby for sit<>stand transfers and to use the toilet in a standing position. Gait training 200' x 2 with a FWW and Aby to correct 2 LOB, modA to correct 2 LOB all to his L side. Seated BP after gait 129/77.\" BBS 19/56 on 1/17.   On regular diet and thins. No further ST needs noted on 1/13.     Prior Function:   Independent with " mobility and ADLs. He was mostly homebound and sedentary at baseline. Not driving 2/2 visual impairment related to prior CVA.    PMH:  Active Ambulatory Problems     Diagnosis Date Noted     Hypertension goal BP (blood pressure) < 140/80 09/30/2008     Tobacco abuse 09/30/2008     Non morbid obesity 09/30/2008     Hyperlipidemia LDL goal <100 05/09/2010     Advanced directives, counseling/discussion 05/29/2013     Bell's palsy 03/02/2016     Other diabetic neurological complication associated with type 2 diabetes mellitus (H) 09/07/2017     Type 2 diabetes mellitus with diabetic neuropathy, with long-term current use of insulin (H) 10/23/2017     Ischemic stroke (H) 01/12/2018     Left-sided weakness 01/12/2018     Resolved Ambulatory Problems     Diagnosis Date Noted     Severe uncontrolled hypertension 05/29/2013     Past Medical History:   Diagnosis Date     Diabetes (H)      HTN (hypertension)      Social Hx:  Social History     Social History     Marital status: Single     Spouse name: N/A     Number of children: N/A     Years of education: N/A     Occupational History     Not on file.     Social History Main Topics     Smoking status: Current Every Day Smoker     Packs/day: 1.00     Years: 40.00     Types: Cigarettes     Smokeless tobacco: Never Used     Alcohol use No     Drug use: No     Sexual activity: Yes     Partners: Female     Other Topics Concern     Parent/Sibling W/ Cabg, Mi Or Angioplasty Before 65f 55m? No     Social History Narrative   Patient lives alone in a mobile home. Home is not wc accessible and has 4 ERNIE.     Family Hx:    Family History   Problem Relation Age of Onset     CANCER Mother      Bone cancer      DIABETES Father      CANCER Father      bone cancer      Hypertension Brother      Hypertension Brother        ROS:   General: denies fevers, chills, weight loss  Ears: denies changes in hearing, tinnitus  Eyes: denies changes in vision, diplopia  CV: denies chest pain,  palpitations  Resp: denies roblems breathing, cough  : riley in place  GI: denies diarrhea, constipation  Skin: denies rash, pruritis  Neuro: denies headache, weakness, paresthesias.     Medications:  No current facility-administered medications for this encounter.      Current Outpatient Prescriptions   Medication     aspirin  MG EC tablet     insulin aspart (NOVOLOG PEN) 100 UNIT/ML injection     insulin glargine (LANTUS) 100 UNIT/ML injection     clopidogrel (PLAVIX) 75 MG tablet     nicotine (NICODERM CQ) 21 MG/24HR 24 hr patch     potassium chloride (KLOR-CON) 20 MEQ Packet     Facility-Administered Medications Ordered in Other Encounters   Medication     tamsulosin (FLOMAX) capsule 0.4 mg     insulin glargine (LANTUS) injection 22 Units     lisinopril-hydrochlorothiazide (PRINZIDE/ZESTORETIC) 20-12.5 MG per tablet 2 tablet     metoprolol succinate (TOPROL-XL) 24 hr tablet 100 mg     influenza quadrivalent (PF) vacc age 3 yrs and older (FLUZONE or Flulaval) injection 0.5 mL     nicotine Patch in Place     nicotine patch REMOVAL     nicotine (NICODERM CQ) 21 MG/24HR 24 hr patch 1 patch     sodium chloride (PF) 0.9% PF flush 10 mL     clopidogrel (PLAVIX) tablet 75 mg     pregabalin (LYRICA) capsule 150 mg     simvastatin (ZOCOR) tablet 20 mg     glucose 40 % gel 15-30 g    Or     dextrose 50 % injection 25-50 mL    Or     glucagon injection 1 mg     naloxone (NARCAN) injection 0.1-0.4 mg     Medication Instruction     insulin aspart (NovoLOG) inj (RAPID ACTING)     insulin aspart (NovoLOG) inj (RAPID ACTING)     insulin aspart (NovoLOG) inj (RAPID ACTING)     potassium chloride SA (K-DUR/KLOR-CON M) CR tablet 20-40 mEq     potassium chloride (KLOR-CON) Packet 20-40 mEq     potassium chloride 10 mEq in 100 mL sterile water intermittent infusion (premix)     potassium chloride 10 mEq in 100 mL intermittent infusion with 10 mg lidocaine     potassium chloride 20 mEq in 50 mL intermittent infusion      magnesium sulfate 4 g in 100 mL sterile water (premade)     acetaminophen (TYLENOL) tablet 650 mg     oxyCODONE IR (ROXICODONE) tablet 5 mg     melatonin tablet 1 mg     docusate sodium (COLACE) capsule 100 mg     bisacodyl (DULCOLAX) EC tablet 5 mg    Or     bisacodyl (DULCOLAX) EC tablet 10 mg    Or     bisacodyl (DULCOLAX) EC tablet 15 mg     magnesium hydroxide (MILK OF MAGNESIA) suspension 30 mL     bisacodyl (DULCOLAX) Suppository 10 mg     ondansetron (ZOFRAN-ODT) ODT tab 4 mg    Or     ondansetron (ZOFRAN) injection 4 mg     aspirin EC EC tablet 325 mg    Or     aspirin Suppository 300 mg     labetalol (NORMODYNE/TRANDATE) injection 10-40 mg     hydrALAZINE (APRESOLINE) injection 10-20 mg       Allergies:   No Known Allergies    Physical Exam:  B/P: 120/79, T: 96.9, P: 79, R: 16  General: NAD, pleasant and cooperative  HEENT: NC/AT, right facial droop  CV: RRR  RESP: Breathing easily on RA  : Hammond in place draining dark yellow urine.   MSK: Full AROM/PROM of BUE and BLE  Neuro:   Mental Status: Patient is appropriate and able to recall new medications. He is oriented to situation.    Speech: dysarthria, normal rate of speech   CN: Right facial droop, EOMI, PERRL, hearing intact to conversation, tongue extends to right, shoulder shrug and SCM intact bilaterally.    MMT: 5/5 on right BUE, LUE with 4++/5 with shoulder abduction, elbow flexion/extension, wrist extension, finger flexion and finger abduction. LLE with 4+/5 with hip flexion, knee extension/flexion, dorsiflexion and plantarflexion.    Reflexes: symmetric at the patella, achilles, and biceps   Tone: NO clonus at the ankles. Normal throughout.    Coordination: Ataxia with FNF on left   Sensation: intact to light touch throughout.   Ext: No BLE edema. No tenderness with palpation of calf bilaterally  Skin: ecchymosis and superficial abrasions scattered over BUE.     Labs:   Reviewed   1/18 K 4.5    Imaging:   Reviewed    Assessment:  Cooper DE PAZ  Marianna is a 61 yo RHD M with PMH significant for DM II with neuropathy, HTN, HLD, tobacco abuse and 2 prior CVAs who was admitted on 1/12/18 with left sided weakness of 2 days duration with MRI with acute infarct within the lateral left medulla and multiple chronic lacunar infarcts within the cerebellum, brainstem, and bilateral basal ganglia. He presents with new ataxia, decreased balance, impulsivity, decreased safety awareness 2/2 above resulting in new impairments with mobility and ADLs. He is admitted to the ARU on 1/19/18 for ongoing medical management and PT/OT.    Plan:  Rehab  --PT for 90 minutes daily to work on neuromuscular re-education focusing on strength, balance, coordination, and endurance.     --OT for 90 minutes daily to work on ADL re-training such as grooming, self cares and bathing.  OT for cognitive eval as well.   --Rehab RN for med administration, VS monitoring, bowel regimen, and bladder monitoring.     Medicine  ##Left medullary ischemic CVA:  -- 325mg ASA and plavix qday  -- PT and OT as above  -- Stroke education  -- Optimization of stroke risk factors including: DM II, HTN, HLD, and tobacco abuse as outlined below.  -- Stat HCT with any focal neuro changes    ##HTN: Goal BP <140/80 long term. Currently well controlled. Monitor closely for orthostatic hypotension given tamsulosin added recently.   --lisinopril/HCTZ 40-25mg, qday  --metoprolol 100mg qday    ##HLD: LDL 92, goal <70.   --20mg simvastatin qday    ##Tobacco Abuse: not interested in quitting at this time  --tobacco cessation education  --21mg/24 hr nicotiene patch   --patient counseled regarding smoking cessation, patient expresses understanding of CVA risk, not interested in quitting at this time.     ##DM II with neuropathy: HgbA1c 11.9, not at goal  --Glargine 22 units bid (recently decreased from 25 units)  --1 UAspart per 10gm carb tid with meals  --SSI  --Hypoglycemia protocol  --DM education as patient was not using  insulin prior to admission  --150mg lyrica bid    ##Urinary Retention: Suspect 2/2 BPH. Patient with 1L requiring IC on 1/18. Now with Hammond in place.   --0.4mg tamsulosin qhs  --PVRs x3 on admission  --Hammond cares  --Monitor UOP  --Voiding trial around 1/25    ## Right MCA stenosis with post stenotic fusiform dilation, asymptomatic  --No acute interventions needed at this time  --Follow up with neuro intervention as an outpatient    #Hypokalemia: K 4.5 on 1/18/18. Patient is on HCTZ. Per review of MAR, not getting daily KCl replacement. Will hold on scheduling for now. May need regular supplementation.   --Recheck on 1/20/18 and 1/22/18 - will have primary team assess whether this needs to be monitored further.     Discharge Goals:  1. Estimated Length of Stay: 1-2 weeks  2. Prognosis: good  3. Decision Maker: self  4. Code Status: full  5. Anticipated D/C Destination and functional outcome: home with family support, mod I with ambulation and ADLs  6. Follow up Appointments on Discharge:   1. Stroke Clinic 2-3 months  2. Neuro intervention after discharge  3. PM&R  4. PCP 1 week    Patient was seen and examined with Dr. Antunez    --------------------    I, Shira Antunez, personally examined and evaluated this patient on 1/19/18. I discussed the patient with my resident Dr. Zaldivar and care team, and agree with the assessment and plan of care as documented in her note.    I personally reviewed the chart (vitals signs, medications, labs and imaging).     My key findings and key management decisions made by me: Patient had a lateral left medulla and multiple chronic lacunar infarcts within the cerebellum, brainstem, and bilateral basal ganglia. He presents with new ataxia, decreased balance, impulsivity, decreased safety awareness. He has a history of 2 prior CVAs in the past, was not managing his stroke risk factors well (HTN, HLD, DM, tobacco abuse). Goal will be for patient to be able to return home,  with likely increased support from PCA.       I spent a total of 120 minutes face-to-face and managing the care of Cooper Cabral. Over 50% of my time on the unit was spent counseling the patient and coordinating care. See note for details.

## 2018-01-19 NOTE — DISCHARGE INSTRUCTIONS
Your risk factors for stroke or TIA (transient ischemic attack):    Your Risk Factors Your Results Normal Ranges   High blood pressure BP Readings from Last 1 Encounters:   01/19/18 123/84    Less than 120/80   Cholesterol              Total Lab Results   Component Value Date    CHOL 147 01/14/2018      Less than 150    Triglycerides   Lab Results   Component Value Date    TRIG 89 01/14/2018    Less than 150   LDL Lab Results   Component Value Date    LDL 92 01/14/2018       Less than 70   HDL Lab Results   Component Value Date    HDL 37 01/14/2018            Greater than 40 (men)  Greater than 50 (women)   Diabetes   Recent Labs  Lab 01/13/18  0747   *    Fasting blood glucose    Smoking/tobacco use  Quit smoking and tobacco   Overweight  Lose 1-2 pounds a week   Lack of exercise  30 minutes moderate activity each day   Other risk factors include carotid (neck) artery disease, atrial fibrillation and stress. You may be on new medicine to treat high blood pressure, cholesterol, diabetes or atrial fibrillation.    Understanding Stroke Booklet given to patient. Please refer to booklet for further information.    Stroke warning signs and symptoms - CALL 911 right away for:  - Sudden numbness or weakness in the face, arm or leg (often on one side of the body).  - Sudden confusion or trouble understanding what is going on.  - Sudden blurred or decreased vision in one or both eyes.  - Sudden trouble speaking, loss of balance, dizziness or problems with coordination.  - Sudden, severe headache for no reason.  - Fainting or seizures.  - Symptoms may go away then come back suddenly.

## 2018-01-19 NOTE — DISCHARGE SUMMARY
[facility]    Discharge Summary  Hospitalist    Date of Admission:  1/12/2018  Date of Discharge:  1/19/2018  Discharging Provider: Cornell Jacques MD  Date of Service (when I saw the patient): 01/19/18    Discharge Diagnoses   Left lateral medullary stroke  Right middle cerebral artery stenosis, see the discharge summary of 1/15/2018  See the other diagnoses of the discharge summary of 1/15/2018  Additional diagnosis is urinary retention due to  BPH    History of Present Illness   Cooper Cabral is an 62 year old male who presented with left sided weakness and falling and was found to have an left lateral medullary stroke. See the discharge summary of 1/15/2018. The only addition is urinary retention requiring an urinary catheter and the addition of Tamsulosin.  He should have a voiding trial between 1/25 to 1/28/2018    Hospital Course   Cooper Cabral was admitted on 1/12/2018.  The following problems were addressed during his hospitalization:    Principal Problem:    Ischemic stroke (H)  Active Problems:    Hypertension goal BP (blood pressure) < 140/80    Tobacco abuse    Non morbid obesity    Hyperlipidemia LDL goal <100    Other diabetic neurological complication associated with type 2 diabetes mellitus (H)    Type 2 diabetes mellitus with diabetic neuropathy, with long-term current use of insulin (H)    Left-sided weakness      Cornell Jacques MD    Significant Results and Procedures   See the scans and labs, consult notes and the discharge summary of 1/15/2018    Pending Results   These results will be followed up by Dr. Hudson Spring  Unresulted Labs Ordered in the Past 30 Days of this Admission     No orders found from 11/13/2017 to 1/13/2018.          Code Status   Full Code       Primary Care Physician   Hudson Spring    Physical Exam   Temp: 97.4  F (36.3  C) Temp src: Oral BP: 123/84   Heart Rate: 76 Resp: 16 SpO2: 96 % O2 Device: None (Room air)    Vitals:    01/12/18 1109    Weight: 113.4 kg (250 lb)     Vital Signs with Ranges  Temp:  [97.4  F (36.3  C)-98.2  F (36.8  C)] 97.4  F (36.3  C)  Heart Rate:  [67-83] 76  Resp:  [16-18] 16  BP: (121-141)/(67-84) 123/84  SpO2:  [92 %-96 %] 96 %  I/O last 3 completed shifts:  In: 720 [P.O.:720]  Out: 4050 [Urine:4050]    Constitutional: alert, cooperative  Eyes: clear  ENT: not examined  Respiratory: clear to A&P  Cardiovascular: Regular rhythm, normal S1 and S2, no murmurs or gallops  GI: normal bowel sounds, not tender  Lymph/Hematologic: not examined  Genitourinary: urinary catheter in place  Skin: healing ecchymoses and abrasion left scalp, arm and knee areas  Musculoskeletal: no wasting.  Neurologic: right homonymous hemianopsia, Speech slightly slurred, left leg slightly weaker than the right  Walks with the walker, some tendency to deviate to the left  Neuropsychiatric: alert, cooperative and oriented    Discharge Disposition   Discharged to short-term care facility  Condition at discharge: Stable    Consultations This Hospital Stay   PHYSICAL THERAPY ADULT IP CONSULT  OCCUPATIONAL THERAPY ADULT IP CONSULT  SPEECH LANGUAGE PATH ADULT IP CONSULT  SWALLOW EVAL SPEECH PATH AT BEDSIDE IP CONSULT  SMOKING CESSATION PROGRAM IP CONSULT  NEUROLOGY IP CONSULT  SOCIAL WORK IP CONSULT  PHYSICAL THERAPY ADULT IP CONSULT  OCCUPATIONAL THERAPY ADULT IP CONSULT  PHYSICAL THERAPY ADULT IP CONSULT  OCCUPATIONAL THERAPY ADULT IP CONSULT    Time Spent on this Encounter   I, Cornell Jacques, personally saw the patient today and spent less than or equal to 30 minutes discharging this patient.    Discharge Orders     General info for SNF   Length of Stay Estimate: Short Term Care: Estimated # of Days <30  Condition at Discharge: Stable  Level of care:skilled   Rehabilitation Potential: Good  Admission H&P remains valid and up-to-date: Yes  Recent Chemotherapy: N/A  Use Nursing Home Standing Orders: Yes     Mantoux instructions   Give two-step Mantoux (PPD)  Per Facility Policy Yes     Reason for your hospital stay   Mr. Cabral: You came in to the hospital due to falling and were found to have another stroke. You still have some weakness in your left leg, less so in your left arm, and will need therapy to improve that. You are not safe in going  Home at this time, therapy hopefully will correct that. Your diabetes in not controlled and you  Have to take insulin.  That will have to be further managed when you are out of the hospital.  The smoking is not good for you but you indicated you have no desire to quit smoking. Please reconsider that. You are on aspirin and clopidogrel to help prevent more strokes. You are on Simvastatin due to the cholesterol being high and being a diabetic and having had strokes and on Pregabalin due to the neuropathy from your diabetes.     Glucose monitor nursing POCT   Before meals and at bedtime     Activity - Up with nursing assistance     Activity - Up with assistive device     Follow Up and recommended labs and tests   Follow up with Dr Rolando Ireland on Friday February 16th at 10:45 am. Please check in at 10:30.  [facility]  [phone]  [address], [address] [place], [place] [id number]     General info for SNF   Length of Stay Estimate: Short Term Care: Estimated # of Days <30  Condition at Discharge: Improving  Level of care:skilled   Rehabilitation Potential: Good  Admission H&P remains valid and up-to-date: Yes  Recent Chemotherapy: N/A  Use Nursing Home Standing Orders: Yes     Mantoux instructions   Give two-step Mantoux (PPD) Per Facility Policy Yes     Reason for your hospital stay   Mr. Cabral:  You came in due to falling and left sided weakness. You have had strokes in the past and you determined that you had a stroke and the workup here confirmed that.  Your blood pressure was initially allowed to run a bit higher to prevent a spread of the stroke area, but you are now back on it. You have  done reasonably well with not smoking and I wish you would continue to not smoke. Your diabetes has been controlled with insulin and you are doing well there.   You are going to rehab to get better, although you have shown improvement here  You have had difficulty emptying your bladder and you have an urinary catheter and were started on Tamsulosin, the first dose being at bedtime on 1/18/2018. Somewhere between 1/25 to 1/28, a trial of removing the catheter should be undertaken.     Glucose monitor nursing POCT   Before meals and at bedtime     Hammond catheter   To straight gravity drainage. He should have a voiding trial between 1/25 and 1/28 and if not able to successfully void he will need the catheter replaced and he will need to see an Urologist.     Activity - Up with assistive device     Activity - Up with nursing assistance     Physical Therapy Adult Consult   Evaluate and treat as clinically indicated.    Reason:  Stroke with residual left sided weakness, leg more than arm.  Previous right homonymous hemianopsia and dysphasia, but no dysphagia. He has seen speech and needs no more speech therapy at this time.     Occupational Therapy Adult Consult   Evaluate and treat as clinically indicated.    Reason:  Left sided stroke     Physical Therapy Adult Consult   Evaluate and treat as clinically indicated.    Reason:  Recent right sided stroke with left sided weakness and unstable gait     Occupational Therapy Adult Consult   Evaluate and treat as clinically indicated.    Reason:  stroke     Fall precautions     Fall precautions     Advance Diet as Tolerated   Follow this diet upon discharge: Orders Placed This Encounter     Combination Diet Regular Diet Adult; 9950-5898 Calories: Moderate Consistent CHO (4-6 CHO units/meal)     Advance Diet as Tolerated   Follow this diet upon discharge: Orders Placed This Encounter     Combination Diet Regular Diet Adult; 2674-7819 Calories: Moderate Consistent CHO (4-6 CHO  units/meal)     Advance Diet as Tolerated       Discharge Medications   Current Discharge Medication List      START taking these medications    Details   !! lisinopril-hydrochlorothiazide (PRINZIDE/ZESTORETIC) 20-12.5 MG per tablet Take 2 tablets by mouth every morning  Qty: 30 tablet    Associated Diagnoses: Hypertension goal BP (blood pressure) < 140/80      !! metoprolol succinate (TOPROL-XL) 100 MG 24 hr tablet Take 1 tablet (100 mg) by mouth every morning  Qty: 30 tablet    Associated Diagnoses: Hypertension goal BP (blood pressure) < 140/80      tamsulosin (FLOMAX) 0.4 MG capsule Take 1 capsule (0.4 mg) by mouth At Bedtime  Qty: 60 capsule    Associated Diagnoses: Benign prostatic hyperplasia with urinary retention      aspirin  MG EC tablet Take 1 tablet (325 mg) by mouth daily  Qty: 60 tablet, Refills: 0    Associated Diagnoses: Cerebrovascular accident (CVA), unspecified mechanism (H)      clopidogrel (PLAVIX) 75 MG tablet Take 1 tablet (75 mg) by mouth daily  Qty: 30 tablet    Associated Diagnoses: Cerebrovascular accident (CVA), unspecified mechanism (H)      nicotine (NICODERM CQ) 21 MG/24HR 24 hr patch Place 1 patch onto the skin daily  Qty: 30 patch, Refills: 0    Associated Diagnoses: Tobacco abuse      potassium chloride (KLOR-CON) 20 MEQ Packet 20 mEq by Oral or Feeding Tube route 2 times daily  Qty: 60 tablet, Refills: 1    Associated Diagnoses: Hypokalemia       !! - Potential duplicate medications found. Please discuss with provider.      CONTINUE these medications which have CHANGED    Details   insulin glargine (LANTUS) 100 UNIT/ML injection Inject 22 Units Subcutaneous 2 times daily  Qty: 15 mL, Refills: 0    Associated Diagnoses: Type 2 diabetes mellitus with diabetic neuropathy, with long-term current use of insulin (H)      insulin aspart (NOVOLOG PEN) 100 UNIT/ML injection Dose one unit per ten grams of carbohydrate subq with each meal  Qty: 10 mL, Refills: 3    Associated  Diagnoses: Type 2 diabetes mellitus with diabetic neuropathy, with long-term current use of insulin (H)         CONTINUE these medications which have NOT CHANGED    Details   pregabalin (LYRICA) 150 MG capsule Take 1 capsule (150 mg) by mouth 2 times daily  Qty: 90 capsule, Refills: 3    Associated Diagnoses: Type 2 diabetes mellitus with diabetic neuropathy, with long-term current use of insulin (H)      !! metoprolol (TOPROL-XL) 100 MG 24 hr tablet Take 1 tablet (100 mg) by mouth daily  Qty: 90 tablet, Refills: 3    Comments: Profile Rx: patient will contact pharmacy when needed  Associated Diagnoses: Hypertension goal BP (blood pressure) < 140/80      !! lisinopril-hydrochlorothiazide (PRINZIDE/ZESTORETIC) 20-12.5 MG per tablet Take 2 tablets by mouth daily  Qty: 180 tablet, Refills: 3    Comments: Profile Rx: patient will contact pharmacy when needed  Associated Diagnoses: Hypertension goal BP (blood pressure) < 140/80; Type 2 diabetes mellitus with diabetic neuropathy, with long-term current use of insulin (H)      simvastatin (ZOCOR) 20 MG tablet Take 1 tablet (20 mg) by mouth At Bedtime  Qty: 90 tablet, Refills: 3    Comments: Profile Rx: patient will contact pharmacy when needed  Associated Diagnoses: Hyperlipidemia LDL goal <100      Multiple Vitamins-Minerals (MULTI FOR HIM PO) Take 1 capsule by mouth daily.      blood glucose monitoring (ACCU-CHEK LUISANA) test strip by In Vitro route 1 times daily (test)  Qty: 100 strip, Refills: 3    Comments: Profile Rx: patient will contact pharmacy when needed  Associated Diagnoses: Type 2 diabetes mellitus with diabetic neuropathy, with long-term current use of insulin (H)      order for DME Diabetic shoes due to neuropathy  Qty: 1 Device, Refills: 0    Associated Diagnoses: Type 2 diabetes mellitus with diabetic neuropathy, with long-term current use of insulin (H); Other diabetic neurological complication associated with type 2 diabetes mellitus (H)      insulin pen  needle (B-D U/F) 31G X 8 MM Use once daily or as directed.  Qty: 90 each, Refills: 1    Associated Diagnoses: Uncontrolled diabetes mellitus (H)      ACCU-CHEK MULTICLIX LANCETS MISC tests twice a day  Qty: 100, Refills: 3    Associated Diagnoses: Diabetes mellitus, type 2 (H)       !! - Potential duplicate medications found. Please discuss with provider.      STOP taking these medications       metFORMIN (GLUCOPHAGE) 1000 MG tablet Comments:   Reason for Stopping:         dulaglutide (TRULICITY) 0.75 MG/0.5ML pen Comments:   Reason for Stopping:         Calcium-Vitamin D-Vitamin K (CALCIUM + D + K PO) Comments:   Reason for Stopping:         FISH OIL Comments:   Reason for Stopping:         aspirin 81 MG tablet Comments:   Reason for Stopping:             Allergies   No Known Allergies  Data   Most Recent 3 CBC's:  Recent Labs   Lab Test  01/13/18   0747  01/12/18   1135   WBC  7.8  8.5   HGB  16.3  16.5   MCV  91  91   PLT  219  247      Most Recent 3 BMP's:  Recent Labs   Lab Test  01/18/18   1602  01/15/18   0825  01/14/18   0908   01/13/18   0747  01/12/18   1135  09/07/17   1432   NA   --    --    --    --   139  135  138   POTASSIUM  4.5  3.7  3.3*   < >  3.1*  3.7  4.4   CHLORIDE   --    --    --    --   104  98  101   CO2   --    --    --    --   27  28  29   BUN   --    --    --    --   25  30  25   CR   --    --    --    --   0.76  0.88  0.84   ANIONGAP   --    --    --    --   8  9  8   MAINE   --    --    --    --   8.8  8.9  9.7   GLC   --    --    --    --   155*  324*  269*    < > = values in this interval not displayed.     Most Recent 2 LFT's:  Recent Labs   Lab Test  01/13/18   0747  01/12/18   1135   AST  29  36   ALT  31  33   ALKPHOS  79  84   BILITOTAL  1.5*  1.4*     Most Recent INR's and Anticoagulation Dosing History:  Anticoagulation Dose History     Recent Dosing and Labs Latest Ref Rng & Units 1/12/2018    INR 0.86 - 1.14 1.05        Most Recent 3 Troponin's:  Recent Labs   Lab Test   01/13/18   0040  01/12/18   1135   TROPI  0.488*  0.452*     Most Recent Cholesterol Panel:  Recent Labs   Lab Test  01/14/18   0908   CHOL  147   LDL  92   HDL  37*   TRIG  89     Most Recent 6 Bacteria Isolates From Any Culture (See EPIC Reports for Culture Details):No lab results found.  Most Recent TSH, T4 and A1c Labs:  Recent Labs   Lab Test  01/12/18   1135   TSH  1.07   A1C  11.9*     Results for orders placed or performed during the hospital encounter of 01/12/18   CT Head w/o Contrast    Narrative    CT SCAN OF THE HEAD WITHOUT CONTRAST  1/12/2018 12:29 PM     HISTORY:  Left-sided weakness frequent falls.    TECHNIQUE:  Axial images of the head and coronal reformations without  IV contrast material. Radiation dose for this scan was reduced using  automated exposure control, adjustment of the mA and/or kV according  to patient size, or iterative reconstruction technique.    COMPARISON: None.    FINDINGS: Area of encephalomalacia with cortical volume loss in the  right parietal lobe, probably due to chronic infarct. There is  hypodensity and loss of gray-white matter differentiation in the right  parietal/occipital region. This is age indeterminate. Area of  encephalomalacia within the paramedian left occipital lobe with ex  vacuo dilatation of the occipital horn of the left lateral ventricle,  probably from chronic infarct. Chronic-appearing lacunar infarct in  the right cerebellum. Chronic-appearing infarcts in the right basal  ganglia and subinsular region. Patchy periventricular white matter  hypodensities are nonspecific but likely related to chronic  microvascular ischemic disease. Mild diffuse parenchymal volume loss.    No evidence of acute intracranial hemorrhage. No mass effect or  midline shift. No abnormal extra-axial fluid collection.    Mild mucosal thickening within the visualized paranasal sinuses. The  bony calvarium and bones of the skull base appear intact.       Impression    IMPRESSION:       1. Hypodensity in the right parietal/occipital region likely  representing an age-indeterminate infarct. Acute ischemia in this area  would be difficult to exclude. Consider further evaluation with brain  MRI.  2. Chronic-appearing areas of encephalomalacia within the right  parietal lobe and paramedian left occipital lobe, likely due to  previous infarct. Chronic-appearing lacunar infarcts in the right  basal ganglia and subinsular region as well as the right cerebellum.  3. Diffuse parenchymal volume loss and white matter changes, likely  due to chronic microvascular ischemic disease.  4. No evidence of acute intracranial hemorrhage, mass, or herniation.     LION VAZ MD   MRI Brain w & w/o contrast    Narrative    MRI BRAIN WITHOUT AND WITH CONTRAST January 13, 2018 12:09 AM    HISTORY: Acute CVI.      TECHNIQUE: Multiplanar, multisequence MRI of the brain without and  with 11 mL Gadavist.    COMPARISON: Head CT from earlier the same day.    FINDINGS: Area of restricted diffusion within the left lateral medulla  measuring 0.5 mm with associated T2 hyperintensity concerning for  acute area of ischemia. No other areas of restricted diffusion. Areas  of encephalomalacia and gliosis within the right frontal lobe,  paramedian left occipital lobe, and right parietal occipital region  are probably due to previous infarcts. Multiple chronic appearing  lacunar infarcts within the cerebellum, brainstem, and basal ganglia.  Diffuse parenchymal volume loss. Patchy periventricular white matter  T2 hyperintensities likely related to chronic microvascular ischemic  disease. There is ex vacuo dilatation of the occipital horn of the  left lateral ventricle. Ventricles are proportional to the cerebral  sulci without evidence of hydrocephalus.    No abnormal intracranial enhancement.    Mild mucosal thickening in the inferior right maxillary sinus.       Impression    IMPRESSION:    1. Acute infarct within the lateral left  medulla without associated  hemorrhagic transformation. No other acute areas of ischemia.  2. Other areas of encephalomalacia and gliosis within the right  frontal lobe, paramedian left occipital lobe, and right  parietal/occipital region presumably due to previous infarcts.  Multiple chronic appearing lacunar infarcts also within the  cerebellum, brainstem, and bilateral basal ganglia.  3. Diffuse parenchymal volume loss and white matter changes likely due  to chronic microvascular ischemic disease.     LION VAZ MD   CT Head Neck Angio w/o & w Contrast    Narrative    CTA ANGIOGRAM HEAD NECK  1/12/2018 8:47 PM     HISTORY: ischemic stroke .;     TECHNIQUE:  Precontrast localizing scans were followed by CT  angiography with an injection of 70 mL Isovue -370 IV contrast with  scans through the head and neck.  Images were transferred to a  separate 3-D workstation where multiplanar reformations and 3-D images  were created.  Estimates of carotid stenoses are made relative to the  distal internal carotid artery diameters except as noted. Radiation  dose for this scan was reduced using automated exposure control,  adjustment of the mA and/or kV according to patient size, or iterative  reconstruction technique.    COMPARISON: None.    FINDINGS: Estimates of stenosis at the carotid bifurcations are  relative to the distal internal carotids.    Arch: There is a small amount of calcified atherosclerotic plaque in  the arch of the aorta. There is normal arch anatomy.    Neck:  Right Carotid:  The right common carotid artery is normal.  The right  carotid bifurcation appears normal.  Right internal carotid artery is  tortuous..     Left Carotid:  The left common carotid artery is unremarkable.  Small  amount of calcified atherosclerotic plaque is seen at the left carotid  bifurcation. No stenosis.. Left internal carotid artery is tortuous..       Vertebrals:  The vertebral arteries are normal.    Head:  Right Carotid:No  occluded vessels are seen. The M1 segment of the  right middle cerebral is abnormal with focal areas of significant,  probably greater than 70% stenosis. Between the areas of stenosis  there is a more focal area of aneurysmal dilatation of the M1 segment  of the right middle cerebral. This focal dilatation measures about 5  mm in diameter.     Left Carotid:  No occluded vessels are identified.  Mild  atherosclerotic disease with mild stenosis at the origin of several M2  branches.    Basilar:  The basilar artery and its branches appear normal.     Miscellaneous: Venous sinuses are patent..      Impression    IMPRESSION: Abnormal M1 segment of the right middle cerebral with a  high-grade stenosis in the M1 segment of the right middle cerebral and  more focal, 5 mm aneurysmal dilatation of the right middle cerebral.  The etiology of this pattern is uncertain. It could be due to  atherosclerotic disease but a mycotic aneurysm could also have this  appearance. Correlate with the patient's clinical symptoms.    Report called to the patient's nurse on the seventh floor at [facility].    Report called to patient's nurse.    GORDO RUBIO MD

## 2018-01-19 NOTE — PLAN OF CARE
Problem: Patient Care Overview  Goal: Plan of Care/Patient Progress Review  Outcome: No Change  L medulla infract. Pt A&O x4, forgetful. VSS, on RA. LS clear. BLE baseline neuropathy, numbness, feet, otherwise CMS intact. Neuros: Lt facial droop, LUE/LLE weakness, patient has baseline slurred speech, R field cut and neglect from previous stroke. Up SBA w/ GB and walker. Denies pain. BUE/BLE bruises, abrasions, scabs, KATARINA. Nicotine patch, R deltoid.  +BS, passing flatus, BM yesterday per patient, no BM today. Hammond patent, placed d/t retention, adequate output, urine dark jose m, encouraged water intake. Mod CHO diet. , 184, SSI and 1 unit given for 10 grams of carbs consumed, see MAR. Discharging to [place] acute [facility] today at 1315. Nrsg will continue to monitor.

## 2018-01-19 NOTE — PLAN OF CARE
Problem: Patient Care Overview  Goal: Plan of Care/Patient Progress Review  Occupational Therapy Discharge Summary    Reason for therapy discharge:    Discharged to acute rehabilitation facility.    Progress towards therapy goal(s). See goals on Care Plan in [facility] electronic health record for goal details.  Goals not met.  Barriers to achieving goals:   discharge from facility.    Therapy recommendation(s):    Continued therapy is recommended.  Rationale/Recommendations:  Pt would benifit from daily OT to return to PLOF.

## 2018-01-19 NOTE — PLAN OF CARE
Problem: Patient Care Overview  Goal: Plan of Care/Patient Progress Review  Discharge Planner PT   Patient plan for discharge: ARU  Current status: Pt is independent with bed mobility, requires SBA for transfers with a FWW, and Aby for gait 300' x 2 with a FWW and 4 LOB with Aby to correct all to his L side. Aby for negotiating stairs with B railings. BP after amb 110/76.  Barriers to return to prior living situation: level of assist required, fall risk  Recommendations for discharge: ARU  Rationale for recommendations: Pt would benefit from PT at ARU to progress mobility and balance so he can return home. Very motivated and eager to participate.       Entered by: Patricia Cruz 01/19/2018 12:37 PM         Physical Therapy Discharge Summary    Reason for therapy discharge:    Discharged to acute rehabilitation facility.    Progress towards therapy goal(s). See goals on Care Plan in [facility] electronic health record for goal details.  Goals not met.  Barriers to achieving goals:   discharge from facility.    Therapy recommendation(s):    Continued therapy is recommended.  Rationale/Recommendations:  PT at ARU to progress mobility.

## 2018-01-19 NOTE — PLAN OF CARE
Problem: Patient Care Overview  Goal: Plan of Care/Patient Progress Review  Discharge Planner OT   Patient plan for discharge: rehab  Current status: Pt SBA to Aby for transfers, cues needed to problem solve with LE dressing and being able to thread catheter in pant leg after education and visual demonstration. Toilet transfer with Aby and cues for hand placement.   Barriers to return to prior living situation: decreased balance, falls risk, decreased safety awareness, lives alone   Recommendations for discharge: ARU per plan established by the Occupational Therapist  Rationale for recommendations: Pt would benefit from daily therapy to return to PLOF       Entered by: Marta Hernandez 01/19/2018 8:50 AM       Pt has discharged to ARU, GOALS NOT MET, see discharge summary

## 2018-01-19 NOTE — H&P
Post Admission Physician Evaluation:    I have compared Cooper Cabral's condition on admission to acute rehabilitation to that outlined in the preadmission screen. History and physical exam performed by me. No significant differences are identified and the patient remains appropriate for an inpatient rehabilitation facility level of care to manage medical issues and address functional impairments due to left ischemic medullary stroke.    Comorbid medical conditions being managed: Hypertension, hyperlipidemia, tobacco abuse, DM II, diabetic neuropathy, urinary retention, hypokalemia    Prior functional level: Previously independent with mobility without an AD. Had previous right visual field cut and did not drive for this reason. Had a PCA come in every 2 days to assist with meal prep, cleaning and laundry.     Present function:  OT: Pt SBA to Aby for transfers, cues needed to problem solve with LE dressing and being able to thread catheter in pant leg after education and visual demonstration. Toilet transfer with Aby and cues for hand placement.  PT: Pt was up in a chair upon PT arrival. Seated /77. Pt requires Aby for sit<>stand transfers and to use the toilet in a standing position. Gait training 200' x 2 with a FWW and Aby to correct 2 LOB, modA to correct 2 LOB all to his L side. Seated BP after gait 129/77. BBS 19/56 on .     Anticipated rehabilitation course: Anticipate that he will progress to modified independent with mobility, ADLs and safety awareness so that he can return home safely.     Will benefit from intensive rehabilitation includin minutes each of PT and OT  Rehabilitation nursing  Close management by physiatry    Prognosis: Good rehab potential, although long-term prognosis a bit guarded given his history of non-compliance regarding medication management and smoking cessation, which places him at a higher risk for repeat stroke.      Estimated length of stay: 1-2 weeks

## 2018-01-19 NOTE — IP AVS SNAPSHOT
[facility]    [address] [place] [id number]    Phone:  [phone]                                       After Visit Summary   1/19/2018    Cooper Cabral    MRN: [id number]           After Visit Summary Signature Page     I have received my discharge instructions, and my questions have been answered. I have discussed any challenges I see with this plan with the nurse or doctor.    ..........................................................................................................................................  Patient/Patient Representative Signature      ..........................................................................................................................................  Patient Representative Print Name and Relationship to Patient    ..................................................               ................................................  Date                                            Time    ..........................................................................................................................................  Reviewed by Signature/Title    ...................................................              ..............................................  Date                                                            Time

## 2018-01-20 LAB
ANION GAP SERPL CALCULATED.3IONS-SCNC: 7 MMOL/L (ref 3–14)
BASOPHILS # BLD AUTO: 0.1 10E9/L (ref 0–0.2)
BASOPHILS NFR BLD AUTO: 0.8 %
BUN SERPL-MCNC: 14 MG/DL (ref 7–30)
CALCIUM SERPL-MCNC: 8.6 MG/DL (ref 8.5–10.1)
CHLORIDE SERPL-SCNC: 105 MMOL/L (ref 94–109)
CO2 SERPL-SCNC: 31 MMOL/L (ref 20–32)
CREAT SERPL-MCNC: 0.66 MG/DL (ref 0.66–1.25)
DIFFERENTIAL METHOD BLD: NORMAL
EOSINOPHIL # BLD AUTO: 0.3 10E9/L (ref 0–0.7)
EOSINOPHIL NFR BLD AUTO: 5 %
ERYTHROCYTE [DISTWIDTH] IN BLOOD BY AUTOMATED COUNT: 12.7 % (ref 10–15)
GFR SERPL CREATININE-BSD FRML MDRD: >90 ML/MIN/1.7M2
GLUCOSE SERPL-MCNC: 91 MG/DL (ref 70–99)
HCT VFR BLD AUTO: 47.9 % (ref 40–53)
HGB BLD-MCNC: 15.9 G/DL (ref 13.3–17.7)
IMM GRANULOCYTES # BLD: 0 10E9/L (ref 0–0.4)
IMM GRANULOCYTES NFR BLD: 0.2 %
LYMPHOCYTES # BLD AUTO: 2 10E9/L (ref 0.8–5.3)
LYMPHOCYTES NFR BLD AUTO: 33 %
MCH RBC QN AUTO: 31.7 PG (ref 26.5–33)
MCHC RBC AUTO-ENTMCNC: 33.2 G/DL (ref 31.5–36.5)
MCV RBC AUTO: 96 FL (ref 78–100)
MONOCYTES # BLD AUTO: 0.7 10E9/L (ref 0–1.3)
MONOCYTES NFR BLD AUTO: 10.7 %
NEUTROPHILS # BLD AUTO: 3.1 10E9/L (ref 1.6–8.3)
NEUTROPHILS NFR BLD AUTO: 50.3 %
NRBC # BLD AUTO: 0 10*3/UL
NRBC BLD AUTO-RTO: 0 /100
PLATELET # BLD AUTO: 240 10E9/L (ref 150–450)
POTASSIUM SERPL-SCNC: 3.8 MMOL/L (ref 3.4–5.3)
RBC # BLD AUTO: 5.01 10E12/L (ref 4.4–5.9)
SODIUM SERPL-SCNC: 143 MMOL/L (ref 133–144)
WBC # BLD AUTO: 6.2 10E9/L (ref 4–11)

## 2018-01-20 PROCEDURE — 97535 SELF CARE MNGMENT TRAINING: CPT | Mod: GO | Performed by: OCCUPATIONAL THERAPIST

## 2018-01-20 PROCEDURE — 40000193 ZZH STATISTIC PT WARD VISIT

## 2018-01-20 PROCEDURE — 40000133 ZZH STATISTIC OT WARD VISIT: Performed by: OCCUPATIONAL THERAPIST

## 2018-01-20 PROCEDURE — 25000132 ZZH RX MED GY IP 250 OP 250 PS 637: Performed by: STUDENT IN AN ORGANIZED HEALTH CARE EDUCATION/TRAINING PROGRAM

## 2018-01-20 PROCEDURE — 97165 OT EVAL LOW COMPLEX 30 MIN: CPT | Mod: GO | Performed by: OCCUPATIONAL THERAPIST

## 2018-01-20 PROCEDURE — 25000132 ZZH RX MED GY IP 250 OP 250 PS 637: Mod: GY | Performed by: PHYSICAL MEDICINE & REHABILITATION

## 2018-01-20 PROCEDURE — 97162 PT EVAL MOD COMPLEX 30 MIN: CPT | Mod: GP

## 2018-01-20 PROCEDURE — 97116 GAIT TRAINING THERAPY: CPT | Mod: GP

## 2018-01-20 PROCEDURE — 80048 BASIC METABOLIC PNL TOTAL CA: CPT | Performed by: STUDENT IN AN ORGANIZED HEALTH CARE EDUCATION/TRAINING PROGRAM

## 2018-01-20 PROCEDURE — 36415 COLL VENOUS BLD VENIPUNCTURE: CPT | Performed by: STUDENT IN AN ORGANIZED HEALTH CARE EDUCATION/TRAINING PROGRAM

## 2018-01-20 PROCEDURE — 97530 THERAPEUTIC ACTIVITIES: CPT | Mod: GO | Performed by: OCCUPATIONAL THERAPIST

## 2018-01-20 PROCEDURE — 00000146 ZZHCL STATISTIC GLUCOSE BY METER IP

## 2018-01-20 PROCEDURE — 85025 COMPLETE CBC W/AUTO DIFF WBC: CPT | Performed by: STUDENT IN AN ORGANIZED HEALTH CARE EDUCATION/TRAINING PROGRAM

## 2018-01-20 PROCEDURE — A9270 NON-COVERED ITEM OR SERVICE: HCPCS | Mod: GY | Performed by: STUDENT IN AN ORGANIZED HEALTH CARE EDUCATION/TRAINING PROGRAM

## 2018-01-20 PROCEDURE — A9270 NON-COVERED ITEM OR SERVICE: HCPCS | Mod: GY | Performed by: PHYSICAL MEDICINE & REHABILITATION

## 2018-01-20 PROCEDURE — 97530 THERAPEUTIC ACTIVITIES: CPT | Mod: GP

## 2018-01-20 PROCEDURE — 12800006 ZZH R&B REHAB

## 2018-01-20 RX ADMIN — INSULIN GLARGINE 22 UNITS: 100 INJECTION, SOLUTION SUBCUTANEOUS at 09:29

## 2018-01-20 RX ADMIN — PREGABALIN 150 MG: 75 CAPSULE ORAL at 09:29

## 2018-01-20 RX ADMIN — CLOPIDOGREL BISULFATE 75 MG: 75 TABLET, FILM COATED ORAL at 09:18

## 2018-01-20 RX ADMIN — LISINOPRIL AND HYDROCHLOROTHIAZIDE 2 TABLET: 12.5; 2 TABLET ORAL at 09:19

## 2018-01-20 RX ADMIN — NICOTINE 1 PATCH: 21 PATCH, EXTENDED RELEASE TRANSDERMAL at 09:19

## 2018-01-20 RX ADMIN — PREGABALIN 150 MG: 75 CAPSULE ORAL at 21:32

## 2018-01-20 RX ADMIN — SIMVASTATIN 20 MG: 20 TABLET, FILM COATED ORAL at 21:32

## 2018-01-20 RX ADMIN — METOPROLOL SUCCINATE 100 MG: 50 TABLET, EXTENDED RELEASE ORAL at 09:18

## 2018-01-20 RX ADMIN — INSULIN GLARGINE 22 UNITS: 100 INJECTION, SOLUTION SUBCUTANEOUS at 21:32

## 2018-01-20 RX ADMIN — TAMSULOSIN HYDROCHLORIDE 0.4 MG: 0.4 CAPSULE ORAL at 21:32

## 2018-01-20 RX ADMIN — ASPIRIN 325 MG: 325 TABLET, DELAYED RELEASE ORAL at 09:18

## 2018-01-20 ASSESSMENT — ACTIVITIES OF DAILY LIVING (ADL): PREVIOUS_RESPONSIBILITIES: MEAL PREP;MEDICATION MANAGEMENT

## 2018-01-20 NOTE — PROGRESS NOTES
[facility], [place]   Physical Medicine and Rehabilitation Daily Note           Assessment and Plan of Care:   Cooper Cabral is a 61 yo RHD M with PMH significant for DM II with neuropathy, HTN, HLD, tobacco abuse and 2 prior CVAs who was admitted on 1/12/18 with left sided weakness of 2 days duration with MRI with acute infarct within the lateral left medulla and multiple chronic lacunar infarcts within the cerebellum, brainstem, and bilateral basal ganglia. He presents with new ataxia, decreased balance, impulsivity, decreased safety awareness 2/2 above resulting in new impairments with mobility and ADLs. He is admitted to the ARU on 1/19/18 for ongoing medical management and PT/OT.    --Vitals: BP slightly elevated. BMP and CBC wnl.  --Continue ongoing medical management.  --Continue therapies and plan of care. Admitted yesterday; completing evals today. ELOS is 10-12 days.              Interval history:   Doing well. Denies fever, chills, CP, SOB, N/V, abdominal pain, new pain or weakness/numbness/tingling.             Physical Exam:     Vitals:    01/19/18 2201 01/19/18 2300 01/20/18 0300 01/20/18 0903   BP: 145/83 136/80 143/86 148/90   BP Location: Right arm Left arm Left arm Left arm   Pulse: 72 68 73 78   Resp:  16 16 16   Temp:  96.4  F (35.8  C) 97  F (36.1  C) 96.6  F (35.9  C)   TempSrc:  Oral Oral Oral   SpO2:  96% 97% 93%   Weight:       Height:         Gen: NAD, resting in bed  Heart: RRR  Lungs: clear breath sounds b/l  Abd: soft and non-tender  Ext: wwp, trace edema in BLE, no tenderness in calves  MSK/neuro: alert and oriented. speech fluent. Mild right hemiparesis. Incoordination.     Bruises on his face and extremities due to falls    Hammond in place draining clear urine          Data:   Scheduled meds    aspirin  325 mg Oral Daily     clopidogrel  75 mg Oral Daily     insulin glargine  22 Units Subcutaneous BID     lisinopril-hydrochlorothiazide  2 tablet Oral  Daily     metoprolol succinate  100 mg Oral Daily     nicotine  1 patch Transdermal Daily     pregabalin  150 mg Oral BID     simvastatin  20 mg Oral At Bedtime     tamsulosin  0.4 mg Oral At Bedtime     insulin aspart   Subcutaneous QAM AC     insulin aspart   Subcutaneous Daily with lunch     insulin aspart   Subcutaneous Daily with supper     insulin aspart  1-7 Units Subcutaneous TID AC     insulin aspart  1-5 Units Subcutaneous At Bedtime     nicotine   Transdermal Daily     nicotine   Transdermal Q8H       PRN meds:  glucose **OR** dextrose **OR** glucagon, acetaminophen, senna-docusate, polyethylene glycol      Klaudia Traylor MD  Physical Medicine and Rehabilitation     I spent a total of 15 minutes face-to-face and managing the care of Cooper Cabral. Over 50% of my time on the unit was spent counseling the patient and coordinating care. See note for details.

## 2018-01-20 NOTE — PLAN OF CARE
Problem: Patient Care Overview  Goal: Plan of Care/Patient Progress Review  PT: Evaluation complete and treatment initiated. Pt demonstrates LLE weakness, impaired coordination with gait, has tendency to lean R with RLE circumducting and adducting with heel strike. Pt aware of gait impairments but needs cuing to correct. Pt reports no peripheral vision to R side, which has been present for years since previous stroke. Pt able to transfer sit<>stand with CGA and FWW but needs cuing to slow down to improve safety. Pt amb 200 ft x2 with close CGA and FWW.    Anticipate 10 day LOS and DC home with continued PCA/nursing cares. Pt may need AD  For transfers and gait pending on progress made with therapy.

## 2018-01-20 NOTE — PROGRESS NOTES
01/20/18 0823   Quick Adds   Type of Visit Initial PT Evaluation   Living Environment   Lives With alone   Living Arrangements mobile home   Home Accessibility stairs to enter home   Number of Stairs to Enter Home 4   Number of Stairs Within Home 0   Stair Railings at Home outside, present on right side   Transportation Available family or friend will provide;public transportation   Living Environment Comment Pt lives in trailor home. does not drive. uses public transportation   Self-Care   Dominant Hand right   Usual Activity Tolerance good   Current Activity Tolerance poor   Regular Exercise no   Equipment Currently Used at Home grab bar   Activity/Exercise/Self-Care Comment Pt lives a very sedentary lifestyle. Pt gets rides to the grocery from family members. Pt reports sister in law is PCA and comes every 2 days to do cleaning, medication management. Pt is homebound most days   Functional Level Prior   Ambulation 0-->independent   Transferring 0-->independent   Toileting 0-->independent   Bathing 0-->independent   Dressing 0-->independent   Eating 0-->independent   Communication 0-->understands/communicates without difficulty   Swallowing 0-->swallows foods/liquids without difficulty   Cognition 0 - no cognition issues reported   Fall history within last six months yes   Number of times patient has fallen within last six months 7   Which of the above functional risks had a recent onset or change? ambulation;transferring   Prior Functional Level Comment Pt reports wednesday after PCA left started to fall. Unable to get up wednesday night and stay in home wihtout food for 2 days until PCA came back friday. Pt reports did not want to call anyone d/t did not want to go to hospital. Pt at baseline is IND with functional mobility. IND with bathing and dressing. Assist for cooking, cleaning and medications   General Information   Onset of Illness/Injury or Date of Surgery - Date 01/10/18   Referring Physician  "Marcelina Zaldivar MD   Patient/Family Goals Statement \"I don't have major goals. I just want to be able to go home   Pertinent History of Current Problem (include personal factors and/or comorbidities that impact the POC) Per H&P: \" Cooper Cabral is a 61 yo RHD M with PMH significant for DM II with neuropathy, HTN, HLD, tobacco abuse and 2 prior CVAs who was admitted on 1/12/18 with left sided weakness of 2 days duration and CT scan showing right pareital/occipital hyper density concerning for ischemic CVA\"  Patient reports poor compliance with insulin and diabetes management because he hates needles and that \"its hard to stay on top of.\"    Precautions/Limitations fall precautions   Weight-Bearing Status - LUE weight-bearing as tolerated   Weight-Bearing Status - RUE full weight-bearing   Weight-Bearing Status - LLE weight-bearing as tolerated   Weight-Bearing Status - RLE full weight-bearing   Cognitive Status Examination   Orientation orientation to person, place and time   Level of Consciousness alert   Follows Commands and Answers Questions 100% of the time   Personal Safety and Judgment at risk behaviors demonstrated;impaired   Memory intact   Pain Assessment   Patient Currently in Pain No   Integumentary/Edema   Integumentary/Edema no deficits were identifed   Posture    Posture Forward head position   Range of Motion (ROM)   ROM Comment Not formally measured. WFL. Bi ankles able to reach neutral position with AROM   MMT: Hip, Rehab Eval   Hip Flexion - Left Side (4-/5) good minus, left   Hip Flexion - Right Side (4/5) good, right   Hip ABduction - Right Side (5/5) normal,right   Hip ADduction - Right Side (5/5) normal,right   Hip ABduction - Left Side (3+/5) fair plus, left   Hip ADduction - Left Side (4/5) good, left   MMT: Knee, Rehab Eval   Knee Flexion - Left Side (4-/5) good minus, left   Knee Extension - Left Side (4-/5) good minus, left   Knee Flexion - Right Side (5/5) normal,right   Knee " Extension - Right Side (5/5) normal,right   MMT: Ankle, Rehab Eval   Ankle Dorsiflexion - Left Side (3+/5) fair plus, left   Ankle Plantarflexion - Left Side (4/5) good, left   Ankle Dorsiflexion - Right Side 5/5) normal,left   Ankle Plantarflexion - Right Side 5/5) normal,left   ARC Assessment Only   Acute Rehab FIM See FIM scores for Mobility/ADL Assessment   Balance   Balance Comments Pt demonstrates safe sitting balance at EOB with no assist. Pt requires BUE support on FWW when standing and with gait   Sensory Examination   Sensory Perception Comments chronic neuropathy in Bi feet and hands. Pt able to feel pressure in hands and feet but no sensation to light touch   Coordination   Coordination Comments Pts RLe circumducts and adducts during gait'   Muscle Tone   Muscle Tone no deficits were identified   General Therapy Interventions   Planned Therapy Interventions balance training;bed mobility training;gait training;motor coordination training;neuromuscular re-education;ROM;strengthening;transfer training;stretching;progressive activity/exercise;home program guidelines;risk factor education   Clinical Impression   Criteria for Skilled Therapeutic Intervention yes, treatment indicated   PT Diagnosis Impaired functional mobility   Influenced by the following impairments impaired LLE strength and coordination, impaired balance   Functional limitations due to impairments transfers, gait, stairs   Clinical Presentation Stable/Uncomplicated   Clinical Presentation Rationale Per pts PMHx and current medical and functional status   Clinical Decision Making (Complexity) Moderate complexity   Therapy Frequency` daily  (90 min/day)   Predicted Duration of Therapy Intervention (days/wks) 10 days   Anticipated Discharge Disposition Home with Assist;Home with Home Therapy   Risk & Benefits of therapy have been explained Yes   Patient, Family & other staff in agreement with plan of care Yes   Total Evaluation Time   Total  Evaluation Time (Minutes) 30

## 2018-01-20 NOTE — PLAN OF CARE
Problem: Goal/Outcome  Goal: Goal Outcome Summary  Outcome: No Change  FOCUS/GOAL  Bowel management, Bladder management, Nutrition/Feeding/Swallowing precautions, Medication management, Pain management, Wound care management and Medical management    ASSESSMENT, INTERVENTIONS AND CONTINUING PLAN FOR GOAL:  [id number]    Pt admitted on Friday from station 73, post ischemic stroke. A&Ox4. Uses call light appropriately.Denies pain. Ao1 min assist using a walker. Hammond intact and draining dark-tea colored urine. Encouraging fluids. Pt has type II DM, using SS & carb coverage. Poor sensation in feet, skin abrasions on bi-lat feet/legs and L elbow from falling prior to diagnosis of stroke. Nicotine patch worn on R deltoid, due to be removed at 0800. LBM reported 1/19. Alarms on for safety.

## 2018-01-20 NOTE — PLAN OF CARE
"Problem: Goal/Outcome  Goal: Goal Outcome Summary  FOCUS/GOAL  Medical management    ASSESSMENT, INTERVENTIONS AND CONTINUING PLAN FOR GOAL:  Patient alert and oriented and able to make needs known. Patient transfers with SBA with gait belt applied for safety and a walker. Patient has Hammond catheter in place, which is draining tea colored, clear urine without difficulty. Patient had a bowel movement this shift. Nicotine patch in place to LUE. Blood glucose levels this shift were 98 and 99. No sliding scale coverage required; carbohydrate coverage administered per order. Patient states that he was not administering insulin to himself at home prior to injury because he \"has a thing with needles,\" which is why his blood glucose levels were high. Writer discussed with patient that he should practice administering his own insulin injections prior to discharge. Skin is ecchymotic and left posterior forearm swollen, which patient states is the arm he injured when he fell. No warmth or pain to arm. Patient denied any difficulty with pain this shift. Staff will continue with plan of care.         "

## 2018-01-20 NOTE — PROGRESS NOTES
01/20/18 1000   Quick Adds   Type of Visit Initial Occupational Therapy Evaluation   Living Environment   Lives With alone   Living Arrangements mobile home   Home Accessibility stairs to enter home;grab bars present (bathtub);tub/shower is not walk in   Number of Stairs to Enter Home 4   Number of Stairs Within Home 0   Stair Railings at Home outside, present on right side   Transportation Available family or friend will provide  (reports that brother or sister in law drives him around )   Living Environment Comment Patient lives in a mobile home, alone, and recieves PCA services from his sister in law every other day to assist with IADL's such as laundry, dishes, med ordering etc.  Recieves x1 meal a day from a delivery service similar to meals on wheels    Self-Care   Dominant Hand right   Usual Activity Tolerance fair   Current Activity Tolerance poor   Regular Exercise no   Equipment Currently Used at Home grab bar   Activity/Exercise/Self-Care Comment Patient reports that he is very sedentary most days, has not driven since 2015 and has PCA services, meal services, reports that he is fairly homebound    Functional Level Prior   Ambulation 0-->independent   Transferring 0-->independent   Toileting 0-->independent   Bathing 0-->independent   Dressing 0-->independent   Eating 0-->independent   Communication 0-->understands/communicates without difficulty   Swallowing 0-->swallows foods/liquids without difficulty   Cognition 0 - no cognition issues reported   Fall history within last six months yes   Number of times patient has fallen within last six months 7   Which of the above functional risks had a recent onset or change? ambulation;transferring;bathing;fall history   Prior Functional Level Comment Patient reports falling on Wednesday after PCA left, patient did not feel like calling anyone and continued to fall over the next couple of days until the home RN came and called the emergency services to bring him  "to the hospital        Present no   Language English    General Information   Onset of Illness/Injury or Date of Surgery - Date 01/12/18   Referring Physician Dr. Rudolph    Patient/Family Goals Statement To get back to walking    Additional Occupational Profile Info/Pertinent History of Current Problem Per H&P: \" Cooper Cabral is a 63 yo RHD M with PMH significant for DM II with neuropathy, HTN, HLD, tobacco abuse and 2 prior CVAs who was admitted on 1/12/18 with left sided weakness of 2 days duration and CT scan showing right pareital/occipital hyper density concerning for ischemic CVA\"  Patient reports poor compliance with insulin and diabetes management because he hates needles and that \"its hard to stay on top of.\"    Precautions/Limitations fall precautions   Weight-Bearing Status - LUE full weight-bearing   Weight-Bearing Status - RUE full weight-bearing   Weight-Bearing Status - LLE full weight-bearing   Weight-Bearing Status - RLE full weight-bearing   Heart Disease Risk Factors Smoking;Diabetes;Overweight   Cognitive Status Examination   Orientation orientation to person, place and time   Level of Consciousness alert   Able to Follow Commands WNL/WFL   Personal Safety (Cognitive) WNL/WFL   Memory impaired  (reports some memory issues, 2/5 on the SLUMS with de. recall)   Attention Distractible during evaluation   Organization/Problem Solving No deficits were identified   Executive Function No deficits were identified   Visual Perception   Visual Perception Wears glasses   Visual Field Right visual field cut from previous stroke, adapts well    Sensory Examination   Sensory Comments Baseline neuropathy in bilateral feet and hands    Pain Assessment   Patient Currently in Pain No   Range of Motion (ROM)   ROM Comment UE ROM WFL    Strength   Strength Comments Generalized weakness at shoulders L>R, 4/5. elbows 4+/5, see below for hand strength    Hand Strength   Left hand  (pounds) " 30 pounds   Right hand  (pounds) 65 pounds   Left lateral pinch (pounds) 3 pounds   Right lateral pinch (pounds) 8 pounds   Left three point pinch (pounds) 5 pounds   Right three point pinch (pounds) 9.5 pounds   Coordination   Upper Extremity Coordination Left UE impaired;Right UE impaired   Left hand, nine hole peg test (seconds) 52   Right hand, nine hole peg test (seconds) 55   Mobility   Bed Mobility Bed mobility skill: Sit to supine;Bed mobility skill: Supine to sit   Bed Mobility Skill: Sit to Supine   Level of [place]: Sit/Supine minimum assist (75% patients effort)   Physical Assist/Nonphysical Assist: Sit/Supine verbal cues;supervision;1 person assist   Assistive Device: Sit/Supine bedrail   Bed Mobility Skill: Supine to Sit   Level of [place]: Supine/Sit minimum assist (75% patients effort)   Physical Assist/Nonphysical Assist: Supine/Sit supervision;verbal cues;1 person assist   Assistive Device: Supine/Sit bedrail   Upper Body Dressing   Level of [place]: Dress Upper Body contact guard   Physical Assist/Nonphysical Assist: Dress Upper Body verbal cues;supervision   Lower Body Dressing   Level of [place]: Dress Lower Body maximum assist (25% patients effort)   Physical Assist/Nonphysical Assist: Dress Lower Body 1 person assist;verbal cues;supervision   Grooming   Level of [place]: Grooming stand-by assist   Physical Assist/Nonphysical Assist: Grooming set-up required   Eating/Self Feeding   Level of [place]: Eating independent   Instrumental Activities of Daily Living (IADL)   Previous Responsibilities meal prep;medication management  (light meal prep and med management, otherwise has PCA. )   Activities of Daily Living Analysis   Impairments Contributing to Impaired Activities of Daily Living balance impaired;coordination impaired;strength decreased;sensation decreased   General Therapy Interventions   Planned Therapy Interventions ADL retraining;IADL  retraining;strengthening;stretching;transfer training;home program guidelines;progressive activity/exercise;visual perception   Clinical Impression   Criteria for Skilled Therapeutic Interventions Met yes, treatment indicated   OT Diagnosis Decreased independence in ADL's and functional mobility    Influenced by the following impairments impaired balance, coordination, strength, tolerance for activity    Assessment of Occupational Performance 3-5 Performance Deficits   Identified Performance Deficits dressing, standing grooming, functional transfers, IADL's    Clinical Decision Making (Complexity) Low complexity   Therapy Frequency daily   Predicted Duration of Therapy Intervention (days/wks) 10 days    Anticipated Equipment Needs at Discharge (will continue to assess pending progression )   Anticipated Discharge Disposition Home with Home Therapy   Risks and Benefits of Treatment have been explained. Yes   Patient, Family & other staff in agreement with plan of care Yes   Total Evaluation Time   Total Evaluation Time (Minutes) 10       Candice QUIJANO/JARRET

## 2018-01-20 NOTE — PLAN OF CARE
Problem: Patient Care Overview  Goal: Plan of Care/Patient Progress Review  OT evaluation completed.  Patient demonstrates decreased strength, decreased activity tolerance, impaired UE coordination, impaired visual motor skills and impaired balance which impairs his ability to independently participate in ADL s and meaningful IADL s within the home environment.  Patient receives PCA services for IADL s and a meal service at baseline, but reports that he does do light meal prep for himself from time to time.    Patient to return home with PCA services and C for safe transition post stroke.  Equipment needs TBD based on progress towards therapy goals.  ELOS: 10-12 days.      Candice QUIJANO/JARRET

## 2018-01-21 LAB
GLUCOSE BLDC GLUCOMTR-MCNC: 116 MG/DL (ref 70–99)
GLUCOSE BLDC GLUCOMTR-MCNC: 122 MG/DL (ref 70–99)
GLUCOSE BLDC GLUCOMTR-MCNC: 134 MG/DL (ref 70–99)
GLUCOSE BLDC GLUCOMTR-MCNC: 136 MG/DL (ref 70–99)
GLUCOSE BLDC GLUCOMTR-MCNC: 212 MG/DL (ref 70–99)
GLUCOSE BLDC GLUCOMTR-MCNC: 237 MG/DL (ref 70–99)
GLUCOSE BLDC GLUCOMTR-MCNC: 95 MG/DL (ref 70–99)
GLUCOSE BLDC GLUCOMTR-MCNC: 99 MG/DL (ref 70–99)

## 2018-01-21 PROCEDURE — 97116 GAIT TRAINING THERAPY: CPT | Mod: GP

## 2018-01-21 PROCEDURE — A9270 NON-COVERED ITEM OR SERVICE: HCPCS | Mod: GY | Performed by: STUDENT IN AN ORGANIZED HEALTH CARE EDUCATION/TRAINING PROGRAM

## 2018-01-21 PROCEDURE — 40000133 ZZH STATISTIC OT WARD VISIT: Performed by: OCCUPATIONAL THERAPIST

## 2018-01-21 PROCEDURE — 12800006 ZZH R&B REHAB

## 2018-01-21 PROCEDURE — 97535 SELF CARE MNGMENT TRAINING: CPT | Mod: GO | Performed by: OCCUPATIONAL THERAPIST

## 2018-01-21 PROCEDURE — 25000132 ZZH RX MED GY IP 250 OP 250 PS 637: Performed by: STUDENT IN AN ORGANIZED HEALTH CARE EDUCATION/TRAINING PROGRAM

## 2018-01-21 PROCEDURE — 00000146 ZZHCL STATISTIC GLUCOSE BY METER IP

## 2018-01-21 PROCEDURE — 97110 THERAPEUTIC EXERCISES: CPT | Mod: GP

## 2018-01-21 PROCEDURE — 97110 THERAPEUTIC EXERCISES: CPT | Mod: GO | Performed by: OCCUPATIONAL THERAPIST

## 2018-01-21 PROCEDURE — 25000131 ZZH RX MED GY IP 250 OP 636 PS 637: Performed by: STUDENT IN AN ORGANIZED HEALTH CARE EDUCATION/TRAINING PROGRAM

## 2018-01-21 PROCEDURE — 97530 THERAPEUTIC ACTIVITIES: CPT | Mod: GP

## 2018-01-21 PROCEDURE — 40000193 ZZH STATISTIC PT WARD VISIT

## 2018-01-21 RX ADMIN — ASPIRIN 325 MG: 325 TABLET, DELAYED RELEASE ORAL at 08:54

## 2018-01-21 RX ADMIN — PREGABALIN 150 MG: 75 CAPSULE ORAL at 08:55

## 2018-01-21 RX ADMIN — NICOTINE 1 PATCH: 21 PATCH, EXTENDED RELEASE TRANSDERMAL at 08:55

## 2018-01-21 RX ADMIN — CLOPIDOGREL BISULFATE 75 MG: 75 TABLET, FILM COATED ORAL at 08:54

## 2018-01-21 RX ADMIN — INSULIN GLARGINE 22 UNITS: 100 INJECTION, SOLUTION SUBCUTANEOUS at 08:55

## 2018-01-21 RX ADMIN — SIMVASTATIN 20 MG: 20 TABLET, FILM COATED ORAL at 21:51

## 2018-01-21 RX ADMIN — INSULIN GLARGINE 22 UNITS: 100 INJECTION, SOLUTION SUBCUTANEOUS at 21:50

## 2018-01-21 RX ADMIN — TAMSULOSIN HYDROCHLORIDE 0.4 MG: 0.4 CAPSULE ORAL at 21:51

## 2018-01-21 RX ADMIN — PREGABALIN 150 MG: 75 CAPSULE ORAL at 21:51

## 2018-01-21 RX ADMIN — INSULIN ASPART 2 UNITS: 100 INJECTION, SOLUTION INTRAVENOUS; SUBCUTANEOUS at 17:52

## 2018-01-21 NOTE — PLAN OF CARE
Problem: Goal/Outcome  Goal: Goal Outcome Summary  FOCUS/GOAL  Medical management    ASSESSMENT, INTERVENTIONS AND CONTINUING PLAN FOR GOAL:  Patient is alert and oriented, but forgetful. Hammond catheter patent and intact draining clear, jose m urine. Blood pressure this morning was 90/54, so blood pressure medications were held per order. Upon reassessment, /78. Mepilex intact to left elbow with swelling to posterior forearm/elbow that appears to be improving. Patient denies any discomfort to area. Patient denies any difficulty with pain or discomfort at all. Nicotine patch removed from LUE this shift and placed on RUE. Blood glucose levels were 134 and 116. Only carbohydrate coverage required this shift. Patient ambulates with CGA and walker. Nursing will continue with plan of care.

## 2018-01-21 NOTE — PLAN OF CARE
Problem: Individualization  Goal: Patient Individualization  Outcome: No Change  FOCUS/GOAL  Bladder management and Medical management    ASSESSMENT, INTERVENTIONS AND CONTINUING PLAN FOR GOAL:  Alert & oriented, able to make needs known. Independent with bed mobility. Hammond catheter patent, draining dark, jose m urine. No BM this shift. Drsg intact to left elbow. Denies pain or discomfort. Pleasant & cooperative. Fall precautions in place at all times.

## 2018-01-21 NOTE — PLAN OF CARE
Problem: Patient Care Overview  Goal: Plan of Care/Patient Progress Review  Outcome: Therapy, progress toward functional goals as expected  OT:  Full shower assessment completed this date.  Patient completes shower with min A while sitting on shower bench, CGA in functional transfer with use of grab bar and back of shower chair. Assist needed to wash/dry feet.  Patient has ROM deficits in R shoulder but is able to utilize functionally.      Candice HYATT

## 2018-01-21 NOTE — PROGRESS NOTES
PM&R brief note    Chart reviewed.  No issues per nursing.  I did not personally see this patient today.  Continue current plan of care. Noted low BP today; asymptomatic. Will monitor.    Klaudia Traylor MD  Physical Medicine & Rehabilitation

## 2018-01-21 NOTE — PLAN OF CARE
"Problem: Goal/Outcome  Goal: Goal Outcome Summary  Outcome: No Change  FOCUS/GOAL  Bowel management and Bladder management    ASSESSMENT, INTERVENTIONS AND CONTINUING PLAN FOR GOAL:  Pt a&ox4. Denied pain. Ao1 using walker to br. LBM today. Hammond intact and draining dark jose m-orange colored urine. Encouraging fluids. Pt remained in bed this shift, Encouraging pt to reposition weight while in bed. Pt swearing and upset towards staff about leaving door open and \"everyone in hallway is so obnoxious\" apologized to pt and put sign on door. Nicotine patch on L deltoid, to be removed at 0800. Mepilex intact on L elbow covering a healing abrasion from fall. Some swelling noted around posterior side of forearm towards elbow-pt denies pain. Will continue to monitor. Alarms on for safety.          "

## 2018-01-22 LAB
ANION GAP SERPL CALCULATED.3IONS-SCNC: 5 MMOL/L (ref 3–14)
BUN SERPL-MCNC: 18 MG/DL (ref 7–30)
CALCIUM SERPL-MCNC: 8.5 MG/DL (ref 8.5–10.1)
CHLORIDE SERPL-SCNC: 107 MMOL/L (ref 94–109)
CO2 SERPL-SCNC: 32 MMOL/L (ref 20–32)
CREAT SERPL-MCNC: 0.88 MG/DL (ref 0.66–1.25)
GFR SERPL CREATININE-BSD FRML MDRD: 87 ML/MIN/1.7M2
GLUCOSE BLDC GLUCOMTR-MCNC: 102 MG/DL (ref 70–99)
GLUCOSE BLDC GLUCOMTR-MCNC: 138 MG/DL (ref 70–99)
GLUCOSE BLDC GLUCOMTR-MCNC: 150 MG/DL (ref 70–99)
GLUCOSE BLDC GLUCOMTR-MCNC: 194 MG/DL (ref 70–99)
GLUCOSE SERPL-MCNC: 128 MG/DL (ref 70–99)
POTASSIUM SERPL-SCNC: 4.2 MMOL/L (ref 3.4–5.3)
SODIUM SERPL-SCNC: 144 MMOL/L (ref 133–144)

## 2018-01-22 PROCEDURE — A9270 NON-COVERED ITEM OR SERVICE: HCPCS | Mod: GY | Performed by: PHYSICIAN ASSISTANT

## 2018-01-22 PROCEDURE — 12800006 ZZH R&B REHAB

## 2018-01-22 PROCEDURE — 25000132 ZZH RX MED GY IP 250 OP 250 PS 637: Performed by: PHYSICIAN ASSISTANT

## 2018-01-22 PROCEDURE — 80048 BASIC METABOLIC PNL TOTAL CA: CPT | Performed by: STUDENT IN AN ORGANIZED HEALTH CARE EDUCATION/TRAINING PROGRAM

## 2018-01-22 PROCEDURE — 40000193 ZZH STATISTIC PT WARD VISIT: Performed by: PHYSICAL THERAPIST

## 2018-01-22 PROCEDURE — 97110 THERAPEUTIC EXERCISES: CPT | Mod: GP

## 2018-01-22 PROCEDURE — 97110 THERAPEUTIC EXERCISES: CPT | Mod: GO

## 2018-01-22 PROCEDURE — 97530 THERAPEUTIC ACTIVITIES: CPT | Mod: GO

## 2018-01-22 PROCEDURE — 36415 COLL VENOUS BLD VENIPUNCTURE: CPT | Performed by: STUDENT IN AN ORGANIZED HEALTH CARE EDUCATION/TRAINING PROGRAM

## 2018-01-22 PROCEDURE — 25000132 ZZH RX MED GY IP 250 OP 250 PS 637: Performed by: STUDENT IN AN ORGANIZED HEALTH CARE EDUCATION/TRAINING PROGRAM

## 2018-01-22 PROCEDURE — A9270 NON-COVERED ITEM OR SERVICE: HCPCS | Mod: GY | Performed by: STUDENT IN AN ORGANIZED HEALTH CARE EDUCATION/TRAINING PROGRAM

## 2018-01-22 PROCEDURE — 00000146 ZZHCL STATISTIC GLUCOSE BY METER IP

## 2018-01-22 PROCEDURE — 25000132 ZZH RX MED GY IP 250 OP 250 PS 637: Mod: GY | Performed by: PHYSICAL MEDICINE & REHABILITATION

## 2018-01-22 PROCEDURE — 97112 NEUROMUSCULAR REEDUCATION: CPT | Mod: GP | Performed by: PHYSICAL THERAPIST

## 2018-01-22 PROCEDURE — 97116 GAIT TRAINING THERAPY: CPT | Mod: GP | Performed by: PHYSICAL THERAPIST

## 2018-01-22 PROCEDURE — 97535 SELF CARE MNGMENT TRAINING: CPT | Mod: GO

## 2018-01-22 PROCEDURE — 40000133 ZZH STATISTIC OT WARD VISIT

## 2018-01-22 PROCEDURE — A9270 NON-COVERED ITEM OR SERVICE: HCPCS | Mod: GY | Performed by: PHYSICAL MEDICINE & REHABILITATION

## 2018-01-22 PROCEDURE — 40000193 ZZH STATISTIC PT WARD VISIT

## 2018-01-22 RX ADMIN — INSULIN GLARGINE 22 UNITS: 100 INJECTION, SOLUTION SUBCUTANEOUS at 21:54

## 2018-01-22 RX ADMIN — PREGABALIN 150 MG: 75 CAPSULE ORAL at 08:39

## 2018-01-22 RX ADMIN — INSULIN ASPART 1 UNITS: 100 INJECTION, SOLUTION INTRAVENOUS; SUBCUTANEOUS at 12:57

## 2018-01-22 RX ADMIN — ASPIRIN 325 MG: 325 TABLET, DELAYED RELEASE ORAL at 08:39

## 2018-01-22 RX ADMIN — PREGABALIN 150 MG: 75 CAPSULE ORAL at 21:17

## 2018-01-22 RX ADMIN — INSULIN GLARGINE 22 UNITS: 100 INJECTION, SOLUTION SUBCUTANEOUS at 08:45

## 2018-01-22 RX ADMIN — METOPROLOL SUCCINATE 100 MG: 50 TABLET, EXTENDED RELEASE ORAL at 08:39

## 2018-01-22 RX ADMIN — METFORMIN HYDROCHLORIDE 1000 MG: 500 TABLET ORAL at 17:24

## 2018-01-22 RX ADMIN — NICOTINE 1 PATCH: 21 PATCH, EXTENDED RELEASE TRANSDERMAL at 08:49

## 2018-01-22 RX ADMIN — LISINOPRIL AND HYDROCHLOROTHIAZIDE 2 TABLET: 12.5; 2 TABLET ORAL at 08:39

## 2018-01-22 RX ADMIN — SIMVASTATIN 20 MG: 20 TABLET, FILM COATED ORAL at 21:17

## 2018-01-22 RX ADMIN — TAMSULOSIN HYDROCHLORIDE 0.4 MG: 0.4 CAPSULE ORAL at 21:17

## 2018-01-22 RX ADMIN — CLOPIDOGREL BISULFATE 75 MG: 75 TABLET, FILM COATED ORAL at 08:41

## 2018-01-22 NOTE — PLAN OF CARE
Problem: Individualization  Goal: Patient Individualization  Outcome: No Change  FOCUS/GOAL  Bladder management and Medical management    ASSESSMENT, INTERVENTIONS AND CONTINUING PLAN FOR GOAL:  Alert and oriented, slurred speech, uses call light to make needs known. Moves independently in bed. Hammond catheter patent, draining clear, jose m urine, fluid intake encouraged. Drsg intact to left elbow. Denies pain or discomfort. Pt was noted to be resting comfortable during rounds.

## 2018-01-22 NOTE — PLAN OF CARE
Problem: Goal/Outcome  Goal: Goal Outcome Summary  Outcome: Improving  Patient is A&O x 3, VS'S. LS clear, BS  Active, had a medium BM today.Toleraing intakes. Cath care done, jose m urine, output is 530 cc.SBA with a walker.Will continue to monitor.

## 2018-01-22 NOTE — PLAN OF CARE
Problem: Goal/Outcome  Goal: Goal Outcome Summary  Outcome: No Change  FOCUS/GOAL  Bladder management and Medical management    ASSESSMENT, INTERVENTIONS AND CONTINUING PLAN FOR GOAL:  Pt denied pain, SOB, or chest pain.  and 237. Hammond catheter in place, draining jose m urine. Pt drank water with encouragement. /92 at 1600, down to 142/79 at HS.

## 2018-01-22 NOTE — PLAN OF CARE
Problem: Goal/Outcome  Goal: Goal Outcome Summary  PT: Pt participating well with therapy,but needs seated rest breaks due to ROBERT.  Pt still with difficulty with narrow MICHELE standing, with tendency for LOB to R. Continue to work on coordination, balance, safe management of catheter.

## 2018-01-22 NOTE — PROGRESS NOTES
"  [facility]   Acute Rehabilitation Unit  Daily progress note    interval history  Cooper Cabral was seen and examined at bedside. Reports he is doing ok, denies n/v/d, dizziness,  sob, fevers, bowel or bladder concerns.  Aware he is here after stroke with reportedly several falls leading to presentation.  Discussed recurrent stroke and mentioned patient's risk factors for stroke, DM, HTN, HLD, Smoking.  Patient immediately volunteers that he is aware smoking puts him at risk for stroke but \"will not quit\". Reports he takes his oral pills, though has issues with needles and has limited use of insulin, trulicity and bg checks, he is open to the idea of additional pills for diabetes and willing to discuss diabetes management but it is clear this regimen will need be be simplified prior to discharge.      PT: Pt participating well with therapy,but needs seated rest breaks due to ROBERT.  Pt still with difficulty with narrow MICHELE standing, with tendency for LOB to R. Continue to work on coordination, balance, safe management of catheter.     medications    metFORMIN  1,000 mg Oral BID w/meals     aspirin  325 mg Oral Daily     clopidogrel  75 mg Oral Daily     insulin glargine  22 Units Subcutaneous BID     lisinopril-hydrochlorothiazide  2 tablet Oral Daily     metoprolol succinate  100 mg Oral Daily     nicotine  1 patch Transdermal Daily     pregabalin  150 mg Oral BID     simvastatin  20 mg Oral At Bedtime     tamsulosin  0.4 mg Oral At Bedtime     insulin aspart   Subcutaneous QAM AC     insulin aspart   Subcutaneous Daily with lunch     insulin aspart   Subcutaneous Daily with supper     insulin aspart  1-7 Units Subcutaneous TID AC     insulin aspart  1-5 Units Subcutaneous At Bedtime     nicotine   Transdermal Daily     nicotine   Transdermal Q8H        glucose **OR** dextrose **OR** glucagon, acetaminophen, senna-docusate, polyethylene glycol     physical exam  /82 (BP " Location: Left arm)  Pulse 73  Temp 96.6  F (35.9  C) (Oral)  Resp 16  Ht 1.829 m (6')  Wt 96.6 kg (213 lb)  SpO2 93%  BMI 28.89 kg/m2  Gen:  Alert NAD  Cardio: rrr  Pulm: non labored clear on room air  Abd: soft non distended non tender  Ext: warm dry without edema  Neuro/MSK: alert, dysarthria, impaired balance, ambulating with walker    labs  BMP reviewed.  .      assessment and plan    Mr. Cooper Cabral is a 61 yo man with PMH significant for DM II with neuropathy, HTN, HLD, tobacco abuse and 2 prior CVAs who was admitted on 1/12/18 with left sided weakness of 2 days duration with MRI with acute infarct within the lateral left medulla and multiple chronic lacunar infarcts within the cerebellum, brainstem, and bilateral basal ganglia. He presents with new ataxia, decreased balance, impulsivity, decreased safety awareness 2/2 above resulting in new impairments with mobility and ADLs. He is admitted to the ARU on 1/19/18 for ongoing medical management and PT/OT.     Rehabilitation - continue comprehensive acute inpatient rehabilitation program with multidisciplinary approach including therapies, rehab nursing, and physiatry following. 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  -- 325mg ASA and plavix qday  -- PT and OT as above- consider SLP   -- Stroke education  -- Optimization of : DM II, HTN, HLD, and tobacco abuse as outlined below.     DM II with neuropathy: HgbA1c 11.9% non complaint with insulin, trulicity, BG management, reportedly dislikes needles.   -start Metformin 1000 mg bid  -- continue Glargine 22 units bid   --1 UAspart per 10gm carb tid with meals  --SSI  --continue  lyrica for neuropathy  -will need DM edu  -will need insulin regimen simplified prior to discharge-      HTN: Goal BP <140/80 long term. Currently well controlled. 120s-140s    --lisinopril/HCTZ 40-25mg, qday  --metoprolol XL 100mg qday     HLD: LDL 92, goal <70.    --20mg simvastatin qday     Tobacco Abuse: verbalizing strong opposition to cessation.  --continue tobacco cessation education  -- continue 21mg/24 hr nicotiene patch      Urinary Retention: Suspect 2/2 BPH. Patient with 1L retention requiring IC on 1/18. Now with Hammond in place.   --0.4mg tamsulosin qhs  --Hammond cares  --Monitor UOP  --Voiding trial ~ 1/25      Right MCA stenosis with post stenotic fusiform dilation, asymptomatic  --No acute interventions needed at this time  --Follow up with neuro intervention as an outpatient        Patient seen and discussed with Dr. Acosta  PM&R Staff Physician, GEM Cruz NP Endocrinology.    Ruth Mendieta PA-C  Rehab Service  Pager: [phone]

## 2018-01-22 NOTE — PROGRESS NOTES
18 2100   Living Arrangements   Lives With alone   Living Arrangements mobile home   Able to Return to Prior Living Arrangements yes   Home Safety   Patient Feels Safe Living in Home? yes   Discharge Needs Assessment   Equipment Currently Used at Home grab bar   Transportation Available family or friend will provide     Social Work: Initial Assessment with Discharge Plan    Patient Name: Cooper Cabral  : 1955  Age: 62 year old  MRN: [id number]  Completed assessment with: patient  Admitted to ARU: 18    Presenting Information   Date of SW assessment: 18  Health Care Directive: none  Primary Health Care Agent: next-of-kin would be surrogate decision-maker if necessary  Secondary Health Care Agent: none  Living Situation: lives alone in mobile home with 4 step entry  Previous Functional Status: independent mostly, noncompliant with diabetes management  DME available: [facility]  Patient and family understanding of hospitalization: stroke  Cultural/Language/Spiritual Considerations: speaks English    Physical Health  Reason for admission: acute infarct lateral left medulla with left-sided weakness, history of 2 prior CVA's (cerebral vascular accident), DM II    Provider Information   Primary Care Physician: Hudson BARONE : ______________ (patient didn't know name of , thinks she is from [facility])    Mental Health/Chemical Dependency:   Diagnosis: no mental health  Alcohol/Tobacco/Narcotics: current smoker-1 ppd of cigarettes (chooses not to quit)  Support/Services in Place: smoking cessation encouraged by MDs  Services Needed/Recommended: none  Sexuality/Intimacy: heterosexual    Support System  Marital Status: single  Family support: 4 brothers-Lyle, Ermias, Kwabena, Harrison  Other support available: sister-in-law Rachel (works as his PCA), extended family members    Community Resources  Current in home services: PCA (sister-in-law Rachel) 1 hr every other day for:  meal prep, cleaning & laundry; home RN every 2 weeks for medication set up, meal delivery from [employer] 7 days/wk  Previous services: see above    Financial/Employment/Education  Employment Status: disabled  Income Source: SSI  Education: high school  Financial Concerns: none  Insurance: [employer]    Discharge Plan   Patient and family discharge goal: return home with supportive services and recommended therapy  Provided education on discharge plan: home/outpatient therapy  Patient agreeable to discharge plan: tbd  Provided education and attained signature for Medicare IM and IRF Patient Rights and Privacy Information provided to patient : n/a  Provided patient with [place] [employer] resources: declined  Barriers to discharge: none identified    Discharge Recommendations   Disposition: resume prior supportive services and recommended therapy services  Transportation Needs: family provides rides  Name of Transportation Company and Phone: n/a    Additional comments   D:  See H&P for full medical background.  I:  Met with patient to complete assessment and social work consult.  A:  Patient is doing okay.  Supportive family who is involved.  He can't remember name of CAD[employer] worker nor PCA company that sister-in-law/PCA is employed.  P:  SW will follow and assist as needed.    Please invite to Care Conference:  Lyle (brother) [phone]      KRUNAL Vega   Phone: [phone]  Pager: [phone]

## 2018-01-23 LAB
GLUCOSE BLDC GLUCOMTR-MCNC: 110 MG/DL (ref 70–99)
GLUCOSE BLDC GLUCOMTR-MCNC: 144 MG/DL (ref 70–99)
GLUCOSE BLDC GLUCOMTR-MCNC: 146 MG/DL (ref 70–99)
GLUCOSE BLDC GLUCOMTR-MCNC: 181 MG/DL (ref 70–99)

## 2018-01-23 PROCEDURE — 25000132 ZZH RX MED GY IP 250 OP 250 PS 637: Mod: GY | Performed by: STUDENT IN AN ORGANIZED HEALTH CARE EDUCATION/TRAINING PROGRAM

## 2018-01-23 PROCEDURE — 97535 SELF CARE MNGMENT TRAINING: CPT | Mod: GO

## 2018-01-23 PROCEDURE — 97530 THERAPEUTIC ACTIVITIES: CPT | Mod: GP

## 2018-01-23 PROCEDURE — 00000146 ZZHCL STATISTIC GLUCOSE BY METER IP

## 2018-01-23 PROCEDURE — A9270 NON-COVERED ITEM OR SERVICE: HCPCS | Mod: GY | Performed by: PHYSICIAN ASSISTANT

## 2018-01-23 PROCEDURE — 97116 GAIT TRAINING THERAPY: CPT | Mod: GP

## 2018-01-23 PROCEDURE — 40000133 ZZH STATISTIC OT WARD VISIT

## 2018-01-23 PROCEDURE — 40000193 ZZH STATISTIC PT WARD VISIT

## 2018-01-23 PROCEDURE — 97110 THERAPEUTIC EXERCISES: CPT | Mod: GP

## 2018-01-23 PROCEDURE — 97530 THERAPEUTIC ACTIVITIES: CPT | Mod: GO

## 2018-01-23 PROCEDURE — 12800006 ZZH R&B REHAB

## 2018-01-23 PROCEDURE — 25000132 ZZH RX MED GY IP 250 OP 250 PS 637: Performed by: PHYSICAL MEDICINE & REHABILITATION

## 2018-01-23 PROCEDURE — A9270 NON-COVERED ITEM OR SERVICE: HCPCS | Mod: GY | Performed by: STUDENT IN AN ORGANIZED HEALTH CARE EDUCATION/TRAINING PROGRAM

## 2018-01-23 PROCEDURE — 25000132 ZZH RX MED GY IP 250 OP 250 PS 637: Performed by: PHYSICIAN ASSISTANT

## 2018-01-23 PROCEDURE — A9270 NON-COVERED ITEM OR SERVICE: HCPCS | Mod: GY | Performed by: PHYSICAL MEDICINE & REHABILITATION

## 2018-01-23 PROCEDURE — 97112 NEUROMUSCULAR REEDUCATION: CPT | Mod: GP

## 2018-01-23 RX ADMIN — INSULIN GLARGINE 22 UNITS: 100 INJECTION, SOLUTION SUBCUTANEOUS at 08:41

## 2018-01-23 RX ADMIN — CLOPIDOGREL BISULFATE 75 MG: 75 TABLET, FILM COATED ORAL at 07:45

## 2018-01-23 RX ADMIN — SIMVASTATIN 20 MG: 20 TABLET, FILM COATED ORAL at 22:20

## 2018-01-23 RX ADMIN — NICOTINE 1 PATCH: 21 PATCH, EXTENDED RELEASE TRANSDERMAL at 07:47

## 2018-01-23 RX ADMIN — INSULIN ASPART 1 UNITS: 100 INJECTION, SOLUTION INTRAVENOUS; SUBCUTANEOUS at 07:45

## 2018-01-23 RX ADMIN — METOPROLOL SUCCINATE 100 MG: 50 TABLET, EXTENDED RELEASE ORAL at 07:43

## 2018-01-23 RX ADMIN — PREGABALIN 150 MG: 75 CAPSULE ORAL at 07:42

## 2018-01-23 RX ADMIN — LISINOPRIL AND HYDROCHLOROTHIAZIDE 2 TABLET: 12.5; 2 TABLET ORAL at 07:43

## 2018-01-23 RX ADMIN — METFORMIN HYDROCHLORIDE 1000 MG: 500 TABLET ORAL at 17:25

## 2018-01-23 RX ADMIN — PREGABALIN 150 MG: 75 CAPSULE ORAL at 19:44

## 2018-01-23 RX ADMIN — METFORMIN HYDROCHLORIDE 1000 MG: 500 TABLET ORAL at 07:43

## 2018-01-23 RX ADMIN — INSULIN GLARGINE 22 UNITS: 100 INJECTION, SOLUTION SUBCUTANEOUS at 22:21

## 2018-01-23 RX ADMIN — ASPIRIN 325 MG: 325 TABLET, DELAYED RELEASE ORAL at 07:45

## 2018-01-23 RX ADMIN — TAMSULOSIN HYDROCHLORIDE 0.4 MG: 0.4 CAPSULE ORAL at 22:20

## 2018-01-23 RX ADMIN — INSULIN ASPART 1 UNITS: 100 INJECTION, SOLUTION INTRAVENOUS; SUBCUTANEOUS at 11:57

## 2018-01-23 NOTE — PROGRESS NOTES
[facility]   Acute Rehabilitation Unit  Daily progress note    interval history  Cooper Cabral was seen resting in bed reports he is tired after therapy though feels he did sleep well last night.  Denies n/v/d, sob, fevers, dizziness or other concerns.  Discusses history of right visual field cut without new vision changes present for several years.   See rounds note by Dr. Acosta for further details.    medications    metFORMIN  1,000 mg Oral BID w/meals     aspirin  325 mg Oral Daily     clopidogrel  75 mg Oral Daily     insulin glargine  22 Units Subcutaneous BID     lisinopril-hydrochlorothiazide  2 tablet Oral Daily     metoprolol succinate  100 mg Oral Daily     nicotine  1 patch Transdermal Daily     pregabalin  150 mg Oral BID     simvastatin  20 mg Oral At Bedtime     tamsulosin  0.4 mg Oral At Bedtime     insulin aspart   Subcutaneous QAM AC     insulin aspart   Subcutaneous Daily with lunch     insulin aspart   Subcutaneous Daily with supper     insulin aspart  1-7 Units Subcutaneous TID AC     insulin aspart  1-5 Units Subcutaneous At Bedtime     nicotine   Transdermal Daily     nicotine   Transdermal Q8H        glucose **OR** dextrose **OR** glucagon, acetaminophen, senna-docusate, polyethylene glycol     physical exam  /64 (BP Location: Left arm)  Pulse 79  Temp 97.9  F (36.6  C) (Oral)  Resp 17  Ht 1.829 m (6')  Wt 96.6 kg (213 lb)  SpO2 91%  BMI 28.89 kg/m2  Gen:  Alert NAD  Cardio: rrr  Pulm: non labored clear on room air  Abd: soft non distended non tender  Ext: warm dry without edema  Neuro/MSK: alert, dysarthria, impaired balance, right visual field cut, strength 4+/5 on right extremity, 4/5 on left upper extremity 4-/5 at left hip, 4/5 ke/kf/ df/pf    labs  No new labs.  .      assessment and plan    Mr. Cooper Cabral is a 63 yo man with PMH significant for DM II with neuropathy, HTN, HLD, tobacco abuse and 2 prior CVAs who was admitted  on 1/12/18 with left sided weakness of 2 days duration with MRI with acute infarct within the lateral left medulla and multiple chronic lacunar infarcts within the cerebellum, brainstem, and bilateral basal ganglia. He presents with new ataxia, decreased balance, impulsivity, decreased safety awareness 2/2 above resulting in new impairments with mobility and ADLs. He is admitted to the ARU on 1/19/18 for ongoing medical management and PT/OT.     Rehabilitation - continue comprehensive acute inpatient rehabilitation program with multidisciplinary approach including therapies, rehab nursing, and physiatry following. 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  -- 325mg ASA and plavix qday  -- PT and OT as above- consider SLP   -- Stroke education  -- Optimization of : DM II, HTN, HLD, and tobacco abuse as outlined below.     DM II with neuropathy: HgbA1c 11.9% non complaint with insulin, trulicity, BG management, reportedly dislikes needles.  Glucose 102-194 last 48 hours.  -continue Metformin 1000 mg bid  -- continue Glargine 22 units bid   --DC carbohydrate coverage-   --continue SSI  --continue  lyrica for neuropathy  -will need DM edu  -will need insulin regimen simplified prior to discharge-      HTN: Goal BP <140/80 long term. Currently well controlled. 110s-140s    --lisinopril/HCTZ 40-25mg, qday  --metoprolol XL 100mg qday     HLD: LDL 92, goal <70.   --20mg simvastatin qday     Tobacco Abuse: verbalizing strong opposition to cessation.  --continue tobacco cessation education  -- continue 21mg/24 hr nicotiene patch      Urinary Retention: Suspect 2/2 BPH. Patient with 1L retention requiring IC on 1/18. Now with Hammond in place.   --0.4mg tamsulosin qhs  --Hammond cares  --Monitor UOP  --Voiding trial ~ 1/25      Right MCA stenosis- with post stenotic fusiform dilation, asymptomatic  --No acute interventions needed at this time  --Follow up with neuro  intervention as an outpatient        Patient discussed with Dr. Acosta  PM&R Staff Physician    Ruth Mendieta PA-C  Rehab Service  Pager: [phone]

## 2018-01-23 NOTE — PLAN OF CARE
Problem: Individualization  Goal: Patient Individualization  Outcome: No Change  FOCUS/GOAL  Pain management    ASSESSMENT, INTERVENTIONS AND CONTINUING PLAN FOR GOAL:  Pt in bed all NOC, denies pain and discomfort. No SOB noted or reported. NSG to continue monitoring.

## 2018-01-23 NOTE — PLAN OF CARE
Problem: Goal/Outcome  Goal: Goal Outcome Summary  Patient is alert and oriented x 4. Denies any pain or discomfort. Hammond catheter is patent and intact. Draining jose m urine, fluids encouraged. SBA with walker. Independently repositioning self in bed. Mepilex intact to left elbow. Offers no care concerns at this time.

## 2018-01-23 NOTE — PLAN OF CARE
Acute Rehab Care Conference/Team Rounds      Type: Team Rounds    Present: Dr. Guille Acosta, Ruth Mendieta PA, Ivory Zhou RN, Rocio March SW, Karie Mirza OT, Margaret Costa PT, Kwabena Dickerson SLP,  Kaye Garcia , Gina Graham, Bernice Michael Dietician        Discharge Barriers/Treatment/Education    Rehab Diagnosis: Ischemic stroke    Active Medical Co-morbidities/Prognosis: Poorly managed diabetes, hypertension, tobacco use disorder, urine retention    Safety:    Pain:    Medications, Skin, Tubes/Lines:    Swallowing/Nutrition:    Bowel/Bladder:    Psychosocial: Pt resides alone. Pt reported additional support from extended family. Goal to return home with continued support. Team working towards a safe d/c plan.     ADLs/IADLs: Patient progressing well with OT goals. Continues with main deficit of balance; using FWW but easily distraction and baseline impaired vision impacting overall safety with mobility. Patient is able to complete FB dressing, toileting transfer, grooming, and bed > chair transfer with supervision and intermittent CGA for balance, but improving since admission. Patient demonstrates deficits with memory and higher level problem solving, but likely near baseline. Patient currently has every other day check in support from PCA (sister in law) who assists with cleaning, but pt reports that she could help more if needed. Patient reports difficulty with his tub transfers prior to admission and was not regularly bathing but could be open to PCA assisting if needed. Recommend ongoing OP therapy.     Mobility: Patient is progressing well with functional mobility, but continues to have inconsistency with balance and ambulation. At this time he requires SBA for balance and safety, but will be made MOD I in room within the next few days. He has mild gait instability, and is forgetful of catheter bag. He will have assist from brother to enter home upon d/c. States he does  not leave the house without brother. Has PCA who assists with cleaning and other household chores.     Cognition/Language:  Patient demonstrates deficits with memory and higher level problem solving, but likely near baseline.    Community Re-Entry:    Transportation: pt was not a  at baseline; car transfer not a barrier     Decision maker: self    Plan of Care and goals reviewed and updated.    Discharge Plan/Recommendations    Fall Precautions: continue    Overall plan for the patient: Cooper has shown some initial improvements but definitely had some instability and coordination difficulties.  Still has indwelling Hammond catheter we will trial out later this week.  Still working to achieve consistency with mobility ADLs and basic cognitive domains as he has limited support living alone with stairs needed.  Estimate maybe home by weeked.  Add care conference Friday. Plan ongoing in home PT, OT, SLP, RN and HHA after discharge.      Utilization Review and Continued Stay Justification    Medical Necessity Criteria:    For any criteria that is not met, please document reason and plan for discharge, transfer, or modification of plan of care to address.    Requires intensive rehabilitation program to treat functional deficits?: Yes  Requires 3x per week or greater involvement of rehabilitation physician to oversee rehabilitation program?: Yes  Requires rehabilitation nursing interventions?: Yes  Patient is making functional progress?: Yes  There is a potential for additional functional progress? Yes  Patient is participating in therapy 3 hours per day a minimum of 5 days per week or 15 hours per week in 7 day period?:Yes  Has discharge needs that require coordinated discharge planning approach?:Yes      Final Physician Sign off    Statement of Approval: I agree with all the recommendations detailed in this document.     Patient Goals        1. Pt will d/c to home/community setting upon completion of therapy/RN  goals  2. Pt and family will be involved in d/c planning and will be made aware of services available to meet pts needs.      OT target date for goal attainment: 01/30/18  OT Frequency: 90 minutes daily   OT goal: hygiene/grooming: while standing, independent  OT goal: upper body dressing: Independent, including set-up/clothing retrieval  OT goal: lower body dressing: Modified independent, including set-up/clothing retrieval, using adaptive equipment  OT goal: upper body bathing: Modified independent, using adaptive equipment  OT goal: lower body bathing: Modified independent, using adaptive equipment  OT Goal: transfer: Modified independent, with assistive device  OT goal: toilet transfer/toileting: Modified independent, using adaptive equipment, toilet transfer, cleaning and garment management  OT goal: meal preparation: Modified independent, with simple meal preparation, ambulatory level  OT goal: home management: Modified independent, with light demand household tasks, ambulatory level  OT goal: cognitive: Patient/caregiver will verbalize understanding of cognitive assessment results/recommendations as needed for safe discharge planning  OT goal 1: Patient will demonstrate improved fine motor coordination by achieving a score of 35 seconds or less on the 9 hole peg test bilaterally   OT goal 2: Patient will participate in dynavision training to address visual motor deficits   OT goal 3: Patient will participate and demonstrate 100% independence in UE strengthening programming as indicated to improve UE strength as needed for ADL independence.    OT/KATARINA face-to-face collaboration occurred, patient progress and plan of care reviewed.       PT target date for goal attainment: 01/30/18  PT Frequency: Daily -90 min/day  PT goal: bed mobility: Independent  PT goal: transfers: Modified independent, Bed to/from chair, Sit to/from stand, Assistive device  PT goal: gait: Modified independent, Greater than 200 feet  PT  goal: stairs: 4 stairs, Modified independent, Rail on right  PT goal 1: Demonstrate low falls risk with a balance assessment score of < 13.5 seconds on TUG,  > 19/24 on Dynamic Gait Index, > 9/12 on 4-Item Dynamic Gait Index, or > 45/56 on Hodgson Balance Assessment.   PT Goal 2: Pt will be IND with HEP BLE strengthening upon DC for continued improvement    Patient/Family Goal: Medication Management: Pt will successfully complete MAP to demonstrate proper understanding of home medication regimen by discharge.  Individualized Goal 1: Pt will successfully complete a stroke class by discharge.  Individualized Goal 2: Pt will successfully participate in a falls class by discharge and state 3 ways to prevent falls.

## 2018-01-23 NOTE — PLAN OF CARE
Problem: Goal/Outcome  Goal: Goal Outcome Summary  FOCUS/GOAL  Bowel management, Bladder management, Pain management, Mobility and Equipment/Assistive devices    ASSESSMENT, INTERVENTIONS AND CONTINUING PLAN FOR GOAL:    Patient is A&Ox4, continent of bowel, Hammond catheter patent and draining jose m urine, had M BM today. No complaints of pain. B, 194, insulin given per order. Transfers SBA with a walker. Left elbow wound covered with Mepilex. Continue POC.

## 2018-01-24 LAB
GLUCOSE BLDC GLUCOMTR-MCNC: 119 MG/DL (ref 70–99)
GLUCOSE BLDC GLUCOMTR-MCNC: 125 MG/DL (ref 70–99)
GLUCOSE BLDC GLUCOMTR-MCNC: 139 MG/DL (ref 70–99)
GLUCOSE BLDC GLUCOMTR-MCNC: 82 MG/DL (ref 70–99)

## 2018-01-24 PROCEDURE — 25000132 ZZH RX MED GY IP 250 OP 250 PS 637: Mod: GY | Performed by: PHYSICIAN ASSISTANT

## 2018-01-24 PROCEDURE — 97530 THERAPEUTIC ACTIVITIES: CPT | Mod: GP

## 2018-01-24 PROCEDURE — 97116 GAIT TRAINING THERAPY: CPT | Mod: GP | Performed by: PHYSICAL THERAPIST

## 2018-01-24 PROCEDURE — A9270 NON-COVERED ITEM OR SERVICE: HCPCS | Mod: GY | Performed by: STUDENT IN AN ORGANIZED HEALTH CARE EDUCATION/TRAINING PROGRAM

## 2018-01-24 PROCEDURE — 00000146 ZZHCL STATISTIC GLUCOSE BY METER IP

## 2018-01-24 PROCEDURE — 40000133 ZZH STATISTIC OT WARD VISIT

## 2018-01-24 PROCEDURE — 97535 SELF CARE MNGMENT TRAINING: CPT | Mod: GO

## 2018-01-24 PROCEDURE — 40000193 ZZH STATISTIC PT WARD VISIT: Performed by: PHYSICAL THERAPIST

## 2018-01-24 PROCEDURE — 25000132 ZZH RX MED GY IP 250 OP 250 PS 637: Performed by: STUDENT IN AN ORGANIZED HEALTH CARE EDUCATION/TRAINING PROGRAM

## 2018-01-24 PROCEDURE — 97112 NEUROMUSCULAR REEDUCATION: CPT | Mod: GP | Performed by: PHYSICAL THERAPIST

## 2018-01-24 PROCEDURE — 40000133 ZZH STATISTIC OT WARD VISIT: Performed by: OCCUPATIONAL THERAPIST

## 2018-01-24 PROCEDURE — 25000131 ZZH RX MED GY IP 250 OP 636 PS 637: Performed by: STUDENT IN AN ORGANIZED HEALTH CARE EDUCATION/TRAINING PROGRAM

## 2018-01-24 PROCEDURE — 40000193 ZZH STATISTIC PT WARD VISIT

## 2018-01-24 PROCEDURE — 12800006 ZZH R&B REHAB

## 2018-01-24 PROCEDURE — A9270 NON-COVERED ITEM OR SERVICE: HCPCS | Mod: GY | Performed by: PHYSICAL MEDICINE & REHABILITATION

## 2018-01-24 PROCEDURE — 97530 THERAPEUTIC ACTIVITIES: CPT | Mod: GO

## 2018-01-24 PROCEDURE — A9270 NON-COVERED ITEM OR SERVICE: HCPCS | Mod: GY | Performed by: PHYSICIAN ASSISTANT

## 2018-01-24 PROCEDURE — 97535 SELF CARE MNGMENT TRAINING: CPT | Mod: GO | Performed by: OCCUPATIONAL THERAPIST

## 2018-01-24 PROCEDURE — 25000132 ZZH RX MED GY IP 250 OP 250 PS 637: Mod: GY | Performed by: PHYSICAL MEDICINE & REHABILITATION

## 2018-01-24 RX ADMIN — METFORMIN HYDROCHLORIDE 1000 MG: 500 TABLET ORAL at 07:45

## 2018-01-24 RX ADMIN — INSULIN GLARGINE 22 UNITS: 100 INJECTION, SOLUTION SUBCUTANEOUS at 07:48

## 2018-01-24 RX ADMIN — METFORMIN HYDROCHLORIDE 1000 MG: 500 TABLET ORAL at 17:34

## 2018-01-24 RX ADMIN — CLOPIDOGREL BISULFATE 75 MG: 75 TABLET, FILM COATED ORAL at 07:47

## 2018-01-24 RX ADMIN — SIMVASTATIN 20 MG: 20 TABLET, FILM COATED ORAL at 22:10

## 2018-01-24 RX ADMIN — PSYLLIUM HUSK 1 PACKET: 3.4 POWDER ORAL at 14:23

## 2018-01-24 RX ADMIN — METOPROLOL SUCCINATE 100 MG: 50 TABLET, EXTENDED RELEASE ORAL at 07:45

## 2018-01-24 RX ADMIN — PREGABALIN 150 MG: 75 CAPSULE ORAL at 20:37

## 2018-01-24 RX ADMIN — NICOTINE 1 PATCH: 21 PATCH, EXTENDED RELEASE TRANSDERMAL at 07:49

## 2018-01-24 RX ADMIN — PREGABALIN 150 MG: 75 CAPSULE ORAL at 07:48

## 2018-01-24 RX ADMIN — INSULIN GLARGINE 22 UNITS: 100 INJECTION, SOLUTION SUBCUTANEOUS at 22:10

## 2018-01-24 RX ADMIN — TAMSULOSIN HYDROCHLORIDE 0.4 MG: 0.4 CAPSULE ORAL at 22:10

## 2018-01-24 RX ADMIN — LISINOPRIL AND HYDROCHLOROTHIAZIDE 2 TABLET: 12.5; 2 TABLET ORAL at 07:47

## 2018-01-24 RX ADMIN — ASPIRIN 325 MG: 325 TABLET, DELAYED RELEASE ORAL at 07:47

## 2018-01-24 NOTE — PLAN OF CARE
FOCUS/GOAL  Bowel management, Bladder management, Wound care management and Mobility    ASSESSMENT, INTERVENTIONS AND CONTINUING PLAN FOR GOAL:  Pt is A&Ox4, speech is slurred and Pt does have some word finding trouble. Requires a SBA c walker. Denies any pain of discomfort. Hammond patent, draining jose m urine, fluids encouraged. Hammond care completed, some mild redness at tip of penis, continue to monitor. Mepilex changed on left elbow.

## 2018-01-24 NOTE — PLAN OF CARE
FOCUS/GOAL  Bowel management, Bladder management, Pain management, Skin integrity and Cognition/Memory/Judgment/Problem solving    ASSESSMENT, INTERVENTIONS AND CONTINUING PLAN FOR GOAL:  Pt is alert and oriented, speech is slurred, word finding difficulties at times, transfers with SBA and walker, denied pain, riley patent and draining jose m urine, continent of bowels during shift, Mepilex in place on left elbow, LBM 1/21/2018, slept through majority of the night, continue with POC.

## 2018-01-24 NOTE — PROGRESS NOTES
01/23/18 1431   Signing Clinician's Name / Credentials   Signing clinician's name / credentials Leola Short OTR/L   Quick Adds   Rehab Discipline OT   Therapeutic Activity   Treatment Detail OT: facilitated balance/endurance building ex using RW and navigating in between 5 objects x 4 trails with supervision for safety and progressed to without a RW with CGA for safety and 1 LOB and required assistance to stabilize. Trunk rotation B hand activity using a dowel miroslava for hitting a ball both ways with CGA for safety, multiple trials. Side stepping weight shifting without use of RW transporting items with CGA for safety, visual demo but pt unable to follow directions, multiple trials, seated break d/t fatigue.    ADL Training   Treatment Detail OT: assessed for mod I in the room using RW and reaaranged the room for tray, chair in relation to the bed for ease use. pt able to take the catheter bag and place it on the RW appropriately. pt able to walk from bed to the door to the bed and bed to chair and chair to the door and back to the chair with distant supervision. proceeded to go to the bathroom for simulated transfer and assessment and pt had to turn back d/t another pt using the bathroom, pt turned quickly and had a LOB and required CGA to stabilize. discussed with pt regarding slowing down and taking time to turn to avoid LOB. Towards the end of the session assessed for mobility from chair to the bathroom using RW and educated pt on walking into the bathroom sideways d/t pt stating he is not able to fit the RW in the doorway of his house, pt able to do so with supervision for safety when going sideways through the door x 2 trials. Pt is not quiet ready for mod I, will continue to assess and progress pt as soon as he is safe. Educated pt to continue to jaylin for assistance, pt agreed.    Additional Documentation   OT Plan OT: review mod I assessment- update if able; dynamic balance, short distance mobiltiy without  FWW (as it will not fit in bathroom)   Total Session Time   Total Session Time (minutes) 45 minutes   OT - Acute Rehab Center Time   Individual Time (minutes) - enter zero if not applicable - OT 45  (25 adls/iadls, 20 ther act)   Group Time (minutes) - enter zero if not applicable  - OT 0   Concurrent Time (minutes) - enter zero if not applicable  - OT 0   Co-Treatment Time (minutes) - enter zero if not applicable  - OT 0   ARC Total Session Time (minutes) - OT 45

## 2018-01-24 NOTE — PROGRESS NOTES
01/23/18 0915   Signing Clinician's Name / Credentials   Signing clinician's name / credentials Margaret Costa DPT   Quick Adds   Rehab Discipline PT   Therapeutic Activity   Treatment Detail PT: focus on safe management of catheter bag. Needed only one reminder to move bag from NuStep to walker at end of session. Education on benefits of spending time OOB.    Therapeutic Exercise   Symptoms Noted During/After Treatment fatigue   Treatment Detail PT: NuStep for CV endurance and BLE strengthening. L4 x 13 minutes and L2 for another 13 minutes. Patient took seated rest break between bouts to catch breath. Limited by BLE deconditioning.    Additional Documentation   Rehab Comments Rescheduled for later in AM d/t breakfast   PT Plan PT: Assess for MOD I in room, endurance, ambulation without WW for balance challenge.    Total Session Time   Total Session Time (minutes) 45 minutes  (= 28 therex, 17 ther act)   PT - Acute Rehab Center Time   Individual Time (minutes) - enter zero if not applicable - PT 45   Group Time (minutes) - enter zero if not applicable  - PT 0   Concurrent Time (minutes) - enter zero if not applicable  - PT 0   Co-Treatment Time (minutes) - enter zero if not applicable  - PT 0   ARC Total Session Time (minutes) - PT 45   Chair/bed-to-chair Transfer (FIM)   Describe performance PT: SBA with WW, cueing for catheter managemetn   FIM Score: Chair/bed-to-chair Transfer 5- Supervision or Setup   Roll Left and Right   Assistance Needed Independent   Physical Assistance Level No physical assistance   Sit to Lying   Assistance Needed Independent   Physical Assistance Level No physical assistance   Lying to Sitting on Side of Bed   Assistance Needed Independent   Physical Assistance Level No physical assistance   Sit to Stand   Assistance Needed Supervision   Physical Assistance Level No physical assistance   Comment PT: SBA with WW   Locomotion (FIM)   Locomotion Comment PT: amb to/from PT gym with WW, SBA.  "No LOB noted, but pt does stop x 2 to \"get my bearings.\" Improved speed and smoothness today. 160 ft x 2.    Walk 10 Feet   Assistance Needed Supervision   Physical Assistance Level No physical assistance   Walk 50 Feet with Two Turns   Assistance Needed Supervision   Physical Assistance Level No physical assistance   Walk 150 Feet   Assistance Needed Supervision   Physical Assistance Level No physical assistance   Walking 10 Feet on Uneven Surfaces   Assistance Needed Supervision   Physical Assistance Level No physical assistance     "

## 2018-01-24 NOTE — PROGRESS NOTES
"  [facility]   Acute Rehabilitation Unit  Daily progress note    interval history  Cooper Cabral was seen resting in bed feels tired and inclined to lay in bed and watch tv, reportedly this is what he does at home. \"and I eat a lot\", discussed blood sugar control and patient does note he should eat better at home.  He is feeling a bit constipated as reportedly has not gone in several days, no n/v/ or sore stomach.  Later noted to have loose incontinent stool will start fiber and monitor.      PT: incontinent of bowel during PT session today. Patient was brought back to room to finish going to bathroom, instructed to press call light for assistance getting cleaned up. When PT returned to room, pt was back in bed but had not changed out of soiled pants. Bed sheets were then soiled as well. When pt was asked why he did not ask for assist to change into clean pants, he replied \"I just didn't care.\" Assisted to change into clean pants and instructed not to get back in bed until nursing staff was able to change bedding.     medications    psyllium  1 packet Oral Daily     metFORMIN  1,000 mg Oral BID w/meals     aspirin  325 mg Oral Daily     clopidogrel  75 mg Oral Daily     insulin glargine  22 Units Subcutaneous BID     lisinopril-hydrochlorothiazide  2 tablet Oral Daily     metoprolol succinate  100 mg Oral Daily     nicotine  1 patch Transdermal Daily     pregabalin  150 mg Oral BID     simvastatin  20 mg Oral At Bedtime     tamsulosin  0.4 mg Oral At Bedtime     insulin aspart  1-7 Units Subcutaneous TID AC     insulin aspart  1-5 Units Subcutaneous At Bedtime     nicotine   Transdermal Daily     nicotine   Transdermal Q8H        glucose **OR** dextrose **OR** glucagon, acetaminophen, senna-docusate, polyethylene glycol     physical exam  BP 91/53 (BP Location: Left arm)  Pulse 71  Temp 96.8  F (36  C) (Oral)  Resp 16  Ht 1.829 m (6')  Wt 96.6 kg (213 lb)  SpO2 92%  " BMI 28.89 kg/m2  Gen:  Alert NAD  Cardio: rrr  Pulm: non labored clear on room air  Abd: soft non distended non tender  Ext: warm dry without edema  Neuro/MSK: alert, mild dysarthria, impaired balance, right visual field cut. Moves all extremities in bed.     labs  No new labs.  .      assessment and plan    Mr. Cooper Cabral is a 63 yo man with PMH significant for DM II with neuropathy, HTN, HLD, tobacco abuse and 2 prior CVAs who was admitted on 1/12/18 with left sided weakness of 2 days duration with MRI with acute infarct within the lateral left medulla and multiple chronic lacunar infarcts within the cerebellum, brainstem, and bilateral basal ganglia. He presents with new ataxia, decreased balance, impulsivity, decreased safety awareness 2/2 above resulting in new impairments with mobility and ADLs. He is admitted to the ARU on 1/19/18 for ongoing medical management and PT/OT.     Rehabilitation - continue comprehensive acute inpatient rehabilitation program with multidisciplinary approach including therapies, rehab nursing, and physiatry following. 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  -- 325mg ASA and plavix qday  -- PT and OT as above- consider SLP   -- Stroke education  -- Optimization of : DM II, HTN, HLD, and tobacco abuse as outlined below.     DM II with neuropathy: HgbA1c 11.9% non complaint with insulin, trulicity, BG management, reportedly dislikes needles.  Glucose stable.   -continue Metformin 1000 mg bid  -- continue Glargine 22 units bid   --continue SSI- will dc at discharge.  --continue  lyrica for neuropathy  -continue DM edu as needed.     HTN: Goal BP <140/80 long term. Currently well controlled. 110s-140s    --lisinopril/HCTZ 40-25mg, qday  --metoprolol XL 100mg qday     HLD: LDL 92, goal <70.   --20mg simvastatin qday     Tobacco Abuse: verbalizing strong opposition to cessation.  --continue tobacco cessation  education  -- continue 21mg/24 hr nicotiene patch      Urinary Retention: Suspect 2/2 BPH. Patient with 1L retention requiring IC on 1/18. Now with Riley in place.   --continue 0.4mg tamsulosin qhs  --Riley cares  --Monitor UOP  --Voiding trial ~ 1/25- remove riley in am, bladder scan and document PVRs.       Right MCA stenosis- with post stenotic fusiform dilation, asymptomatic  --No acute interventions needed at this time  --Follow up with neuro intervention as an outpatient        Patient discussed with Dr. Acosta  PM&R Staff Physician    Ruth Mendieta PA-C  Rehab Service  Pager: [phone]

## 2018-01-24 NOTE — PLAN OF CARE
Problem: Goal/Outcome  Goal: Goal Outcome Summary  Patient is alert and oriented. Denies any pain or discomfort. Hammond catheter is patent and intact, good amounts of output but urine still jose m, though clearer than yesterday. Per therapy, pt was noted to be incontinent of bm, soiled clothing. Staff assisting to complete  sujata cares and clean up after bm. SBA for transfers in room with walker. Offers no care concerns at this time.

## 2018-01-24 NOTE — PLAN OF CARE
Problem: Patient Care Overview  Goal: Plan of Care/Patient Progress Review  OT: Patient demonstrates improvement with balance with and without the walker.   Adamantly refused shower after large incontinent episode.

## 2018-01-24 NOTE — PROGRESS NOTES
01/23/18 1103   Signing Clinician's Name / Credentials   Signing clinician's name / credentials Karie Mirza OTR/L    Quick Adds   Rehab Discipline OT   ADL Training   Treatment Detail OT: discussion at start of session about daily routine at home, level of assistance. Patient does very little at home. Assessed for mod I in the room- completed toilet transfer, bed mobility, chair transfer and in room mobility. Patient with 1 LOB in room and additional LOB while in therpay session. Will hold on mod I order at this time. Completed light meal prep task  in the kitchen, simulated to home routine. Patient able to make microwaveable meal and make coffee. Required assist to read instructions, but able to carry out task after verbal instructions. supervision initially for balance with mobility throught the kitchen but 2 LOB with quick turn requiring min A. Patient with seated rest break, then ambulated back to his room with increased R lean with FWW, SBA with return to room.    Additional Documentation   OT Plan OT: review mod I assessment- update if able; dynamic balance, short distance mobiltiy without FWW (as it will not fit in bathroom)   OT - Acute Rehab Center Time   Individual Time (minutes) - enter zero if not applicable - OT 55  (self care)   Group Time (minutes) - enter zero if not applicable  - OT 0   Concurrent Time (minutes) - enter zero if not applicable  - OT 0   Co-Treatment Time (minutes) - enter zero if not applicable  - OT 0   ARC Total Session Time (minutes) - OT 55

## 2018-01-24 NOTE — PROGRESS NOTES
01/23/18 1300   Signing Clinician's Name / Credentials   Signing clinician's name / credentials Danelle Ferguson PTA   Quick Adds   Rehab Discipline PT   PT Assistant Visit Number 1   Neuromuscular Re-education   Symptoms Noted During/After Treatment fatigue   Treatment Detail PT: Focus on standing balance for improved safety with functional mobility: NBOS and modified tandem stance-cues for no UE support in // bars but pt reluctant to let go, pt letting go for a few seconds and LOB to R; same stances with 2# weighted ball passing from one hand to the other, elbow flex/ext and shoulder flex/ext, improved balance performance but still showing instability and LOB to both R and L-cuing for equal weight shifting as pt tending to put all weight on either L/R; side stepping with unilateral UE support 15' in each direction; pt ambulated with on AD for balance challenge, 120' x 1 and 200' x 1-greater difficulty with turns, pt needing CGA to mod A throughout for balance, better performance when walking in middle of vásquez as opposed to vásquez as pt tended to lose balance towards wall side.    Additional Documentation   PT Plan PT: transfers with FWW for safety, gait safety with ww and turns, continue quasi static balance activities   Total Session Time   Total Session Time (minutes) 45 minutes   PT - Acute Rehab Center Time   Individual Time (minutes) - enter zero if not applicable - PT 45  (30 neuro, 5 ther act, 10 gait)   Group Time (minutes) - enter zero if not applicable  - PT 0   Concurrent Time (minutes) - enter zero if not applicable  - PT 0   Co-Treatment Time (minutes) - enter zero if not applicable  - PT 0   ARC Total Session Time (minutes) - PT 45   Chair/bed-to-chair Transfer (FIM)   Describe performance PT: pt demo variability regarding safety during transfers with FWW, CGA  and one instance needing mod A   Lying to Sitting on Side of Bed   Assistance Needed Independent   Sit to Stand   Assistance Needed  "Adaptive equipment;Set-up / clean-up;Supervision   Locomotion (FIM)   Locomotion Comment PT: pt ambulated to and from PT gym with FWW, variability in stability throughout-pt would stop and \"reset\" as needed when seeming unsteady or poor ww management, cues to slow down and to stay in middle of ww; pt engaged in cone weaving and turns with FWW, increased difficulty with sharp turns and difficulty staying inside of ww as pt tending to step outside and lateral of ww.     "

## 2018-01-24 NOTE — PLAN OF CARE
"Problem: Patient Care Overview  Goal: Plan of Care/Patient Progress Review  PT: incontinent of bowel during PT session today. Patient was brought back to room to finish going to bathroom, instructed to press call light for assistance getting cleaned up. When PT returned to room, pt was back in bed but had not changed out of soiled pants. Bed sheets were then soiled as well. When pt was asked why he did not ask for assist to change into clean pants, he replied \"I just didn't care.\" Assisted to change into clean pants and instructed not to get back in bed until nursing staff was able to change bedding.       "

## 2018-01-25 LAB
GLUCOSE BLDC GLUCOMTR-MCNC: 152 MG/DL (ref 70–99)
GLUCOSE BLDC GLUCOMTR-MCNC: 74 MG/DL (ref 70–99)
GLUCOSE BLDC GLUCOMTR-MCNC: 86 MG/DL (ref 70–99)

## 2018-01-25 PROCEDURE — 97116 GAIT TRAINING THERAPY: CPT | Mod: GP | Performed by: PHYSICAL THERAPIST

## 2018-01-25 PROCEDURE — 97116 GAIT TRAINING THERAPY: CPT | Mod: GP | Performed by: REHABILITATION PRACTITIONER

## 2018-01-25 PROCEDURE — 97750 PHYSICAL PERFORMANCE TEST: CPT | Mod: GP | Performed by: PHYSICAL THERAPIST

## 2018-01-25 PROCEDURE — A9270 NON-COVERED ITEM OR SERVICE: HCPCS | Mod: GY | Performed by: PHYSICAL MEDICINE & REHABILITATION

## 2018-01-25 PROCEDURE — A9270 NON-COVERED ITEM OR SERVICE: HCPCS | Mod: GY | Performed by: PHYSICIAN ASSISTANT

## 2018-01-25 PROCEDURE — 97535 SELF CARE MNGMENT TRAINING: CPT | Mod: GO

## 2018-01-25 PROCEDURE — 25000131 ZZH RX MED GY IP 250 OP 636 PS 637: Performed by: STUDENT IN AN ORGANIZED HEALTH CARE EDUCATION/TRAINING PROGRAM

## 2018-01-25 PROCEDURE — 25000132 ZZH RX MED GY IP 250 OP 250 PS 637: Mod: GY | Performed by: STUDENT IN AN ORGANIZED HEALTH CARE EDUCATION/TRAINING PROGRAM

## 2018-01-25 PROCEDURE — 00000146 ZZHCL STATISTIC GLUCOSE BY METER IP

## 2018-01-25 PROCEDURE — 97530 THERAPEUTIC ACTIVITIES: CPT | Mod: GP | Performed by: REHABILITATION PRACTITIONER

## 2018-01-25 PROCEDURE — 12800006 ZZH R&B REHAB

## 2018-01-25 PROCEDURE — A9270 NON-COVERED ITEM OR SERVICE: HCPCS | Mod: GY | Performed by: STUDENT IN AN ORGANIZED HEALTH CARE EDUCATION/TRAINING PROGRAM

## 2018-01-25 PROCEDURE — 97530 THERAPEUTIC ACTIVITIES: CPT | Mod: GP | Performed by: PHYSICAL THERAPIST

## 2018-01-25 PROCEDURE — 25000132 ZZH RX MED GY IP 250 OP 250 PS 637: Mod: GY | Performed by: PHYSICAL MEDICINE & REHABILITATION

## 2018-01-25 PROCEDURE — 25000132 ZZH RX MED GY IP 250 OP 250 PS 637: Mod: GY | Performed by: PHYSICIAN ASSISTANT

## 2018-01-25 PROCEDURE — 40000133 ZZH STATISTIC OT WARD VISIT

## 2018-01-25 PROCEDURE — 40000133 ZZH STATISTIC OT WARD VISIT: Performed by: OCCUPATIONAL THERAPIST

## 2018-01-25 PROCEDURE — 97150 GROUP THERAPEUTIC PROCEDURES: CPT | Mod: GO | Performed by: OCCUPATIONAL THERAPIST

## 2018-01-25 PROCEDURE — 40000193 ZZH STATISTIC PT WARD VISIT: Performed by: REHABILITATION PRACTITIONER

## 2018-01-25 RX ADMIN — ASPIRIN 325 MG: 325 TABLET, DELAYED RELEASE ORAL at 07:45

## 2018-01-25 RX ADMIN — CLOPIDOGREL BISULFATE 75 MG: 75 TABLET, FILM COATED ORAL at 07:44

## 2018-01-25 RX ADMIN — SIMVASTATIN 20 MG: 20 TABLET, FILM COATED ORAL at 21:39

## 2018-01-25 RX ADMIN — PREGABALIN 150 MG: 75 CAPSULE ORAL at 20:30

## 2018-01-25 RX ADMIN — PREGABALIN 150 MG: 75 CAPSULE ORAL at 07:50

## 2018-01-25 RX ADMIN — TAMSULOSIN HYDROCHLORIDE 0.4 MG: 0.4 CAPSULE ORAL at 21:39

## 2018-01-25 RX ADMIN — METFORMIN HYDROCHLORIDE 1000 MG: 500 TABLET ORAL at 17:18

## 2018-01-25 RX ADMIN — METOPROLOL SUCCINATE 100 MG: 50 TABLET, EXTENDED RELEASE ORAL at 07:44

## 2018-01-25 RX ADMIN — INSULIN GLARGINE 22 UNITS: 100 INJECTION, SOLUTION SUBCUTANEOUS at 07:45

## 2018-01-25 RX ADMIN — PSYLLIUM HUSK 1 PACKET: 3.4 POWDER ORAL at 07:44

## 2018-01-25 RX ADMIN — INSULIN ASPART 1 UNITS: 100 INJECTION, SOLUTION INTRAVENOUS; SUBCUTANEOUS at 17:17

## 2018-01-25 RX ADMIN — LISINOPRIL AND HYDROCHLOROTHIAZIDE 2 TABLET: 12.5; 2 TABLET ORAL at 07:44

## 2018-01-25 RX ADMIN — METFORMIN HYDROCHLORIDE 1000 MG: 500 TABLET ORAL at 07:44

## 2018-01-25 RX ADMIN — NICOTINE 1 PATCH: 21 PATCH, EXTENDED RELEASE TRANSDERMAL at 07:44

## 2018-01-25 NOTE — PLAN OF CARE
FOCUS/GOAL  Bowel management, Bladder management, Pain management, Skin integrity and Cognition/Memory/Judgment/Problem solving    ASSESSMENT, INTERVENTIONS AND CONTINUING PLAN FOR GOAL:  Pt is alert and oriented x4, has slurred speech and can be slightly confused at times, denies pain, is content of bowel, riley catheter removed without a problem, urine jose m with some small clots in urine following removal, begin PVRs, transfers with walker and CGA, Mepilex on left elbow,  slept through the night, no further care concerns at this time continue with POC.

## 2018-01-25 NOTE — PROGRESS NOTES
"  [facility]   Acute Rehabilitation Unit  Daily progress note    interval history  Cooper Cabral was seen walking in vásquez with therapy and later resting in bed.  Reports he is doing fine and denies n/v/, sob, fever, ab pain.  Notes riley removed this am and has not yet voided, encouraged to get up to bathroom every 4 hours and try to void even if no urge to go, patient agreeable.  Discussed loose incontinent stool yesterday, \"I'm not sure why that happened\", denies gi symptoms as above, and typically does not have issues with incontinence. We also discussed lower blood glucose this am, reiterates typically bg very high at home, eating differently and will try to do better at home, continues to report he will take twice daily lantus.     PT:  Pt demo 30 min of PT progressing functional mobility. Pt demo static standing up to 30 sec with mild instability with head turns. Pt demo amb up to 200'x 2 with WW and 90 without AD needing increased assist due to mild instability and slight R side neglect. Pt demo multi step tasks needing V.c throughout. Pt has increased instability when all turns with WW to pt R . Pt demo up and down 4 steps x 2 with one rail needing v. For full foot on step.     medications    psyllium  1 packet Oral Daily     metFORMIN  1,000 mg Oral BID w/meals     aspirin  325 mg Oral Daily     clopidogrel  75 mg Oral Daily     insulin glargine  22 Units Subcutaneous BID     lisinopril-hydrochlorothiazide  2 tablet Oral Daily     metoprolol succinate  100 mg Oral Daily     nicotine  1 patch Transdermal Daily     pregabalin  150 mg Oral BID     simvastatin  20 mg Oral At Bedtime     tamsulosin  0.4 mg Oral At Bedtime     insulin aspart  1-7 Units Subcutaneous TID AC     insulin aspart  1-5 Units Subcutaneous At Bedtime     nicotine   Transdermal Daily     nicotine   Transdermal Q8H        glucose **OR** dextrose **OR** glucagon, acetaminophen, senna-docusate, " polyethylene glycol     physical exam  /70 (BP Location: Left arm)  Pulse 69  Temp 95.6  F (35.3  C) (Oral)  Resp 16  Ht 1.829 m (6')  Wt 96.6 kg (213 lb)  SpO2 94%  BMI 28.89 kg/m2  Gen:  Alert NAD  Pulm: non labored on room air  Abd: soft non distended non tender  Ext: warm dry without edema  Neuro/MSK: alert, dysarthria, impaired balance, right visual field cut seen ambulating in vásquez with walker     labs  No new labs.  .      assessment and plan    Mr. Cooper Cabral is a 63 yo man with PMH significant for DM II with neuropathy, HTN, HLD, tobacco abuse and 2 prior CVAs who was admitted on 1/12/18 with left sided weakness of 2 days duration with MRI with acute infarct within the lateral left medulla and multiple chronic lacunar infarcts within the cerebellum, brainstem, and bilateral basal ganglia. He presents with new ataxia, decreased balance, impulsivity, decreased safety awareness 2/2 above resulting in new impairments with mobility and ADLs. He is admitted to the ARU on 1/19/18 for ongoing medical management and PT/OT.     Rehabilitation - continue comprehensive acute inpatient rehabilitation program with multidisciplinary approach including therapies, rehab nursing, and physiatry following. 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  -- 325mg ASA and plavix qday  -- PT and OT as above- consider SLP   -- Stroke education  -- Optimization of : DM II, HTN, HLD, and tobacco abuse as outlined below.     DM II with neuropathy: HgbA1c 11.9% non complaint with insulin, trulicity, BG management, reportedly dislikes needles.  Am Glucose 74, though notes, he eats less food and more healthy during hospital stay, plan to dc home on metformin, home trulicity, and Lantus- will reduce to 20 units bid  -continue Metformin 1000 mg bid  -- keep dose same am and pm- reduce to -  Glargine 20 units bid- due to am bg 74  --continue SSI- will d/c at time  of discharge.   --continue  lyrica for neuropathy  -will need DM edu     HTN: Goal BP <140/80 long term. Currently well controlled.   --lisinopril/HCTZ 40-25mg, qday  --metoprolol XL 100mg qday     HLD: LDL 92, goal <70.   --20mg simvastatin qday     Tobacco Abuse: verbalizing strong opposition to cessation.  --continue tobacco cessation education  -- continue 21mg/24 hr nicotiene patch      Urinary Retention: Suspect 2/2 BPH. Patient with 1L retention requiring IC on 1/18.   --0.4mg tamsulosin qhs  --Monitor UOP  --riley removed today- void trials q 4 hours with bladder scans.      Right MCA stenosis- with post stenotic fusiform dilation, asymptomatic  --No acute interventions needed at this time  --Follow up with neuro intervention as an outpatient        Patient discussed with Dr. Acosta  PM&R Staff Physician    Ruth Mendieta PA-C  Rehab Service  Pager: [phone]

## 2018-01-25 NOTE — PLAN OF CARE
"Problem: Goal/Outcome  Goal: Goal Outcome Summary  OT: Pt participated in the established \"Transitions Group\" for stroke pts. Highlighting topics such as return to meaningful activities, health/wellness, fatigue, depression/anxiety, bowel/bladder considerations w/ community re-integration and caregiver wellness/support. Pt very receptive to class, however does not have specific goals for himself following discharge. Pt looking forward to getting more rest at home, no plans for community integration.      "

## 2018-01-25 NOTE — PROGRESS NOTES
"CLINICAL NUTRITION SERVICES - ASSESSMENT NOTE     Nutrition Prescription    RECOMMENDATIONS FOR MDs/PROVIDERS TO ORDER:  None    Malnutrition Status:    Patient does not meet criteria    Recommendations already ordered by Registered Dietitian (RD):  None    Future/Additional Recommendations:  Patient may benefit from further review diabetes diet.     REASON FOR ASSESSMENT  Cooper Cabral is a/an 62 year old male assessed by the dietitian for LOS    NUTRITION HISTORY  Patient is on a regular diet at home. He reports he does not have a lot of money to buy food. He tends to mostly snack throughout the day. He likes Greek yogurt and string cheese. He does not cook. He does receive 1 frozen meal/day through a meal delivery service; he thinks it's called \"[employer]\". He usually eats this meal for lunch. Patient has a history of diabetes, however he does not count carbs. He was not regularly taking his insulin at home, however states that now he is going to take it as prescribed.    CURRENT NUTRITION ORDERS  Diet: Moderate Consistent Carbohydrate  Intake/Tolerance: Patient is generally ordering well for meals. Intakes are %.     LABS  Labs reviewed  Hgb A1C: 11.9 (1/12)    MEDICATIONS  Medications reviewed    ANTHROPOMETRICS  Height: 182.9 cm (6' 0\")  Most Recent Weight: 96.6 kg (213 lb)    IBW: 78.8 kg  BMI: Overweight BMI 25-29.9  Weight History:   Wt Readings from Last 5 Encounters:   01/19/18 96.6 kg (213 lb)   01/12/18 113.4 kg (250 lb)   11/21/17 105.5 kg (232 lb 9.6 oz)   09/07/17 103.7 kg (228 lb 9.6 oz)   05/08/17 103.5 kg (228 lb 4 oz)       Dosing Weight: 83 kg (adj for >120% IBW)    ASSESSED NUTRITION NEEDS  Estimated Energy Needs: 8189-7022 kcals/day (25 - 30 kcals/kg)  Justification: Maintenance  Estimated Protein Needs:  grams protein/day (1 - 1.2 grams of pro/kg)  Justification: Increased needs  Estimated Fluid Needs: 1 mL/kcal or per MD goals   Justification: Maintenance    PHYSICAL " FINDINGS  See malnutrition section below.     MALNUTRITION  % Intake: No decreased intake noted  % Weight Loss: Unable to assess (fluctuations noted)  Subcutaneous Fat Loss: None observed  Muscle Loss: None observed  Fluid Accumulation/Edema: None noted  Malnutrition Diagnosis: Patient does not meet two of the above criteria necessary for diagnosing malnutrition    NUTRITION DIAGNOSIS  Predicted inadequate nutrient intake (energy/ protein) related to prolonged hospital stay with potential for reduced appetite as evidenced by anticipated intake less than estimated needs.      INTERVENTIONS  Implementation  Nutrition Education: brief review of consistent carb diet, handout provided. Encouraged pt to consume regular meals for blood sugar management.     Goals  Patient to consume % of nutritionally adequate meal trays TID, or the equivalent with supplements/snacks.     Monitoring/Evaluation  Progress toward goals will be monitored and evaluated per protocol.        Jojo Zabala RD

## 2018-01-25 NOTE — PROGRESS NOTES
01/25/18 1026   Signing Clinician's Name / Credentials   Signing clinician's name / credentials stephanie Houston PT   Hodgson Balance Scale (EJ JOSE, SCARLET S, SAVAGE WILLS, MOHAMUD B: MEASURING BALANCE IN THE ELDERLY: VALIDATION OF AN INSTRUMENT. CAN. J. PUB. HEALTH, JULY/AUGUST SUPPLEMENT 2:S7-11, 1992.)   Sit To Stand 3   Standing Unsupported 4   Sitting Unsupported 4   Stand to Sit 3   Transfers 3   Standing with Eyes Closed 4   Standing Unsupported, Feet Together 1   Reach Forward With Outstretched Arm 3   Retrieve Object From Floor 3   Turning to Look Behind 1   Turn 360 Degrees 1   Placing Alternate Foot on Stool (4-6 inches) 1   Unsupported Tandem Stand (Demonstrate to Subject) 2   One Leg Stand 1   Total Score (A score of 45 or less has been correlated with an increased risk of falls)   Total Score (out of 56) 34   Hodgson Balance Scale (BBS) Cutoff Scores for CVA Population:  Patient Score: 34/56    The BBS is a measure of static and dynamic standing balance that has been validated in community dwelling elderly individuals and individuals who have Parkinson's Disease, MS, and those who are s/p CVA and TBI. The test is administered without an assistive device. Scores from the Hodgson are used to determine the probability of falling based on the patient's previous history of falls and their test performance.     0-20 High risk for falling- Corresponded with w/c bound status  21-40 Medium risk for falling- Able to walk with assistance  41-56 Low risk for falling- Able to walk independently  According to [facility].  Available at http://www.strokecenter.org/.  Accessibility verified April 10, 2013.  Minimal Detectable Change = 6.5 according to Miguel Ángel & Seney 2008    Assessment (rationale for performing, application to patient s function & care plan): Pt is a 61 y/o male who is s/p CVA ( and has had 2 CVAs in the past) who presents with balance deficits, with tendency for LOB to the R with dynamic  mobility.  Pt scored 34/56 indicating medium fall risk, and pt appears safer with use of FWW with gait at this time to avoid LOB. Pt is unable to perform SLS, and still with difficulty with weightshifting.  Pt able to self correct balance at times with use of FWW,  but does need cuing at times for safety with stand to sit.     Minutes billed as physical performance test: 20

## 2018-01-25 NOTE — PLAN OF CARE
Problem: Individualization  Goal: Patient Individualization  Patient A&O x4, lungs sound clear, Denied CP, lightheadedness, dizziness and SOB, drinking well riley in place and patency maintained, order to pull the riley out tomorrow, self repositioned and turned in bed, heels elevated off bed, demonstrates the ability to use call light appropriately, will continue to monitor patient.

## 2018-01-25 NOTE — PLAN OF CARE
Problem: Patient Care Overview  Goal: Plan of Care/Patient Progress Review  PT:  Pt demo 30 min of PT progressing functional mobility. Pt demo static standing up to 30 sec with mild instability with head turns. Pt demo amb up to 200'x 2 with WW and 90 without AD needing increased assist due to mild instability and slight R side neglect. Pt demo multi step tasks needing V.c throughout. Pt has increased instability when all turns with WW to pt R . Pt demo up and down 4 steps x 2 with one rail needing v. For full foot on step.

## 2018-01-25 NOTE — PLAN OF CARE
"Problem: Goal/Outcome  Goal: Goal Outcome Summary  Patient is alert and oriented x 4. Denies any pain or discomfort. Hammond catheter removed by previous Rn, No spontaneous voids. When offered to bathroom or urinal patient is refusing to try to void stating \" I don't want to, I don't feel like I need to go\"  Writer insisting that he should try anyway's, but pt declining. Writer reproached again and again to encourage pt to try voiding. Pt stating \" yes I will try, but not right now\" bladder scanned twice 0900 for 179. At 1230, bladder scanned for 427, straight cathed for 350 cc. SBa for transfers with his walker. Will continue to monitor bladder function and care per poc.      "

## 2018-01-26 LAB
GLUCOSE BLDC GLUCOMTR-MCNC: 106 MG/DL (ref 70–99)
GLUCOSE BLDC GLUCOMTR-MCNC: 149 MG/DL (ref 70–99)
GLUCOSE BLDC GLUCOMTR-MCNC: 180 MG/DL (ref 70–99)
GLUCOSE BLDC GLUCOMTR-MCNC: 79 MG/DL (ref 70–99)
GLUCOSE BLDC GLUCOMTR-MCNC: 93 MG/DL (ref 70–99)

## 2018-01-26 PROCEDURE — 97530 THERAPEUTIC ACTIVITIES: CPT | Mod: GO

## 2018-01-26 PROCEDURE — 97110 THERAPEUTIC EXERCISES: CPT | Mod: GP | Performed by: PHYSICAL THERAPIST

## 2018-01-26 PROCEDURE — 97530 THERAPEUTIC ACTIVITIES: CPT | Mod: GO | Performed by: OCCUPATIONAL THERAPIST

## 2018-01-26 PROCEDURE — A9270 NON-COVERED ITEM OR SERVICE: HCPCS | Mod: GY | Performed by: PHYSICAL MEDICINE & REHABILITATION

## 2018-01-26 PROCEDURE — 25000132 ZZH RX MED GY IP 250 OP 250 PS 637: Performed by: PHYSICAL MEDICINE & REHABILITATION

## 2018-01-26 PROCEDURE — 97535 SELF CARE MNGMENT TRAINING: CPT | Mod: GO

## 2018-01-26 PROCEDURE — 25000132 ZZH RX MED GY IP 250 OP 250 PS 637: Performed by: STUDENT IN AN ORGANIZED HEALTH CARE EDUCATION/TRAINING PROGRAM

## 2018-01-26 PROCEDURE — 40000187 ZZH STATISTIC PATIENT MED CONFERENCE < 30 MIN: Performed by: OCCUPATIONAL THERAPIST

## 2018-01-26 PROCEDURE — 12800006 ZZH R&B REHAB

## 2018-01-26 PROCEDURE — 40000193 ZZH STATISTIC PT WARD VISIT

## 2018-01-26 PROCEDURE — 97116 GAIT TRAINING THERAPY: CPT | Mod: GP | Performed by: PHYSICAL THERAPIST

## 2018-01-26 PROCEDURE — A9270 NON-COVERED ITEM OR SERVICE: HCPCS | Mod: GY | Performed by: STUDENT IN AN ORGANIZED HEALTH CARE EDUCATION/TRAINING PROGRAM

## 2018-01-26 PROCEDURE — A9270 NON-COVERED ITEM OR SERVICE: HCPCS | Mod: GY | Performed by: PHYSICIAN ASSISTANT

## 2018-01-26 PROCEDURE — 40000193 ZZH STATISTIC PT WARD VISIT: Performed by: PHYSICAL THERAPIST

## 2018-01-26 PROCEDURE — 40000133 ZZH STATISTIC OT WARD VISIT

## 2018-01-26 PROCEDURE — 25000132 ZZH RX MED GY IP 250 OP 250 PS 637: Performed by: PHYSICIAN ASSISTANT

## 2018-01-26 PROCEDURE — 00000146 ZZHCL STATISTIC GLUCOSE BY METER IP

## 2018-01-26 PROCEDURE — 97150 GROUP THERAPEUTIC PROCEDURES: CPT | Mod: GP

## 2018-01-26 PROCEDURE — 40000133 ZZH STATISTIC OT WARD VISIT: Performed by: OCCUPATIONAL THERAPIST

## 2018-01-26 RX ORDER — FINASTERIDE 5 MG/1
5 TABLET, FILM COATED ORAL DAILY
Status: DISCONTINUED | OUTPATIENT
Start: 2018-01-26 | End: 2018-01-29 | Stop reason: HOSPADM

## 2018-01-26 RX ADMIN — PREGABALIN 150 MG: 75 CAPSULE ORAL at 19:24

## 2018-01-26 RX ADMIN — PREGABALIN 150 MG: 75 CAPSULE ORAL at 08:15

## 2018-01-26 RX ADMIN — METOPROLOL SUCCINATE 100 MG: 50 TABLET, EXTENDED RELEASE ORAL at 08:05

## 2018-01-26 RX ADMIN — ASPIRIN 325 MG: 325 TABLET, DELAYED RELEASE ORAL at 08:04

## 2018-01-26 RX ADMIN — CLOPIDOGREL BISULFATE 75 MG: 75 TABLET, FILM COATED ORAL at 08:03

## 2018-01-26 RX ADMIN — METFORMIN HYDROCHLORIDE 1000 MG: 500 TABLET ORAL at 08:05

## 2018-01-26 RX ADMIN — LISINOPRIL AND HYDROCHLOROTHIAZIDE 2 TABLET: 12.5; 2 TABLET ORAL at 08:04

## 2018-01-26 RX ADMIN — TAMSULOSIN HYDROCHLORIDE 0.4 MG: 0.4 CAPSULE ORAL at 21:40

## 2018-01-26 RX ADMIN — PSYLLIUM HUSK 1 PACKET: 3.4 POWDER ORAL at 08:03

## 2018-01-26 RX ADMIN — SIMVASTATIN 20 MG: 20 TABLET, FILM COATED ORAL at 21:40

## 2018-01-26 RX ADMIN — FINASTERIDE 5 MG: 5 TABLET, FILM COATED ORAL at 21:42

## 2018-01-26 RX ADMIN — METFORMIN HYDROCHLORIDE 1000 MG: 500 TABLET ORAL at 17:25

## 2018-01-26 RX ADMIN — NICOTINE 1 PATCH: 21 PATCH, EXTENDED RELEASE TRANSDERMAL at 08:03

## 2018-01-26 NOTE — PLAN OF CARE
Problem: Patient Care Overview  Goal: Plan of Care/Patient Progress Review  Outcome: Therapy, progress toward functional goals as expected  Pt attended Falls Prevention class today with group of 5 patients. Pt selected for class due to documented gait deficit and falls risk. Class includes education in falls risks, how to decrease that risk through behavior and home modifications and energy conservation; and instruction in available equipment designed to increase home safety. Pt was able to verbalize understanding of materials and participated appropriately in the discussion and problem-solving segments of the class.

## 2018-01-26 NOTE — PLAN OF CARE
"Problem: Goal/Outcome  Goal: Goal Outcome Summary  FOCUS/GOAL  Bladder management, Nutrition/Feeding/Swallowing precautions, Medication management and Skin integrity    ASSESSMENT, INTERVENTIONS AND CONTINUING PLAN FOR GOAL:  Pt is alert and oriented, VSS, no complaints of pain. Pt upgraded to MOD-I in room with walker. Pt ate all of dinner and given 1 unit of insulin for preprandial BG of 152. Pt needing explanation for reasoning of void trials. Pt kept saying \"I just cant feel anything, I can't go.\" Pt bladder scanned for 580 and was unable to void standing at toilet. SIC for 525 ml. Bladder scan at 2227 was 200mL. Pt picking scab on left arm, nurse placed bandaid on site.         "

## 2018-01-26 NOTE — PLAN OF CARE
Acute Rehab Care Conference/Team Rounds      Type: Patient Conference    Present: Dr Guille Acosta, Rocio March [facility], Cooper Cabral patient, Yordan Burkett PT, Eden Cline OT, Mila Natarajan RN.       Discharge Barriers/Treatment/Education    Rehab Diagnosis: stroke    Active Medical Co-morbidities/Prognosis: poorly controlled diabetes, tobacco use disorder with no intent to quit, hypertension, hyperlipidemia, urine retention.    Safety: Modified Independent in the room, using walker to ambulate. Able to call appropriately for help. Alert and oriented. No alarms on.     Pain: Denies any pain.    Medications, Skin, Tubes/Lines: Able to take medications whole with water. Mepilex on left elbow for abrasion. No tubes or lines.     Swallowing/Nutrition:    Bowel/Bladder: BM this morning on 1/26 per pt report. Pt stated it was formed, denies any diarrhea or incontinence of bowel. Bladder scan done at 0445 showed >999cc. Pt stated he had gotten up to the bathroom and voided a couple times during the night but only voided in small amounts. Straight cath'd 1400cc at 0500.     Psychosocial: Pt resides alone. Pts sister provides PCA support. Goal to return home with continued support and ongoing PCA services. Team working towards a safe d/c plan.     ADLs/IADLs: Pt making progress with ADLs. Pt is Mod IND UB/LB dressing, IND standing g/h, and Mod IND toilet transfer/toilet hygiene during day, receiving supervision overnight. Plan to progress to complete Mod IND today pending safety and consistency. Pt requiring SBA TB bathing and shower transfer, recommend initial oversight with HHA. Recommending shower chair, however pt reports that shower is equipt with grab bars and too small for shower chair. Will need to simulate bathroom mobility without AD prior to discharge. Pt needing cues to complete tasks slowly to maintain balance. Pt SBA/Mod IND simple meal prep (microwave meals) and SBA household tasks. Recommend HH OT  following discharge.    Mobility: improving balance. Still recommending walker. Working on endurance.    Cognition/Language: Not a barrier to discharge. Pt has PCA support every other day and check in support from family. Pt is minimally involved with IADLs. Pt also has RN support for med admin/BG checks.     Community Re-Entry:    Transportation:    Decision maker: self    Plan of Care and goals reviewed and updated.    Discharge Plan/Recommendations    Fall Precautions: continue    Patient/Family input to goals: insisting he'll not quit smoking. Previously daily support from his sister in law is his PCA and home RN every few weeks.    Overall plan for the patient: Cooper has shown some nice functional gains. Urine retention not looking good with trial of void and will replace indwelling riley and teach him to do cares. Targeting home Monday with some initial in home OT, PT and HHA. He already has established PCA but not helping with bathing. Has home RN.      Utilization Review and Continued Stay Justification    Medical Necessity Criteria:    For any criteria that is not met, please document reason and plan for discharge, transfer, or modification of plan of care to address.    Requires intensive rehabilitation program to treat functional deficits?: Yes    Requires 3x per week or greater involvement of rehabilitation physician to oversee rehabilitation program?: Yes    Requires rehabilitation nursing interventions?: Yes    Patient is making functional progress?: Yes    There is a potential for additional functional progress? Yes    Patient is participating in therapy 3 hours per day a minimum of 5 days per week or 15 hours per week in 7 day period?:Yes    Has discharge needs that require coordinated discharge planning approach?:Yes        Final Physician Sign off    Statement of Approval: I agree with all the recommendations detailed in this document.     Patient Goals             OT target date for goal  attainment: 01/30/18  OT Frequency: 90 minutes daily   OT goal: hygiene/grooming: while standing, independent  OT goal: upper body dressing: Independent, including set-up/clothing retrieval  OT goal: lower body dressing: Modified independent, including set-up/clothing retrieval, using adaptive equipment  OT goal: upper body bathing: Modified independent, using adaptive equipment  OT goal: lower body bathing: Modified independent, using adaptive equipment  OT Goal: transfer: Modified independent, with assistive device  OT goal: toilet transfer/toileting: Modified independent, using adaptive equipment, toilet transfer, cleaning and garment management  OT goal: meal preparation: Modified independent, with simple meal preparation, ambulatory level  OT goal: home management: Modified independent, with light demand household tasks, ambulatory level  OT goal: cognitive: Patient/caregiver will verbalize understanding of cognitive assessment results/recommendations as needed for safe discharge planning  OT goal 1: Patient will demonstrate improved fine motor coordination by achieving a score of 35 seconds or less on the 9 hole peg test bilaterally   OT goal 2: Patient will participate in dynavision training to address visual motor deficits   OT goal 3: Patient will participate and demonstrate 100% independence in UE strengthening programming as indicated to improve UE strength as needed for ADL independence.         PT target date for goal attainment: 01/30/18  PT Frequency: Daily -90 min/day  PT goal: bed mobility: Independent  PT goal: transfers: Modified independent, Bed to/from chair, Sit to/from stand, Assistive device  PT goal: gait: Modified independent, Greater than 200 feet  PT goal: stairs: 4 stairs, Modified independent, Rail on right        PT goal 1: Demonstrate low falls risk with a balance assessment score of < 13.5 seconds on TUG,  > 19/24 on Dynamic Gait Index, > 9/12 on 4-Item Dynamic Gait Index, or > 45/56  on Hodgson Balance Assessment.   PT Goal 2: Pt will be IND with HEP BLE strengthening upon DC for continued improvement  Patient/Family Goal: Medication Management: Pt will successfully complete MAP to demonstrate proper understanding of home medication regimen by discharge.   Individualized Goal 1: Pt will successfully complete a stroke class by discharge.  Individualized Goal 2: Pt will successfully participate in a falls class by discharge and state 3 ways to prevent falls.

## 2018-01-26 NOTE — DISCHARGE INSTRUCTIONS
Follow up appointments:    You are scheduled to see your primary provider, Dr. Hudson Spring  February 12th, 2018 at 11:40am    Address  [facility]                          [address]                          [place], [place]   Phone   [phone]    -- Neurology to follow-up stroke   Dr Rolando Ireland on Friday February 16th at 10:45 am. Please check in at 10:30.                         Address  [facility]                          [address]; [address]    [address]    [place] [address]  Phone             [phone]    You have an appointment with Urology at [facility] [phone]  February 13th,2018 at 8:00am with Dr. Zabell [address] [facility], [address]  [place] [address]    -----------------------------------------------    To reduce the risk of subsequent stroke there are several important factors including optimal management of anticoagulants, blood pressure, cholesterol, diabetes and smoking abstinence.    Anticoagulation:  You are on Aspirin and Clopidogrel    Blood Pressure:  Keeping your blood pressures less than 130/80 has been shown to reduce risk of recurrent stroke. Recording your blood pressure and heart rate once daily in a log book can help you and your providers make decisions on optimal management. You are encouraged to bring your log book with you to your primary physician and/or cardiology doctor visits.    You are currently on metoprolol, lisinopril-hydrochlorothiazide to help control your blood pressure. Several lifestyle modifications have been associated with blood pressure reduction and are an important part of a comprehensive plan. These include: weight loss; a diet low in salt and cholesterol and rich in fruits and vegetables; regular aerobic physical activity and limited alcohol consumption.    Diabetes:  Optimal management of diabetes not only reduces risk of another stroke, it can help with healing process from  "your recent stroke. Blood glucose levels measure how well you're controlling your diabetes. An additional test \"hemoglobin A1C\" reflects how you have been overall with glucose control in the prior few months. This should be less than 7 though even lower below 6 is considered normal. Your recent Hgb A1C was very elevated, 11.9 consistent with the report of not recently taking your insulin.  Remains very important to manage your diabetes is a key risk factor for recurrent stroke, heart disease and many other problems. This should be followed up with your primary provider and/or endocrinologist.    Your present plan is to check blood glucose consistently Twice Daily. These should be recorded along with date/time and any relevant notes such as food consumed, insulin/medication taken etc. This helps you and your providers make decisions on optimal management. You're encouraged to bring you log book with to your primary and/or endocrinology doctor visits.  Your current medications include Insulin Glargine (Lantus) and metformin. Specific doses and frequencies listed elsewhere.     Diet:  Diet and exercise are very important for diabetes regardless of being on medication or not. Be aware of and moderate your carbohydrate intake as instructed by doctor, dietitian or nurse.  Regular physical activity may be more difficult since your stroke though doing what you can on a daily basis is helpful.     Cholesterol:  Traditional target levels for LDL cholesterol or \"bad cholesterol\" is less than 130 however once you have had a stroke, your target LDL level is now less than 70. Additional recommendations such as increasing your HDL or \"good\" cholesterol and lowering your triglyceride level can also be important.    Your most recent lipid panel was   Lab Results   Component Value Date    CHOL 147 01/14/2018     Lab Results   Component Value Date    HDL 37 01/14/2018     Lab Results   Component Value Date    LDL 92 01/14/2018     Lab " Results   Component Value Date    TRIG 89 01/14/2018     Lab Results   Component Value Date    CHOLHDLRATIO 4.9 10/14/2015       This should be followed up in 2-3 months with your primary provider.    Smoking:  Finally one of the most important modifiable risk factors is to not smoke.  Strongly encourage you to reconsider quitting or at least cutting back.  If you are interested in patch, gum or other options, please talk with your primary provider. Support through counseling, nicotine replacement, and oral smoking-cessation medications may all be helpful.       HOME CARE  [facility] [phone] will provide RN, PT, OT, and Home Health Aide. They will call you after you discharge to schedule the first visit.

## 2018-01-26 NOTE — PLAN OF CARE
Problem: Goal/Outcome  Goal: Goal Outcome Summary  Pt is planning to d/c home on Monday, 1/29 with continued family support and ongoing home care therapy. Placed referral to [facility]  ([phone]) for RN, PT, OT, HHA. Sw will continue to assist as needed.

## 2018-01-27 LAB
GLUCOSE BLDC GLUCOMTR-MCNC: 100 MG/DL (ref 70–99)
GLUCOSE BLDC GLUCOMTR-MCNC: 117 MG/DL (ref 70–99)
GLUCOSE BLDC GLUCOMTR-MCNC: 85 MG/DL (ref 70–99)
GLUCOSE BLDC GLUCOMTR-MCNC: 91 MG/DL (ref 70–99)

## 2018-01-27 PROCEDURE — 97535 SELF CARE MNGMENT TRAINING: CPT | Mod: GO

## 2018-01-27 PROCEDURE — A9270 NON-COVERED ITEM OR SERVICE: HCPCS | Mod: GY | Performed by: PHYSICAL MEDICINE & REHABILITATION

## 2018-01-27 PROCEDURE — 97530 THERAPEUTIC ACTIVITIES: CPT | Mod: GP

## 2018-01-27 PROCEDURE — A9270 NON-COVERED ITEM OR SERVICE: HCPCS | Mod: GY | Performed by: PHYSICIAN ASSISTANT

## 2018-01-27 PROCEDURE — 97110 THERAPEUTIC EXERCISES: CPT | Mod: GP

## 2018-01-27 PROCEDURE — 97110 THERAPEUTIC EXERCISES: CPT | Mod: GO

## 2018-01-27 PROCEDURE — 25000132 ZZH RX MED GY IP 250 OP 250 PS 637: Mod: GY | Performed by: PHYSICAL MEDICINE & REHABILITATION

## 2018-01-27 PROCEDURE — 40000133 ZZH STATISTIC OT WARD VISIT

## 2018-01-27 PROCEDURE — 25000132 ZZH RX MED GY IP 250 OP 250 PS 637: Mod: GY | Performed by: PHYSICIAN ASSISTANT

## 2018-01-27 PROCEDURE — 97112 NEUROMUSCULAR REEDUCATION: CPT | Mod: GP

## 2018-01-27 PROCEDURE — 97530 THERAPEUTIC ACTIVITIES: CPT | Mod: GO

## 2018-01-27 PROCEDURE — 12800006 ZZH R&B REHAB

## 2018-01-27 PROCEDURE — 00000146 ZZHCL STATISTIC GLUCOSE BY METER IP

## 2018-01-27 PROCEDURE — A9270 NON-COVERED ITEM OR SERVICE: HCPCS | Mod: GY | Performed by: STUDENT IN AN ORGANIZED HEALTH CARE EDUCATION/TRAINING PROGRAM

## 2018-01-27 PROCEDURE — 97116 GAIT TRAINING THERAPY: CPT | Mod: GP

## 2018-01-27 PROCEDURE — 40000193 ZZH STATISTIC PT WARD VISIT

## 2018-01-27 PROCEDURE — 25000132 ZZH RX MED GY IP 250 OP 250 PS 637: Performed by: STUDENT IN AN ORGANIZED HEALTH CARE EDUCATION/TRAINING PROGRAM

## 2018-01-27 RX ADMIN — METFORMIN HYDROCHLORIDE 1000 MG: 500 TABLET ORAL at 07:47

## 2018-01-27 RX ADMIN — PSYLLIUM HUSK 1 PACKET: 3.4 POWDER ORAL at 07:47

## 2018-01-27 RX ADMIN — ASPIRIN 325 MG: 325 TABLET, DELAYED RELEASE ORAL at 07:49

## 2018-01-27 RX ADMIN — NICOTINE 1 PATCH: 21 PATCH, EXTENDED RELEASE TRANSDERMAL at 07:50

## 2018-01-27 RX ADMIN — SIMVASTATIN 20 MG: 20 TABLET, FILM COATED ORAL at 21:41

## 2018-01-27 RX ADMIN — CLOPIDOGREL BISULFATE 75 MG: 75 TABLET, FILM COATED ORAL at 07:47

## 2018-01-27 RX ADMIN — PREGABALIN 150 MG: 75 CAPSULE ORAL at 20:15

## 2018-01-27 RX ADMIN — TAMSULOSIN HYDROCHLORIDE 0.4 MG: 0.4 CAPSULE ORAL at 21:41

## 2018-01-27 RX ADMIN — METFORMIN HYDROCHLORIDE 1000 MG: 500 TABLET ORAL at 17:00

## 2018-01-27 RX ADMIN — FINASTERIDE 5 MG: 5 TABLET, FILM COATED ORAL at 07:49

## 2018-01-27 RX ADMIN — PREGABALIN 150 MG: 75 CAPSULE ORAL at 07:47

## 2018-01-27 NOTE — PLAN OF CARE
"Problem: Goal/Outcome  Goal: Goal Outcome Summary  Outcome: Therapy, progress toward functional goals as expected  OT: pt reports lack of interest in therapeutic exercises saying \"I won't do anything at home, I just like to sit and watch TV, my PCA will do all the other stuff.\" Recommend pt engage in sometime of OOB and community activity, though pt reports lack of interest. Pt was SBA with FWW for all mobility.      "

## 2018-01-27 NOTE — PLAN OF CARE
Problem: Goal/Outcome  Goal: Goal Outcome Summary  Outcome: Improving  FOCUS/GOAL  Medical management    ASSESSMENT, INTERVENTIONS AND CONTINUING PLAN FOR GOAL:  Patient slept well. He is modified independent in the room, uses a walker, he did not get out of bed tonight. He denied pain. Has a riley which was reinserted yesterday, he will be d/c with the riley, he needs riley cares teaching.

## 2018-01-27 NOTE — PLAN OF CARE
Problem: Goal/Outcome  Goal: Goal Outcome Summary  Outcome: Improving  FOCUS/GOAL  Bladder management and Medical management    ASSESSMENT, INTERVENTIONS AND CONTINUING PLAN FOR GOAL:  Pt denied any pain or discomfort, riley patent and intact with jose m out put. Pt still on training on how to change the riley bag to leg bag but he is aware not to trip over his bag while he is moving around. The writer held his BP meds this AM due to to parameters. Pt ate good meals no need for insulin for BG checks.  Nursing will cont POC

## 2018-01-27 NOTE — PLAN OF CARE
Problem: Patient Care Overview  Goal: Plan of Care/Patient Progress Review  Patient had good session this AM - able to participate in higher level balance activities and gait without AD with no overt LOB. More unsteady this PM,needing cues to slow down with gait and maintain close proximity to walker with 2 LOB toward left. Engaged in aerobic exercise on Nustep for 15 minutes, AVSS.

## 2018-01-27 NOTE — PLAN OF CARE
Problem: Goal/Outcome  Goal: Goal Outcome Summary  FOCUS/GOAL  Bladder management, Nutrition/Feeding/Swallowing precautions and Medication management    ASSESSMENT, INTERVENTIONS AND CONTINUING PLAN FOR GOAL:  Pt alert and oriented, VSS, no complaints of pain this shift. Pt did not receive any sliding scale insulin coverage this shift as he did not meet the order parameters. Pt had leg bag for riley catheter drainage and this nurse found it to be full when arriving to pt's room to practice changing to standard bag. Pt stating he did not realize the bag was full, 650mL urine emptied. Pt needing further practice on switching between bags. Dr. Acosta starting pt on finasteride with evening medications. Nursing to continue with POC

## 2018-01-27 NOTE — PROGRESS NOTES
[facility], [place]    Physical Medicine and Rehabilitation Daily Note     Assessment and Plan:    62-year-old with ischemic stroke, uncontrolled diabetes, hypertension, tobacco dependence and urine retention.  Admitted to inpatient acute rehab 1/19.    Interval History:    Patient seen on rounds, making gains with therapies. Glu . Hammond catheter in place  Mod I with transfer and walker. Mod I with dressing. Independent with comprehension. Ambulates 200 ft x 2 with FWW and CGA. Continue therapies and discharge planning.          Review of Systems:    8 systems reviewed, see interval history for details.     Medications:    See chart     Physical Exam:      All vitals stable  Constitutional: Alert, NAD    Lungs:  Clear    Cardiovascular:  RRR   Abdomen: Soft    Genitounirinary: Hammond catheter    Musculoskeletal: No joint swelling or redness    Neurologic:  No new focal changes     Total time spent >25 min with chart review, patient exam, rehab plan and dictation. >50% time spent face to face and with patient care coordination.      DELIA DIGGS

## 2018-01-27 NOTE — PROGRESS NOTES
PM&R Daily Note:    62-year-old with ischemic stroke, uncontrolled diabetes, hypertension, tobacco dependence and urine retention.  Admitted to inpatient acute rehab 1/19.    Removed Riley catheter yesterday and since has had only limited ability to empty bladder with very high residuals.  He is not necessarily feeling as distended or full as he would expect.  Will replace indwelling Riley catheter.  He will need to follow-up with urology as an outpatient.  Begin training on his self-management of catheter at home. Add finasteride. Continues tamsulosin.     Formal care conference held today. please see separate document in Plan of Care tab for full details. Briefly Overall plan for the patient: Cooper has shown some nice functional gains. Urine retention not looking good with trial of void and will replace indwelling riley and teach him to do cares. Targeting home Monday with some initial in home OT, PT and HHA. He already has established PCA but not helping with bathing. Has home RN.    Patient/Family input to goals: insisting he'll not quit smoking. Previously daily support from his sister in law is his PCA and home RN every few weeks.        Vitals:    01/25/18 0729 01/25/18 1500 01/26/18 0800 01/26/18 1550   BP: 121/70 114/68 131/63 125/75   BP Location: Left arm Left arm Left arm Left arm   Pulse: 69 71 75 82   Resp:  15 18 18   Temp: 95.6  F (35.3  C) 96.7  F (35.9  C) 96  F (35.6  C) 97.8  F (36.6  C)   TempSrc: Oral Oral Oral Oral   SpO2: 94% 90% 92% 96%   Weight:       Height:         Alert pleasant, participating in care conference.  Mild dysarthria and left facial weakness  Breathing easy, no cough, slight expiratory wheeze  Heart regular      insulin glargine  20 Units Subcutaneous BID     psyllium  1 packet Oral Daily     metFORMIN  1,000 mg Oral BID w/meals     aspirin  325 mg Oral Daily     clopidogrel  75 mg Oral Daily     lisinopril-hydrochlorothiazide  2 tablet Oral Daily     metoprolol succinate   100 mg Oral Daily     nicotine  1 patch Transdermal Daily     pregabalin  150 mg Oral BID     simvastatin  20 mg Oral At Bedtime     tamsulosin  0.4 mg Oral At Bedtime     insulin aspart  1-7 Units Subcutaneous TID AC     insulin aspart  1-5 Units Subcutaneous At Bedtime     nicotine   Transdermal Daily     nicotine   Transdermal Q8H     50 minutes spent today, 35 in direct patient interaction including care conference, remainder in coordination of care.

## 2018-01-27 NOTE — PLAN OF CARE
FOCUS/GOAL  Bladder management and Discharge planning    ASSESSMENT, INTERVENTIONS AND CONTINUING PLAN FOR GOAL:  Patient had care conference today, brother attended. Plan is for discharge on Monday afternoon. Brother and his wife (his PCA) will plan on coming for discharge for riley care teaching and medication administration instruction. Encourage patient to maintain recommended diabetes plan at home with PCA support. Patient agreed to have home health aid upon discharge.     Patient having difficulty voiding after riley removal yesterday. Patient stated that he had small void early this morning, but was unable to void when asked throughout shift. Indwelling riley catheter replaced per Md's order due to continued retention. After riley insertion this afternoon patient only had 500 cc output of dark yellow urine. Have been encouraging patient to drink fluids during shift and explain importance of adequate hydration.     Patient continues to modified independent in room.

## 2018-01-28 LAB
GLUCOSE BLDC GLUCOMTR-MCNC: 113 MG/DL (ref 70–99)
GLUCOSE BLDC GLUCOMTR-MCNC: 157 MG/DL (ref 70–99)
GLUCOSE BLDC GLUCOMTR-MCNC: 76 MG/DL (ref 70–99)
GLUCOSE BLDC GLUCOMTR-MCNC: 81 MG/DL (ref 70–99)

## 2018-01-28 PROCEDURE — 97110 THERAPEUTIC EXERCISES: CPT | Mod: GP

## 2018-01-28 PROCEDURE — 40000133 ZZH STATISTIC OT WARD VISIT

## 2018-01-28 PROCEDURE — 97116 GAIT TRAINING THERAPY: CPT | Mod: GP | Performed by: PHYSICAL THERAPIST

## 2018-01-28 PROCEDURE — 40000193 ZZH STATISTIC PT WARD VISIT: Performed by: PHYSICAL THERAPIST

## 2018-01-28 PROCEDURE — 97112 NEUROMUSCULAR REEDUCATION: CPT | Mod: GP

## 2018-01-28 PROCEDURE — 97110 THERAPEUTIC EXERCISES: CPT | Mod: GO

## 2018-01-28 PROCEDURE — 25000132 ZZH RX MED GY IP 250 OP 250 PS 637: Performed by: PHYSICAL MEDICINE & REHABILITATION

## 2018-01-28 PROCEDURE — 25000132 ZZH RX MED GY IP 250 OP 250 PS 637: Performed by: PHYSICIAN ASSISTANT

## 2018-01-28 PROCEDURE — 25000132 ZZH RX MED GY IP 250 OP 250 PS 637: Performed by: STUDENT IN AN ORGANIZED HEALTH CARE EDUCATION/TRAINING PROGRAM

## 2018-01-28 PROCEDURE — A9270 NON-COVERED ITEM OR SERVICE: HCPCS | Mod: GY | Performed by: PHYSICAL MEDICINE & REHABILITATION

## 2018-01-28 PROCEDURE — 97110 THERAPEUTIC EXERCISES: CPT | Mod: GP | Performed by: PHYSICAL THERAPIST

## 2018-01-28 PROCEDURE — 12800006 ZZH R&B REHAB

## 2018-01-28 PROCEDURE — 97530 THERAPEUTIC ACTIVITIES: CPT | Mod: GP | Performed by: PHYSICAL THERAPIST

## 2018-01-28 PROCEDURE — 00000146 ZZHCL STATISTIC GLUCOSE BY METER IP

## 2018-01-28 PROCEDURE — A9270 NON-COVERED ITEM OR SERVICE: HCPCS | Mod: GY | Performed by: PHYSICIAN ASSISTANT

## 2018-01-28 PROCEDURE — 97530 THERAPEUTIC ACTIVITIES: CPT | Mod: GO

## 2018-01-28 PROCEDURE — A9270 NON-COVERED ITEM OR SERVICE: HCPCS | Mod: GY | Performed by: STUDENT IN AN ORGANIZED HEALTH CARE EDUCATION/TRAINING PROGRAM

## 2018-01-28 PROCEDURE — 40000193 ZZH STATISTIC PT WARD VISIT

## 2018-01-28 PROCEDURE — 97530 THERAPEUTIC ACTIVITIES: CPT | Mod: GP

## 2018-01-28 PROCEDURE — 97535 SELF CARE MNGMENT TRAINING: CPT | Mod: GO

## 2018-01-28 RX ADMIN — LISINOPRIL AND HYDROCHLOROTHIAZIDE 2 TABLET: 12.5; 2 TABLET ORAL at 08:20

## 2018-01-28 RX ADMIN — PSYLLIUM HUSK 1 PACKET: 3.4 POWDER ORAL at 08:20

## 2018-01-28 RX ADMIN — METFORMIN HYDROCHLORIDE 1000 MG: 500 TABLET ORAL at 08:19

## 2018-01-28 RX ADMIN — PREGABALIN 150 MG: 75 CAPSULE ORAL at 19:16

## 2018-01-28 RX ADMIN — ASPIRIN 325 MG: 325 TABLET, DELAYED RELEASE ORAL at 08:20

## 2018-01-28 RX ADMIN — CLOPIDOGREL BISULFATE 75 MG: 75 TABLET, FILM COATED ORAL at 08:20

## 2018-01-28 RX ADMIN — METOPROLOL SUCCINATE 100 MG: 50 TABLET, EXTENDED RELEASE ORAL at 08:19

## 2018-01-28 RX ADMIN — PREGABALIN 150 MG: 75 CAPSULE ORAL at 08:25

## 2018-01-28 RX ADMIN — SIMVASTATIN 20 MG: 20 TABLET, FILM COATED ORAL at 21:44

## 2018-01-28 RX ADMIN — METFORMIN HYDROCHLORIDE 1000 MG: 500 TABLET ORAL at 17:13

## 2018-01-28 RX ADMIN — FINASTERIDE 5 MG: 5 TABLET, FILM COATED ORAL at 08:20

## 2018-01-28 RX ADMIN — INSULIN ASPART 1 UNITS: 100 INJECTION, SOLUTION INTRAVENOUS; SUBCUTANEOUS at 17:12

## 2018-01-28 RX ADMIN — TAMSULOSIN HYDROCHLORIDE 0.4 MG: 0.4 CAPSULE ORAL at 21:44

## 2018-01-28 RX ADMIN — NICOTINE 1 PATCH: 21 PATCH, EXTENDED RELEASE TRANSDERMAL at 08:25

## 2018-01-28 NOTE — PLAN OF CARE
Problem: Goal/Outcome  Goal: Goal Outcome Summary  FOCUS/GOAL  Bladder management, Nutrition/Feeding/Swallowing precautions and Medication management    ASSESSMENT, INTERVENTIONS AND CONTINUING PLAN FOR GOAL:  Pt alert and oriented, VSS, no complaints of pain. Hammond catheter in place, urine appears very concentrated, pt stating he has not been drinking very much. Pt educated on the importance of drinking and encouraged to drink more water. Pt did not receive any sliding scale insulin coverage as he did not reach the order parameters.

## 2018-01-28 NOTE — PLAN OF CARE
"Problem: Goal/Outcome  Goal: Goal Outcome Summary  Outcome: Therapy, progress toward functional goals as expected  OT: Pt completed Ind day shower see FIM. Pt demonstrates Mod I with basic ADLs in room. Pt noted to have 1 major LOB that would have resulted in fall if therapist was not close SBA for support. Incident occurred due to pt undressing pants and managing riley while standing and balancing. Pt demonstrated safety with shower/dressing procedure when sitting. However, pt appears to lack some safety awareness and verbalizing \"I'm just lazy, I wont where any clothes anyway, I just won't shower, I'll just dry off in bed\" and similar statements as solutions to safety. Pt resistive to exercise  due to  I m lazy, I won t do it at home . Pt otherwise was SBA with FWW for functional mobility and ADL tasks.      "

## 2018-01-28 NOTE — PLAN OF CARE
Problem: Goal/Outcome  Goal: Goal Outcome Summary  Outcome: Improving  FOCUS/GOAL  Medical management    ASSESSMENT, INTERVENTIONS AND CONTINUING PLAN FOR GOAL:  Patient slept well. No c/o pain. He is modified independent in the room using a walker. Has a Hammond. Plan to d/c Monday 01/29.  Continue Hammond cares teaching.

## 2018-01-28 NOTE — PROGRESS NOTES
[facility], [place]    Physical Medicine and Rehabilitation Daily Note     Assessment and Plan:    62-year-old with ischemic stroke, uncontrolled diabetes, hypertension, tobacco dependence and urine retention. Admitted to inpatient acute rehab 1/19.    Interval History:    Patient seen on rounds, making gains with therapies. Glu . Mod I with dressing. Independent with comprehension and expression. Ambulates 200 ft x 2 with FWW and CGA. Mod I with transfers. Continue therapies and discharge planning.          Review of Systems:    8 systems reviewed, see interval history for details.     Medications:    See chart     Physical Exam:      All vitals stable  Constitutional: Alert, NAD    Lungs:  Clear    Cardiovascular:  RRR   Abdomen: Soft    Genitounirinary:  Hammond catheter.    Musculoskeletal: No joint swelling or redness    Neurologic:  No new focal changes    Total time spent >25 min with chart review, patient exam, rehab plan and dictation. >50% time spent face to face and with patient care coordination.       DELIA DIGGS

## 2018-01-28 NOTE — PLAN OF CARE
Problem: Patient Care Overview  Goal: Plan of Care/Patient Progress Review  PT: pt demo good participation in therapy today, on track for DC home tomorrow with home care services.

## 2018-01-28 NOTE — PLAN OF CARE
Problem: Goal/Outcome  Goal: Goal Outcome Summary  Outcome: No Change  FOCUS/GOAL  Bladder management, Medical management and Mobility    ASSESSMENT, INTERVENTIONS AND CONTINUING PLAN FOR GOAL:  Pt's vitals stable. BGs good but trending lower although still>70. Notified Dr Steele and he said he is aware. Will pass on to regular PM&R MD/PA if they want to adjust lantus tomorrow. Pt mod I in room with walker. Had been staying mostly in bed unless with therapies. No bm this shift. Riley patent and draining jose m urine. Pt needs a lot of encouragement to drink fluids because he said he is just not use to drink fluids during the day per baseline. Pt also said that he knows how to administer lantus but he doesn't want to poke himself that he sometimes does not give himself insulin and that his BGs were in the 300s at home. Pt also educated on switching overnight bag to leg bag and he is unable to do it unless given specific step by step directions. He was also unable to demonstrate ability empty his overnight urine bag. He needed repeated step by step directions just to unclamp and clamp back the drainage bag. He is unable to see due to vision problems. He was unable to position bag so it can empty into the toilet. He will need assist switch from leg bag to overnight urine drainage bag and back to leg bag, attached straps around his legs (which he was unable to do without repeated cuing) and also empty urine bags. Discharge planned tomorrow. His PCA will be here tomorrow and will educate on managing riley. Will also clarify if PCA can manage BG checks and insulin.

## 2018-01-29 VITALS
DIASTOLIC BLOOD PRESSURE: 69 MMHG | HEIGHT: 72 IN | TEMPERATURE: 96.6 F | RESPIRATION RATE: 16 BRPM | OXYGEN SATURATION: 92 % | HEART RATE: 86 BPM | SYSTOLIC BLOOD PRESSURE: 112 MMHG | WEIGHT: 213 LBS | BODY MASS INDEX: 28.85 KG/M2

## 2018-01-29 LAB
GLUCOSE BLDC GLUCOMTR-MCNC: 106 MG/DL (ref 70–99)
GLUCOSE BLDC GLUCOMTR-MCNC: 125 MG/DL (ref 70–99)

## 2018-01-29 PROCEDURE — 40000133 ZZH STATISTIC OT WARD VISIT: Performed by: OCCUPATIONAL THERAPIST

## 2018-01-29 PROCEDURE — 25000132 ZZH RX MED GY IP 250 OP 250 PS 637: Mod: GY | Performed by: PHYSICAL MEDICINE & REHABILITATION

## 2018-01-29 PROCEDURE — A9270 NON-COVERED ITEM OR SERVICE: HCPCS | Mod: GY | Performed by: STUDENT IN AN ORGANIZED HEALTH CARE EDUCATION/TRAINING PROGRAM

## 2018-01-29 PROCEDURE — 97535 SELF CARE MNGMENT TRAINING: CPT | Mod: GO | Performed by: OCCUPATIONAL THERAPIST

## 2018-01-29 PROCEDURE — 40000193 ZZH STATISTIC PT WARD VISIT: Performed by: PHYSICAL THERAPIST

## 2018-01-29 PROCEDURE — 97530 THERAPEUTIC ACTIVITIES: CPT | Mod: GO | Performed by: OCCUPATIONAL THERAPIST

## 2018-01-29 PROCEDURE — 25000132 ZZH RX MED GY IP 250 OP 250 PS 637: Mod: GY | Performed by: PHYSICIAN ASSISTANT

## 2018-01-29 PROCEDURE — 00000146 ZZHCL STATISTIC GLUCOSE BY METER IP

## 2018-01-29 PROCEDURE — A9270 NON-COVERED ITEM OR SERVICE: HCPCS | Mod: GY | Performed by: PHYSICAL MEDICINE & REHABILITATION

## 2018-01-29 PROCEDURE — 97110 THERAPEUTIC EXERCISES: CPT | Mod: GP | Performed by: PHYSICAL THERAPIST

## 2018-01-29 PROCEDURE — A9270 NON-COVERED ITEM OR SERVICE: HCPCS | Mod: GY | Performed by: PHYSICIAN ASSISTANT

## 2018-01-29 PROCEDURE — 25000132 ZZH RX MED GY IP 250 OP 250 PS 637: Mod: GY | Performed by: STUDENT IN AN ORGANIZED HEALTH CARE EDUCATION/TRAINING PROGRAM

## 2018-01-29 RX ORDER — CLOPIDOGREL BISULFATE 75 MG/1
75 TABLET ORAL 2 TIMES DAILY
Qty: 60 TABLET | Refills: 0 | Status: SHIPPED | OUTPATIENT
Start: 2018-01-29 | End: 2018-01-29

## 2018-01-29 RX ORDER — CLOPIDOGREL BISULFATE 75 MG/1
75 TABLET ORAL DAILY
Qty: 30 TABLET | Refills: 0 | Status: SHIPPED | OUTPATIENT
Start: 2018-01-29 | End: 2019-01-29

## 2018-01-29 RX ORDER — SIMVASTATIN 20 MG
20 TABLET ORAL AT BEDTIME
Qty: 30 TABLET | Refills: 0 | Status: SHIPPED | OUTPATIENT
Start: 2018-01-29 | End: 2018-12-06

## 2018-01-29 RX ORDER — PREGABALIN 150 MG/1
150 CAPSULE ORAL 2 TIMES DAILY
Qty: 60 CAPSULE | Refills: 0 | Status: SHIPPED | OUTPATIENT
Start: 2018-01-29 | End: 2018-08-01

## 2018-01-29 RX ORDER — LISINOPRIL AND HYDROCHLOROTHIAZIDE 12.5; 2 MG/1; MG/1
2 TABLET ORAL DAILY
Qty: 60 TABLET | Refills: 0 | Status: SHIPPED | OUTPATIENT
Start: 2018-01-29 | End: 2018-12-06

## 2018-01-29 RX ORDER — TAMSULOSIN HYDROCHLORIDE 0.4 MG/1
0.4 CAPSULE ORAL AT BEDTIME
Qty: 30 CAPSULE | Refills: 0 | Status: SHIPPED | OUTPATIENT
Start: 2018-01-29 | End: 2018-12-06

## 2018-01-29 RX ORDER — FINASTERIDE 5 MG/1
5 TABLET, FILM COATED ORAL DAILY
Qty: 30 TABLET | Refills: 0 | Status: SHIPPED | OUTPATIENT
Start: 2018-01-30 | End: 2019-01-29

## 2018-01-29 RX ORDER — METOPROLOL SUCCINATE 100 MG/1
100 TABLET, EXTENDED RELEASE ORAL DAILY
Qty: 30 TABLET | Refills: 0 | Status: SHIPPED | OUTPATIENT
Start: 2018-01-29 | End: 2018-12-06

## 2018-01-29 RX ADMIN — ASPIRIN 325 MG: 325 TABLET, DELAYED RELEASE ORAL at 08:09

## 2018-01-29 RX ADMIN — CLOPIDOGREL BISULFATE 75 MG: 75 TABLET, FILM COATED ORAL at 08:09

## 2018-01-29 RX ADMIN — NICOTINE 1 PATCH: 21 PATCH, EXTENDED RELEASE TRANSDERMAL at 08:09

## 2018-01-29 RX ADMIN — FINASTERIDE 5 MG: 5 TABLET, FILM COATED ORAL at 08:14

## 2018-01-29 RX ADMIN — METFORMIN HYDROCHLORIDE 1000 MG: 500 TABLET ORAL at 08:08

## 2018-01-29 RX ADMIN — PREGABALIN 150 MG: 75 CAPSULE ORAL at 08:07

## 2018-01-29 RX ADMIN — PSYLLIUM HUSK 1 PACKET: 3.4 POWDER ORAL at 08:12

## 2018-01-29 NOTE — PLAN OF CARE
Problem: Goal/Outcome  Goal: Goal Outcome Summary  Outcome: Improving  FOCUS/GOAL  Medical management    ASSESSMENT, INTERVENTIONS AND CONTINUING PLAN FOR GOAL:  Patient slept well. No c/o pain He is modified independent in the room. He is discharging to home today with a riley, per evening shift, patient needs to continue to practice riley cares. He emptied the drainage bag independently and reported the output.

## 2018-01-29 NOTE — PLAN OF CARE
Problem: Patient Care Overview  Goal: Plan of Care/Patient Progress Review  Occupational Therapy Discharge Summary    Reason for therapy discharge:    Discharged to home with home therapy.    Progress towards therapy goal(s). See goals on Care Plan in [facility] electronic health record for goal details.  Goals partially met.  Barriers to achieving goals:   balance deficits, vision loss with Right visual field, and poor safety awareness. .    Therapy recommendation(s):    Continued therapy is recommended.  Rationale/Recommendations:  Home health OT to increase safety and IND with ADLs and IADLs, and home health aide for bathing. .Pt is Mod IND with toilet transfer with FWW and toilet hygiene. Pt requires SBA with UBD and Min A with LBD tasks d/t poor balance. Pt requires SBA with bathtub transfer with grab bars, and SBA with balance d/t balance deficits. Recommend supervision with IADLs (i.e. Meal preparation, money management, and medication management) d/t poor safety awareness and cognitive deficits.

## 2018-01-29 NOTE — PLAN OF CARE
"Problem: Goal/Outcome  Goal: Goal Outcome Summary  Physical Therapy Discharge Summary    Reason for therapy discharge:    Pt to d/c home.  Pt lives alone.  Pt verbalizes that he is \"lazy\" , and not sure he will continue with home program. Pt is now Shukri with FWW for mobility.     Progress towards therapy goal(s). See goals on Care Plan in [facility] electronic health record for goal details.  Goals partially met.  Barriers to achieving goals:   Pt still demonstrates balance deficits, has vision loss from previous stroke affecting R visual field. Pt verbalizes a lack of desidre to exercise, and states he wants to get back to smoking once home, although has been counseled to not smoke, as it is a risk factor for another stroke. Pt issued a FWW, fitted to pt. .    Therapy recommendation(s):    Continued therapy is recommended.  Rationale/Recommendations:  Home PT for balance, home safety eval, fall prevention. .        "

## 2018-01-29 NOTE — PLAN OF CARE
"Problem: Goal/Outcome  Goal: Goal Outcome Summary  FOCUS/GOAL  Bladder management, Medication management and Discharge planning    ASSESSMENT, INTERVENTIONS AND CONTINUING PLAN FOR GOAL:  Pt is alert and oriented, pleasant, VSS. BG at dinner 157, 1 unit sliding scale insulin given. Urine very dark jose m colored. Pt stating \"yeah I don't drink very much.\" Pt educated on importance of drinking more fluids. Leg bag for riley was almost completely full at two different times when nurse came into pt's room. Pt stating he doesn't really check it when asked by nurse. Pt understanding how to drain both types of riley bags and able to switch between leg and standard bags after education and some demonstration. Pt able to demonstrate multiple times how to do each task and stated he feels much better about it now. Continued practice on task probably still needed. When questioned by nurse pt stating that he would only checks his blood sugars at home once every couple days and it would be in the 300's. Educated pt that he needs to check his BG more often and 300's is much too high. Pt following up much of the nurses education with \"I get that.\"         "

## 2018-01-29 NOTE — DISCHARGE SUMMARY
[facility]   Acute Rehabilitation Unit  Discharge summary     Date of Admission: 1/19/2018  Date of Discharge: 1/29/2018  Disposition: home  Primary Care Physician: Hudson Spring  Attending physician: Dr. Guille Acosta        discharge diagnosis  Left medullary ischemic cva  Dm type 2 with hyperglycemia, neuropathy  htn  Tobacco use disorder  Urinary retention      brief summary  Mr. Cooper Cabral is a 63 yo man with PMH significant for DM II with neuropathy, HTN, HLD, tobacco abuse and 2 prior CVAs who was admitted on 1/12/18 with left sided weakness of 2 days duration with MRI with acute infarct within the lateral left medulla and multiple chronic lacunar infarcts within the cerebellum, brainstem, and bilateral basal ganglia. He presents with new ataxia, decreased balance, impulsivity, decreased safety awareness 2/2 above resulting in new impairments with mobility and ADLs. He is admitted to the ARU on 1/19/18 for ongoing medical management and PT/OT.      rehabilitation course  Rehab Diagnosis: stroke     Active Medical Co-morbidities/Prognosis: poorly controlled diabetes, tobacco use disorder with no intent to quit, hypertension, hyperlipidemia, urine retention.     Safety: Modified Independent in the room, using walker to ambulate. Able to call appropriately for help. Alert and oriented. No alarms on.      Pain: Denies any pain.     Medications, Skin, Tubes/Lines: Able to take medications whole with water. Mepilex on left elbow for abrasion. No tubes or lines.      Swallowing/Nutrition:     Bowel/Bladder: BM this morning on 1/26 per pt report. Pt stated it was formed, denies any diarrhea or incontinence of bowel. Bladder scan done at 0445 showed >999cc. Pt stated he had gotten up to the bathroom and voided a couple times during the night but only voided in small amounts. Straight cath'd 1400cc at 0500.      Psychosocial: Pt resides alone. Pts sister provides PCA  support. Goal to return home with continued support and ongoing PCA services. Team working towards a safe d/c plan.      ADLs/IADLs: Pt making progress with ADLs. Pt is Mod IND UB/LB dressing, IND standing g/h, and Mod IND toilet transfer/toilet hygiene during day, receiving supervision overnight. Plan to progress to complete Mod IND today pending safety and consistency. Pt requiring SBA TB bathing and shower transfer, recommend initial oversight with HHA. Recommending shower chair, however pt reports that shower is equipt with grab bars and too small for shower chair. Will need to simulate bathroom mobility without AD prior to discharge. Pt needing cues to complete tasks slowly to maintain balance. Pt SBA/Mod IND simple meal prep (microwave meals) and SBA household tasks. Recommend HH OT following discharge.     Mobility: improving balance. Still recommending walker. Working on endurance.     Cognition/Language: Not a barrier to discharge. Pt has PCA support every other day and check in support from family. Pt is minimally involved with IADLs. Pt also has RN support for med admin/BG checks.      Decision maker: self        Fall Precautions: continue     Patient/Family input to goals: insisting he'll not quit smoking. Previously daily support from his sister in law is his PCA and home RN every few weeks.     Overall plan for the patient: Cooper has shown some nice functional gains. Urine retention not looking good with trial of void and will replace indwelling riley and teach him to do cares. Targeting home Monday with some initial in home OT, PT and HHA. He already has established PCA but not helping with bathing. Has home RN.     mEDICAL COURSE  Left medullary ischemic CVA: with history of multiple past CVAs.  -- 325mg ASA and plavix qday  -- PT and OT  -- Stroke education  -- Optimization of : DM II, HTN, HLD, and tobacco abuse as outlined below. Patient educated on risk factors and management throughout ARU  "stay.      DM II with neuropathy: HgbA1c 11.9% non complaint with insulin, trulicity, BG management, reportedly dislikes needles. Glucose last 24 hours . States he  eats less food and more healthy during hospital stay and he will try to do better at home with that but states \"I will not be moving anywhere near this much\".    -continue Metformin 1000 mg bid  -- keep dose same am and pm- continue Lantus 20 units bid  -encouraged qid accuchecks though patient states he will do once \"maybe twice at most\" a day  --continue  lyrica for neuropathy  -follow up with primary care-      HTN: Goal BP <140/80 long term.  Well controlled.   --lisinopril/HCTZ 40-25mg, qday  --metoprolol XL 100mg qday      HLD: LDL 92, goal <70.   --20mg simvastatin qday      Tobacco Use: verbalizing strong opposition to cessation, regular education.  Verbalizes plan to continue to smoke.      Urinary Retention: Suspect 2/2 BPH. Riley initially removed 1/18 with retention up to 1 liter, riley placed started on flomax, removed 1/25 with ongoing retention was replaced and trained on riley cares.  -continue flomax, finasteride.   -follow up with urology       Right MCA stenosis- with post stenotic fusiform dilation, asymptomatic  --No acute interventions needed at this time  --Follow up with neuro intervention as an outpatient    dISCHARGE MEDICATIONS  Discharge Medication List as of 1/29/2018  1:18 PM      START taking these medications    Details   finasteride (PROSCAR) 5 MG tablet Take 1 tablet (5 mg) by mouth daily, Disp-30 tablet, R-0, E-Prescribe         CONTINUE these medications which have CHANGED    Details   insulin glargine (LANTUS) 100 UNIT/ML injection Inject 20 Units Subcutaneous 2 times daily, Disp-15 mL, R-0, E-Prescribe      lisinopril-hydrochlorothiazide (PRINZIDE/ZESTORETIC) 20-12.5 MG per tablet Take 2 tablets by mouth daily, Disp-60 tablet, R-0, E-Prescribe      metoprolol succinate (TOPROL-XL) 100 MG 24 hr tablet Take 1 " tablet (100 mg) by mouth daily, Disp-30 tablet, R-0, E-Prescribe      pregabalin (LYRICA) 150 MG capsule Take 1 capsule (150 mg) by mouth 2 times daily, Disp-60 capsule, R-0, Local Print      simvastatin (ZOCOR) 20 MG tablet Take 1 tablet (20 mg) by mouth At Bedtime, Disp-30 tablet, R-0, E-Prescribe      tamsulosin (FLOMAX) 0.4 MG capsule Take 1 capsule (0.4 mg) by mouth At Bedtime, Disp-30 capsule, R-0, E-Prescribe      clopidogrel (PLAVIX) 75 MG tablet Take 1 tablet (75 mg) by mouth daily, Disp-30 tablet, R-0, E-Prescribe         CONTINUE these medications which have NOT CHANGED    Details   aspirin  MG EC tablet Take 1 tablet (325 mg) by mouth daily, Disp-60 tablet, R-0, Transitional      blood glucose monitoring (ACCU-CHEK LUISANA) test strip by In Vitro route 1 times daily (test), Disp-100 strip, R-3, E-PrescribeProfile Rx: patient will contact pharmacy when needed      order for DME Diabetic shoes due to neuropathyDisp-1 Device, R-0, Local Print      insulin pen needle (B-D U/F) 31G X 8 MM Use once daily or as directed.Disp-90 each, [id number]      Multiple Vitamins-Minerals (MULTI FOR HIM PO) Take 1 capsule by mouth daily., 1 capsule, Oral, DAILY, Until Discontinued, Historical      ACCU-CHEK MULTICLIX LANCETS MISC tests twice a day, Does not apply, Disp-100, R-3, Fax         STOP taking these medications       insulin aspart (NOVOLOG PEN) 100 UNIT/ML injection Comments:   Reason for Stopping:         nicotine (NICODERM CQ) 21 MG/24HR 24 hr patch Comments:   Reason for Stopping:         potassium chloride (KLOR-CON) 20 MEQ Packet Comments:   Reason for Stopping:                 DISCHARGE INSTRUCTIONS AND FOLLOW UP    Discharge Procedure Orders  UROLOGY ADULT REFERRAL   Referral Type: Consultation     Home care nursing referral   Referral Type: Home Health Therapies & Aides     Home Care PT Referral for Hospital Discharge   Referral Type: Home Health Therapies & Aides     Home Care OT Referral for  Hospital Discharge     Reason for your hospital stay   Order Comments: Admitted to rehab following a stroke.     Adult [facility] Follow-up and recommended labs and tests   Order Comments: Follow up with Primary care (follow up hospitalization, diabetes, htn) , Neurology (follow up stroke), Urology (urinary retention).     Appointments on [place] and/or [facility] (with [facility] or [facility] provider or service). Call [phone] if you haven't heard regarding these appointments within 7 days of discharge.     Activity   Order Comments: Your activity upon discharge: up with walker no driving.   Order Specific Question Answer Comments   Is discharge order? Yes      Monitor and record   Order Comments: blood glucose 4 times a day, before meals and at bedtime     MD face to face encounter   Order Comments: Documentation of Face to Face and Certification for Home Health Services    I certify that patient: Cooper Cabral is under my care and that I, or a nurse practitioner or physician's assistant working with me, had a face-to-face encounter that meets the physician face-to-face encounter requirements with this patient on: 1/29/2018.    This encounter with the patient was in whole, or in part, for the following medical condition, which is the primary reason for home health care: s/p stroke with impaired mobility.    I certify that, based on my findings, the following services are medically necessary home health services: Nursing, Occupational Therapy and Physical Therapy.    My clinical findings support the need for the above services because: Nurse is needed: To provide assessment and oversight required in the home to assure adherence to the medical plan due to: past medical non complianc,recurrent stroke.., Occupational Therapy Services are needed to assess and treat cognitive ability and address ADL safety due to impairment in functional mobility. and Physical Therapy Services are needed to assess and treat the  following functional impairments: balance, mobility.    Further, I certify that my clinical findings support that this patient is homebound (i.e. absences from home require considerable and taxing effort and are for medical reasons or [facility] services or infrequently or of short duration when for other reasons) because: Requires assistance of another person or specialized equipment to access medical services because patient requires supervision for safety, transportation.     Based on the above findings. I certify that this patient is confined to the home and needs intermittent skilled nursing care, physical therapy and/or speech therapy.  The patient is under my care, and I have initiated the establishment of the plan of care.  This patient will be followed by a physician who will periodically review the plan of care.  Physician/Provider to provide follow up care: Hudson Spring    Attending hospital physician (the Medicare certified [place] provider): Guille Acosta MD  Physician Signature: See electronic signature associated with these discharge orders.  Date: 1/29/2018     Diet   Order Comments: Follow this diet upon discharge: Please follow a diabetic diet.  Limit sugars, sodas, syrups etc.     Combination Diet Regular Diet Adult; [id number] Calories: Moderate Consistent CHO (4-6 CHO units/meal)   Order Specific Question Answer Comments   Is discharge order? Yes             physical examination    Most recent Vital Signs:   Vitals:    01/28/18 1217 01/28/18 1544 01/29/18 0747 01/29/18 1232   BP: 116/64 118/67 103/47 112/69   BP Location: Right arm Left arm Left arm Left arm   Pulse: 83 76 71 86   Resp:  16 16    Temp:  97.5  F (36.4  C) 96.6  F (35.9  C)    TempSrc:  Oral Oral    SpO2:  97% 92%    Weight:       Height:       General: alert NAD  CV: rrr  Pulm: non labored clear on room air  Ab: soft non distended non tender  Neuro: alert oriented, speech clear, right visual deficit, strength 5/5 throughout.   Ambulating with walker.       50 minutes spent in discharge, including >50% in counseling and coordination of care, medication review and plan of care recommended on follow up.     Patient was evaluated on day of discharge by attending physician, No att. providers found, who agrees with plan of care.    Discharge summary was forwarded to Hudson Spring (PCP) at the time of discharge, so as to bridge from hospital to outpatient care.     It was our pleasure to care for Cooper Cabral during this hospitalization. Please do not hesitate to contact me should there be questions regarding the hospital course or discharge plan.          Ruth Mendieta PA-C   Rehab Medicine Service  [phone]

## 2018-01-29 NOTE — PLAN OF CARE
Problem: Goal/Outcome  Goal: Goal Outcome Summary  Outcome: Adequate for Discharge Date Met: 01/29/18  FOCUS/GOAL  Discharge planning and Mobility    ASSESSMENT, INTERVENTIONS AND CONTINUING PLAN FOR GOAL:  Pt independent in room with walker. Vitals stable. BGs good. Pt able to demonstrate ability to administer lantus injection to self correctly. Pt also able to demonstrate ability to empty overnight urine drainage bag. Educated on riley cares and able to demonstrate that he can do that. Encouraged to drink fluids because urine started to get jose m again by mid morning and he was more compliant today with drinking fluids. When pt's brother and sister in law/PCA came, they were educated on riley management.  Also demonstrated how to empty overnight urine drainage bag and leg bag. Demonstrated to them also how to switch from overnight urine drainage bag and leg back. Encouraged pt and family/PCA to use urine overnight bag and only use leg bag when pt going out to minimize risk of infection with switching bags. He has been able to manage that bag while in the unit. Given a handout on managing riley catheter at home. They verbalized understanding. Discharge meds and instructions reviewed with pt and family before discharge. Assisted by this nurse out of unit for discharge ambulatory using walker accompanied by family.

## 2018-02-05 ENCOUNTER — MEDICAL CORRESPONDENCE (OUTPATIENT)
Dept: HEALTH INFORMATION MANAGEMENT | Facility: CLINIC | Age: 63
End: 2018-02-05

## 2018-02-12 ENCOUNTER — TELEPHONE (OUTPATIENT)
Dept: LAB | Facility: CLINIC | Age: 63
End: 2018-02-12

## 2018-02-13 ENCOUNTER — OFFICE VISIT (OUTPATIENT)
Dept: UROLOGY | Facility: CLINIC | Age: 63
End: 2018-02-13
Payer: COMMERCIAL

## 2018-02-13 VITALS
HEART RATE: 64 BPM | HEIGHT: 72 IN | BODY MASS INDEX: 33.86 KG/M2 | DIASTOLIC BLOOD PRESSURE: 78 MMHG | SYSTOLIC BLOOD PRESSURE: 118 MMHG | WEIGHT: 250 LBS

## 2018-02-13 DIAGNOSIS — R33.9 URINARY RETENTION: Primary | ICD-10-CM

## 2018-02-13 PROCEDURE — 99203 OFFICE O/P NEW LOW 30 MIN: CPT | Mod: 25 | Performed by: UROLOGY

## 2018-02-13 PROCEDURE — 51700 IRRIGATION OF BLADDER: CPT | Performed by: UROLOGY

## 2018-02-13 ASSESSMENT — PAIN SCALES - GENERAL: PAINLEVEL: NO PAIN (0)

## 2018-02-13 NOTE — LETTER
2/13/2018       RE: Cooper Cabral  [address]  [place] [place] [facility] [address]     Dear Colleague,    Thank you for referring your patient, Cooper Cabral, to the [facility] [place] [facility]. Please see a copy of my visit note below.    [facility]  Main Office: [address]  [address]  [place] [address]       CHIEF COMPLAINT:  Urinary retention    HISTORY:   I was asked by Dr. Guille Acosta, hospitalist at the [facility], to see this 62-year-old diabetic gentleman in regards to urinary retention after a stroke. He was admitted to the hospital on January 12 with left-sided weakness. He had a prior history of a stroke. He was ultimately discharged on January 29. He was also found to be in urinary retention. He underwent one trial of void in the hospital but he failed. He was started on Flomax upon discharge. He continues to take the medication. He has no prior urologic history. He reports no urinary symptoms prior to his hospitalization. He has never seen a urologist previously.      PAST MEDICAL HISTORY:   Past Medical History:   Diagnosis Date     Diabetes (H)      HTN (hypertension)        PAST SURGICAL HISTORY: No past surgical history on file.    FAMILY HISTORY:   Family History   Problem Relation Age of Onset     CANCER Mother      Bone cancer      DIABETES Father      CANCER Father      bone cancer      Hypertension Brother      Hypertension Brother        SOCIAL HISTORY:   Social History   Substance Use Topics     Smoking status: Current Every Day Smoker     Packs/day: 1.00     Years: 40.00     Types: Cigarettes     Smokeless tobacco: Never Used     Alcohol use No        No Known Allergies      Current Outpatient Prescriptions:      finasteride (PROSCAR) 5 MG tablet, Take 1 tablet (5 mg) by mouth daily, Disp: 30 tablet, Rfl: 0     insulin glargine (LANTUS) 100 UNIT/ML injection, Inject  20 Units Subcutaneous 2 times daily, Disp: 15 mL, Rfl: 0     lisinopril-hydrochlorothiazide (PRINZIDE/ZESTORETIC) 20-12.5 MG per tablet, Take 2 tablets by mouth daily, Disp: 60 tablet, Rfl: 0     metoprolol succinate (TOPROL-XL) 100 MG 24 hr tablet, Take 1 tablet (100 mg) by mouth daily, Disp: 30 tablet, Rfl: 0     pregabalin (LYRICA) 150 MG capsule, Take 1 capsule (150 mg) by mouth 2 times daily, Disp: 60 capsule, Rfl: 0     simvastatin (ZOCOR) 20 MG tablet, Take 1 tablet (20 mg) by mouth At Bedtime, Disp: 30 tablet, Rfl: 0     tamsulosin (FLOMAX) 0.4 MG capsule, Take 1 capsule (0.4 mg) by mouth At Bedtime, Disp: 30 capsule, Rfl: 0     clopidogrel (PLAVIX) 75 MG tablet, Take 1 tablet (75 mg) by mouth daily, Disp: 30 tablet, Rfl: 0     aspirin  MG EC tablet, Take 1 tablet (325 mg) by mouth daily, Disp: 60 tablet, Rfl: 0     blood glucose monitoring (ACCU-CHEK LUISANA) test strip, by In Vitro route 1 times daily (test), Disp: 100 strip, Rfl: 3     order for DME, Diabetic shoes due to neuropathy, Disp: 1 Device, Rfl: 0     insulin pen needle (B-D U/F) 31G X 8 MM, Use once daily or as directed., Disp: 90 each, Rfl: 1     Multiple Vitamins-Minerals (MULTI FOR HIM PO), Take 1 capsule by mouth daily., Disp: , Rfl:      ACCU-CHEK MULTICLIX LANCETS MISC, tests twice a day, Disp: 100, Rfl: 3    Review Of Systems:  Skin: negative  Eyes: negative  Ears/Nose/Throat: negative  Respiratory: No shortness of breath, dyspnea on exertion, cough, or hemoptysis  Cardiovascular: negative  Gastrointestinal: negative  Genitourinary: negative  Musculoskeletal: negative  Neurologic: negative  Psychiatric: negative  Hematologic/Lymphatic/Immunologic: negative  Endocrine: negative      PHYSICAL EXAM:    There were no vitals taken for this visit.  General appearance: In NAD, conversant  HEENT: Normocephalic and atraumatic, anicteric sclera  Cardiovascular: Not examined  Respiratory: normal, non-labored breathing  Gastrointestinal:  negative, Abdomen soft, non-tender, and non-distended.   Musculoskeletal: Not Examined  Peripheral Vascular/extremity: No peripheral edema  Skin: Normal temperature, turgor, and texture. No rash  Psychiatric: Appropriate affect, alert and oriented to person, place, and time    Penis: Normal  Scrotal skin: Normal, no lesions  Testicles: Normal to palpation bilaterally  Epididymis: Normal to palpation bilaterally  Lymphatic: Normal inguinal lymph nodes  Digital Rectal Exam: His prostate is only slightly enlarged but benign and symmetric to palpation    Cystoscopy: Not done      PSA: Last PSA was 2.11 in September 2016    UA RESULTS:  No results for input(s): COLOR, APPEARANCE, URINEGLC, URINEBILI, URINEKETONE, SG, UBLD, URINEPH, PROTEIN, UROBILINOGEN, NITRITE, LEUKEST, RBCU, WBCU in the last 11924 hours.    Bladder Scan:     Other Labs:      Imaging Studies: None      CLINICAL IMPRESSION:   We filled his bladder with 250 mL of water today and remove the [place] catheter. He was unable to urinate.    PLAN:   He has continued urinary retention. We discussed the clinical situation. I recommended that he stay on Flomax and give his urinary retention more time to resolve. I recommended that we leave the catheter again for a month. I will see him back in 1 month. In the morning we'll simply remove his catheter but then in the afternoon we will see him back to see if he has voided that day and also check a bladder scan.      Tonio Amador MD

## 2018-02-13 NOTE — LETTER
2/13/2018      RE: Cooper Cabral  [address]  [facility] [address]       [facility]  Main Office: [address]  [address]  [place] [address]       CHIEF COMPLAINT:  Urinary retention    HISTORY:   I was asked by Dr. Guille Acosta, hospitalist at the [facility], to see this 62-year-old diabetic gentleman in regards to urinary retention after a stroke. He was admitted to the hospital on January 12 with left-sided weakness. He had a prior history of a stroke. He was ultimately discharged on January 29. He was also found to be in urinary retention. He underwent one trial of void in the hospital but he failed. He was started on Flomax upon discharge. He continues to take the medication. He has no prior urologic history. He reports no urinary symptoms prior to his hospitalization. He has never seen a urologist previously.      PAST MEDICAL HISTORY:   Past Medical History:   Diagnosis Date     Diabetes (H)      HTN (hypertension)        PAST SURGICAL HISTORY: No past surgical history on file.    FAMILY HISTORY:   Family History   Problem Relation Age of Onset     CANCER Mother      Bone cancer      DIABETES Father      CANCER Father      bone cancer      Hypertension Brother      Hypertension Brother        SOCIAL HISTORY:   Social History   Substance Use Topics     Smoking status: Current Every Day Smoker     Packs/day: 1.00     Years: 40.00     Types: Cigarettes     Smokeless tobacco: Never Used     Alcohol use No        No Known Allergies      Current Outpatient Prescriptions:      finasteride (PROSCAR) 5 MG tablet, Take 1 tablet (5 mg) by mouth daily, Disp: 30 tablet, Rfl: 0     insulin glargine (LANTUS) 100 UNIT/ML injection, Inject 20 Units Subcutaneous 2 times daily, Disp: 15 mL, Rfl: 0     lisinopril-hydrochlorothiazide (PRINZIDE/ZESTORETIC) 20-12.5 MG per tablet, Take 2 tablets by mouth daily, Disp: 60 tablet, Rfl: 0     metoprolol succinate (TOPROL-XL)  100 MG 24 hr tablet, Take 1 tablet (100 mg) by mouth daily, Disp: 30 tablet, Rfl: 0     pregabalin (LYRICA) 150 MG capsule, Take 1 capsule (150 mg) by mouth 2 times daily, Disp: 60 capsule, Rfl: 0     simvastatin (ZOCOR) 20 MG tablet, Take 1 tablet (20 mg) by mouth At Bedtime, Disp: 30 tablet, Rfl: 0     tamsulosin (FLOMAX) 0.4 MG capsule, Take 1 capsule (0.4 mg) by mouth At Bedtime, Disp: 30 capsule, Rfl: 0     clopidogrel (PLAVIX) 75 MG tablet, Take 1 tablet (75 mg) by mouth daily, Disp: 30 tablet, Rfl: 0     aspirin  MG EC tablet, Take 1 tablet (325 mg) by mouth daily, Disp: 60 tablet, Rfl: 0     blood glucose monitoring (ACCU-CHEK LUISANA) test strip, by In Vitro route 1 times daily (test), Disp: 100 strip, Rfl: 3     order for DME, Diabetic shoes due to neuropathy, Disp: 1 Device, Rfl: 0     insulin pen needle (B-D U/F) 31G X 8 MM, Use once daily or as directed., Disp: 90 each, Rfl: 1     Multiple Vitamins-Minerals (MULTI FOR HIM PO), Take 1 capsule by mouth daily., Disp: , Rfl:      ACCU-CHEK MULTICLIX LANCETS MISC, tests twice a day, Disp: 100, Rfl: 3    Review Of Systems:  Skin: negative  Eyes: negative  Ears/Nose/Throat: negative  Respiratory: No shortness of breath, dyspnea on exertion, cough, or hemoptysis  Cardiovascular: negative  Gastrointestinal: negative  Genitourinary: negative  Musculoskeletal: negative  Neurologic: negative  Psychiatric: negative  Hematologic/Lymphatic/Immunologic: negative  Endocrine: negative      PHYSICAL EXAM:    There were no vitals taken for this visit.  General appearance: In NAD, conversant  HEENT: Normocephalic and atraumatic, anicteric sclera  Cardiovascular: Not examined  Respiratory: normal, non-labored breathing  Gastrointestinal: negative, Abdomen soft, non-tender, and non-distended.   Musculoskeletal: Not Examined  Peripheral Vascular/extremity: No peripheral edema  Skin: Normal temperature, turgor, and texture. No rash  Psychiatric: Appropriate affect, alert  and oriented to person, place, and time    Penis: Normal  Scrotal skin: Normal, no lesions  Testicles: Normal to palpation bilaterally  Epididymis: Normal to palpation bilaterally  Lymphatic: Normal inguinal lymph nodes  Digital Rectal Exam: His prostate is only slightly enlarged but benign and symmetric to palpation    Cystoscopy: Not done      PSA: Last PSA was 2.11 in September 2016    UA RESULTS:  No results for input(s): COLOR, APPEARANCE, URINEGLC, URINEBILI, URINEKETONE, SG, UBLD, URINEPH, PROTEIN, UROBILINOGEN, NITRITE, LEUKEST, RBCU, WBCU in the last 74258 hours.    Bladder Scan:     Other Labs:      Imaging Studies: None      CLINICAL IMPRESSION:   We filled his bladder with 250 mL of water today and remove the Hammond catheter. He was unable to urinate.    PLAN:   He has continued urinary retention. We discussed the clinical situation. I recommended that he stay on Flomax and give his urinary retention more time to resolve. I recommended that we leave the catheter again for a month. I will see him back in 1 month. In the morning we'll simply remove his catheter but then in the afternoon we will see him back to see if he has voided that day and also check a bladder scan.      Tonio Amador MD

## 2018-02-13 NOTE — PROGRESS NOTES
[facility]  Main Office: [address]  [address]  [place] [address]       CHIEF COMPLAINT:  Urinary retention    HISTORY:   I was asked by Dr. Guille Acosta, hospitalist at the [facility], to see this 62-year-old diabetic gentleman in regards to urinary retention after a stroke. He was admitted to the hospital on January 12 with left-sided weakness. He had a prior history of a stroke. He was ultimately discharged on January 29. He was also found to be in urinary retention. He underwent one trial of void in the hospital but he failed. He was started on Flomax upon discharge. He continues to take the medication. He has no prior urologic history. He reports no urinary symptoms prior to his hospitalization. He has never seen a urologist previously.      PAST MEDICAL HISTORY:   Past Medical History:   Diagnosis Date     Diabetes (H)      HTN (hypertension)        PAST SURGICAL HISTORY: No past surgical history on file.    FAMILY HISTORY:   Family History   Problem Relation Age of Onset     CANCER Mother      Bone cancer      DIABETES Father      CANCER Father      bone cancer      Hypertension Brother      Hypertension Brother        SOCIAL HISTORY:   Social History   Substance Use Topics     Smoking status: Current Every Day Smoker     Packs/day: 1.00     Years: 40.00     Types: Cigarettes     Smokeless tobacco: Never Used     Alcohol use No        No Known Allergies      Current Outpatient Prescriptions:      finasteride (PROSCAR) 5 MG tablet, Take 1 tablet (5 mg) by mouth daily, Disp: 30 tablet, Rfl: 0     insulin glargine (LANTUS) 100 UNIT/ML injection, Inject 20 Units Subcutaneous 2 times daily, Disp: 15 mL, Rfl: 0     lisinopril-hydrochlorothiazide (PRINZIDE/ZESTORETIC) 20-12.5 MG per tablet, Take 2 tablets by mouth daily, Disp: 60 tablet, Rfl: 0     metoprolol succinate (TOPROL-XL) 100 MG 24 hr tablet, Take 1 tablet (100 mg) by mouth daily, Disp: 30 tablet, Rfl: 0      pregabalin (LYRICA) 150 MG capsule, Take 1 capsule (150 mg) by mouth 2 times daily, Disp: 60 capsule, Rfl: 0     simvastatin (ZOCOR) 20 MG tablet, Take 1 tablet (20 mg) by mouth At Bedtime, Disp: 30 tablet, Rfl: 0     tamsulosin (FLOMAX) 0.4 MG capsule, Take 1 capsule (0.4 mg) by mouth At Bedtime, Disp: 30 capsule, Rfl: 0     clopidogrel (PLAVIX) 75 MG tablet, Take 1 tablet (75 mg) by mouth daily, Disp: 30 tablet, Rfl: 0     aspirin  MG EC tablet, Take 1 tablet (325 mg) by mouth daily, Disp: 60 tablet, Rfl: 0     blood glucose monitoring (ACCU-CHEK LUISANA) test strip, by In Vitro route 1 times daily (test), Disp: 100 strip, Rfl: 3     order for DME, Diabetic shoes due to neuropathy, Disp: 1 Device, Rfl: 0     insulin pen needle (B-D U/F) 31G X 8 MM, Use once daily or as directed., Disp: 90 each, Rfl: 1     Multiple Vitamins-Minerals (MULTI FOR HIM PO), Take 1 capsule by mouth daily., Disp: , Rfl:      ACCU-CHEK MULTICLIX LANCETS MISC, tests twice a day, Disp: 100, Rfl: 3    Review Of Systems:  Skin: negative  Eyes: negative  Ears/Nose/Throat: negative  Respiratory: No shortness of breath, dyspnea on exertion, cough, or hemoptysis  Cardiovascular: negative  Gastrointestinal: negative  Genitourinary: negative  Musculoskeletal: negative  Neurologic: negative  Psychiatric: negative  Hematologic/Lymphatic/Immunologic: negative  Endocrine: negative      PHYSICAL EXAM:    There were no vitals taken for this visit.  General appearance: In NAD, conversant  HEENT: Normocephalic and atraumatic, anicteric sclera  Cardiovascular: Not examined  Respiratory: normal, non-labored breathing  Gastrointestinal: negative, Abdomen soft, non-tender, and non-distended.   Musculoskeletal: Not Examined  Peripheral Vascular/extremity: No peripheral edema  Skin: Normal temperature, turgor, and texture. No rash  Psychiatric: Appropriate affect, alert and oriented to person, place, and time    Penis: Normal  Scrotal skin: Normal, no  lesions  Testicles: Normal to palpation bilaterally  Epididymis: Normal to palpation bilaterally  Lymphatic: Normal inguinal lymph nodes  Digital Rectal Exam: His prostate is only slightly enlarged but benign and symmetric to palpation    Cystoscopy: Not done      PSA: Last PSA was 2.11 in September 2016    UA RESULTS:  No results for input(s): COLOR, APPEARANCE, URINEGLC, URINEBILI, URINEKETONE, SG, UBLD, URINEPH, PROTEIN, UROBILINOGEN, NITRITE, LEUKEST, RBCU, WBCU in the last 72253 hours.    Bladder Scan:     Other Labs:      Imaging Studies: None      CLINICAL IMPRESSION:   We filled his bladder with 250 mL of water today and remove the Hammond catheter. He was unable to urinate.    PLAN:   He has continued urinary retention. We discussed the clinical situation. I recommended that he stay on Flomax and give his urinary retention more time to resolve. I recommended that we leave the catheter again for a month. I will see him back in 1 month. In the morning we'll simply remove his catheter but then in the afternoon we will see him back to see if he has voided that day and also check a bladder scan.      Tonio Amador MD

## 2018-02-13 NOTE — NURSING NOTE
Patient to have trial of void.  Leg bag removed from riley catheter.  250 cc sterile water instilled into bladder through catheter via gravity.  Balloon deflated.  Catheter removed.  Patient unable to void.    Catheter insertion documentation on 2/13/2018:    Cooper Cabral presents to the clinic for catheter insertion.  Reason for insertion: urinary retention  Order has been verified. yes  Catheter successfully inserted into the urethral meatus in the usual sterile fashion without immediate complication.  Type of catheter placed: 18 fr coude indwelling   Urine is jose m in color.  300 cc's of urine output returned.  Balloon was filled with 8 cc's of normal saline.  Securement device placed for the catheter.  The patient tolerated the procedure well and was instructed to call with any questions or concerns.    Rhiannon Hernandez LPN

## 2018-02-13 NOTE — MR AVS SNAPSHOT
After Visit Summary   2/13/2018    Cooper Cabral    MRN: [id number]           Patient Information     Date Of Birth          1955        Visit Information        Provider Department      2/13/2018 1:30 PM Tonio Amador MD [facility]         Follow-ups after your visit        Follow-up notes from your care team     Return in about 1 month (around 3/13/2018) for AM cath out with nurse, see me that same afternoon for UA and scan.      Your next 10 appointments already scheduled     Mar 15, 2018  9:00 AM CDT   Nurse Visit with UA NURSE   [facility] ([facility] [place])    [address]  [address]  [place] [address] [phone]            Mar 15, 2018  1:30 PM CDT   Return Visit with Tonio Amador MD   [facility] ([facility] [place])    [address]  [address]  [place] [address] [phone]              Who to contact     If you have questions or need follow up information about today's clinic visit or your schedule please contact [facility] directly at [phone].  Normal or non-critical lab and imaging results will be communicated to you by MyChart, letter or phone within 4 business days after the clinic has received the results. If you do not hear from us within 7 days, please contact the clinic through [employer] or phone. If you have a critical or abnormal lab result, we will notify you by phone as soon as possible.  Submit refill requests through [employer] or call your pharmacy and they will forward the refill request to us. Please allow 3 business days for your refill to be completed.          Additional Information About Your Visit        [facility] Information     [employer] gives you secure access to your electronic health record. If you see a primary care provider, you can also send  messages to your care team and make appointments. If you have questions, please call your primary care clinic.  If you do not have a primary care provider, please call [phone] and they will assist you.        Care EveryWhere ID     This is your Care EveryWhere ID. This could be used by other organizations to access your Section medical records  [id number]        Your Vitals Were     Pulse Height BMI (Body Mass Index)             64 1.829 m (6') 33.91 kg/m2          Blood Pressure from Last 3 Encounters:   02/13/18 118/78   01/29/18 112/69   01/19/18 123/84    Weight from Last 3 Encounters:   02/13/18 113.4 kg (250 lb)   01/19/18 96.6 kg (213 lb)   01/12/18 113.4 kg (250 lb)              Today, you had the following     No orders found for display       Primary Care Provider Office Phone # Fax #    [facility] [phone]       [address] [place] [address]        Equal Access to Services     [facility]: Hadii aad ku hadasho Soomaali, waaxda luqadaha, qaybta kaalmada adeegyada, waxay hiwot haynavneet cabrera . So [facility] [phone].    ATENCIÓN: Si habla español, tiene a beltran disposición servicios gratuitos de asistencia lingüística. Ll[facility] [phone].    We comply with applicable federal civil rights laws and [place] laws. We do not discriminate on the basis of race, color, national origin, age, disability, sex, sexual orientation, or gender identity.            Thank you!     Thank you for choosing [facility] [place]  for your care. Our goal is always to provide you with excellent care. Hearing back from our patients is one way we can continue to improve our services. Please take a few minutes to complete the written survey that you may receive in the mail after your visit with us. Thank you!             Your Updated Medication List - Protect others around you: Learn how to safely use, store and throw away your medicines at  www.disposemymeds.org.          This list is accurate as of 2/13/18  2:26 PM.  Always use your most recent med list.                   Brand Name Dispense Instructions for use Diagnosis    aspirin 325 MG EC tablet     60 tablet    Take 1 tablet (325 mg) by mouth daily    Cerebrovascular accident (CVA), unspecified mechanism (H)       blood glucose monitoring lancets     100    tests twice a day    Diabetes mellitus, type 2 (H)       blood glucose monitoring test strip    ACCU-CHEK LUISANA    100 strip    by In Vitro route 1 times daily (test)    Type 2 diabetes mellitus with diabetic neuropathy, with long-term current use of insulin (H)       clopidogrel 75 MG tablet    PLAVIX    30 tablet    Take 1 tablet (75 mg) by mouth daily    Cerebrovascular accident (CVA), unspecified mechanism (H)       finasteride 5 MG tablet    PROSCAR    30 tablet    Take 1 tablet (5 mg) by mouth daily    Urinary retention       insulin glargine 100 UNIT/ML injection    LANTUS    15 mL    Inject 20 Units Subcutaneous 2 times daily    Type 2 diabetes mellitus with diabetic neuropathy, with long-term current use of insulin (H)       insulin pen needle 31G X 8 MM    B-D U/F    90 each    Use once daily or as directed.    Uncontrolled diabetes mellitus (H)       lisinopril-hydrochlorothiazide 20-12.5 MG per tablet    PRINZIDE/ZESTORETIC    60 tablet    Take 2 tablets by mouth daily    Hypertension goal BP (blood pressure) < 140/80, Type 2 diabetes mellitus with diabetic neuropathy, with long-term current use of insulin (H)       metoprolol succinate 100 MG 24 hr tablet    TOPROL-XL    30 tablet    Take 1 tablet (100 mg) by mouth daily    Hypertension goal BP (blood pressure) < 140/80       MULTI FOR HIM PO      Take 1 capsule by mouth daily.        order for DME     1 Device    Diabetic shoes due to neuropathy    Type 2 diabetes mellitus with diabetic neuropathy, with long-term current use of insulin (H), Other diabetic neurological  complication associated with type 2 diabetes mellitus (H)       pregabalin 150 MG capsule    LYRICA    60 capsule    Take 1 capsule (150 mg) by mouth 2 times daily    Type 2 diabetes mellitus with diabetic neuropathy, with long-term current use of insulin (H)       simvastatin 20 MG tablet    ZOCOR    30 tablet    Take 1 tablet (20 mg) by mouth At Bedtime    Hyperlipidemia LDL goal <100       tamsulosin 0.4 MG capsule    FLOMAX    30 capsule    Take 1 capsule (0.4 mg) by mouth At Bedtime    Benign prostatic hyperplasia with urinary retention

## 2018-03-01 ENCOUNTER — OFFICE VISIT (OUTPATIENT)
Dept: INTERNAL MEDICINE | Facility: CLINIC | Age: 63
End: 2018-03-01
Payer: COMMERCIAL

## 2018-03-01 VITALS
DIASTOLIC BLOOD PRESSURE: 70 MMHG | BODY MASS INDEX: 31.59 KG/M2 | HEART RATE: 92 BPM | TEMPERATURE: 98.1 F | OXYGEN SATURATION: 99 % | RESPIRATION RATE: 16 BRPM | SYSTOLIC BLOOD PRESSURE: 110 MMHG | WEIGHT: 232.9 LBS

## 2018-03-01 DIAGNOSIS — L03.115 CELLULITIS OF RIGHT LOWER EXTREMITY: ICD-10-CM

## 2018-03-01 DIAGNOSIS — E08.621: Primary | ICD-10-CM

## 2018-03-01 DIAGNOSIS — L97.408: Primary | ICD-10-CM

## 2018-03-01 PROCEDURE — 99214 OFFICE O/P EST MOD 30 MIN: CPT | Performed by: PHYSICIAN ASSISTANT

## 2018-03-01 PROCEDURE — 87070 CULTURE OTHR SPECIMN AEROBIC: CPT | Performed by: PHYSICIAN ASSISTANT

## 2018-03-01 PROCEDURE — 87077 CULTURE AEROBIC IDENTIFY: CPT | Performed by: PHYSICIAN ASSISTANT

## 2018-03-01 RX ORDER — SULFAMETHOXAZOLE/TRIMETHOPRIM 800-160 MG
2 TABLET ORAL 2 TIMES DAILY
Qty: 28 TABLET | Refills: 0 | Status: SHIPPED | OUTPATIENT
Start: 2018-03-01 | End: 2018-03-13

## 2018-03-01 NOTE — PATIENT INSTRUCTIONS
Schedule follow up for early next week with primary or other colleague/myself     Daily dressing changes     Bactrim two pills twice a day   Augmentin 1 pill once a day     Urgent follow up if symptoms worsen:  - increased redness, swelling, fever or weakness

## 2018-03-01 NOTE — MR AVS SNAPSHOT
After Visit Summary   3/1/2018    Cooper Cabral    MRN: [id number]           Patient Information     Date Of Birth          1955        Visit Information        Provider Department      3/1/2018 3:40 PM Rayna Salgado PA-C [facility]        Today's Diagnoses     Diabetic ulcer of left midfoot associated with diabetes mellitus due to underlying condition, limited to breakdown of skin (H)    -  1      Care Instructions    Schedule follow up for early next week with primary or other colleague/myself     Daily dressing changes     Bactrim two pills twice a day   Augmentin 1 pill once a day     Urgent follow up if symptoms worsen:  - increased redness, swelling, fever or weakness           Follow-ups after your visit        Your next 10 appointments already scheduled     Mar 13, 2018  9:00 AM CDT   Nurse Visit with UA NURSE   [facility] ([facility])    [address]  [address]  [facility] [address] [phone]            Mar 13, 2018  1:30 PM CDT   Return Visit with Tonio Amador MD   [facility] ([facility])    [address]  [address]  [facility] [address] [phone]              Who to contact     If you have questions or need follow up information about today's clinic visit or your schedule please contact [facility] directly at [phone].  Normal or non-critical lab and imaging results will be communicated to you by MyChart, letter or phone within 4 business days after the clinic has received the results. If you do not hear from us within 7 days, please contact the clinic through MyChart or phone. If you have a critical or abnormal lab result, we will notify you by phone as soon as possible.  Submit refill requests through [employer] or call your pharmacy and they will forward the refill request to us. Please  allow 3 business days for your refill to be completed.          Additional Information About Your Visit        MyChart Information     [employer] [facility] gives you secure access to your electronic health record. If you see a primary care provider, you can also send messages to your care team and make appointments. If you have questions, please call your primary care clinic.  If you do not have a primary care provider, please call [phone] and they will assist you.        Care EveryWhere ID     This is your Care EveryWhere ID. This could be used by other organizations to access your [facility] medical records  [id number]        Your Vitals Were     Pulse Temperature Respirations Pulse Oximetry BMI (Body Mass Index)       92 98.1  F (36.7  C) (Oral) 16 99% 31.59 kg/m2        Blood Pressure from Last 3 Encounters:   03/01/18 110/70   02/13/18 118/78   01/29/18 112/69    Weight from Last 3 Encounters:   03/01/18 232 lb 14.4 oz (105.6 kg)   02/13/18 250 lb (113.4 kg)   01/19/18 213 lb (96.6 kg)              Today, you had the following     No orders found for display         Today's Medication Changes          These changes are accurate as of 3/1/18  4:50 PM.  If you have any questions, ask your nurse or doctor.               Start taking these medicines.        Dose/Directions    amoxicillin-clavulanate 875-125 MG per tablet   Commonly known as:  AUGMENTIN   Used for:  Diabetic ulcer of left midfoot associated with diabetes mellitus due to underlying condition, limited to breakdown of skin (H)   Started by:  Rayna Salgado PA-C        Dose:  1 tablet   Take 1 tablet by mouth 2 times daily   Quantity:  14 tablet   Refills:  0       sulfamethoxazole-trimethoprim 800-160 MG per tablet   Commonly known as:  BACTRIM DS/SEPTRA DS   Used for:  Diabetic ulcer of left midfoot associated with diabetes mellitus due to underlying condition, limited to breakdown of skin (H)   Started by:  Rayna Salgado PA-C        Dose:  2 tablet    Take 2 tablets by mouth 2 times daily   Quantity:  28 tablet   Refills:  0            Where to get your medicines      These medications were sent to [facility] [place], [place] - [address]  [address] [place] [address]     Phone:  [phone]     amoxicillin-clavulanate 875-125 MG per tablet    sulfamethoxazole-trimethoprim 800-160 MG per tablet                Primary Care Provider Office Phone # Fax #    [place] [phone]       [address] [place] [address]        Equal Access to Services     [facility]: Hadii aad ku hadasho Soomaali, waaxda luqadaha, qaybta kaalmada adeegyada, waxay hiwot haynavneet cabrera . So [facility] [phone].    ATENCIÓN: Si habla español, tiene a beltran disposición servicios gratuitos de asistencia lingüística. [facility] [phone].    We comply with applicable federal civil rights laws and [place] laws. We do not discriminate on the basis of race, color, national origin, age, disability, sex, sexual orientation, or gender identity.            Thank you!     Thank you for choosing [facility]  for your care. Our goal is always to provide you with excellent care. Hearing back from our patients is one way we can continue to improve our services. Please take a few minutes to complete the written survey that you may receive in the mail after your visit with us. Thank you!             Your Updated Medication List - Protect others around you: Learn how to safely use, store and throw away your medicines at www.disposemymeds.org.          This list is accurate as of 3/1/18  4:50 PM.  Always use your most recent med list.                   Brand Name Dispense Instructions for use Diagnosis    amoxicillin-clavulanate 875-125 MG per tablet    AUGMENTIN    14 tablet    Take 1 tablet by mouth 2 times daily    Diabetic ulcer of left midfoot associated with diabetes mellitus due to underlying condition,  limited to breakdown of skin (H)       aspirin 325 MG EC tablet     60 tablet    Take 1 tablet (325 mg) by mouth daily    Cerebrovascular accident (CVA), unspecified mechanism (H)       blood glucose monitoring lancets     100    tests twice a day    Diabetes mellitus, type 2 (H)       blood glucose monitoring test strip    ACCU-CHEK LUISANA    100 strip    by In Vitro route 1 times daily (test)    Type 2 diabetes mellitus with diabetic neuropathy, with long-term current use of insulin (H)       clopidogrel 75 MG tablet    PLAVIX    30 tablet    Take 1 tablet (75 mg) by mouth daily    Cerebrovascular accident (CVA), unspecified mechanism (H)       finasteride 5 MG tablet    PROSCAR    30 tablet    Take 1 tablet (5 mg) by mouth daily    Urinary retention       insulin glargine 100 UNIT/ML injection    LANTUS    15 mL    Inject 20 Units Subcutaneous 2 times daily    Type 2 diabetes mellitus with diabetic neuropathy, with long-term current use of insulin (H)       insulin pen needle 31G X 8 MM    B-D U/F    90 each    Use once daily or as directed.    Uncontrolled diabetes mellitus (H)       lisinopril-hydrochlorothiazide 20-12.5 MG per tablet    PRINZIDE/ZESTORETIC    60 tablet    Take 2 tablets by mouth daily    Hypertension goal BP (blood pressure) < 140/80, Type 2 diabetes mellitus with diabetic neuropathy, with long-term current use of insulin (H)       metoprolol succinate 100 MG 24 hr tablet    TOPROL-XL    30 tablet    Take 1 tablet (100 mg) by mouth daily    Hypertension goal BP (blood pressure) < 140/80       MULTI FOR HIM PO      Take 1 capsule by mouth daily.        order for DME     1 Device    Diabetic shoes due to neuropathy    Type 2 diabetes mellitus with diabetic neuropathy, with long-term current use of insulin (H), Other diabetic neurological complication associated with type 2 diabetes mellitus (H)       pregabalin 150 MG capsule    LYRICA    60 capsule    Take 1 capsule (150 mg) by mouth 2 times  daily    Type 2 diabetes mellitus with diabetic neuropathy, with long-term current use of insulin (H)       simvastatin 20 MG tablet    ZOCOR    30 tablet    Take 1 tablet (20 mg) by mouth At Bedtime    Hyperlipidemia LDL goal <100       sulfamethoxazole-trimethoprim 800-160 MG per tablet    BACTRIM DS/SEPTRA DS    28 tablet    Take 2 tablets by mouth 2 times daily    Diabetic ulcer of left midfoot associated with diabetes mellitus due to underlying condition, limited to breakdown of skin (H)       tamsulosin 0.4 MG capsule    FLOMAX    30 capsule    Take 1 capsule (0.4 mg) by mouth At Bedtime    Benign prostatic hyperplasia with urinary retention

## 2018-03-01 NOTE — PROGRESS NOTES
SUBJECTIVE:   Cooper Cabral is a 62 year old male who presents to clinic today for the following health issues:    Chief Complaint   Patient presents with     Wound Check     Pt has a wound on his Right food on the anterial side, underneith the fourth and fifth digits. Pt states that he does not feel a thing, but it is red around it.  x 3 days   - onset: 3 days   - symptoms: patient notes he had a blister on the top of his foot and then it popped and has been this way for a few days now  - patient has history of type 2 diabetes with neuropathy and thus cannot feel pain  - patient has a PCA at home who recommended he comes into the clinic to be seen  - wound care history: none   - denies: fever, weakness, pain or body aches   - blood sugars: does not check his blood sugars   - patient was recently hospitalized for stroke    Problem list and histories reviewed & adjusted, as indicated.  Additional history: as documented      Reviewed and updated as needed this visit by clinical staff  Tobacco  Allergies  Meds  Problems       Reviewed and updated as needed this visit by Provider  Meds  Problems         OBJECTIVE:     /70  Pulse 92  Temp 98.1  F (36.7  C) (Oral)  Resp 16  Wt 232 lb 14.4 oz (105.6 kg)  SpO2 99%  BMI 31.59 kg/m2  Body mass index is 31.59 kg/(m^2).  GENERAL: healthy, alert and no distress  MS:  On the dorsum of right foot there is a superficial ulcer about one inch in diameter with some scabbing on the peripheral of the ulcer surrounding the ulcer is erythema, warmth, and swelling that extends to the ankle, there is no swelling erythema or warmth of the calf, pulses are intact, sensation decreased    ASSESSMENT/PLAN:       1. Diabetic ulcer of midfoot associated with diabetes mellitus due to underlying condition, with other ulcer severity, unspecified laterality (H)  2. Cellulitis of right lower extremity   - reviewed case with MD and staff  - Uptodate guidelines were reviewed with  with the below recommendations for treatment, and I advised patient due to the number of antibiotics he should also take a probiotic  - wound was dressed in clinic today and educated patient on wound care and dressing changes at home  - sulfamethoxazole-trimethoprim (BACTRIM DS/SEPTRA DS) 800-160 MG per tablet; Take 2 tablets by mouth 2 times daily  Dispense: 28 tablet; Refill: 0  - amoxicillin-clavulanate (AUGMENTIN) 875-125 MG per tablet; Take 1 tablet by mouth 2 times daily  Dispense: 14 tablet; Refill: 0  - Wound Culture Aerobic Bacterial  - reviewed signs and symptoms that should prompt urgent follow up, otherwise follow up early next week in clinic   - pt agreed to the above plan and all questions are answered       Rayna Salgado PA-C  [facility]

## 2018-03-04 LAB
BACTERIA SPEC CULT: ABNORMAL
Lab: ABNORMAL
SPECIMEN SOURCE: ABNORMAL

## 2018-03-09 ENCOUNTER — HOSPITAL ENCOUNTER (EMERGENCY)
Facility: CLINIC | Age: 63
Discharge: HOME | End: 2018-03-09
Attending: EMERGENCY MEDICINE | Admitting: EMERGENCY MEDICINE
Payer: COMMERCIAL

## 2018-03-09 VITALS
SYSTOLIC BLOOD PRESSURE: 152 MMHG | WEIGHT: 255 LBS | RESPIRATION RATE: 20 BRPM | DIASTOLIC BLOOD PRESSURE: 90 MMHG | HEIGHT: 72 IN | OXYGEN SATURATION: 98 % | HEART RATE: 106 BPM | TEMPERATURE: 98.2 F | BODY MASS INDEX: 34.54 KG/M2

## 2018-03-09 DIAGNOSIS — R31.0 GROSS HEMATURIA: ICD-10-CM

## 2018-03-09 DIAGNOSIS — R33.9 URINARY RETENTION WITH INCOMPLETE BLADDER EMPTYING: ICD-10-CM

## 2018-03-09 DIAGNOSIS — T83.011A MALFUNCTION OF FOLEY CATHETER, INITIAL ENCOUNTER (H): ICD-10-CM

## 2018-03-09 PROCEDURE — 99284 EMERGENCY DEPT VISIT MOD MDM: CPT | Mod: 25

## 2018-03-09 PROCEDURE — 51702 INSERT TEMP BLADDER CATH: CPT

## 2018-03-09 PROCEDURE — 51798 US URINE CAPACITY MEASURE: CPT

## 2018-03-09 ASSESSMENT — ENCOUNTER SYMPTOMS
HEMATURIA: 0
ABDOMINAL PAIN: 1
APPETITE CHANGE: 0

## 2018-03-09 NOTE — ED AVS SNAPSHOT
Emergency Department    [address] [place] [address]    Phone:  [phone]    Fax:  [phone]                                       Cooper Cabral   MRN: [id number]    Department:   Emergency Department   Date of Visit:  3/9/2018           After Visit Summary Signature Page     I have received my discharge instructions, and my questions have been answered. I have discussed any challenges I see with this plan with the nurse or doctor.    ..........................................................................................................................................  Patient/Patient Representative Signature      ..........................................................................................................................................  Patient Representative Print Name and Relationship to Patient    ..................................................               ................................................  Date                                            Time    ..........................................................................................................................................  Reviewed by Signature/Title    ...................................................              ..............................................  Date                                                            Time

## 2018-03-09 NOTE — ED AVS SNAPSHOT
Emergency Department    [address] [facility] [address]    Phone:  [phone]    Fax:  [phone]                                       Cooper Cabral   MRN: [id number]    Department:   Emergency Department   Date of Visit:  3/9/2018           Patient Information     Date Of Birth          1955        Your diagnoses for this visit were:     Urinary retention with incomplete bladder emptying     Malfunction of Roman catheter, initial encounter (H)        You were seen by Nancy Lazar MD.      Follow-up Information     Follow up with  Emergency Department.    Specialty:  EMERGENCY MEDICINE    Why:  if bleeding worsens or persists.    Contact information:    [address] [facility] [address] [phone]        Discharge Instructions       Drink plenty of fluids.    Discharge References/Attachments     ROMAN CATHETER, CARE (ENGLISH)    URINARY RETENTION, MALE (ENGLISH)      Future Appointments        Provider Department Dept Phone Center    3/13/2018 9:00 AM UA NURSE [facility] [phone] [facility]    3/13/2018 1:30 PM Tonio Amador MD [facility] [phone] [facility]      24 Hour Appointment Hotline       To make an appointment at any [facility], call [phone] ([phone]). If you don't have a family doctor or clinic, we will help you find one. [facility] clinics are conveniently located to serve the needs of you and your family.             Review of your medicines      Our records show that you are taking the medicines listed below. If these are incorrect, please call your family doctor or clinic.        Dose / Directions Last dose taken    amoxicillin-clavulanate 875-125 MG per tablet   Commonly known as:  AUGMENTIN   Dose:  1 tablet   Quantity:  14 tablet        Take 1 tablet by mouth 2 times daily   Refills:  0        aspirin 325 MG EC  tablet   Dose:  325 mg   Quantity:  60 tablet        Take 1 tablet (325 mg) by mouth daily   Refills:  0        blood glucose monitoring lancets   Quantity:  100        tests twice a day   Refills:  3        blood glucose monitoring test strip   Commonly known as:  ACCU-CHEK LUISANA   Quantity:  100 strip        by In Vitro route 1 times daily (test)   Refills:  3        clopidogrel 75 MG tablet   Commonly known as:  PLAVIX   Dose:  75 mg   Quantity:  30 tablet        Take 1 tablet (75 mg) by mouth daily   Refills:  0        finasteride 5 MG tablet   Commonly known as:  PROSCAR   Dose:  5 mg   Quantity:  30 tablet        Take 1 tablet (5 mg) by mouth daily   Refills:  0        insulin glargine 100 UNIT/ML injection   Commonly known as:  LANTUS   Dose:  20 Units   Quantity:  15 mL        Inject 20 Units Subcutaneous 2 times daily   Refills:  0        insulin pen needle 31G X 8 MM   Commonly known as:  B-D U/F   Quantity:  90 each        Use once daily or as directed.   Refills:  1        lisinopril-hydrochlorothiazide 20-12.5 MG per tablet   Commonly known as:  PRINZIDE/ZESTORETIC   Dose:  2 tablet   Quantity:  60 tablet        Take 2 tablets by mouth daily   Refills:  0        metoprolol succinate 100 MG 24 hr tablet   Commonly known as:  TOPROL-XL   Dose:  100 mg   Quantity:  30 tablet        Take 1 tablet (100 mg) by mouth daily   Refills:  0        MULTI FOR HIM PO   Dose:  1 capsule        Take 1 capsule by mouth daily.   Refills:  0        order for DME   Quantity:  1 Device        Diabetic shoes due to neuropathy   Refills:  0        pregabalin 150 MG capsule   Commonly known as:  LYRICA   Dose:  150 mg   Quantity:  60 capsule        Take 1 capsule (150 mg) by mouth 2 times daily   Refills:  0        simvastatin 20 MG tablet   Commonly known as:  ZOCOR   Dose:  20 mg   Quantity:  30 tablet        Take 1 tablet (20 mg) by mouth At Bedtime   Refills:  0        sulfamethoxazole-trimethoprim 800-160 MG per tablet    Commonly known as:  BACTRIM DS/SEPTRA DS   Dose:  2 tablet   Quantity:  28 tablet        Take 2 tablets by mouth 2 times daily   Refills:  0        tamsulosin 0.4 MG capsule   Commonly known as:  FLOMAX   Dose:  0.4 mg   Quantity:  30 capsule        Take 1 capsule (0.4 mg) by mouth At Bedtime   Refills:  0                Orders Needing Specimen Collection     None      Pending Results     No orders found from 3/7/2018 to 3/10/2018.            Pending Culture Results     No orders found from 3/7/2018 to 3/10/2018.            Pending Results Instructions     If you had any lab results that were not finalized at the time of your Discharge, you can call the ED Lab Result RN at [phone]. You will be contacted by this team for any positive Lab results or changes in treatment. The nurses are available 7 days a week from 10A to 6:30P.  You can leave a message 24 hours per day and they will return your call.        Test Results From Your Hospital Stay               Clinical Quality Measure: Blood Pressure Screening     Your blood pressure was checked while you were in the emergency department today. The last reading we obtained was  BP: (!) 138/100 . Please read the guidelines below about what these numbers mean and what you should do about them.  If your systolic blood pressure (the top number) is less than 120 and your diastolic blood pressure (the bottom number) is less than 80, then your blood pressure is normal. There is nothing more that you need to do about it.  If your systolic blood pressure (the top number) is 120-139 or your diastolic blood pressure (the bottom number) is 80-89, your blood pressure may be higher than it should be. You should have your blood pressure rechecked within a year by a primary care provider.  If your systolic blood pressure (the top number) is 140 or greater or your diastolic blood pressure (the bottom number) is 90 or greater, you may have high blood pressure. High blood pressure  is treatable, but if left untreated over time it can put you at risk for heart attack, stroke, or kidney failure. You should have your blood pressure rechecked by a primary care provider within the next 4 weeks.  If your provider in the emergency department today gave you specific instructions to follow-up with your doctor or provider even sooner than that, you should follow that instruction and not wait for up to 4 weeks for your follow-up visit.        Thank you for choosing [facility]       Thank you for choosing [facility] for your care. Our goal is always to provide you with excellent care. Hearing back from our patients is one way we can continue to improve our services. Please take a few minutes to complete the written survey that you may receive in the mail after you visit with us. Thank you!        "[employer] Online Information     [employer] gives you secure access to your electronic health record. If you see a primary care provider, you can also send messages to your care team and make appointments. If you have questions, please call your primary care clinic.  If you do not have a primary care provider, please call [phone] and they will assist you.        Care EveryWhere ID     This is your Care EveryWhere ID. This could be used by other organizations to access your [facility] medical records  [id number]        Equal Access to Services     DANIELLA NATHAN : Kelly Palma, kris stacy, regan yao, mark rios. So [facility] [phone].    ATENCIÓN: Si habla español, tiene a beltran disposición servicios gratuitos de asistencia lingüística. Simon al [phone].    We comply with applicable federal civil rights laws and [place] laws. We do not discriminate on the basis of race, color, national origin, age, disability, sex, sexual orientation, or gender identity.            After Visit Summary       This is your record. Keep this with you and show to your community  pharmacist(s) and doctor(s) at your next visit.

## 2018-03-13 ENCOUNTER — OFFICE VISIT (OUTPATIENT)
Dept: UROLOGY | Facility: CLINIC | Age: 63
End: 2018-03-13
Payer: COMMERCIAL

## 2018-03-13 ENCOUNTER — ALLIED HEALTH/NURSE VISIT (OUTPATIENT)
Dept: UROLOGY | Facility: CLINIC | Age: 63
End: 2018-03-13
Payer: COMMERCIAL

## 2018-03-13 VITALS
SYSTOLIC BLOOD PRESSURE: 124 MMHG | WEIGHT: 245 LBS | HEIGHT: 72 IN | BODY MASS INDEX: 33.18 KG/M2 | DIASTOLIC BLOOD PRESSURE: 68 MMHG | HEART RATE: 80 BPM

## 2018-03-13 DIAGNOSIS — R33.9 URINARY RETENTION: Primary | ICD-10-CM

## 2018-03-13 LAB — PR INTERVAL - MUSE: 298

## 2018-03-13 PROCEDURE — 51798 US URINE CAPACITY MEASURE: CPT | Performed by: UROLOGY

## 2018-03-13 PROCEDURE — 99207 ZZC NO CHARGE LOS: CPT | Performed by: UROLOGY

## 2018-03-13 PROCEDURE — 99213 OFFICE O/P EST LOW 20 MIN: CPT | Mod: 25 | Performed by: UROLOGY

## 2018-03-13 RX ORDER — TAMSULOSIN HYDROCHLORIDE 0.4 MG/1
0.4 CAPSULE ORAL DAILY
Qty: 90 CAPSULE | Refills: 3 | Status: SHIPPED | OUTPATIENT
Start: 2018-03-13 | End: 2019-01-29

## 2018-03-13 ASSESSMENT — PAIN SCALES - GENERAL
PAINLEVEL: NO PAIN (0)
PAINLEVEL: NO PAIN (0)

## 2018-03-13 NOTE — PROGRESS NOTES
Cooper Cabral comes into clinic today at the request of Dr. Amador for riley catheter removal.  This service provided today was under the supervising provider of the day, Dr. Amador, who was available if needed.    Catheter removal documentation on 3/13/2018:    Cooper Cabral presents to the clinic for catheter removal.  Reason for removal: urinary retention resolved after stroke ?  Order has been verified. yes  Catheter successfully removed at 9:15 AM without immediate complication.  200 cc's of urine present in the catheter bag.  Urethral meatus is free of secretions and encrustation.  The patient is afebrile.  The patient tolerated the procedure well and was instructed to follow-up with Dr. Amador this afternoon for UA/PVR.    Rhiannon Hernandez LPN

## 2018-03-13 NOTE — MR AVS SNAPSHOT
After Visit Summary   3/13/2018    Cooper Cabral    MRN: [id number]           Patient Information     Date Of Birth          1955        Visit Information        Provider Department      3/13/2018 1:30 PM Tonio Amador MD [facility]        Today's Diagnoses     Urinary retention    -  1       Follow-ups after your visit        Follow-up notes from your care team     Return for In 1 month: see nurse at end of the day for cath out then see me 8AM the next morning for UA / scan.      Your next 10 appointments already scheduled     Apr 18, 2018  4:00 PM CDT   Nurse Visit with UA NURSE   [facility] ([facility])    [address]  [address]  [facility] [address] [phone]            Apr 19, 2018  8:15 AM CDT   Return Visit with Tonio Amador MD   [facility] ([facility])    [address]  [address]  [facility] [address] [phone]              Who to contact     If you have questions or need follow up information about today's clinic visit or your schedule please contact [facility] directly at [phone].  Normal or non-critical lab and imaging results will be communicated to you by DyMyndhart, letter or phone within 4 business days after the clinic has received the results. If you do not hear from us within 7 days, please contact the clinic through DyMyndhart or phone. If you have a critical or abnormal lab result, we will notify you by phone as soon as possible.  Submit refill requests through dINK or call your pharmacy and they will forward the refill request to us. Please allow 3 business days for your refill to be completed.          Additional Information About Your Visit        DyMyndharSoCAT Information     dINK gives you secure access to your electronic health record. If  you see a primary care provider, you can also send messages to your care team and make appointments. If you have questions, please call your primary care clinic.  If you do not have a primary care provider, please call [phone] and they will assist you.        Care EveryWhere ID     This is your Care EveryWhere ID. This could be used by other organizations to access your [place] medical records  [id number]        Your Vitals Were     Pulse Height BMI (Body Mass Index)             80 1.829 m (6') 33.23 kg/m2          Blood Pressure from Last 3 Encounters:   03/13/18 124/68   03/09/18 152/90   03/01/18 110/70    Weight from Last 3 Encounters:   03/13/18 111.1 kg (245 lb)   03/09/18 115.7 kg (255 lb)   03/01/18 105.6 kg (232 lb 14.4 oz)              We Performed the Following     MEASURE POST-VOID RESIDUAL URINE/BLADDER CAPACITY, US NON-IMAGING (61995)          Today's Medication Changes          These changes are accurate as of 3/13/18  3:09 PM.  If you have any questions, ask your nurse or doctor.               These medicines have changed or have updated prescriptions.        Dose/Directions    * tamsulosin 0.4 MG capsule   Commonly known as:  FLOMAX   This may have changed:  Another medication with the same name was added. Make sure you understand how and when to take each.   Used for:  Benign prostatic hyperplasia with urinary retention   Changed by:  Tonio Amador MD        Dose:  0.4 mg   Take 1 capsule (0.4 mg) by mouth At Bedtime   Quantity:  30 capsule   Refills:  0       * tamsulosin 0.4 MG capsule   Commonly known as:  FLOMAX   This may have changed:  You were already taking a medication with the same name, and this prescription was added. Make sure you understand how and when to take each.   Used for:  Urinary retention   Changed by:  Tonio Amador MD        Dose:  0.4 mg   Take 1 capsule (0.4 mg) by mouth daily   Quantity:  90 capsule   Refills:  3       * Notice:  This list has  2 medication(s) that are the same as other medications prescribed for you. Read the directions carefully, and ask your doctor or other care provider to review them with you.         Where to get your medicines      These medications were sent to [facility] [address] [place] [address]  [address] [place] [address]     Phone:  [phone]     tamsulosin 0.4 MG capsule                Primary Care Provider Office Phone # Fax #    [place] [phone]       [address] [place] [address]        Equal Access to Services     [facility]: Hadii aad ku hadasho Soomaali, waaxda luqadaha, qaybta kaalmada adeegyada, mark mi haynavneet cabrera . So [facility] [phone].    ATENCIÓN: Si habla español, tiene a beltran disposición servicios gratuitos de asistencia lingüística. [facility] [phone].    We comply with applicable federal civil rights laws and [place] laws. We do not discriminate on the basis of race, color, national origin, age, disability, sex, sexual orientation, or gender identity.            Thank you!     Thank you for choosing [facility] [place]  for your care. Our goal is always to provide you with excellent care. Hearing back from our patients is one way we can continue to improve our services. Please take a few minutes to complete the written survey that you may receive in the mail after your visit with us. Thank you!             Your Updated Medication List - Protect others around you: Learn how to safely use, store and throw away your medicines at www.disposemymeds.org.          This list is accurate as of 3/13/18  3:09 PM.  Always use your most recent med list.                   Brand Name Dispense Instructions for use Diagnosis    aspirin 325 MG EC tablet     60 tablet    Take 1 tablet (325 mg) by mouth daily    Cerebrovascular accident (CVA), unspecified mechanism (H)       blood glucose monitoring  lancets     100    tests twice a day    Diabetes mellitus, type 2 (H)       blood glucose monitoring test strip    ACCU-CHEK LUISANA    100 strip    by In Vitro route 1 times daily (test)    Type 2 diabetes mellitus with diabetic neuropathy, with long-term current use of insulin (H)       clopidogrel 75 MG tablet    PLAVIX    30 tablet    Take 1 tablet (75 mg) by mouth daily    Cerebrovascular accident (CVA), unspecified mechanism (H)       finasteride 5 MG tablet    PROSCAR    30 tablet    Take 1 tablet (5 mg) by mouth daily    Urinary retention       insulin glargine 100 UNIT/ML injection    LANTUS    15 mL    Inject 20 Units Subcutaneous 2 times daily    Type 2 diabetes mellitus with diabetic neuropathy, with long-term current use of insulin (H)       insulin pen needle 31G X 8 MM    B-D U/F    90 each    Use once daily or as directed.    Uncontrolled diabetes mellitus (H)       lisinopril-hydrochlorothiazide 20-12.5 MG per tablet    PRINZIDE/ZESTORETIC    60 tablet    Take 2 tablets by mouth daily    Hypertension goal BP (blood pressure) < 140/80, Type 2 diabetes mellitus with diabetic neuropathy, with long-term current use of insulin (H)       metFORMIN 1000 MG tablet    GLUCOPHAGE          metoprolol succinate 100 MG 24 hr tablet    TOPROL-XL    30 tablet    Take 1 tablet (100 mg) by mouth daily    Hypertension goal BP (blood pressure) < 140/80       MULTI FOR HIM PO      Take 1 capsule by mouth daily.        order for DME     1 Device    Diabetic shoes due to neuropathy    Type 2 diabetes mellitus with diabetic neuropathy, with long-term current use of insulin (H), Other diabetic neurological complication associated with type 2 diabetes mellitus (H)       pregabalin 150 MG capsule    LYRICA    60 capsule    Take 1 capsule (150 mg) by mouth 2 times daily    Type 2 diabetes mellitus with diabetic neuropathy, with long-term current use of insulin (H)       simvastatin 20 MG tablet    ZOCOR    30 tablet    Take 1  tablet (20 mg) by mouth At Bedtime    Hyperlipidemia LDL goal <100       * tamsulosin 0.4 MG capsule    FLOMAX    30 capsule    Take 1 capsule (0.4 mg) by mouth At Bedtime    Benign prostatic hyperplasia with urinary retention       * tamsulosin 0.4 MG capsule    FLOMAX    90 capsule    Take 1 capsule (0.4 mg) by mouth daily    Urinary retention       * Notice:  This list has 2 medication(s) that are the same as other medications prescribed for you. Read the directions carefully, and ask your doctor or other care provider to review them with you.

## 2018-03-13 NOTE — LETTER
3/13/2018       RE: Cooper Cabral  [address]  [facility] [address]     Dear Colleague,    Thank you for referring your patient, Cooper Cabral, to the [facility] [place] at [facility]. Please see a copy of my visit note below.    Office Visit Note  [facility]  [phone]    UROLOGIC DIAGNOSES:   Urinary retention    CURRENT INTERVENTIONS:   flomax    HISTORY:   Cooper had his catheter removed earlier today in the clinic.  He is now back 5-1/2 hours later but he has not voided all day. Bladder scan shows 298 mL in the bladder.      PAST MEDICAL HISTORY:   Past Medical History:   Diagnosis Date     Diabetes (H)      HTN (hypertension)        PAST SURGICAL HISTORY:   Past Surgical History:   Procedure Laterality Date     broken ankle Right      CYSTOSCOPY         FAMILY HISTORY:   Family History   Problem Relation Age of Onset     CANCER Mother      Bone cancer      DIABETES Father      CANCER Father      bone cancer      Hypertension Brother      Hypertension Brother        SOCIAL HISTORY:   Social History   Substance Use Topics     Smoking status: Current Every Day Smoker     Packs/day: 1.00     Years: 40.00     Types: Cigarettes     Smokeless tobacco: Never Used     Alcohol use No       Current Outpatient Prescriptions   Medication     metFORMIN (GLUCOPHAGE) 1000 MG tablet     finasteride (PROSCAR) 5 MG tablet     insulin glargine (LANTUS) 100 UNIT/ML injection     lisinopril-hydrochlorothiazide (PRINZIDE/ZESTORETIC) 20-12.5 MG per tablet     metoprolol succinate (TOPROL-XL) 100 MG 24 hr tablet     pregabalin (LYRICA) 150 MG capsule     simvastatin (ZOCOR) 20 MG tablet     tamsulosin (FLOMAX) 0.4 MG capsule     clopidogrel (PLAVIX) 75 MG tablet     aspirin  MG EC tablet     blood glucose monitoring (ACCU-CHEK LUISANA) test strip     order for DME     insulin pen needle (B-D U/F) 31G X 8 MM      Multiple Vitamins-Minerals (MULTI FOR HIM PO)     ACCU-CHEK MULTICLIX LANCETS MISC     No current facility-administered medications for this visit.          PHYSICAL EXAM:    There were no vitals taken for this visit.    HEENT: Normocephalic and atraumatic   Cardiac: Not done  Back/Flank: Not done  CNS/PNS: Not done  Respiratory: Normal non-labored breathing  Abdomen: Soft nontender and nondistended  Peripheral Vascular: Not done  Mental Status: Not done    Penis: Not done  Scrotal Skin: Not done  Testicles: Not done  Epididymis: Not done  Digital Rectal Exam:     Cystoscopy: Not done    Imaging: None    Urinalysis: UA RESULTS:  No results for input(s): COLOR, APPEARANCE, URINEGLC, URINEBILI, URINEKETONE, SG, UBLD, URINEPH, PROTEIN, UROBILINOGEN, NITRITE, LEUKEST, RBCU, WBCU in the last 86702 hours.    PSA:     Post Void Residual:     Other labs: None today      IMPRESSION:  Urinary retention    PLAN:  It appears that he continues to retain urine. However, after 5-1/2 hours he really hasn't had enough fluid to fill his bladder completely yet.  We replaced the catheter today. In 1 month we will try again but next time we will have him come back at the end of the day for catheter removal and then back first thing he next morning for a urinalysis and bladder scan.    Total Time: 15 minutes                                      Total in Consultation: 15 minutes      Tonio Amador M.D.

## 2018-03-13 NOTE — PROGRESS NOTES
Office Visit Note  [facility]  [phone]    UROLOGIC DIAGNOSES:   Urinary retention    CURRENT INTERVENTIONS:   flomax    HISTORY:   Cooper had his catheter removed earlier today in the clinic.  He is now back 5-1/2 hours later but he has not voided all day. Bladder scan shows 298 mL in the bladder.      PAST MEDICAL HISTORY:   Past Medical History:   Diagnosis Date     Diabetes (H)      HTN (hypertension)        PAST SURGICAL HISTORY:   Past Surgical History:   Procedure Laterality Date     broken ankle Right      CYSTOSCOPY         FAMILY HISTORY:   Family History   Problem Relation Age of Onset     CANCER Mother      Bone cancer      DIABETES Father      CANCER Father      bone cancer      Hypertension Brother      Hypertension Brother        SOCIAL HISTORY:   Social History   Substance Use Topics     Smoking status: Current Every Day Smoker     Packs/day: 1.00     Years: 40.00     Types: Cigarettes     Smokeless tobacco: Never Used     Alcohol use No       Current Outpatient Prescriptions   Medication     metFORMIN (GLUCOPHAGE) 1000 MG tablet     finasteride (PROSCAR) 5 MG tablet     insulin glargine (LANTUS) 100 UNIT/ML injection     lisinopril-hydrochlorothiazide (PRINZIDE/ZESTORETIC) 20-12.5 MG per tablet     metoprolol succinate (TOPROL-XL) 100 MG 24 hr tablet     pregabalin (LYRICA) 150 MG capsule     simvastatin (ZOCOR) 20 MG tablet     tamsulosin (FLOMAX) 0.4 MG capsule     clopidogrel (PLAVIX) 75 MG tablet     aspirin  MG EC tablet     blood glucose monitoring (ACCU-CHEK LUISANA) test strip     order for DME     insulin pen needle (B-D U/F) 31G X 8 MM     Multiple Vitamins-Minerals (MULTI FOR HIM PO)     ACCU-CHEK MULTICLIX LANCETS MISC     No current facility-administered medications for this visit.          PHYSICAL EXAM:    There were no vitals taken for this visit.    HEENT: Normocephalic and atraumatic   Cardiac: Not done  Back/Flank: Not done  CNS/PNS: Not done  Respiratory:  Normal non-labored breathing  Abdomen: Soft nontender and nondistended  Peripheral Vascular: Not done  Mental Status: Not done    Penis: Not done  Scrotal Skin: Not done  Testicles: Not done  Epididymis: Not done  Digital Rectal Exam:     Cystoscopy: Not done    Imaging: None    Urinalysis: UA RESULTS:  No results for input(s): COLOR, APPEARANCE, URINEGLC, URINEBILI, URINEKETONE, SG, UBLD, URINEPH, PROTEIN, UROBILINOGEN, NITRITE, LEUKEST, RBCU, WBCU in the last 727[address] hours.    PSA:     Post Void Residual:     Other labs: None today      IMPRESSION:  Urinary retention    PLAN:  It appears that he continues to retain urine. However, after 5-1/2 hours he really hasn't had enough fluid to fill his bladder completely yet.  We replaced the catheter today. In 1 month we will try again but next time we will have him come back at the end of the day for catheter removal and then back first thing he next morning for a urinalysis and bladder scan.    Total Time: 15 minutes                                      Total in Consultation: 15 minutes      Tonio Amador M.D.

## 2018-03-13 NOTE — MR AVS SNAPSHOT
After Visit Summary   3/13/2018    Cooper Cabral    MRN: [id number]           Patient Information     Date Of Birth          1955        Visit Information        Provider Department      3/13/2018 9:00 AM UA NURSE [facility]        Today's Diagnoses     Urinary retention    -  1       Follow-ups after your visit        Your next 10 appointments already scheduled     Mar 13, 2018  1:30 PM CDT   Return Visit with Tonio Amador MD   [facility] ([facility])    [address]  [address]  [facility] [address] [phone]              Who to contact     If you have questions or need follow up information about today's clinic visit or your schedule please contact [facility]OLOG[facility] directly at [phone].  Normal or non-critical lab and imaging results will be communicated to you by Oddcasthart, letter or phone within 4 business days after the clinic has received the results. If you do not hear from us within 7 days, please contact the clinic through Oddcasthart or phone. If you have a critical or abnormal lab result, we will notify you by phone as soon as possible.  Submit refill requests through [employer] Funding or call your pharmacy and they will forward the refill request to us. Please allow 3 business days for your refill to be completed.          Additional Information About Your Visit        MyChart Information     Plan [employer] Funding gives you secure access to your electronic health record. If you see a primary care provider, you can also send messages to your care team and make appointments. If you have questions, please call your primary care clinic.  If you do not have a primary care provider, please call [phone] and they will assist you.        Care EveryWhere ID     This is your Care EveryWhere ID. This could be used by other organizations to access your [place]  medical records  [id number]         Blood Pressure from Last 3 Encounters:   03/09/18 152/90   03/01/18 110/70   02/13/18 118/78    Weight from Last 3 Encounters:   03/09/18 115.7 kg (255 lb)   03/01/18 105.6 kg (232 lb 14.4 oz)   02/13/18 113.4 kg (250 lb)              Today, you had the following     No orders found for display       Primary Care Provider Office Phone # Fax #    [place] [phone]       [address] [facility] [address]        Equal Access to Services     [facility]: Hadii luann albert hadasho Somirna, waaxda luqadaha, qaybta kaalmada adedeepikayada, mark cabrera . So [facility] [phone].    ATENCIÓN: Si habla español, tiene a beltran disposición servicios gratuitos de asistencia lingüística. [facility] [phone].    We comply with applicable federal civil rights laws and [place] laws. We do not discriminate on the basis of race, color, national origin, age, disability, sex, sexual orientation, or gender identity.            Thank you!     Thank you for choosing [facility]OLOG[facility]IN[facility] [place]  for your care. Our goal is always to provide you with excellent care. Hearing back from our patients is one way we can continue to improve our services. Please take a few minutes to complete the written survey that you may receive in the mail after your visit with us. Thank you!             Your Updated Medication List - Protect others around you: Learn how to safely use, store and throw away your medicines at www.disposemymeds.org.          This list is accurate as of 3/13/18 10:23 AM.  Always use your most recent med list.                   Brand Name Dispense Instructions for use Diagnosis    aspirin 325 MG EC tablet     60 tablet    Take 1 tablet (325 mg) by mouth daily    Cerebrovascular accident (CVA), unspecified mechanism (H)       blood glucose monitoring lancets     100    tests twice a day    Diabetes mellitus, type 2 (H)        blood glucose monitoring test strip    ACCU-CHEK LUISANA    100 strip    by In Vitro route 1 times daily (test)    Type 2 diabetes mellitus with diabetic neuropathy, with long-term current use of insulin (H)       clopidogrel 75 MG tablet    PLAVIX    30 tablet    Take 1 tablet (75 mg) by mouth daily    Cerebrovascular accident (CVA), unspecified mechanism (H)       finasteride 5 MG tablet    PROSCAR    30 tablet    Take 1 tablet (5 mg) by mouth daily    Urinary retention       insulin glargine 100 UNIT/ML injection    LANTUS    15 mL    Inject 20 Units Subcutaneous 2 times daily    Type 2 diabetes mellitus with diabetic neuropathy, with long-term current use of insulin (H)       insulin pen needle 31G X 8 MM    B-D U/F    90 each    Use once daily or as directed.    Uncontrolled diabetes mellitus (H)       lisinopril-hydrochlorothiazide 20-12.5 MG per tablet    PRINZIDE/ZESTORETIC    60 tablet    Take 2 tablets by mouth daily    Hypertension goal BP (blood pressure) < 140/80, Type 2 diabetes mellitus with diabetic neuropathy, with long-term current use of insulin (H)       metFORMIN 1000 MG tablet    GLUCOPHAGE          metoprolol succinate 100 MG 24 hr tablet    TOPROL-XL    30 tablet    Take 1 tablet (100 mg) by mouth daily    Hypertension goal BP (blood pressure) < 140/80       MULTI FOR HIM PO      Take 1 capsule by mouth daily.        order for DME     1 Device    Diabetic shoes due to neuropathy    Type 2 diabetes mellitus with diabetic neuropathy, with long-term current use of insulin (H), Other diabetic neurological complication associated with type 2 diabetes mellitus (H)       pregabalin 150 MG capsule    LYRICA    60 capsule    Take 1 capsule (150 mg) by mouth 2 times daily    Type 2 diabetes mellitus with diabetic neuropathy, with long-term current use of insulin (H)       simvastatin 20 MG tablet    ZOCOR    30 tablet    Take 1 tablet (20 mg) by mouth At Bedtime    Hyperlipidemia LDL goal <100        tamsulosin 0.4 MG capsule    FLOMAX    30 capsule    Take 1 capsule (0.4 mg) by mouth At Bedtime    Benign prostatic hyperplasia with urinary retention

## 2018-04-23 ENCOUNTER — ALLIED HEALTH/NURSE VISIT (OUTPATIENT)
Dept: UROLOGY | Facility: CLINIC | Age: 63
End: 2018-04-23
Payer: COMMERCIAL

## 2018-04-23 DIAGNOSIS — R33.9 URINARY RETENTION: Primary | ICD-10-CM

## 2018-04-23 DIAGNOSIS — Z53.9 DIAGNOSIS NOT YET DEFINED: Primary | ICD-10-CM

## 2018-04-23 NOTE — PROGRESS NOTES
Cooper Cabral comes into clinic today at the request of Dr Amador Ordering Provider for [facility].          This service provided today was under the supervising provider of the day DR Castellanos, who was available if needed.        Here for riley catheter removal per Dr Amador, Balloon deflate and riley removed with ease and intact. Some tried blood at the tip. Cooper has appointment with Dr Amador at 8am tomorrow for UA  and PVR check. He understands that id he is unable to void tonight and is uncomfortable to go to ED, Carline Borden LPN

## 2018-04-23 NOTE — MR AVS SNAPSHOT
After Visit Summary   4/23/2018    Cooper Cabral    MRN: [id number]           Patient Information     Date Of Birth          1955        Visit Information        Provider Department      4/23/2018 4:00 PM UA NURSE [facility] Urology [facility]        Today's Diagnoses     Urinary retention    -  1       Follow-ups after your visit        Future tests that were ordered for you today     Open Future Orders        Priority Expected Expires Ordered    UA without Microscopic Routine  4/23/2019 4/23/2018            Who to contact     If you have questions or need follow up information about today's clinic visit or your schedule please contact [facility] UROLOGY [facility] directly at [phone].  Normal or non-critical lab and imaging results will be communicated to you by MyFabhart, letter or phone within 4 business days after the clinic has received the results. If you do not hear from us within 7 days, please contact the clinic through [employer] or phone. If you have a critical or abnormal lab result, we will notify you by phone as soon as possible.  Submit refill requests through [employer] or call your pharmacy and they will forward the refill request to us. Please allow 3 business days for your refill to be completed.          Additional Information About Your Visit        MyChart Information     [employer] gives you secure access to your electronic health record. If you see a primary care provider, you can also send messages to your care team and make appointments. If you have questions, please call your primary care clinic.  If you do not have a primary care provider, please call [phone] and they will assist you.        Care EveryWhere ID     This is your Care EveryWhere ID. This could be used by other organizations to access your [facility] medical records  [id number]         Blood Pressure from Last 3 Encounters:   03/13/18 124/68   03/09/18 152/90    03/01/18 110/70    Weight from Last 3 Encounters:   03/13/18 111.1 kg (245 lb)   03/09/18 115.7 kg (255 lb)   03/01/18 105.6 kg (232 lb 14.4 oz)              Today, you had the following     No orders found for display       Primary Care Provider Office Phone # Fax #    [place] [phone]       [address] [facility] [address]        Equal Access to Services     DANIELLA NATHAN : Hadii aad ku hadasho Soomaali, waaxda luqadaha, qaybta kaalmada adeegyada, waxay idiin hayaan adeeg kharash latamiko . So [facility] [phone].    ATENCIÓN: Si habla español, tiene a beltran disposición servicios gratuitos de asistencia lingüística. Llame al [phone].    We comply with applicable federal civil rights laws and [place] laws. We do not discriminate on the basis of race, color, national origin, age, disability, sex, sexual orientation, or gender identity.            Thank you!     Thank you for choosing [facility] [place]  for your care. Our goal is always to provide you with excellent care. Hearing back from our patients is one way we can continue to improve our services. Please take a few minutes to complete the written survey that you may receive in the mail after your visit with us. Thank you!             Your Updated Medication List - Protect others around you: Learn how to safely use, store and throw away your medicines at www.disposemymeds.org.          This list is accurate as of 4/23/18 11:59 PM.  Always use your most recent med list.                   Brand Name Dispense Instructions for use Diagnosis    aspirin 325 MG EC tablet     60 tablet    Take 1 tablet (325 mg) by mouth daily    Cerebrovascular accident (CVA), unspecified mechanism (H)       blood glucose monitoring lancets     100    tests twice a day    Diabetes mellitus, type 2 (H)       blood glucose monitoring test strip    ACCU-CHEK LUISANA    100 strip    by In Vitro route 1 times daily (test)     Type 2 diabetes mellitus with diabetic neuropathy, with long-term current use of insulin (H)       clopidogrel 75 MG tablet    PLAVIX    30 tablet    Take 1 tablet (75 mg) by mouth daily    Cerebrovascular accident (CVA), unspecified mechanism (H)       finasteride 5 MG tablet    PROSCAR    30 tablet    Take 1 tablet (5 mg) by mouth daily    Urinary retention       insulin glargine 100 UNIT/ML injection    LANTUS    15 mL    Inject 20 Units Subcutaneous 2 times daily    Type 2 diabetes mellitus with diabetic neuropathy, with long-term current use of insulin (H)       insulin pen needle 31G X 8 MM    B-D U/F    90 each    Use once daily or as directed.    Uncontrolled diabetes mellitus (H)       lisinopril-hydrochlorothiazide 20-12.5 MG per tablet    PRINZIDE/ZESTORETIC    60 tablet    Take 2 tablets by mouth daily    Hypertension goal BP (blood pressure) < 140/80, Type 2 diabetes mellitus with diabetic neuropathy, with long-term current use of insulin (H)       metFORMIN 1000 MG tablet    GLUCOPHAGE          metoprolol succinate 100 MG 24 hr tablet    TOPROL-XL    30 tablet    Take 1 tablet (100 mg) by mouth daily    Hypertension goal BP (blood pressure) < 140/80       MULTI FOR HIM PO      Take 1 capsule by mouth daily.        order for DME     1 Device    Diabetic shoes due to neuropathy    Type 2 diabetes mellitus with diabetic neuropathy, with long-term current use of insulin (H), Other diabetic neurological complication associated with type 2 diabetes mellitus (H)       pregabalin 150 MG capsule    LYRICA    60 capsule    Take 1 capsule (150 mg) by mouth 2 times daily    Type 2 diabetes mellitus with diabetic neuropathy, with long-term current use of insulin (H)       simvastatin 20 MG tablet    ZOCOR    30 tablet    Take 1 tablet (20 mg) by mouth At Bedtime    Hyperlipidemia LDL goal <100       * tamsulosin 0.4 MG capsule    FLOMAX    30 capsule    Take 1 capsule (0.4 mg) by mouth At Bedtime    Benign  prostatic hyperplasia with urinary retention       * tamsulosin 0.4 MG capsule    FLOMAX    90 capsule    Take 1 capsule (0.4 mg) by mouth daily    Urinary retention       * Notice:  This list has 2 medication(s) that are the same as other medications prescribed for you. Read the directions carefully, and ask your doctor or other care provider to review them with you.

## 2018-06-11 DIAGNOSIS — Z53.9 DIAGNOSIS NOT YET DEFINED: Primary | ICD-10-CM

## 2018-06-11 PROCEDURE — G0179 MD RECERTIFICATION HHA PT: HCPCS | Performed by: INTERNAL MEDICINE

## 2018-06-18 DIAGNOSIS — Z53.9 DIAGNOSIS NOT YET DEFINED: Primary | ICD-10-CM

## 2018-08-01 DIAGNOSIS — E11.40 TYPE 2 DIABETES MELLITUS WITH DIABETIC NEUROPATHY, WITH LONG-TERM CURRENT USE OF INSULIN (H): ICD-10-CM

## 2018-08-01 DIAGNOSIS — Z79.4 TYPE 2 DIABETES MELLITUS WITH DIABETIC NEUROPATHY, WITH LONG-TERM CURRENT USE OF INSULIN (H): ICD-10-CM

## 2018-08-01 RX ORDER — PREGABALIN 150 MG/1
150 CAPSULE ORAL 2 TIMES DAILY
Qty: 60 CAPSULE | Refills: 0 | Status: SHIPPED | OUTPATIENT
Start: 2018-08-01 | End: 2018-09-10

## 2018-08-01 RX ORDER — PREGABALIN 150 MG/1
CAPSULE ORAL
Qty: 90 CAPSULE | Refills: 3 | OUTPATIENT
Start: 2018-08-01

## 2018-08-01 NOTE — TELEPHONE ENCOUNTER
Medication is listed as not been prescribed by me.  Refill request is to go to originating provider

## 2018-08-01 NOTE — TELEPHONE ENCOUNTER
Requested Prescriptions   Pending Prescriptions Disp Refills     LYRICA 150 MG capsule [Pharmacy Med Name: LYRICA 150MG  CAP] 90 capsule 3     Sig: TAKE 1 CAPSULE BY MOUTH 3 TIMES DAILY    There is no refill protocol information for this order        Last Written Prescription Date:  1/29/18  Last Fill Quantity: 60,  # refills: 0   Last office visit: 3/1/2018 with prescribing provider:  3/1/18   Future Office Visit:

## 2018-08-01 NOTE — TELEPHONE ENCOUNTER
Previously prescribed by past doctor at [facility], Dr. Layton.   Transferred care to you.   You did a prescription for it on 11/21/17.  3 fills after that ran out were given when pt was in hospital/Inpatient.

## 2018-08-16 DIAGNOSIS — Z53.9 DIAGNOSIS NOT YET DEFINED: Primary | ICD-10-CM

## 2018-08-16 PROCEDURE — G0179 MD RECERTIFICATION HHA PT: HCPCS | Performed by: INTERNAL MEDICINE

## 2018-08-28 ENCOUNTER — TELEPHONE (OUTPATIENT)
Dept: INTERNAL MEDICINE | Facility: CLINIC | Age: 63
End: 2018-08-28

## 2018-08-28 DIAGNOSIS — Z79.4 TYPE 2 DIABETES MELLITUS WITH DIABETIC NEUROPATHY, WITH LONG-TERM CURRENT USE OF INSULIN (H): Primary | ICD-10-CM

## 2018-08-28 DIAGNOSIS — E11.40 TYPE 2 DIABETES MELLITUS WITH DIABETIC NEUROPATHY, WITH LONG-TERM CURRENT USE OF INSULIN (H): Primary | ICD-10-CM

## 2018-08-28 NOTE — TELEPHONE ENCOUNTER
Reason for Call:  Medication or medication refill:    Do you use a [facility]?  Name of the pharmacy and phone number for the current request:  [facility] [phone]    Name of the medication requested: One Touch Meter    Other request: [facility] needs a new script--fax # [phone]    Can we leave a detailed message on this number? YES    Phone number patient can be reached at: Other phone number:  [phone]    Best Time: anytime    Call taken on 8/28/2018 at 1:16 PM by DALIA BROWN

## 2018-08-28 NOTE — TELEPHONE ENCOUNTER
Fax received from [facility]. Insurance does not cover Accu-chek Paige. Please send orders for new testing supplies. Order pended

## 2018-08-30 ENCOUNTER — MEDICAL CORRESPONDENCE (OUTPATIENT)
Dept: HEALTH INFORMATION MANAGEMENT | Facility: CLINIC | Age: 63
End: 2018-08-30

## 2018-09-10 DIAGNOSIS — E11.40 TYPE 2 DIABETES MELLITUS WITH DIABETIC NEUROPATHY, WITH LONG-TERM CURRENT USE OF INSULIN (H): ICD-10-CM

## 2018-09-10 DIAGNOSIS — Z79.4 TYPE 2 DIABETES MELLITUS WITH DIABETIC NEUROPATHY, WITH LONG-TERM CURRENT USE OF INSULIN (H): ICD-10-CM

## 2018-09-11 RX ORDER — PREGABALIN 150 MG/1
CAPSULE ORAL
Qty: 60 CAPSULE | Refills: 1 | Status: SHIPPED | OUTPATIENT
Start: 2018-09-11 | End: 2019-01-29

## 2018-09-11 NOTE — TELEPHONE ENCOUNTER
Requested Prescriptions   Pending Prescriptions Disp Refills     LYRICA 150 MG capsule [Pharmacy Med Name: LYRICA 150MG  CAP]  Last Written Prescription Date:  08/01/2018  Last Fill Quantity: 60,  # refills: 0   Last Office Visit: 3/1/2018   Future Office Visit:      60 capsule 0     Sig: TAKE 1 CAPSULE BY MOUTH 2 TIMES DAILY    There is no refill protocol information for this order

## 2018-10-01 DIAGNOSIS — E11.40 TYPE 2 DIABETES MELLITUS WITH DIABETIC NEUROPATHY, WITH LONG-TERM CURRENT USE OF INSULIN (H): ICD-10-CM

## 2018-10-01 DIAGNOSIS — I10 HYPERTENSION GOAL BP (BLOOD PRESSURE) < 140/80: ICD-10-CM

## 2018-10-01 DIAGNOSIS — Z79.4 TYPE 2 DIABETES MELLITUS WITH DIABETIC NEUROPATHY, WITH LONG-TERM CURRENT USE OF INSULIN (H): ICD-10-CM

## 2018-10-01 NOTE — TELEPHONE ENCOUNTER
"Requested Prescriptions   Pending Prescriptions Disp Refills     lisinopril-hydrochlorothiazide (PRINZIDE/ZESTORETIC) 20-12.5 MG per tablet [Pharmacy Med Name: LISINOPRIL/HCTZ 20/12.5M TA 20-12.5 TAB]  Last Written Prescription Date:  01/29/2018  Last Fill Quantity: 60,  # refills: 0   Last Office Visit: 3/1/2018   Future Office Visit:      180 tablet 3     Sig: TAKE 2 TABLETS BY MOUTH DAILY    Diuretics (Including Combos) Protocol Passed    10/1/2018 10:15 AM       Passed - Blood pressure under 140/90 in past 12 months    BP Readings from Last 3 Encounters:   03/13/18 124/68   03/09/18 152/90   03/01/18 110/70                Passed - Recent (12 mo) or future (30 days) visit within the authorizing provider's specialty    Patient had office visit in the last 12 months or has a visit in the next 30 days with authorizing provider or within the authorizing provider's specialty.  See \"Patient Info\" tab in inbasket, or \"Choose Columns\" in Meds & Orders section of the refill encounter.           Passed - Patient is age 18 or older       Passed - Normal serum creatinine on file in past 12 months    Recent Labs   Lab Test  01/22/18   0600   CR  0.88             Passed - Normal serum potassium on file in past 12 months    Recent Labs   Lab Test  01/22/18   0600   POTASSIUM  4.2                   Passed - Normal serum sodium on file in past 12 months    Recent Labs   Lab Test  01/22/18   0600   NA  144                "

## 2018-10-02 RX ORDER — LISINOPRIL AND HYDROCHLOROTHIAZIDE 12.5; 2 MG/1; MG/1
TABLET ORAL
Qty: 180 TABLET | Refills: 0 | Status: SHIPPED | OUTPATIENT
Start: 2018-10-02 | End: 2019-01-29

## 2018-10-08 DIAGNOSIS — Z53.9 DIAGNOSIS NOT YET DEFINED: Primary | ICD-10-CM

## 2018-10-08 PROCEDURE — G0179 MD RECERTIFICATION HHA PT: HCPCS | Performed by: INTERNAL MEDICINE

## 2018-10-29 DIAGNOSIS — I10 HYPERTENSION GOAL BP (BLOOD PRESSURE) < 140/80: ICD-10-CM

## 2018-10-30 RX ORDER — METOPROLOL SUCCINATE 100 MG/1
TABLET, EXTENDED RELEASE ORAL
Qty: 90 TABLET | Refills: 1 | Status: SHIPPED | OUTPATIENT
Start: 2018-10-30 | End: 2019-01-29

## 2018-10-30 NOTE — TELEPHONE ENCOUNTER
"Requested Prescriptions   Pending Prescriptions Disp Refills     metoprolol succinate (TOPROL-XL) 100 MG 24 hr tablet [Pharmacy Med Name: METOPROLOL SUCC ER 100MG 100 TAB]  Last Written Prescription Date:  01/29/2018  Last Fill Quantity: 30,  # refills: 0   Last office visit: 3/1/2018 with prescribing provider:   JOSEFINA  Future Office Visit:     90 tablet 3     Sig: TAKE ONE TABLET BY MOUTH DAILY.    Beta-Blockers Protocol Passed    10/29/2018  3:32 PM       Passed - Blood pressure under 140/90 in past 12 months    BP Readings from Last 3 Encounters:   03/13/18 124/68   03/09/18 152/90   03/01/18 110/70                Passed - Patient is age 6 or older       Passed - Recent (12 mo) or future (30 days) visit within the authorizing provider's specialty    Patient had office visit in the last 12 months or has a visit in the next 30 days with authorizing provider or within the authorizing provider's specialty.  See \"Patient Info\" tab in inbasket, or \"Choose Columns\" in Meds & Orders section of the refill encounter.              TRULICITY 0.75 MG/0.5ML pen [Pharmacy Med Name: TRULICITY 0.75/0.5 SOL 0.75 SOPN]  Last Written Prescription Date:  09/07/2017  Last Fill Quantity: 6mL,  # refills: 4   Last office visit: 3/1/2018 with prescribing provider:  JOSEFINA   Future Office Visit:     6 mL 5     Sig: INJECT 0.75 MG SUBCUTANEOUS EVERY 7 DAYS    GLP-1 Agonists Protocol Failed    10/29/2018  3:32 PM       Failed - Blood pressure less than 140/90 in past 6 months    BP Readings from Last 3 Encounters:   03/13/18 124/68   03/09/18 152/90   03/01/18 110/70                Failed - Microalbumin on file in past 12 months    Recent Labs   Lab Test  09/12/16   1031   MICROL  324   UMALCR  231.43*            Failed - HgbA1C in past 3 or 6 months    If HgbA1C is 8 or greater, it needs to be on file within the past 3 months.  If less than 8, must be on file within the past 6 months.     Recent Labs   Lab Test  01/12/18   1135   A1C  11.9* " "           Failed - Recent (6 mo) or future (30 days) visit within the authorizing provider's specialty    Patient had office visit in the last 6 months or has a visit in the next 30 days with authorizing provider.  See \"Patient Info\" tab in inbasket, or \"Choose Columns\" in Meds & Orders section of the refill encounter.           Passed - LDL on file in past 12 months    Recent Labs   Lab Test  01/14/18   0908   LDL  92            Passed - Patient is age 18 or older       Passed - Normal serum creatinine on file in past 12 months    Recent Labs   Lab Test  01/22/18   0600   CR  0.88                 NOTE: dulaglutide (TRULICITY) 0.75 MG/0.5ML pen WAS DISCONTINUED ON 01/15/2018. REASON: STOP AT DISCHARGE  "

## 2018-10-31 RX ORDER — DULAGLUTIDE 0.75 MG/.5ML
INJECTION, SOLUTION SUBCUTANEOUS
Qty: 6 ML | Refills: 5 | OUTPATIENT
Start: 2018-10-31

## 2018-11-05 DIAGNOSIS — Z79.4 TYPE 2 DIABETES MELLITUS WITH DIABETIC NEUROPATHY, WITH LONG-TERM CURRENT USE OF INSULIN (H): ICD-10-CM

## 2018-11-05 DIAGNOSIS — E11.40 TYPE 2 DIABETES MELLITUS WITH DIABETIC NEUROPATHY, WITH LONG-TERM CURRENT USE OF INSULIN (H): ICD-10-CM

## 2018-11-05 NOTE — TELEPHONE ENCOUNTER
"Requested Prescriptions   Pending Prescriptions Disp Refills     metFORMIN (GLUCOPHAGE) 1000 MG tablet [Pharmacy Med Name: METFORMIN 1000 MG TAB 1000 TAB]  Last Written Prescription Date:  03/01/2018  Last Fill Quantity: n/a,  # refills: 03   Last Office Visit: 3/1/2018   Future Office Visit:      180 tablet 3     Sig: TAKE ONE TABLET BY MOUTH TWO TIMES DAILY.    Biguanide Agents Failed    11/5/2018  9:56 AM       Failed - Blood pressure less than 140/90 in past 6 months    BP Readings from Last 3 Encounters:   03/13/18 124/68   03/09/18 152/90   03/01/18 110/70                Failed - Patient has had a Microalbumin in the past 15 mos.    Recent Labs   Lab Test  09/12/16   1031   MICROL  324   UMALCR  231.43*            Failed - Patient has documented A1c within the specified period of time.    If HgbA1C is 8 or greater, it needs to be on file within the past 3 months.  If less than 8, must be on file within the past 6 months.     Recent Labs   Lab Test  01/12/18   1135   A1C  11.9*            Failed - Recent (6 mo) or future (30 days) visit within the authorizing provider's specialty    Patient had office visit in the last 6 months or has a visit in the next 30 days with authorizing provider or within the authorizing provider's specialty.  See \"Patient Info\" tab in inbasket, or \"Choose Columns\" in Meds & Orders section of the refill encounter.           Passed - Patient has documented LDL within the past 12 mos.    Recent Labs   Lab Test  01/14/18   0908   LDL  92            Passed - Patient is age 10 or older       Passed - Patient's CR is NOT>1.4 OR Patient's EGFR is NOT<45 within past 12 mos.    Recent Labs   Lab Test  01/22/18   0600   GFRESTIMATED  87   GFRESTBLACK  >90       Recent Labs   Lab Test  01/22/18   0600   CR  0.88            Passed - Patient does NOT have a diagnosis of CHF.          "

## 2018-11-12 DIAGNOSIS — E78.5 HYPERLIPIDEMIA LDL GOAL <100: ICD-10-CM

## 2018-11-12 DIAGNOSIS — Z79.4 TYPE 2 DIABETES MELLITUS WITH DIABETIC NEUROPATHY, WITH LONG-TERM CURRENT USE OF INSULIN (H): ICD-10-CM

## 2018-11-12 DIAGNOSIS — E11.40 TYPE 2 DIABETES MELLITUS WITH DIABETIC NEUROPATHY, WITH LONG-TERM CURRENT USE OF INSULIN (H): ICD-10-CM

## 2018-11-12 NOTE — TELEPHONE ENCOUNTER
"Requested Prescriptions   Pending Prescriptions Disp Refills     simvastatin (ZOCOR) 20 MG tablet [Pharmacy Med Name: SIMVASTATIN 20MG TAB 20 TAB] 90 tablet 3    Last Written Prescription Date:  1/29/18  Last Fill Quantity: 30,  # refills: 0   Last office visit: 3/1/2018 with prescribing provider:  11/21/17   Future Office Visit:     Sig: TAKE ONE TABLET BY MOUTH AT BEDTIME.    Statins Protocol Passed    11/12/2018 12:27 PM       Passed - LDL on file in past 12 months    Recent Labs   Lab Test  01/14/18   0908   LDL  92            Passed - No abnormal creatine kinase in past 12 months    No lab results found.            Passed - Recent (12 mo) or future (30 days) visit within the authorizing provider's specialty    Patient had office visit in the last 12 months or has a visit in the next 30 days with authorizing provider or within the authorizing provider's specialty.  See \"Patient Info\" tab in inbasket, or \"Choose Columns\" in Meds & Orders section of the refill encounter.             Passed - Patient is age 18 or older        metFORMIN (GLUCOPHAGE) 1000 MG tablet [Pharmacy Med Name: METFORMIN 1000 MG TAB 1000 TAB] 180 tablet 3    Last Written Prescription Date:  3/13/18  Last Fill Quantity: NA,  # refills: 3   Last office visit: 3/1/2018 with prescribing provider:  11/21/17   Future Office Visit:     Sig: TAKE ONE TABLET BY MOUTH TWO TIMES DAILY.    Biguanide Agents Failed    11/12/2018 12:27 PM       Failed - Blood pressure less than 140/90 in past 6 months    BP Readings from Last 3 Encounters:   03/13/18 124/68   03/09/18 152/90   03/01/18 110/70                Failed - Patient has had a Microalbumin in the past 15 mos.    Recent Labs   Lab Test  09/12/16   1031   MICROL  324   UMALCR  231.43*            Failed - Patient has documented A1c within the specified period of time.    If HgbA1C is 8 or greater, it needs to be on file within the past 3 months.  If less than 8, must be on file within the past 6 months. " "    Recent Labs   Lab Test  01/12/18   1135   A1C  11.9*            Failed - Recent (6 mo) or future (30 days) visit within the authorizing provider's specialty    Patient had office visit in the last 6 months or has a visit in the next 30 days with authorizing provider or within the authorizing provider's specialty.  See \"Patient Info\" tab in inbasket, or \"Choose Columns\" in Meds & Orders section of the refill encounter.           Passed - Patient has documented LDL within the past 12 mos.    Recent Labs   Lab Test  01/14/18   0908   LDL  92            Passed - Patient is age 10 or older       Passed - Patient's CR is NOT>1.4 OR Patient's EGFR is NOT<45 within past 12 mos.    Recent Labs   Lab Test  01/22/18   0600   GFRESTIMATED  87   GFRESTBLACK  >90       Recent Labs   Lab Test  01/22/18   0600   CR  0.88            Passed - Patient does NOT have a diagnosis of CHF.          "

## 2018-11-13 RX ORDER — SIMVASTATIN 20 MG
TABLET ORAL
Qty: 90 TABLET | Refills: 0 | Status: SHIPPED | OUTPATIENT
Start: 2018-11-13 | End: 2019-01-29

## 2018-11-13 NOTE — TELEPHONE ENCOUNTER
Simvastatin Prescription approved per G Refill Protocol.    Routing refill request to provider for review/approval because:  Labs out of range:  A1C

## 2018-12-03 DIAGNOSIS — Z79.4 TYPE 2 DIABETES MELLITUS WITH DIABETIC NEUROPATHY, WITH LONG-TERM CURRENT USE OF INSULIN (H): ICD-10-CM

## 2018-12-03 DIAGNOSIS — E11.40 TYPE 2 DIABETES MELLITUS WITH DIABETIC NEUROPATHY, WITH LONG-TERM CURRENT USE OF INSULIN (H): ICD-10-CM

## 2018-12-03 RX ORDER — PREGABALIN 150 MG/1
CAPSULE ORAL
Qty: 60 CAPSULE | Refills: 1 | OUTPATIENT
Start: 2018-12-03

## 2018-12-03 NOTE — TELEPHONE ENCOUNTER
LYRICA 150 MG capsule      Last Written Prescription Date:  9/11/2018  Last Fill Quantity: 60,   # refills: 1  Last Office Visit: 3/01/2018  Future Office visit:       Routing refill request to provider for review/approval because:  Drug not on the [facility], [facility] or [facility] refill protocol or controlled substance

## 2018-12-03 NOTE — TELEPHONE ENCOUNTER
"Requested Prescriptions   Pending Prescriptions Disp Refills     metFORMIN (GLUCOPHAGE) 1000 MG tablet [Pharmacy Med Name: METFORMIN 1000 MG TAB 1000 TAB] 180 tablet 3     Sig: TAKE ONE TABLET BY MOUTH TWO TIMES DAILY.    Biguanide Agents Failed    12/3/2018  1:39 PM       Failed - Blood pressure less than 140/90 in past 6 months    BP Readings from Last 3 Encounters:   03/13/18 124/68   03/09/18 152/90   03/01/18 110/70                Failed - Patient has had a Microalbumin in the past 15 mos.    Recent Labs   Lab Test  09/12/16   1031   MICROL  324   UMALCR  231.43*            Failed - Patient has documented A1c within the specified period of time.    If HgbA1C is 8 or greater, it needs to be on file within the past 3 months.  If less than 8, must be on file within the past 6 months.     Recent Labs   Lab Test  01/12/18   1135   A1C  11.9*            Failed - Recent (6 mo) or future (30 days) visit within the authorizing provider's specialty    Patient had office visit in the last 6 months or has a visit in the next 30 days with authorizing provider or within the authorizing provider's specialty.  See \"Patient Info\" tab in inbasket, or \"Choose Columns\" in Meds & Orders section of the refill encounter.           Passed - Patient has documented LDL within the past 12 mos.    Recent Labs   Lab Test  01/14/18   0908   LDL  92            Passed - Patient is age 10 or older       Passed - Patient's CR is NOT>1.4 OR Patient's EGFR is NOT<45 within past 12 mos.    Recent Labs   Lab Test  01/22/18   0600   GFRESTIMATED  87   GFRESTBLACK  >90       Recent Labs   Lab Test  01/22/18   0600   CR  0.88            Passed - Patient does NOT have a diagnosis of CHF.        Patient never filled through us    Last Written Prescription Date:  03/01/2018  Last Fill Quantity: ,  # refills: 3   Last office visit: 3/1/2018 with prescribing provider:  none   Future Office Visit:      "

## 2018-12-04 NOTE — TELEPHONE ENCOUNTER
Routing refill request to provider for review/approval because:  Paris given x1 and patient did not follow up, please advise  Overdue for office visit, labs

## 2018-12-06 ENCOUNTER — TELEPHONE (OUTPATIENT)
Dept: NURSING | Facility: CLINIC | Age: 63
End: 2018-12-06

## 2018-12-06 DIAGNOSIS — E11.40 TYPE 2 DIABETES MELLITUS WITH DIABETIC NEUROPATHY, WITH LONG-TERM CURRENT USE OF INSULIN (H): ICD-10-CM

## 2018-12-06 DIAGNOSIS — Z79.4 TYPE 2 DIABETES MELLITUS WITH DIABETIC NEUROPATHY, WITH LONG-TERM CURRENT USE OF INSULIN (H): ICD-10-CM

## 2018-12-06 NOTE — TELEPHONE ENCOUNTER
Home care nurse calling.  Pt has been out of metformin and lantus for a month or more.  She is going to help him make an upcoming appt.   2 weeks worth of meds refilled.

## 2018-12-17 DIAGNOSIS — Z53.9 DIAGNOSIS NOT YET DEFINED: Primary | ICD-10-CM

## 2018-12-17 DIAGNOSIS — Z79.4 TYPE 2 DIABETES MELLITUS WITH DIABETIC NEUROPATHY, WITH LONG-TERM CURRENT USE OF INSULIN (H): ICD-10-CM

## 2018-12-17 DIAGNOSIS — E11.40 TYPE 2 DIABETES MELLITUS WITH DIABETIC NEUROPATHY, WITH LONG-TERM CURRENT USE OF INSULIN (H): ICD-10-CM

## 2018-12-17 PROCEDURE — G0179 MD RECERTIFICATION HHA PT: HCPCS | Performed by: INTERNAL MEDICINE

## 2018-12-18 NOTE — TELEPHONE ENCOUNTER
Routing refill request to provider for review/approval because:  Paris given x1 and patient did not follow up, please advise  Patient needs to be seen because it has been more than 1 year since last office visit.

## 2018-12-18 NOTE — TELEPHONE ENCOUNTER
"Requested Prescriptions   Pending Prescriptions Disp Refills     metFORMIN (GLUCOPHAGE) 1000 MG tablet [Pharmacy Med Name: METFORMIN 1000 MG TAB 1000 TAB]  Last Written Prescription Date:  12/06/2018  Last Fill Quantity: 30,  # refills: 0   Last Office Visit: 3/1/2018   Future Office Visit:      30 tablet 0     Sig: TAKE 1 TABLET (1,000 MG) BY MOUTH 2 TIMES DAILY (WITH MEALS)    Biguanide Agents Failed - 12/17/2018 12:33 PM       Failed - Blood pressure less than 140/90 in past 6 months    BP Readings from Last 3 Encounters:   03/13/18 124/68   03/09/18 152/90   03/01/18 110/70                Failed - Patient has had a Microalbumin in the past 15 mos.    Recent Labs   Lab Test 09/12/16  1031   MICROL 324   UMALCR 231.43*            Failed - Patient has documented A1c within the specified period of time.    If HgbA1C is 8 or greater, it needs to be on file within the past 3 months.  If less than 8, must be on file within the past 6 months.     Recent Labs   Lab Test 01/12/18  1135   A1C 11.9*            Failed - Recent (6 mo) or future (30 days) visit within the authorizing provider's specialty    Patient had office visit in the last 6 months or has a visit in the next 30 days with authorizing provider or within the authorizing provider's specialty.  See \"Patient Info\" tab in inbasket, or \"Choose Columns\" in Meds & Orders section of the refill encounter.           Passed - Patient has documented LDL within the past 12 mos.    Recent Labs   Lab Test 01/14/18  0908   LDL 92            Passed - Patient is age 10 or older       Passed - Patient's CR is NOT>1.4 OR Patient's EGFR is NOT<45 within past 12 mos.    Recent Labs   Lab Test 01/22/18  0600   GFRESTIMATED 87   GFRESTBLACK >90       Recent Labs   Lab Test 01/22/18  0600   CR 0.88            Passed - Patient does NOT have a diagnosis of CHF.          "

## 2018-12-31 DIAGNOSIS — E11.40 TYPE 2 DIABETES MELLITUS WITH DIABETIC NEUROPATHY, WITH LONG-TERM CURRENT USE OF INSULIN (H): ICD-10-CM

## 2018-12-31 DIAGNOSIS — Z79.4 TYPE 2 DIABETES MELLITUS WITH DIABETIC NEUROPATHY, WITH LONG-TERM CURRENT USE OF INSULIN (H): ICD-10-CM

## 2019-01-01 ENCOUNTER — HOSPITAL ENCOUNTER (INPATIENT)
Facility: CLINIC | Age: 64
LOS: 3 days | Discharge: HOME HEALTH | DRG: 280 | End: 2019-12-30
Attending: EMERGENCY MEDICINE | Admitting: INTERNAL MEDICINE
Payer: MEDICARE

## 2019-01-01 ENCOUNTER — HEALTH MAINTENANCE LETTER (OUTPATIENT)
Age: 64
End: 2019-01-01

## 2019-01-01 ENCOUNTER — DOCUMENTATION ONLY (OUTPATIENT)
Dept: CARDIOLOGY | Facility: CLINIC | Age: 64
End: 2019-01-01

## 2019-01-01 ENCOUNTER — TELEPHONE (OUTPATIENT)
Dept: CARDIOLOGY | Facility: CLINIC | Age: 64
End: 2019-01-01

## 2019-01-01 ENCOUNTER — APPOINTMENT (OUTPATIENT)
Dept: CARDIOLOGY | Facility: CLINIC | Age: 64
DRG: 280 | End: 2019-01-01
Attending: INTERNAL MEDICINE
Payer: MEDICARE

## 2019-01-01 ENCOUNTER — TELEPHONE (OUTPATIENT)
Dept: INTERNAL MEDICINE | Facility: CLINIC | Age: 64
End: 2019-01-01

## 2019-01-01 ENCOUNTER — APPOINTMENT (OUTPATIENT)
Dept: NUCLEAR MEDICINE | Facility: CLINIC | Age: 64
DRG: 280 | End: 2019-01-01
Attending: INTERNAL MEDICINE
Payer: MEDICARE

## 2019-01-01 ENCOUNTER — APPOINTMENT (OUTPATIENT)
Dept: GENERAL RADIOLOGY | Facility: CLINIC | Age: 64
DRG: 280 | End: 2019-01-01
Attending: EMERGENCY MEDICINE
Payer: MEDICARE

## 2019-01-01 ENCOUNTER — TELEPHONE (OUTPATIENT)
Dept: GERIATRICS | Facility: CLINIC | Age: 64
End: 2019-01-01

## 2019-01-01 ENCOUNTER — APPOINTMENT (OUTPATIENT)
Dept: ULTRASOUND IMAGING | Facility: CLINIC | Age: 64
DRG: 280 | End: 2019-01-01
Attending: EMERGENCY MEDICINE
Payer: MEDICARE

## 2019-01-01 VITALS
BODY MASS INDEX: 38.54 KG/M2 | WEIGHT: 284.5 LBS | HEART RATE: 81 BPM | OXYGEN SATURATION: 96 % | DIASTOLIC BLOOD PRESSURE: 91 MMHG | HEIGHT: 72 IN | TEMPERATURE: 98.6 F | SYSTOLIC BLOOD PRESSURE: 164 MMHG | RESPIRATION RATE: 18 BRPM

## 2019-01-01 DIAGNOSIS — L03.115 BILATERAL LOWER LEG CELLULITIS: ICD-10-CM

## 2019-01-01 DIAGNOSIS — Z79.4 TYPE 2 DIABETES MELLITUS WITH DIABETIC NEUROPATHY, WITH LONG-TERM CURRENT USE OF INSULIN (H): ICD-10-CM

## 2019-01-01 DIAGNOSIS — E11.40 TYPE 2 DIABETES MELLITUS WITH DIABETIC NEUROPATHY, WITH LONG-TERM CURRENT USE OF INSULIN (H): ICD-10-CM

## 2019-01-01 DIAGNOSIS — I42.9 CARDIOMYOPATHY, UNSPECIFIED TYPE (H): Primary | ICD-10-CM

## 2019-01-01 DIAGNOSIS — L03.116 BILATERAL LOWER LEG CELLULITIS: ICD-10-CM

## 2019-01-01 DIAGNOSIS — R60.0 EDEMA OF BOTH LEGS: ICD-10-CM

## 2019-01-01 DIAGNOSIS — R79.89 ELEVATED TROPONIN: ICD-10-CM

## 2019-01-01 DIAGNOSIS — I10 HYPERTENSION GOAL BP (BLOOD PRESSURE) < 140/80: ICD-10-CM

## 2019-01-01 DIAGNOSIS — I50.9 OTHER CONGESTIVE HEART FAILURE (H): Primary | ICD-10-CM

## 2019-01-01 DIAGNOSIS — L03.119 CELLULITIS OF LOWER EXTREMITY, UNSPECIFIED LATERALITY: ICD-10-CM

## 2019-01-01 DIAGNOSIS — A41.9 ACUTE SEPSIS (H): ICD-10-CM

## 2019-01-01 LAB
ANION GAP SERPL CALCULATED.3IONS-SCNC: 1 MMOL/L (ref 3–14)
ANION GAP SERPL CALCULATED.3IONS-SCNC: 2 MMOL/L (ref 3–14)
BACTERIA SPEC CULT: ABNORMAL
BASOPHILS # BLD AUTO: 0 10E9/L (ref 0–0.2)
BASOPHILS # BLD AUTO: 0 10E9/L (ref 0–0.2)
BASOPHILS # BLD AUTO: 0.1 10E9/L (ref 0–0.2)
BASOPHILS NFR BLD AUTO: 0.4 %
BASOPHILS NFR BLD AUTO: 0.4 %
BASOPHILS NFR BLD AUTO: 0.6 %
BUN SERPL-MCNC: 21 MG/DL (ref 7–30)
BUN SERPL-MCNC: 21 MG/DL (ref 7–30)
BUN SERPL-MCNC: 29 MG/DL (ref 7–30)
BUN SERPL-MCNC: 35 MG/DL (ref 7–30)
CALCIUM SERPL-MCNC: 8.5 MG/DL (ref 8.5–10.1)
CALCIUM SERPL-MCNC: 8.8 MG/DL (ref 8.5–10.1)
CALCIUM SERPL-MCNC: 9 MG/DL (ref 8.5–10.1)
CALCIUM SERPL-MCNC: 9.1 MG/DL (ref 8.5–10.1)
CHLORIDE SERPL-SCNC: 103 MMOL/L (ref 94–109)
CHLORIDE SERPL-SCNC: 104 MMOL/L (ref 94–109)
CHLORIDE SERPL-SCNC: 104 MMOL/L (ref 94–109)
CHLORIDE SERPL-SCNC: 106 MMOL/L (ref 94–109)
CO2 BLDCOV-SCNC: 39 MMOL/L (ref 21–28)
CO2 SERPL-SCNC: 34 MMOL/L (ref 20–32)
CO2 SERPL-SCNC: 35 MMOL/L (ref 20–32)
CO2 SERPL-SCNC: 37 MMOL/L (ref 20–32)
CO2 SERPL-SCNC: 37 MMOL/L (ref 20–32)
CREAT SERPL-MCNC: 1.35 MG/DL (ref 0.66–1.25)
CREAT SERPL-MCNC: 1.41 MG/DL (ref 0.66–1.25)
CREAT SERPL-MCNC: 1.62 MG/DL (ref 0.66–1.25)
CREAT SERPL-MCNC: 1.67 MG/DL (ref 0.66–1.25)
D DIMER PPP FEU-MCNC: 0.8 UG/ML FEU (ref 0–0.5)
DIFFERENTIAL METHOD BLD: ABNORMAL
EOSINOPHIL # BLD AUTO: 0.4 10E9/L (ref 0–0.7)
EOSINOPHIL # BLD AUTO: 0.4 10E9/L (ref 0–0.7)
EOSINOPHIL # BLD AUTO: 0.5 10E9/L (ref 0–0.7)
EOSINOPHIL NFR BLD AUTO: 4.8 %
EOSINOPHIL NFR BLD AUTO: 5.2 %
EOSINOPHIL NFR BLD AUTO: 5.9 %
ERYTHROCYTE [DISTWIDTH] IN BLOOD BY AUTOMATED COUNT: 14.8 % (ref 10–15)
ERYTHROCYTE [DISTWIDTH] IN BLOOD BY AUTOMATED COUNT: 14.9 % (ref 10–15)
ERYTHROCYTE [DISTWIDTH] IN BLOOD BY AUTOMATED COUNT: 14.9 % (ref 10–15)
GFR SERPL CREATININE-BSD FRML MDRD: 43 ML/MIN/{1.73_M2}
GFR SERPL CREATININE-BSD FRML MDRD: 44 ML/MIN/{1.73_M2}
GFR SERPL CREATININE-BSD FRML MDRD: 52 ML/MIN/{1.73_M2}
GFR SERPL CREATININE-BSD FRML MDRD: 55 ML/MIN/{1.73_M2}
GLUCOSE BLDC GLUCOMTR-MCNC: 102 MG/DL (ref 70–99)
GLUCOSE BLDC GLUCOMTR-MCNC: 130 MG/DL (ref 70–99)
GLUCOSE BLDC GLUCOMTR-MCNC: 131 MG/DL (ref 70–99)
GLUCOSE BLDC GLUCOMTR-MCNC: 138 MG/DL (ref 70–99)
GLUCOSE BLDC GLUCOMTR-MCNC: 154 MG/DL (ref 70–99)
GLUCOSE BLDC GLUCOMTR-MCNC: 159 MG/DL (ref 70–99)
GLUCOSE BLDC GLUCOMTR-MCNC: 168 MG/DL (ref 70–99)
GLUCOSE BLDC GLUCOMTR-MCNC: 170 MG/DL (ref 70–99)
GLUCOSE BLDC GLUCOMTR-MCNC: 172 MG/DL (ref 70–99)
GLUCOSE BLDC GLUCOMTR-MCNC: 180 MG/DL (ref 70–99)
GLUCOSE SERPL-MCNC: 146 MG/DL (ref 70–99)
GLUCOSE SERPL-MCNC: 153 MG/DL (ref 70–99)
GLUCOSE SERPL-MCNC: 168 MG/DL (ref 70–99)
GLUCOSE SERPL-MCNC: 244 MG/DL (ref 70–99)
HBA1C MFR BLD: 8.2 % (ref 0–5.6)
HCT VFR BLD AUTO: 44.4 % (ref 40–53)
HCT VFR BLD AUTO: 44.4 % (ref 40–53)
HCT VFR BLD AUTO: 44.6 % (ref 40–53)
HGB BLD-MCNC: 13.6 G/DL (ref 13.3–17.7)
HGB BLD-MCNC: 13.6 G/DL (ref 13.3–17.7)
HGB BLD-MCNC: 13.7 G/DL (ref 13.3–17.7)
IMM GRANULOCYTES # BLD: 0 10E9/L (ref 0–0.4)
IMM GRANULOCYTES NFR BLD: 0.2 %
IMM GRANULOCYTES NFR BLD: 0.2 %
IMM GRANULOCYTES NFR BLD: 0.3 %
INTERPRETATION ECG - MUSE: NORMAL
LACTATE BLD-SCNC: 2 MMOL/L (ref 0.7–2.1)
LMWH PPP CHRO-ACNC: 0.1 IU/ML
LMWH PPP CHRO-ACNC: 0.15 IU/ML
LYMPHOCYTES # BLD AUTO: 1.3 10E9/L (ref 0.8–5.3)
LYMPHOCYTES # BLD AUTO: 1.4 10E9/L (ref 0.8–5.3)
LYMPHOCYTES # BLD AUTO: 1.7 10E9/L (ref 0.8–5.3)
LYMPHOCYTES NFR BLD AUTO: 14.9 %
LYMPHOCYTES NFR BLD AUTO: 17.7 %
LYMPHOCYTES NFR BLD AUTO: 22 %
Lab: ABNORMAL
MCH RBC QN AUTO: 29.1 PG (ref 26.5–33)
MCH RBC QN AUTO: 29.3 PG (ref 26.5–33)
MCH RBC QN AUTO: 29.3 PG (ref 26.5–33)
MCHC RBC AUTO-ENTMCNC: 30.6 G/DL (ref 31.5–36.5)
MCHC RBC AUTO-ENTMCNC: 30.6 G/DL (ref 31.5–36.5)
MCHC RBC AUTO-ENTMCNC: 30.7 G/DL (ref 31.5–36.5)
MCV RBC AUTO: 95 FL (ref 78–100)
MCV RBC AUTO: 96 FL (ref 78–100)
MCV RBC AUTO: 96 FL (ref 78–100)
MONOCYTES # BLD AUTO: 0.7 10E9/L (ref 0–1.3)
MONOCYTES NFR BLD AUTO: 8.2 %
MONOCYTES NFR BLD AUTO: 8.2 %
MONOCYTES NFR BLD AUTO: 8.5 %
NEUTROPHILS # BLD AUTO: 5 10E9/L (ref 1.6–8.3)
NEUTROPHILS # BLD AUTO: 5.6 10E9/L (ref 1.6–8.3)
NEUTROPHILS # BLD AUTO: 6.1 10E9/L (ref 1.6–8.3)
NEUTROPHILS NFR BLD AUTO: 63.2 %
NEUTROPHILS NFR BLD AUTO: 68.3 %
NEUTROPHILS NFR BLD AUTO: 71 %
NRBC # BLD AUTO: 0 10*3/UL
NRBC BLD AUTO-RTO: 0 /100
NT-PROBNP SERPL-MCNC: 451 PG/ML (ref 0–900)
PCO2 BLDV: 67 MM HG (ref 40–50)
PH BLDV: 7.37 PH (ref 7.32–7.43)
PLATELET # BLD AUTO: 202 10E9/L (ref 150–450)
PLATELET # BLD AUTO: 213 10E9/L (ref 150–450)
PLATELET # BLD AUTO: 220 10E9/L (ref 150–450)
PLATELET # BLD AUTO: 225 10E9/L (ref 150–450)
PLATELET # BLD AUTO: 231 10E9/L (ref 150–450)
PO2 BLDV: 23 MM HG (ref 25–47)
POTASSIUM SERPL-SCNC: 3.7 MMOL/L (ref 3.4–5.3)
POTASSIUM SERPL-SCNC: 4 MMOL/L (ref 3.4–5.3)
POTASSIUM SERPL-SCNC: 4 MMOL/L (ref 3.4–5.3)
POTASSIUM SERPL-SCNC: 4.1 MMOL/L (ref 3.4–5.3)
RBC # BLD AUTO: 4.64 10E12/L (ref 4.4–5.9)
RBC # BLD AUTO: 4.67 10E12/L (ref 4.4–5.9)
RBC # BLD AUTO: 4.67 10E12/L (ref 4.4–5.9)
SAO2 % BLDV FROM PO2: 35 %
SODIUM SERPL-SCNC: 140 MMOL/L (ref 133–144)
SODIUM SERPL-SCNC: 141 MMOL/L (ref 133–144)
SODIUM SERPL-SCNC: 142 MMOL/L (ref 133–144)
SODIUM SERPL-SCNC: 144 MMOL/L (ref 133–144)
SPECIMEN SOURCE: ABNORMAL
TROPONIN I SERPL-MCNC: 0.24 UG/L (ref 0–0.04)
TROPONIN I SERPL-MCNC: 0.24 UG/L (ref 0–0.04)
TROPONIN I SERPL-MCNC: 0.25 UG/L (ref 0–0.04)
WBC # BLD AUTO: 7.9 10E9/L (ref 4–11)
WBC # BLD AUTO: 8.1 10E9/L (ref 4–11)
WBC # BLD AUTO: 8.7 10E9/L (ref 4–11)

## 2019-01-01 PROCEDURE — 94640 AIRWAY INHALATION TREATMENT: CPT | Mod: 76

## 2019-01-01 PROCEDURE — 96367 TX/PROPH/DG ADDL SEQ IV INF: CPT

## 2019-01-01 PROCEDURE — 36415 COLL VENOUS BLD VENIPUNCTURE: CPT | Performed by: EMERGENCY MEDICINE

## 2019-01-01 PROCEDURE — 87040 BLOOD CULTURE FOR BACTERIA: CPT | Performed by: EMERGENCY MEDICINE

## 2019-01-01 PROCEDURE — 25000128 H RX IP 250 OP 636: Performed by: INTERNAL MEDICINE

## 2019-01-01 PROCEDURE — 78580 LUNG PERFUSION IMAGING: CPT

## 2019-01-01 PROCEDURE — 83036 HEMOGLOBIN GLYCOSYLATED A1C: CPT | Performed by: INTERNAL MEDICINE

## 2019-01-01 PROCEDURE — 87070 CULTURE OTHR SPECIMN AEROBIC: CPT | Performed by: EMERGENCY MEDICINE

## 2019-01-01 PROCEDURE — 25800030 ZZH RX IP 258 OP 636: Performed by: INTERNAL MEDICINE

## 2019-01-01 PROCEDURE — 25000125 ZZHC RX 250: Performed by: INTERNAL MEDICINE

## 2019-01-01 PROCEDURE — 80048 BASIC METABOLIC PNL TOTAL CA: CPT | Performed by: HOSPITALIST

## 2019-01-01 PROCEDURE — 99222 1ST HOSP IP/OBS MODERATE 55: CPT | Performed by: INTERNAL MEDICINE

## 2019-01-01 PROCEDURE — 80048 BASIC METABOLIC PNL TOTAL CA: CPT | Performed by: EMERGENCY MEDICINE

## 2019-01-01 PROCEDURE — 25000132 ZZH RX MED GY IP 250 OP 250 PS 637: Mod: GY | Performed by: INTERNAL MEDICINE

## 2019-01-01 PROCEDURE — 93005 ELECTROCARDIOGRAM TRACING: CPT

## 2019-01-01 PROCEDURE — 25000132 ZZH RX MED GY IP 250 OP 250 PS 637: Mod: GY | Performed by: HOSPITALIST

## 2019-01-01 PROCEDURE — 40000275 ZZH STATISTIC RCP TIME EA 10 MIN

## 2019-01-01 PROCEDURE — A9540 TC99M MAA: HCPCS | Performed by: INTERNAL MEDICINE

## 2019-01-01 PROCEDURE — 85025 COMPLETE CBC W/AUTO DIFF WBC: CPT | Performed by: HOSPITALIST

## 2019-01-01 PROCEDURE — 85025 COMPLETE CBC W/AUTO DIFF WBC: CPT | Performed by: INTERNAL MEDICINE

## 2019-01-01 PROCEDURE — 84484 ASSAY OF TROPONIN QUANT: CPT | Performed by: EMERGENCY MEDICINE

## 2019-01-01 PROCEDURE — 00000146 ZZHCL STATISTIC GLUCOSE BY METER IP

## 2019-01-01 PROCEDURE — 94640 AIRWAY INHALATION TREATMENT: CPT

## 2019-01-01 PROCEDURE — 93306 TTE W/DOPPLER COMPLETE: CPT | Mod: 26 | Performed by: INTERNAL MEDICINE

## 2019-01-01 PROCEDURE — 99285 EMERGENCY DEPT VISIT HI MDM: CPT | Mod: 25

## 2019-01-01 PROCEDURE — 93970 EXTREMITY STUDY: CPT

## 2019-01-01 PROCEDURE — 83880 ASSAY OF NATRIURETIC PEPTIDE: CPT | Performed by: EMERGENCY MEDICINE

## 2019-01-01 PROCEDURE — 36415 COLL VENOUS BLD VENIPUNCTURE: CPT | Performed by: HOSPITALIST

## 2019-01-01 PROCEDURE — 87077 CULTURE AEROBIC IDENTIFY: CPT | Performed by: EMERGENCY MEDICINE

## 2019-01-01 PROCEDURE — 85379 FIBRIN DEGRADATION QUANT: CPT | Performed by: EMERGENCY MEDICINE

## 2019-01-01 PROCEDURE — 99233 SBSQ HOSP IP/OBS HIGH 50: CPT | Performed by: HOSPITALIST

## 2019-01-01 PROCEDURE — 25500064 ZZH RX 255 OP 636: Performed by: INTERNAL MEDICINE

## 2019-01-01 PROCEDURE — 21000000 ZZH R&B IMCU HEART CARE

## 2019-01-01 PROCEDURE — 36415 COLL VENOUS BLD VENIPUNCTURE: CPT | Performed by: INTERNAL MEDICINE

## 2019-01-01 PROCEDURE — 96366 THER/PROPH/DIAG IV INF ADDON: CPT

## 2019-01-01 PROCEDURE — 25000125 ZZHC RX 250: Performed by: HOSPITALIST

## 2019-01-01 PROCEDURE — 99232 SBSQ HOSP IP/OBS MODERATE 35: CPT | Performed by: INTERNAL MEDICINE

## 2019-01-01 PROCEDURE — 80048 BASIC METABOLIC PNL TOTAL CA: CPT | Performed by: INTERNAL MEDICINE

## 2019-01-01 PROCEDURE — G0463 HOSPITAL OUTPT CLINIC VISIT: HCPCS

## 2019-01-01 PROCEDURE — 25000132 ZZH RX MED GY IP 250 OP 250 PS 637: Mod: GY | Performed by: EMERGENCY MEDICINE

## 2019-01-01 PROCEDURE — 25000131 ZZH RX MED GY IP 250 OP 636 PS 637: Mod: GY | Performed by: INTERNAL MEDICINE

## 2019-01-01 PROCEDURE — 85520 HEPARIN ASSAY: CPT | Performed by: INTERNAL MEDICINE

## 2019-01-01 PROCEDURE — 25800030 ZZH RX IP 258 OP 636

## 2019-01-01 PROCEDURE — 84484 ASSAY OF TROPONIN QUANT: CPT | Performed by: INTERNAL MEDICINE

## 2019-01-01 PROCEDURE — 71046 X-RAY EXAM CHEST 2 VIEWS: CPT

## 2019-01-01 PROCEDURE — 85025 COMPLETE CBC W/AUTO DIFF WBC: CPT | Performed by: EMERGENCY MEDICINE

## 2019-01-01 PROCEDURE — 83605 ASSAY OF LACTIC ACID: CPT

## 2019-01-01 PROCEDURE — 25000128 H RX IP 250 OP 636: Performed by: EMERGENCY MEDICINE

## 2019-01-01 PROCEDURE — 82803 BLOOD GASES ANY COMBINATION: CPT

## 2019-01-01 PROCEDURE — 34300033 ZZH RX 343: Performed by: INTERNAL MEDICINE

## 2019-01-01 PROCEDURE — 85049 AUTOMATED PLATELET COUNT: CPT | Performed by: INTERNAL MEDICINE

## 2019-01-01 PROCEDURE — 93306 TTE W/DOPPLER COMPLETE: CPT

## 2019-01-01 PROCEDURE — 96365 THER/PROPH/DIAG IV INF INIT: CPT

## 2019-01-01 PROCEDURE — 96368 THER/DIAG CONCURRENT INF: CPT

## 2019-01-01 PROCEDURE — 99239 HOSP IP/OBS DSCHRG MGMT >30: CPT | Performed by: HOSPITALIST

## 2019-01-01 PROCEDURE — 25000128 H RX IP 250 OP 636

## 2019-01-01 PROCEDURE — 87186 SC STD MICRODIL/AGAR DIL: CPT | Performed by: EMERGENCY MEDICINE

## 2019-01-01 PROCEDURE — 99223 1ST HOSP IP/OBS HIGH 75: CPT | Mod: AI | Performed by: INTERNAL MEDICINE

## 2019-01-01 RX ORDER — PREGABALIN 150 MG/1
CAPSULE ORAL
Qty: 60 CAPSULE | Refills: 1 | Status: SHIPPED | OUTPATIENT
Start: 2019-01-01 | End: 2019-01-01

## 2019-01-01 RX ORDER — TAMSULOSIN HYDROCHLORIDE 0.4 MG/1
0.4 CAPSULE ORAL DAILY
Status: DISCONTINUED | OUTPATIENT
Start: 2019-01-01 | End: 2019-01-01 | Stop reason: HOSPADM

## 2019-01-01 RX ORDER — ASPIRIN 81 MG/1
81 TABLET, CHEWABLE ORAL DAILY
Status: DISCONTINUED | OUTPATIENT
Start: 2019-01-01 | End: 2019-01-01

## 2019-01-01 RX ORDER — PROCHLORPERAZINE 25 MG
25 SUPPOSITORY, RECTAL RECTAL EVERY 12 HOURS PRN
Status: DISCONTINUED | OUTPATIENT
Start: 2019-01-01 | End: 2019-01-01 | Stop reason: HOSPADM

## 2019-01-01 RX ORDER — METOPROLOL SUCCINATE 100 MG/1
100 TABLET, EXTENDED RELEASE ORAL DAILY
Status: DISCONTINUED | OUTPATIENT
Start: 2019-01-01 | End: 2019-01-01 | Stop reason: HOSPADM

## 2019-01-01 RX ORDER — NICOTINE POLACRILEX 4 MG
15-30 LOZENGE BUCCAL
Status: DISCONTINUED | OUTPATIENT
Start: 2019-01-01 | End: 2019-01-01 | Stop reason: HOSPADM

## 2019-01-01 RX ORDER — CLOPIDOGREL BISULFATE 75 MG/1
75 TABLET ORAL DAILY
Status: DISCONTINUED | OUTPATIENT
Start: 2019-01-01 | End: 2019-01-01 | Stop reason: HOSPADM

## 2019-01-01 RX ORDER — TORSEMIDE 10 MG/1
10 TABLET ORAL DAILY
Start: 2019-01-01 | End: 2019-01-01

## 2019-01-01 RX ORDER — PREGABALIN 150 MG/1
150 CAPSULE ORAL 2 TIMES DAILY
Status: DISCONTINUED | OUTPATIENT
Start: 2019-01-01 | End: 2019-01-01 | Stop reason: HOSPADM

## 2019-01-01 RX ORDER — DEXTROSE MONOHYDRATE 25 G/50ML
25-50 INJECTION, SOLUTION INTRAVENOUS
Status: DISCONTINUED | OUTPATIENT
Start: 2019-01-01 | End: 2019-01-01 | Stop reason: HOSPADM

## 2019-01-01 RX ORDER — ATORVASTATIN CALCIUM 40 MG/1
40 TABLET, FILM COATED ORAL EVERY EVENING
Qty: 30 TABLET | Refills: 1 | Status: SHIPPED | OUTPATIENT
Start: 2019-01-01 | End: 2020-01-01

## 2019-01-01 RX ORDER — TORSEMIDE 100 MG/1
50 TABLET ORAL DAILY
COMMUNITY
End: 2020-01-01 | Stop reason: DRUGHIGH

## 2019-01-01 RX ORDER — ATORVASTATIN CALCIUM 40 MG/1
40 TABLET, FILM COATED ORAL EVERY EVENING
Status: DISCONTINUED | OUTPATIENT
Start: 2019-01-01 | End: 2019-01-01 | Stop reason: HOSPADM

## 2019-01-01 RX ORDER — LISINOPRIL 5 MG/1
5 TABLET ORAL DAILY
Status: DISCONTINUED | OUTPATIENT
Start: 2019-01-01 | End: 2019-01-01 | Stop reason: HOSPADM

## 2019-01-01 RX ORDER — ASPIRIN 81 MG
TABLET,CHEWABLE ORAL
Qty: 31 TABLET | Refills: 98 | Status: SHIPPED | OUTPATIENT
Start: 2019-01-01 | End: 2020-01-01

## 2019-01-01 RX ORDER — PROCHLORPERAZINE MALEATE 5 MG
10 TABLET ORAL EVERY 6 HOURS PRN
Status: DISCONTINUED | OUTPATIENT
Start: 2019-01-01 | End: 2019-01-01 | Stop reason: HOSPADM

## 2019-01-01 RX ORDER — HEPARIN SODIUM 5000 [USP'U]/.5ML
5000 INJECTION, SOLUTION INTRAVENOUS; SUBCUTANEOUS EVERY 12 HOURS
Status: CANCELLED | OUTPATIENT
Start: 2019-01-01

## 2019-01-01 RX ORDER — NALOXONE HYDROCHLORIDE 0.4 MG/ML
.1-.4 INJECTION, SOLUTION INTRAMUSCULAR; INTRAVENOUS; SUBCUTANEOUS
Status: DISCONTINUED | OUTPATIENT
Start: 2019-01-01 | End: 2019-01-01 | Stop reason: HOSPADM

## 2019-01-01 RX ORDER — LISINOPRIL 5 MG/1
5 TABLET ORAL DAILY
Qty: 30 TABLET | Refills: 1 | Status: SHIPPED | OUTPATIENT
Start: 2019-01-01 | End: 2020-01-01

## 2019-01-01 RX ORDER — ONDANSETRON 2 MG/ML
4 INJECTION INTRAMUSCULAR; INTRAVENOUS EVERY 6 HOURS PRN
Status: DISCONTINUED | OUTPATIENT
Start: 2019-01-01 | End: 2019-01-01 | Stop reason: HOSPADM

## 2019-01-01 RX ORDER — PIPERACILLIN SODIUM, TAZOBACTAM SODIUM 4; .5 G/20ML; G/20ML
4.5 INJECTION, POWDER, LYOPHILIZED, FOR SOLUTION INTRAVENOUS ONCE
Status: COMPLETED | OUTPATIENT
Start: 2019-01-01 | End: 2019-01-01

## 2019-01-01 RX ORDER — LIDOCAINE 40 MG/G
CREAM TOPICAL
Status: DISCONTINUED | OUTPATIENT
Start: 2019-01-01 | End: 2019-01-01 | Stop reason: HOSPADM

## 2019-01-01 RX ORDER — ONDANSETRON 4 MG/1
4 TABLET, ORALLY DISINTEGRATING ORAL EVERY 6 HOURS PRN
Status: DISCONTINUED | OUTPATIENT
Start: 2019-01-01 | End: 2019-01-01 | Stop reason: HOSPADM

## 2019-01-01 RX ORDER — IPRATROPIUM BROMIDE AND ALBUTEROL SULFATE 2.5; .5 MG/3ML; MG/3ML
3 SOLUTION RESPIRATORY (INHALATION) 4 TIMES DAILY
Status: DISCONTINUED | OUTPATIENT
Start: 2019-01-01 | End: 2019-01-01 | Stop reason: HOSPADM

## 2019-01-01 RX ORDER — CEPHALEXIN 500 MG/1
500 CAPSULE ORAL 2 TIMES DAILY
Qty: 20 CAPSULE | Refills: 0 | Status: SHIPPED | OUTPATIENT
Start: 2019-01-01 | End: 2019-01-01

## 2019-01-01 RX ORDER — SIMVASTATIN 20 MG
20 TABLET ORAL AT BEDTIME
Status: DISCONTINUED | OUTPATIENT
Start: 2019-01-01 | End: 2019-01-01

## 2019-01-01 RX ORDER — TORSEMIDE 10 MG/1
TABLET ORAL
Qty: 31 TABLET | Refills: 98 | Status: SHIPPED | OUTPATIENT
Start: 2019-01-01 | End: 2019-01-01 | Stop reason: DRUGHIGH

## 2019-01-01 RX ORDER — ASPIRIN 81 MG/1
324 TABLET, CHEWABLE ORAL ONCE
Status: COMPLETED | OUTPATIENT
Start: 2019-01-01 | End: 2019-01-01

## 2019-01-01 RX ORDER — POLYETHYLENE GLYCOL 3350 17 G/17G
17 POWDER, FOR SOLUTION ORAL DAILY PRN
Status: DISCONTINUED | OUTPATIENT
Start: 2019-01-01 | End: 2019-01-01 | Stop reason: HOSPADM

## 2019-01-01 RX ORDER — ALBUTEROL SULFATE 0.83 MG/ML
2.5 SOLUTION RESPIRATORY (INHALATION) EVERY 4 HOURS PRN
Status: DISCONTINUED | OUTPATIENT
Start: 2019-01-01 | End: 2019-01-01 | Stop reason: HOSPADM

## 2019-01-01 RX ORDER — ACETAMINOPHEN 325 MG/1
650 TABLET ORAL EVERY 4 HOURS PRN
Status: DISCONTINUED | OUTPATIENT
Start: 2019-01-01 | End: 2019-01-01 | Stop reason: HOSPADM

## 2019-01-01 RX ORDER — LISINOPRIL 20 MG/1
20 TABLET ORAL DAILY
Start: 2019-01-01 | End: 2019-01-01

## 2019-01-01 RX ORDER — ASPIRIN 81 MG/1
81 TABLET ORAL DAILY
Status: DISCONTINUED | OUTPATIENT
Start: 2019-01-01 | End: 2019-01-01 | Stop reason: HOSPADM

## 2019-01-01 RX ORDER — TORSEMIDE 20 MG/1
40 TABLET ORAL 2 TIMES DAILY
Start: 2019-01-01 | End: 2019-01-01

## 2019-01-01 RX ORDER — NITROGLYCERIN 0.4 MG/1
0.4 TABLET SUBLINGUAL EVERY 5 MIN PRN
Status: DISCONTINUED | OUTPATIENT
Start: 2019-01-01 | End: 2019-01-01 | Stop reason: HOSPADM

## 2019-01-01 RX ORDER — SODIUM CHLORIDE 9 MG/ML
INJECTION, SOLUTION INTRAVENOUS CONTINUOUS
Status: ACTIVE | OUTPATIENT
Start: 2019-01-01 | End: 2019-01-01

## 2019-01-01 RX ORDER — LISINOPRIL 20 MG/1
20 TABLET ORAL DAILY
Status: DISCONTINUED | OUTPATIENT
Start: 2019-01-01 | End: 2019-01-01

## 2019-01-01 RX ORDER — CEFAZOLIN SODIUM 2 G/100ML
2 INJECTION, SOLUTION INTRAVENOUS EVERY 8 HOURS
Status: DISCONTINUED | OUTPATIENT
Start: 2019-01-01 | End: 2019-01-01

## 2019-01-01 RX ORDER — HEPARIN SODIUM 10000 [USP'U]/100ML
1300 INJECTION, SOLUTION INTRAVENOUS CONTINUOUS
Status: DISCONTINUED | OUTPATIENT
Start: 2019-01-01 | End: 2019-01-01

## 2019-01-01 RX ORDER — LISINOPRIL 20 MG/1
TABLET ORAL
Qty: 31 TABLET | Refills: 98 | Status: ON HOLD | OUTPATIENT
Start: 2019-01-01 | End: 2019-01-01

## 2019-01-01 RX ADMIN — IPRATROPIUM BROMIDE AND ALBUTEROL SULFATE 3 ML: .5; 3 SOLUTION RESPIRATORY (INHALATION) at 11:35

## 2019-01-01 RX ADMIN — METOPROLOL SUCCINATE 100 MG: 100 TABLET, EXTENDED RELEASE ORAL at 08:19

## 2019-01-01 RX ADMIN — IPRATROPIUM BROMIDE AND ALBUTEROL SULFATE 3 ML: .5; 3 SOLUTION RESPIRATORY (INHALATION) at 15:52

## 2019-01-01 RX ADMIN — KIT FOR THE PREPARATION OF TECHNETIUM TC 99M ALBUMIN AGGREGATED 3.3 MCI.: 2.5 INJECTION, POWDER, FOR SOLUTION INTRAVENOUS at 23:17

## 2019-01-01 RX ADMIN — CEFAZOLIN SODIUM 2 G: 2 INJECTION, SOLUTION INTRAVENOUS at 20:30

## 2019-01-01 RX ADMIN — CEFAZOLIN SODIUM 2 G: 2 INJECTION, SOLUTION INTRAVENOUS at 05:07

## 2019-01-01 RX ADMIN — ASPIRIN 81 MG 324 MG: 81 TABLET ORAL at 15:54

## 2019-01-01 RX ADMIN — PREGABALIN 150 MG: 150 CAPSULE ORAL at 08:02

## 2019-01-01 RX ADMIN — CEFAZOLIN SODIUM 2 G: 2 INJECTION, SOLUTION INTRAVENOUS at 03:53

## 2019-01-01 RX ADMIN — SODIUM CHLORIDE: 9 INJECTION, SOLUTION INTRAVENOUS at 20:18

## 2019-01-01 RX ADMIN — TAMSULOSIN HYDROCHLORIDE 0.4 MG: 0.4 CAPSULE ORAL at 08:19

## 2019-01-01 RX ADMIN — PREGABALIN 150 MG: 150 CAPSULE ORAL at 20:30

## 2019-01-01 RX ADMIN — METOPROLOL SUCCINATE 100 MG: 100 TABLET, EXTENDED RELEASE ORAL at 08:02

## 2019-01-01 RX ADMIN — ASPIRIN 81 MG: 81 TABLET, DELAYED RELEASE ORAL at 08:19

## 2019-01-01 RX ADMIN — METOPROLOL SUCCINATE 100 MG: 100 TABLET, EXTENDED RELEASE ORAL at 13:26

## 2019-01-01 RX ADMIN — Medication 2700 UNITS: at 00:02

## 2019-01-01 RX ADMIN — HEPARIN SODIUM 1100 UNITS/HR: 10000 INJECTION, SOLUTION INTRAVENOUS at 16:23

## 2019-01-01 RX ADMIN — IPRATROPIUM BROMIDE AND ALBUTEROL SULFATE 3 ML: .5; 3 SOLUTION RESPIRATORY (INHALATION) at 11:23

## 2019-01-01 RX ADMIN — CEFAZOLIN SODIUM 2 G: 2 INJECTION, SOLUTION INTRAVENOUS at 20:14

## 2019-01-01 RX ADMIN — CEFAZOLIN SODIUM 2 G: 2 INJECTION, SOLUTION INTRAVENOUS at 04:37

## 2019-01-01 RX ADMIN — Medication 5500 UNITS: at 16:23

## 2019-01-01 RX ADMIN — INSULIN ASPART 1 UNITS: 100 INJECTION, SOLUTION INTRAVENOUS; SUBCUTANEOUS at 07:52

## 2019-01-01 RX ADMIN — IPRATROPIUM BROMIDE AND ALBUTEROL SULFATE 3 ML: .5; 3 SOLUTION RESPIRATORY (INHALATION) at 19:54

## 2019-01-01 RX ADMIN — ATORVASTATIN CALCIUM 40 MG: 40 TABLET, FILM COATED ORAL at 20:19

## 2019-01-01 RX ADMIN — ASPIRIN 81 MG: 81 TABLET, DELAYED RELEASE ORAL at 08:47

## 2019-01-01 RX ADMIN — ASPIRIN 81 MG: 81 TABLET, DELAYED RELEASE ORAL at 08:02

## 2019-01-01 RX ADMIN — CEFAZOLIN SODIUM 2 G: 2 INJECTION, SOLUTION INTRAVENOUS at 11:31

## 2019-01-01 RX ADMIN — PIPERACILLIN SODIUM AND TAZOBACTAM SODIUM 4.5 G: 4; .5 INJECTION, POWDER, LYOPHILIZED, FOR SOLUTION INTRAVENOUS at 14:07

## 2019-01-01 RX ADMIN — CEFAZOLIN SODIUM 2 G: 2 INJECTION, SOLUTION INTRAVENOUS at 12:42

## 2019-01-01 RX ADMIN — ATORVASTATIN CALCIUM 40 MG: 40 TABLET, FILM COATED ORAL at 20:31

## 2019-01-01 RX ADMIN — HEPARIN SODIUM 1300 UNITS/HR: 10000 INJECTION, SOLUTION INTRAVENOUS at 05:58

## 2019-01-01 RX ADMIN — CLOPIDOGREL BISULFATE 75 MG: 75 TABLET ORAL at 08:02

## 2019-01-01 RX ADMIN — AMOXICILLIN AND CLAVULANATE POTASSIUM 1 TABLET: 875; 125 TABLET, FILM COATED ORAL at 11:49

## 2019-01-01 RX ADMIN — CLOPIDOGREL BISULFATE 75 MG: 75 TABLET ORAL at 08:19

## 2019-01-01 RX ADMIN — IPRATROPIUM BROMIDE AND ALBUTEROL SULFATE 3 ML: .5; 3 SOLUTION RESPIRATORY (INHALATION) at 07:23

## 2019-01-01 RX ADMIN — IPRATROPIUM BROMIDE AND ALBUTEROL SULFATE 3 ML: .5; 3 SOLUTION RESPIRATORY (INHALATION) at 20:08

## 2019-01-01 RX ADMIN — HUMAN ALBUMIN MICROSPHERES AND PERFLUTREN 2 ML: 10; .22 INJECTION, SOLUTION INTRAVENOUS at 14:31

## 2019-01-01 RX ADMIN — PREGABALIN 150 MG: 150 CAPSULE ORAL at 15:03

## 2019-01-01 RX ADMIN — TAMSULOSIN HYDROCHLORIDE 0.4 MG: 0.4 CAPSULE ORAL at 13:27

## 2019-01-01 RX ADMIN — INSULIN ASPART 1 UNITS: 100 INJECTION, SOLUTION INTRAVENOUS; SUBCUTANEOUS at 13:26

## 2019-01-01 RX ADMIN — IPRATROPIUM BROMIDE AND ALBUTEROL SULFATE 3 ML: .5; 3 SOLUTION RESPIRATORY (INHALATION) at 07:27

## 2019-01-01 RX ADMIN — CEFAZOLIN SODIUM 2 G: 2 INJECTION, SOLUTION INTRAVENOUS at 20:18

## 2019-01-01 RX ADMIN — LISINOPRIL 5 MG: 5 TABLET ORAL at 14:26

## 2019-01-01 RX ADMIN — TAMSULOSIN HYDROCHLORIDE 0.4 MG: 0.4 CAPSULE ORAL at 08:02

## 2019-01-01 RX ADMIN — VANCOMYCIN HYDROCHLORIDE 2000 MG: 5 INJECTION, POWDER, LYOPHILIZED, FOR SOLUTION INTRAVENOUS at 15:43

## 2019-01-01 RX ADMIN — INSULIN ASPART 1 UNITS: 100 INJECTION, SOLUTION INTRAVENOUS; SUBCUTANEOUS at 10:44

## 2019-01-01 RX ADMIN — IPRATROPIUM BROMIDE AND ALBUTEROL SULFATE 3 ML: .5; 3 SOLUTION RESPIRATORY (INHALATION) at 15:53

## 2019-01-01 RX ADMIN — PREGABALIN 150 MG: 150 CAPSULE ORAL at 20:19

## 2019-01-01 RX ADMIN — IPRATROPIUM BROMIDE AND ALBUTEROL SULFATE 3 ML: .5; 3 SOLUTION RESPIRATORY (INHALATION) at 07:56

## 2019-01-01 RX ADMIN — PREGABALIN 150 MG: 150 CAPSULE ORAL at 08:19

## 2019-01-01 RX ADMIN — IPRATROPIUM BROMIDE AND ALBUTEROL SULFATE 3 ML: .5; 3 SOLUTION RESPIRATORY (INHALATION) at 11:01

## 2019-01-01 RX ADMIN — LISINOPRIL 5 MG: 5 TABLET ORAL at 08:19

## 2019-01-01 RX ADMIN — CLOPIDOGREL BISULFATE 75 MG: 75 TABLET ORAL at 13:26

## 2019-01-01 ASSESSMENT — ACTIVITIES OF DAILY LIVING (ADL)
ADLS_ACUITY_SCORE: 15
ADLS_ACUITY_SCORE: 16
ADLS_ACUITY_SCORE: 15
ADLS_ACUITY_SCORE: 15
ADLS_ACUITY_SCORE: 16
ADLS_ACUITY_SCORE: 14
ADLS_ACUITY_SCORE: 16
ADLS_ACUITY_SCORE: 16
ADLS_ACUITY_SCORE: 14
ADLS_ACUITY_SCORE: 16
ADLS_ACUITY_SCORE: 16
ADLS_ACUITY_SCORE: 15
ADLS_ACUITY_SCORE: 16
ADLS_ACUITY_SCORE: 14

## 2019-01-01 ASSESSMENT — MIFFLIN-ST. JEOR
SCORE: 2119.84
SCORE: 2131.64
SCORE: 2118.48
SCORE: 1961.99

## 2019-01-01 ASSESSMENT — ENCOUNTER SYMPTOMS
COLOR CHANGE: 1
SHORTNESS OF BREATH: 0
COUGH: 0

## 2019-01-03 NOTE — TELEPHONE ENCOUNTER
Routing refill request to provider for review/approval because:  Paris given x1 and patient did not follow up, please advise

## 2019-01-03 NOTE — TELEPHONE ENCOUNTER
"Requested Prescriptions   Pending Prescriptions Disp Refills     metFORMIN (GLUCOPHAGE) 1000 MG tablet [Pharmacy Med Name: METFORMIN 1000 MG TAB 1000 TAB]  PATIENT NEEDS AN OFFICE VISIT.  Last Written Prescription Date:  12/6/18  Last Fill Quantity: 30 TABLET,  # refills: 0   Last office visit: 3/1/2018 with prescribing provider:  JOSEFINA   Future Office Visit:     30 tablet 0     Sig: TAKE 1 TABLET (1,000 MG) BY MOUTH 2 TIMES DAILY (WITH MEALS)    Biguanide Agents Failed - 12/31/2018 10:54 AM       Failed - Blood pressure less than 140/90 in past 6 months    BP Readings from Last 3 Encounters:   03/13/18 124/68   03/09/18 152/90   03/01/18 110/70                Failed - Patient has had a Microalbumin in the past 15 mos.    Recent Labs   Lab Test 09/12/16  1031   MICROL 324   UMALCR 231.43*            Failed - Patient has documented A1c within the specified period of time.    If HgbA1C is 8 or greater, it needs to be on file within the past 3 months.  If less than 8, must be on file within the past 6 months.     Recent Labs   Lab Test 01/12/18  1135   A1C 11.9*            Failed - Recent (6 mo) or future (30 days) visit within the authorizing provider's specialty    Patient had office visit in the last 6 months or has a visit in the next 30 days with authorizing provider or within the authorizing provider's specialty.  See \"Patient Info\" tab in inbasket, or \"Choose Columns\" in Meds & Orders section of the refill encounter.           Passed - Patient has documented LDL within the past 12 mos.    Recent Labs   Lab Test 01/14/18  0908   LDL 92            Passed - Patient is age 10 or older       Passed - Patient's CR is NOT>1.4 OR Patient's EGFR is NOT<45 within past 12 mos.    Recent Labs   Lab Test 01/22/18  0600   GFRESTIMATED 87   GFRESTBLACK >90       Recent Labs   Lab Test 01/22/18  0600   CR 0.88            Passed - Patient does NOT have a diagnosis of CHF.          "

## 2019-01-04 ENCOUNTER — TELEPHONE (OUTPATIENT)
Dept: INTERNAL MEDICINE | Facility: CLINIC | Age: 64
End: 2019-01-04

## 2019-01-04 NOTE — TELEPHONE ENCOUNTER
Kamilla HIRSCH [facility] calling [phone]    Just calling to let Dr. Spring pt will run out of meds next week- Lyrica, Metformin and Lisinopril.  Pt lost or never received new insurance card and then pharmacy tries to run the meds they are being denied.  She is trying to work with the insurance company to get his new card.  Also working with pt's .  I gave  Rx assistance number also.

## 2019-01-15 ENCOUNTER — TELEPHONE (OUTPATIENT)
Dept: INTERNAL MEDICINE | Facility: CLINIC | Age: 64
End: 2019-01-15

## 2019-01-15 DIAGNOSIS — Z79.4 TYPE 2 DIABETES MELLITUS WITH DIABETIC NEUROPATHY, WITH LONG-TERM CURRENT USE OF INSULIN (H): ICD-10-CM

## 2019-01-15 DIAGNOSIS — E11.40 TYPE 2 DIABETES MELLITUS WITH DIABETIC NEUROPATHY, WITH LONG-TERM CURRENT USE OF INSULIN (H): ICD-10-CM

## 2019-01-15 NOTE — TELEPHONE ENCOUNTER
Home care nurse calling and reports that patient is out of metformin and she is trying to get the patient to come in for an appointment.  She wanted to FYI the PCP that he is out of medication.  Advise only if needed.  [phone]

## 2019-01-15 NOTE — TELEPHONE ENCOUNTER
I have denied this medication several times and the patient was a no-show for the recent appointment thus the patient has not been seen so no further refills are recommended until seen.

## 2019-01-15 NOTE — TELEPHONE ENCOUNTER
"Routing refill request to provider for review/approval because:  Patient needs to be seen because it has been more than 1 year since last office visit.  Pt \"no-showed\" for Last 3 scheduled appt's with Dr. Spring.          Requested Prescriptions   Pending Prescriptions Disp Refills     metFORMIN (GLUCOPHAGE) 1000 MG tablet [Pharmacy Med Name: METFORMIN 1000 MG TAB 1000 TAB] 30 tablet 0     Sig: TAKE 1 TABLET (1,000 MG) BY MOUTH 2 TIMES DAILY (WITH MEALS)    Biguanide Agents Failed - 1/15/2019  9:44 AM       Failed - Blood pressure less than 140/90 in past 6 months    BP Readings from Last 3 Encounters:   03/13/18 124/68   03/09/18 152/90   03/01/18 110/70                Failed - Patient has documented LDL within the past 12 mos.    Recent Labs   Lab Test 01/14/18  0908   LDL 92            Failed - Patient has had a Microalbumin in the past 15 mos.    Recent Labs   Lab Test 09/12/16  1031   MICROL 324   UMALCR 231.43*            Failed - Patient has documented A1c within the specified period of time.    If HgbA1C is 8 or greater, it needs to be on file within the past 3 months.  If less than 8, must be on file within the past 6 months.     Recent Labs   Lab Test 01/12/18  1135   A1C 11.9*            Failed - Recent (6 mo) or future (30 days) visit within the authorizing provider's specialty    Patient had office visit in the last 6 months or has a visit in the next 30 days with authorizing provider or within the authorizing provider's specialty.  See \"Patient Info\" tab in inbasket, or \"Choose Columns\" in Meds & Orders section of the refill encounter.           Passed - Patient is age 10 or older       Passed - Patient's CR is NOT>1.4 OR Patient's EGFR is NOT<45 within past 12 mos.    Recent Labs   Lab Test 01/22/18  0600   GFRESTIMATED 87   GFRESTBLACK >90       Recent Labs   Lab Test 01/22/18  0600   CR 0.88            Passed - Patient does NOT have a diagnosis of CHF.       Passed - Medication is active on med " list

## 2019-01-28 DIAGNOSIS — Z79.4 TYPE 2 DIABETES MELLITUS WITH DIABETIC NEUROPATHY, WITH LONG-TERM CURRENT USE OF INSULIN (H): ICD-10-CM

## 2019-01-28 DIAGNOSIS — I10 HYPERTENSION GOAL BP (BLOOD PRESSURE) < 140/80: ICD-10-CM

## 2019-01-28 DIAGNOSIS — E11.40 TYPE 2 DIABETES MELLITUS WITH DIABETIC NEUROPATHY, WITH LONG-TERM CURRENT USE OF INSULIN (H): ICD-10-CM

## 2019-01-28 RX ORDER — PREGABALIN 150 MG/1
CAPSULE ORAL
Qty: 60 CAPSULE | Refills: 1 | Status: CANCELLED | OUTPATIENT
Start: 2019-01-28

## 2019-01-28 RX ORDER — LISINOPRIL AND HYDROCHLOROTHIAZIDE 12.5; 2 MG/1; MG/1
TABLET ORAL
Qty: 180 TABLET | Refills: 0 | Status: CANCELLED | OUTPATIENT
Start: 2019-01-28

## 2019-01-29 ENCOUNTER — OFFICE VISIT (OUTPATIENT)
Dept: INTERNAL MEDICINE | Facility: CLINIC | Age: 64
End: 2019-01-29
Payer: MEDICARE

## 2019-01-29 VITALS
DIASTOLIC BLOOD PRESSURE: 78 MMHG | TEMPERATURE: 97.7 F | WEIGHT: 238.4 LBS | HEART RATE: 103 BPM | RESPIRATION RATE: 15 BRPM | SYSTOLIC BLOOD PRESSURE: 128 MMHG | BODY MASS INDEX: 32.29 KG/M2 | OXYGEN SATURATION: 97 % | HEIGHT: 72 IN

## 2019-01-29 DIAGNOSIS — I10 HYPERTENSION GOAL BP (BLOOD PRESSURE) < 140/80: ICD-10-CM

## 2019-01-29 DIAGNOSIS — E78.5 HYPERLIPIDEMIA LDL GOAL <100: ICD-10-CM

## 2019-01-29 DIAGNOSIS — Z79.4 TYPE 2 DIABETES MELLITUS WITH DIABETIC NEUROPATHY, WITH LONG-TERM CURRENT USE OF INSULIN (H): Primary | ICD-10-CM

## 2019-01-29 DIAGNOSIS — R33.9 URINARY RETENTION: ICD-10-CM

## 2019-01-29 DIAGNOSIS — I63.9 CEREBROVASCULAR ACCIDENT (CVA), UNSPECIFIED MECHANISM (H): ICD-10-CM

## 2019-01-29 DIAGNOSIS — E11.40 TYPE 2 DIABETES MELLITUS WITH DIABETIC NEUROPATHY, WITH LONG-TERM CURRENT USE OF INSULIN (H): Primary | ICD-10-CM

## 2019-01-29 LAB
ALBUMIN SERPL-MCNC: 3.8 G/DL (ref 3.4–5)
ALP SERPL-CCNC: 78 U/L (ref 40–150)
ALT SERPL W P-5'-P-CCNC: 24 U/L (ref 0–70)
ANION GAP SERPL CALCULATED.3IONS-SCNC: <1 MMOL/L (ref 3–14)
AST SERPL W P-5'-P-CCNC: 17 U/L (ref 0–45)
BILIRUB SERPL-MCNC: 0.3 MG/DL (ref 0.2–1.3)
BUN SERPL-MCNC: 31 MG/DL (ref 7–30)
CALCIUM SERPL-MCNC: 9.1 MG/DL (ref 8.5–10.1)
CHLORIDE SERPL-SCNC: 108 MMOL/L (ref 94–109)
CHOLEST SERPL-MCNC: 124 MG/DL
CO2 SERPL-SCNC: 33 MMOL/L (ref 20–32)
CREAT SERPL-MCNC: 1.66 MG/DL (ref 0.66–1.25)
GFR SERPL CREATININE-BSD FRML MDRD: 43 ML/MIN/{1.73_M2}
GLUCOSE SERPL-MCNC: 164 MG/DL (ref 70–99)
HBA1C MFR BLD: 7.5 % (ref 0–5.6)
HDLC SERPL-MCNC: 37 MG/DL
LDLC SERPL CALC-MCNC: 69 MG/DL
NONHDLC SERPL-MCNC: 87 MG/DL
POTASSIUM SERPL-SCNC: 4.3 MMOL/L (ref 3.4–5.3)
PROT SERPL-MCNC: 7.4 G/DL (ref 6.8–8.8)
SODIUM SERPL-SCNC: 141 MMOL/L (ref 133–144)
TRIGL SERPL-MCNC: 88 MG/DL
TSH SERPL DL<=0.005 MIU/L-ACNC: 1.18 MU/L (ref 0.4–4)

## 2019-01-29 PROCEDURE — 99214 OFFICE O/P EST MOD 30 MIN: CPT | Performed by: INTERNAL MEDICINE

## 2019-01-29 PROCEDURE — 84443 ASSAY THYROID STIM HORMONE: CPT | Performed by: INTERNAL MEDICINE

## 2019-01-29 PROCEDURE — 36415 COLL VENOUS BLD VENIPUNCTURE: CPT | Performed by: INTERNAL MEDICINE

## 2019-01-29 PROCEDURE — 83036 HEMOGLOBIN GLYCOSYLATED A1C: CPT | Performed by: INTERNAL MEDICINE

## 2019-01-29 PROCEDURE — 80061 LIPID PANEL: CPT | Performed by: INTERNAL MEDICINE

## 2019-01-29 PROCEDURE — 80053 COMPREHEN METABOLIC PANEL: CPT | Performed by: INTERNAL MEDICINE

## 2019-01-29 RX ORDER — LISINOPRIL AND HYDROCHLOROTHIAZIDE 12.5; 2 MG/1; MG/1
2 TABLET ORAL DAILY
Qty: 180 TABLET | Refills: 3 | Status: SHIPPED | OUTPATIENT
Start: 2019-01-29 | End: 2019-01-01 | Stop reason: ALTCHOICE

## 2019-01-29 RX ORDER — CLOPIDOGREL BISULFATE 75 MG/1
75 TABLET ORAL DAILY
Qty: 90 TABLET | Refills: 3 | Status: SHIPPED | OUTPATIENT
Start: 2019-01-29

## 2019-01-29 RX ORDER — SIMVASTATIN 20 MG
20 TABLET ORAL AT BEDTIME
Qty: 90 TABLET | Refills: 3 | Status: ON HOLD | OUTPATIENT
Start: 2019-01-29 | End: 2019-01-01

## 2019-01-29 RX ORDER — FINASTERIDE 5 MG/1
5 TABLET, FILM COATED ORAL DAILY
Qty: 90 TABLET | Refills: 3 | Status: SHIPPED | OUTPATIENT
Start: 2019-01-29 | End: 2019-09-06

## 2019-01-29 RX ORDER — PREGABALIN 150 MG/1
CAPSULE ORAL
Qty: 60 CAPSULE | Refills: 1 | Status: SHIPPED | OUTPATIENT
Start: 2019-01-29 | End: 2019-08-30

## 2019-01-29 RX ORDER — METOPROLOL SUCCINATE 100 MG/1
100 TABLET, EXTENDED RELEASE ORAL DAILY
Qty: 90 TABLET | Refills: 3 | Status: SHIPPED | OUTPATIENT
Start: 2019-01-29 | End: 2020-01-01 | Stop reason: DRUGHIGH

## 2019-01-29 RX ORDER — TAMSULOSIN HYDROCHLORIDE 0.4 MG/1
0.4 CAPSULE ORAL DAILY
Qty: 90 CAPSULE | Refills: 3 | Status: SHIPPED | OUTPATIENT
Start: 2019-01-29

## 2019-01-29 ASSESSMENT — MIFFLIN-ST. JEOR: SCORE: 1914.38

## 2019-01-29 NOTE — PROGRESS NOTES
SUBJECTIVE:   Cooper Cabral is a 63 year old male who presents to clinic today for the following health issues:    Patient accompanied by brother who has the following concerns:  1. Bilateral foot swelling L>R  2. Discuss dental clearance   3. Requesting DME order for cane, lift chair or power recliner. Ongoing issues with neuropathy- discuss foot simulator     Diabetes Follow-up:      Patient is checking blood sugars: rarely.  130 when checking     Diabetic concerns: None     Symptoms of hypoglycemia (low blood sugar): none     Paresthesias (numbness or burning in feet) or sores: Yes      Date of last diabetic eye exam: DUE     BP Readings from Last 2 Encounters:   01/29/19 128/78   03/13/18 124/68     Hemoglobin A1C (%)   Date Value   01/12/2018 11.9 (H)   09/07/2017 11.4 (H)     LDL Cholesterol Calculated (mg/dL)   Date Value   01/14/2018 92   09/07/2017 99       Diabetes Management Resources  Hyperlipidemia Follow-Up      Rate your low fat/cholesterol diet?: fair    Taking statin?  No    Other lipid medications/supplements?:  none    Hypertension Follow-up      Outpatient blood pressures are being checked at home.  Results are 140/90.    Low Salt Diet: not monitoring salt      Amount of exercise or physical activity: None    Problems taking medications regularly: No    Medication side effects: none    Diet: regular (no restrictions)    Problem list and histories reviewed & adjusted, as indicated.  Additional history: as documented    Patient Active Problem List   Diagnosis     Hypertension goal BP (blood pressure) < 140/80     Tobacco abuse     Non morbid obesity     Hyperlipidemia LDL goal <100     Advanced directives, counseling/discussion     Bell's palsy     Other diabetic neurological complication associated with type 2 diabetes mellitus (H)     Type 2 diabetes mellitus with diabetic neuropathy, with long-term current use of insulin (H)     Ischemic stroke (H)     Left-sided weakness     CVA (cerebral  vascular accident) (H)     Past Surgical History:   Procedure Laterality Date     broken ankle Right      CYSTOSCOPY         Social History     Tobacco Use     Smoking status: Current Every Day Smoker     Packs/day: 1.00     Years: 40.00     Pack years: 40.00     Types: Cigarettes     Smokeless tobacco: Never Used   Substance Use Topics     Alcohol use: No     Family History   Problem Relation Age of Onset     Cancer Mother         Bone cancer      Diabetes Father      Cancer Father         bone cancer      Hypertension Brother      Hypertension Brother          Current Outpatient Medications   Medication Sig Dispense Refill     ACCU-CHEK MULTICLIX LANCETS MISC tests twice a day 100 3     aspirin  MG EC tablet Take 1 tablet (325 mg) by mouth daily 60 tablet 0     blood glucose calibration (NO BRAND SPECIFIED) solution To accompany: Blood Glucose Monitor Brands: per insurance. 1 Bottle 3     blood glucose monitoring (NO BRAND SPECIFIED) meter device kit Use to test blood sugar 1 times daily or as directed. Preferred blood glucose meter OR supplies to accompany: Blood Glucose Monitor Brands: per insurance. 1 kit 0     blood glucose monitoring (NO BRAND SPECIFIED) test strip Use to test blood sugar 1 times daily or as directed. To accompany: Blood Glucose Monitor Brands: per insurance. 100 strip 6     clopidogrel (PLAVIX) 75 MG tablet Take 1 tablet (75 mg) by mouth daily 30 tablet 0     finasteride (PROSCAR) 5 MG tablet Take 1 tablet (5 mg) by mouth daily 30 tablet 0     insulin glargine (LANTUS SOLOSTAR PEN) 100 UNIT/ML pen Inject 20 Units Subcutaneous 2 times daily 12 mL 0     insulin pen needle (B-D U/F) 31G X 8 MM Use once daily or as directed. 90 each 1     lisinopril-hydrochlorothiazide (PRINZIDE/ZESTORETIC) 20-12.5 MG per tablet TAKE 2 TABLETS BY MOUTH DAILY 180 tablet 0     LYRICA 150 MG capsule TAKE 1 CAPSULE BY MOUTH 2 TIMES DAILY 60 capsule 1     metFORMIN (GLUCOPHAGE) 1000 MG tablet Take 1 tablet  (1,000 mg) by mouth 2 times daily (with meals) 30 tablet 0     metoprolol succinate (TOPROL-XL) 100 MG 24 hr tablet TAKE ONE TABLET BY MOUTH DAILY. 90 tablet 1     Multiple Vitamins-Minerals (MULTI FOR HIM PO) Take 1 capsule by mouth daily.       order for DME Diabetic shoes due to neuropathy 1 Device 0     simvastatin (ZOCOR) 20 MG tablet TAKE ONE TABLET BY MOUTH AT BEDTIME. 90 tablet 0     tamsulosin (FLOMAX) 0.4 MG capsule Take 1 capsule (0.4 mg) by mouth daily 90 capsule 3     No Known Allergies  BP Readings from Last 3 Encounters:   03/13/18 124/68   03/09/18 152/90   03/01/18 110/70    Wt Readings from Last 3 Encounters:   03/13/18 111.1 kg (245 lb)   03/09/18 115.7 kg (255 lb)   03/01/18 105.6 kg (232 lb 14.4 oz)           Reviewed and updated as needed this visit by clinical staff       Reviewed and updated as needed this visit by Provider       ROS:  CONSTITUTIONAL: NEGATIVE for fever, chills, change in weight  ENT/MOUTH: NEGATIVE for ear, mouth and throat problems  RESP: NEGATIVE for significant cough or SOB  CV: NEGATIVE for chest pain, palpitations or peripheral edema  GI: NEGATIVE for nausea, abdominal pain, heartburn, or change in bowel habits  : NEGATIVE for frequency, dysuria, or hematuria  MUSCULOSKELETAL: NEGATIVE for significant arthralgias or myalgia  HEME: NEGATIVE for bleeding problems  PSYCHIATRIC: NEGATIVE for changes in mood or affect    OBJECTIVE:                                                    /78   Pulse 103   Temp 97.7  F (36.5  C) (Oral)   Resp 15   Ht 1.829 m (6')   Wt 108.1 kg (238 lb 6.4 oz)   SpO2 97%   BMI 32.33 kg/m    Body mass index is 32.33 kg/m .  GENERAL: alert and no distress  EYES: Eyes grossly normal to inspection, extraocular movements - intact, and PERRL  HENT: ear canals- normal; TMs- normal; Nose- normal; Mouth- no ulcers, no lesions  NECK: no tenderness, no adenopathy, no asymmetry, no masses, no stiffness; thyroid- normal to palpation  Obese  RESP:  lungs clear to auscultation - no rales, no rhonchi, no wheezes  CV: regular rates and rhythm, normal S1 S2, no S3 or S4 and no murmur, no click or rub   MS: extremities- no gross deformities noted, trace edema LE with chronic stasis changes  NEURO: neuropathic changes LE bilaterally, no assist device noted in use.  PSYCH: Alert and oriented times 3; speech- coherent , normal rate and volume; able to articulate logical thoughts, able to abstract reason, no tangential thoughts, no hallucinations or delusions, affect- normal     Lab Results   Component Value Date    A1C 11.9 01/12/2018    A1C 11.4 09/07/2017    A1C 14.3 05/08/2017    A1C 12.0 09/12/2016    A1C 14.0 02/03/2016       ASSESSMENT/PLAN:                                                      (E11.40,  Z79.4) Type 2 diabetes mellitus with diabetic neuropathy, with long-term current use of insulin (H)  (primary encounter diagnosis)  Comment: labs as ordered  Plan: insulin glargine (LANTUS SOLOSTAR PEN) 100         UNIT/ML pen, lisinopril-hydrochlorothiazide         (PRINZIDE/ZESTORETIC) 20-12.5 MG tablet,         pregabalin (LYRICA) 150 MG capsule, metFORMIN         (GLUCOPHAGE) 1000 MG tablet, Comprehensive         metabolic panel, Hemoglobin A1c, Albumin Random        Urine Quantitative with Creat Ratio, TSH with         free T4 reflex, order for DME            (I10) Hypertension goal BP (blood pressure) < 140/80  Comment: stable on therapy  Plan: lisinopril-hydrochlorothiazide         (PRINZIDE/ZESTORETIC) 20-12.5 MG tablet,         metoprolol succinate ER (TOPROL-XL) 100 MG 24         hr tablet, Comprehensive metabolic panel            (R33.9) Urinary retention  Comment: on therapy  Plan: finasteride (PROSCAR) 5 MG tablet, tamsulosin         (FLOMAX) 0.4 MG capsule            (I63.9) Cerebrovascular accident (CVA), unspecified mechanism (H)  Comment: stable with noted risk factors  Plan: clopidogrel (PLAVIX) 75 MG tablet            (E78.5) Hyperlipidemia LDL  goal <100  Comment: labs as ordered  Plan: simvastatin (ZOCOR) 20 MG tablet, Lipid Profile          See Patient Instructions    Hudson Spring MD  [facility]    THE MEDICATION LIST HAS BEEN FULLY RECONCILED BY THE M.D. AND THE NURSING STAFF.    25 minutes spent with this patient, face to face, discussing treatment options for listed problems above as well as side effects of appropriate medications.  Counseling time extended beyond 50% of the clinic visit.  Medication dosing, treatment plan and follow-up were discussed. Also reviewed need for primary care testing for patient.

## 2019-01-29 NOTE — LETTER
[facility]  [address] [address]  [phone]      1/29/2019       Cooper Cabral  [address]  [facility] [address]          Sincerely,      Hudson Spring MD  Internal Medicine    Dear Cooper,    Your Hemoglobin A1C and blood sugar tests look good and I would continue with your medication without change.  These tests should be repeated in 6 months.    Your cholesterol looks good and I would not change anything at this point but would repeat your labs in 12 months.    In addition, your liver function tests are completely normal and should be rechecked with your next cholesterol level.    Your kidney function tests are abnormal and should be rechecked here in the clinic with a follow-up visit with me.  I will look forward to seeing you at that time and please call to make an appointment as we need to alter your medications due to your kidney function.    Your thyroid function tests look good and thus I would not change anything at this point.

## 2019-01-29 NOTE — TELEPHONE ENCOUNTER
"Requested Prescriptions   Pending Prescriptions Disp Refills     lisinopril-hydrochlorothiazide (PRINZIDE/ZESTORETIC) 20-12.5 MG tablet [Pharmacy Med Name: LISINOPRIL/HCTZ 20/12.5M TA 20-12.5 TAB]  Last Written Prescription Date:  10/02/2018  Last Fill Quantity: 180,  # refills: 0   Last Office Visit: 3/1/2018   Future Office Visit:    Next 5 appointments (look out 90 days)    Jan 29, 2019  4:00 PM CST  SHORT with Hudson Spring MD  [facility] ([facility] [address] [address] [phone]          180 tablet 0     Sig: TAKE 2 TABLETS BY MOUTH DAILY    Diuretics (Including Combos) Protocol Failed - 1/28/2019  4:51 PM       Failed - Normal serum creatinine on file in past 12 months    Recent Labs   Lab Test 01/22/18  0600   CR 0.88             Failed - Normal serum potassium on file in past 12 months    Recent Labs   Lab Test 01/22/18  0600   POTASSIUM 4.2                   Failed - Normal serum sodium on file in past 12 months    Recent Labs   Lab Test 01/22/18  0600                Passed - Blood pressure under 140/90 in past 12 months    BP Readings from Last 3 Encounters:   03/13/18 124/68   03/09/18 152/90   03/01/18 110/70                Passed - Recent (12 mo) or future (30 days) visit within the authorizing provider's specialty    Patient had office visit in the last 12 months or has a visit in the next 30 days with authorizing provider or within the authorizing provider's specialty.  See \"Patient Info\" tab in inbasket, or \"Choose Columns\" in Meds & Orders section of the refill encounter.             Passed - Medication is active on med list       Passed - Patient is age 18 or older        LYRICA 150 MG capsule [Pharmacy Med Name: LYRICA 150MG  CAP]  Last Written Prescription Date:  09/11/2018  Last Fill Quantity: 60,  # refills: 01   Last Office Visit: 3/1/2018   Future Office Visit:    Next 5 appointments (look out 90 " days)    Jan 29, 2019  4:00 PM CST  SHORT with Hudson Spring MD  [facility] ([facility]) [address] [address]  [phone]          60 capsule 1     Sig: TAKE 1 CAPSULE BY MOUTH 2 TIMES DAILY    There is no refill protocol information for this order        metFORMIN (GLUCOPHAGE) 1000 MG tablet [Pharmacy Med Name: METFORMIN 1000MG TAB 1000 TAB]  Last Written Prescription Date:  01/16/2019  Last Fill Quantity: 30,  # refills: 0   Last Office Visit: 3/1/2018   Future Office Visit:    Next 5 appointments (look out 90 days)    Jan 29, 2019  4:00 PM CST  SHORT with Hudson Spring MD  [facility] ([facility] [address] [address]  [phone]          30 tablet 0     Sig: TAKE 1 TABLET (1,000 MG) BY MOUTH 2 TIMES DAILY (WITH MEALS)    Biguanide Agents Failed - 1/28/2019  4:51 PM       Failed - Blood pressure less than 140/90 in past 6 months    BP Readings from Last 3 Encounters:   03/13/18 124/68   03/09/18 152/90   03/01/18 110/70                Failed - Patient has documented LDL within the past 12 mos.    Recent Labs   Lab Test 01/14/18  0908   LDL 92            Failed - Patient has had a Microalbumin in the past 15 mos.    Recent Labs   Lab Test 09/12/16  1031   MICROL 324   UMALCR 231.43*            Failed - Patient has documented A1c within the specified period of time.    If HgbA1C is 8 or greater, it needs to be on file within the past 3 months.  If less than 8, must be on file within the past 6 months.     Recent Labs   Lab Test 01/12/18  1135   A1C 11.9*            Passed - Patient is age 10 or older       Passed - Patient's CR is NOT>1.4 OR Patient's EGFR is NOT<45 within past 12 mos.    Recent Labs   Lab Test 01/22/18  0600   GFRESTIMATED 87   GFRESTBLACK >90       Recent Labs   Lab Test 01/22/18  0600   CR 0.88            Passed - Patient does NOT have a  "diagnosis of CHF.       Passed - Medication is active on med list       Passed - Recent (6 mo) or future (30 days) visit within the authorizing provider's specialty    Patient had office visit in the last 6 months or has a visit in the next 30 days with authorizing provider or within the authorizing provider's specialty.  See \"Patient Info\" tab in inbasket, or \"Choose Columns\" in Meds & Orders section of the refill encounter.              "

## 2019-02-11 ENCOUNTER — TELEPHONE (OUTPATIENT)
Dept: INTERNAL MEDICINE | Facility: CLINIC | Age: 64
End: 2019-02-11

## 2019-02-11 NOTE — TELEPHONE ENCOUNTER
Reason for Call:  Medication list    Detailed comments: [facility] needs a list of the patient's medications faxed to them.  Fax: [phone]  Attn: Kendal    Phone Number Patient can be reached at: Home number on file [phone] (home)    Best Time: n/a    Can we leave a detailed message on this number? Not Applicable    Call taken on 2/11/2019 at 3:08 PM by Diya Mccollum

## 2019-02-12 ENCOUNTER — TELEPHONE (OUTPATIENT)
Dept: INTERNAL MEDICINE | Facility: CLINIC | Age: 64
End: 2019-02-12

## 2019-02-12 NOTE — TELEPHONE ENCOUNTER
Panel Management Review      Patient has the following on his problem list:     Diabetes    ASA: Passed    Last A1C  Lab Results   Component Value Date    A1C 7.5 01/29/2019    A1C 11.9 01/12/2018    A1C 11.4 09/07/2017    A1C 14.3 05/08/2017    A1C 12.0 09/12/2016     A1C tested: FAILED    Last LDL:    Lab Results   Component Value Date    CHOL 124 01/29/2019     Lab Results   Component Value Date    HDL 37 01/29/2019     Lab Results   Component Value Date    LDL 69 01/29/2019     Lab Results   Component Value Date    TRIG 88 01/29/2019     Lab Results   Component Value Date    CHOLHDLRATIO 4.9 10/14/2015     Lab Results   Component Value Date    NHDL 87 01/29/2019       Is the patient on a Statin? YES             Is the patient on Aspirin? YES    Medications     HMG CoA Reductase Inhibitors    simvastatin (ZOCOR) 20 MG tablet    Salicylates    aspirin  MG EC tablet          Last three blood pressure readings:  BP Readings from Last 3 Encounters:   01/29/19 128/78   03/13/18 124/68   03/09/18 152/90       Date of last diabetes office visit: 2/7/19     Tobacco History:     History   Smoking Status     Current Every Day Smoker     Packs/day: 1.00     Years: 40.00     Types: Cigarettes   Smokeless Tobacco     Never Used           Composite cancer screening  Chart review shows that this patient is due/due soon for the following Fecal Colorectal (FIT)  Summary:    Patient is due/failing the following:   FIT and PHYSICAL    Action needed:   Patient needs office visit for physical. and Patient needs non-fasting lab only appointment    Type of outreach:    Sent letter.    Questions for provider review:    None                                                                                                                                    Anais Up CMA       Chart routed to Care Team .

## 2019-02-19 DIAGNOSIS — Z53.9 DIAGNOSIS NOT YET DEFINED: Primary | ICD-10-CM

## 2019-02-19 PROCEDURE — G0179 MD RECERTIFICATION HHA PT: HCPCS | Performed by: INTERNAL MEDICINE

## 2019-04-16 DIAGNOSIS — Z53.9 DIAGNOSIS NOT YET DEFINED: Primary | ICD-10-CM

## 2019-04-16 PROCEDURE — G0179 MD RECERTIFICATION HHA PT: HCPCS | Performed by: INTERNAL MEDICINE

## 2019-04-23 ENCOUNTER — TRANSFERRED RECORDS (OUTPATIENT)
Dept: HEALTH INFORMATION MANAGEMENT | Facility: CLINIC | Age: 64
End: 2019-04-23

## 2019-07-18 DIAGNOSIS — Z53.9 DIAGNOSIS NOT YET DEFINED: Primary | ICD-10-CM

## 2019-07-18 PROCEDURE — G0179 MD RECERTIFICATION HHA PT: HCPCS | Performed by: INTERNAL MEDICINE

## 2019-07-23 ENCOUNTER — TELEPHONE (OUTPATIENT)
Dept: INTERNAL MEDICINE | Facility: CLINIC | Age: 64
End: 2019-07-23

## 2019-07-23 NOTE — TELEPHONE ENCOUNTER
Nurse with pt's homecare calling because pt's family is requesting a cognitive assessment, and podiatry referral done. Informed that appt with PCP is required. Nurse will notify family to call and schedule appt for pt.

## 2019-08-04 ENCOUNTER — MEDICAL CORRESPONDENCE (OUTPATIENT)
Dept: HEALTH INFORMATION MANAGEMENT | Facility: CLINIC | Age: 64
End: 2019-08-04

## 2019-08-12 DIAGNOSIS — Z53.9 DIAGNOSIS NOT YET DEFINED: Primary | ICD-10-CM

## 2019-08-12 PROCEDURE — G0179 MD RECERTIFICATION HHA PT: HCPCS | Performed by: INTERNAL MEDICINE

## 2019-08-14 ENCOUNTER — OFFICE VISIT (OUTPATIENT)
Dept: INTERNAL MEDICINE | Facility: CLINIC | Age: 64
End: 2019-08-14
Payer: MEDICARE

## 2019-08-14 VITALS
TEMPERATURE: 98 F | BODY MASS INDEX: 35.67 KG/M2 | SYSTOLIC BLOOD PRESSURE: 138 MMHG | OXYGEN SATURATION: 99 % | HEART RATE: 99 BPM | WEIGHT: 263 LBS | RESPIRATION RATE: 15 BRPM | DIASTOLIC BLOOD PRESSURE: 82 MMHG

## 2019-08-14 DIAGNOSIS — I63.9 CEREBROVASCULAR ACCIDENT (CVA), UNSPECIFIED MECHANISM (H): Primary | ICD-10-CM

## 2019-08-14 DIAGNOSIS — Z72.0 TOBACCO ABUSE DISORDER: ICD-10-CM

## 2019-08-14 DIAGNOSIS — Z79.4 TYPE 2 DIABETES MELLITUS WITH DIABETIC NEUROPATHY, WITH LONG-TERM CURRENT USE OF INSULIN (H): ICD-10-CM

## 2019-08-14 DIAGNOSIS — E66.01 MORBID OBESITY (H): ICD-10-CM

## 2019-08-14 DIAGNOSIS — E11.40 TYPE 2 DIABETES MELLITUS WITH DIABETIC NEUROPATHY, WITH LONG-TERM CURRENT USE OF INSULIN (H): ICD-10-CM

## 2019-08-14 LAB
ANION GAP SERPL CALCULATED.3IONS-SCNC: 3 MMOL/L (ref 3–14)
BUN SERPL-MCNC: 19 MG/DL (ref 7–30)
CALCIUM SERPL-MCNC: 8.9 MG/DL (ref 8.5–10.1)
CHLORIDE SERPL-SCNC: 109 MMOL/L (ref 94–109)
CO2 SERPL-SCNC: 31 MMOL/L (ref 20–32)
CREAT SERPL-MCNC: 1.19 MG/DL (ref 0.66–1.25)
GFR SERPL CREATININE-BSD FRML MDRD: 64 ML/MIN/{1.73_M2}
GLUCOSE SERPL-MCNC: 210 MG/DL (ref 70–99)
HBA1C MFR BLD: 8.2 % (ref 0–5.6)
POTASSIUM SERPL-SCNC: 4.1 MMOL/L (ref 3.4–5.3)
SODIUM SERPL-SCNC: 143 MMOL/L (ref 133–144)

## 2019-08-14 PROCEDURE — 36415 COLL VENOUS BLD VENIPUNCTURE: CPT | Performed by: INTERNAL MEDICINE

## 2019-08-14 PROCEDURE — 80048 BASIC METABOLIC PNL TOTAL CA: CPT | Performed by: INTERNAL MEDICINE

## 2019-08-14 PROCEDURE — 99214 OFFICE O/P EST MOD 30 MIN: CPT | Performed by: INTERNAL MEDICINE

## 2019-08-14 PROCEDURE — 83036 HEMOGLOBIN GLYCOSYLATED A1C: CPT | Performed by: INTERNAL MEDICINE

## 2019-08-14 RX ORDER — CEPHALEXIN 500 MG/1
500 CAPSULE ORAL 2 TIMES DAILY
Qty: 14 CAPSULE | Refills: 0 | Status: CANCELLED | OUTPATIENT
Start: 2019-08-14

## 2019-08-14 NOTE — PROGRESS NOTES
Subjective     Cooper Cabral is a 63 year old male who presents to clinic today for the following health issues:    HPI     Patient is here states that he will be moving into a different living situation in the upcoming weeks to months and is having difficulty with ambulation and arising from a seated position due to his prior history of stroke.  He is wondered about the benefit of a seat lift and also needs to have a POLST form filled out due to his living change  Patients brother (Ermias) came prior to appointment with concerns about patient.   1. Feet- swollen with multiple sores  2. Requesting certificate of medical necessity for patient to get a seat lift. Patient is unable to get out of chair at home  3. Patient will be moving to [facility] on [place] 8/31/19. Needs POLST completed. [facility] is also requesting that patient have his medications refilled with punch card system. Patient is not currently aware of the names of the medications he is taking     Diabetes Follow-up      How often are you checking your blood sugar? Once weekly     What time of day are you checking your blood sugars (select all that apply)?  Before meals    Have you had any blood sugars above 200?  Yes     Have you had any blood sugars below 70?  No    What symptoms do you notice when your blood sugar is low?  None    What concerns do you have today about your diabetes? Blood sugar is often over 200 and Other: feet. Patient discontinued lantus many months ago      Do you have any of these symptoms? (Select all that apply)  Redness, sores, or blisters on feet     Have you had a diabetic eye exam in the last 12 months? No    BP Readings from Last 2 Encounters:   01/29/19 128/78   03/13/18 124/68     Hemoglobin A1C (%)   Date Value   01/29/2019 7.5 (H)   01/12/2018 11.9 (H)     LDL Cholesterol Calculated (mg/dL)   Date Value   01/29/2019 69   01/14/2018 92       Diabetes Management Resources      How many servings of fruits and  vegetables do you eat daily?  None     On average, how many sweetened beverages do you drink each day (soda, juice, sweet tea, etc)?   1 Quart of crystal light daily     How many days per week do you miss taking your medication? 0        Patient Active Problem List   Diagnosis     Hypertension goal BP (blood pressure) < 140/80     Tobacco abuse     Non morbid obesity     Hyperlipidemia LDL goal <100     Advanced directives, counseling/discussion     Bell's palsy     Other diabetic neurological complication associated with type 2 diabetes mellitus (H)     Type 2 diabetes mellitus with diabetic neuropathy, with long-term current use of insulin (H)     Ischemic stroke (H)     Left-sided weakness     CVA (cerebral vascular accident) (H)     Obesity (BMI 35.0-39.9) with comorbidity (H)     Past Surgical History:   Procedure Laterality Date     broken ankle Right      CYSTOSCOPY         Social History     Tobacco Use     Smoking status: Current Every Day Smoker     Packs/day: 1.00     Years: 40.00     Pack years: 40.00     Types: Cigarettes     Smokeless tobacco: Never Used   Substance Use Topics     Alcohol use: No     Family History   Problem Relation Age of Onset     Cancer Mother         Bone cancer      Diabetes Father      Cancer Father         bone cancer      Hypertension Brother      Hypertension Brother          Current Outpatient Medications   Medication Sig Dispense Refill     aspirin  MG EC tablet Take 1 tablet (325 mg) by mouth daily 60 tablet 0     clopidogrel (PLAVIX) 75 MG tablet Take 1 tablet (75 mg) by mouth daily 90 tablet 3     finasteride (PROSCAR) 5 MG tablet Take 1 tablet (5 mg) by mouth daily 90 tablet 3     lisinopril-hydrochlorothiazide (PRINZIDE/ZESTORETIC) 20-12.5 MG tablet Take 2 tablets by mouth daily 180 tablet 3     metFORMIN (GLUCOPHAGE) 1000 MG tablet Take 1 tablet (1,000 mg) by mouth 2 times daily (with meals) 180 tablet 3     metoprolol succinate ER (TOPROL-XL) 100 MG 24 hr  tablet Take 1 tablet (100 mg) by mouth daily 90 tablet 3     simvastatin (ZOCOR) 20 MG tablet Take 1 tablet (20 mg) by mouth At Bedtime 90 tablet 3     ACCU-CHEK MULTICLIX LANCETS MISC tests twice a day 100 3     blood glucose calibration (NO BRAND SPECIFIED) solution To accompany: Blood Glucose Monitor Brands: per insurance. 1 Bottle 3     blood glucose monitoring (NO BRAND SPECIFIED) meter device kit Use to test blood sugar 1 times daily or as directed. Preferred blood glucose meter OR supplies to accompany: Blood Glucose Monitor Brands: per insurance. 1 kit 0     blood glucose monitoring (NO BRAND SPECIFIED) test strip Use to test blood sugar 1 times daily or as directed. To accompany: Blood Glucose Monitor Brands: per insurance. 100 strip 6     insulin glargine (LANTUS SOLOSTAR PEN) 100 UNIT/ML pen Inject 20 Units Subcutaneous 2 times daily 36 mL 1     insulin pen needle (B-D U/F) 31G X 8 MM Use once daily or as directed. 90 each 1     Multiple Vitamins-Minerals (MULTI FOR HIM PO) Take 1 capsule by mouth daily.       order for DME Equipment being ordered: cane 1 Device 0     order for DME Diabetic shoes due to neuropathy 1 Device 0     pregabalin (LYRICA) 150 MG capsule TAKE 1 CAPSULE BY MOUTH 2 TIMES DAILY 60 capsule 1     tamsulosin (FLOMAX) 0.4 MG capsule Take 1 capsule (0.4 mg) by mouth daily 90 capsule 3     No Known Allergies  BP Readings from Last 3 Encounters:   08/14/19 (!) 148/88   01/29/19 128/78   03/13/18 124/68    Wt Readings from Last 3 Encounters:   08/14/19 119.3 kg (263 lb)   01/29/19 108.1 kg (238 lb 6.4 oz)   03/13/18 111.1 kg (245 lb)                 Reviewed and updated as needed this visit by Provider         Review of Systems   ROS COMP: EYES: NEGATIVE for vision changes or irritation  ENT/MOUTH: NEGATIVE for ear, mouth and throat problems  RESP: NEGATIVE for significant cough or SOB  CV: NEGATIVE for chest pain, palpitations with peripheral edema  GI: NEGATIVE for nausea, abdominal pain,  heartburn, or change in bowel habits  : NEGATIVE for frequency, dysuria, or hematuria  HEME: NEGATIVE for bleeding problems  PSYCHIATRIC: NEGATIVE for changes in mood or affect      Objective    /82   Pulse 99   Temp 98  F (36.7  C) (Oral)   Resp 15   Wt 119.3 kg (263 lb)   SpO2 99%   BMI 35.67 kg/m    Body mass index is 35.67 kg/m .  Physical Exam   GENERAL:  alert and no distress  EYES: Eyes grossly normal to inspection, PERRL and conjunctivae and sclerae normal  HENT: ear canals and TM's normal, nose and mouth without ulcers or lesions  NECK: no adenopathy, no asymmetry, masses, or scars and thyroid normal to palpation  RESP: lungs clear to auscultation - no rales, rhonchi or wheezes  CV: regular rate and rhythm, normal S1 S2, no S3 or S4, no click or rub, trace peripheral edema   ABDOMEN: obese  SKIN: The feet bilaterally do not demonstrate any cellulitic change but demonstrate market dystrophic toenails throughout all digits as well as superficial dry skin and callus formation.  NEURO:  Poor gait, uses cane.  Difficulty getting up from seated position without aides  PSYCH: mentation appears normal, affect normal/bright    LDL Cholesterol Calculated   Date Value Ref Range Status   01/29/2019 69 <100 mg/dL Final     Comment:     Desirable:       <100 mg/dl       Assessment & Plan     1. Cerebrovascular accident (CVA), unspecified mechanism (H)  Appears stable with no significant clinical improvement from baseline.  I have filled out a medical necessity form for seat for the patient.  I have also spent some time and went over the patient's pulsed form which was copied and placed in the chart.  He requests no aggressive intervention and comfort care in the setting of end-of-life issues he also does not want IV feeds eating tubes but does request potential benefit of antibiotics if needed  - order for DME; Equipment being ordered: seat lift  Dispense: 1 Device; Refill: 0    2. Morbid obesity  (H)  Encouraged ongoing weight loss and dietary change.    3. Tobacco abuse disorder  This patient continues to smoke and I have advised the patient that he really needs to try to cut back on this as he is putting himself at undue an increased risk    4. Type 2 diabetes mellitus with diabetic neuropathy, with long-term current use of insulin (H)  Recheck A1c level and basic metabolic panel today in regards of kidney function.  - Hemoglobin A1c  - Basic metabolic panel     Tobacco Cessation:   reports that he has been smoking cigarettes.  He has a 40.00 pack-year smoking history. He has never used smokeless tobacco.  Tobacco Cessation Action Plan: Information offered: Patient not interested at this time        See Patient Instructions    Return in about 6 months (around 2/14/2020) for diabetes check 6 months.    Hudson Spring MD  [facility]

## 2019-08-14 NOTE — LETTER
[facility][1]  [address] [address]  [phone]      8/14/2019       Cooper Cabral  [address]  [facility] [place] [address]        Dear Cooper,    Basic panel including your kidney function tests are stable..    Your Hemoglobin A1C is more abnormal and indicates your blood sugars could be better controlled.  Please follow-up in the clinic to discuss some changes that need to be done.  I would recommend to be more aggressive in regards to your diet and exercise program and consider rechecking your A1c level in 3 months for comparison or we should probably need to go over some additional medication that she may benefit from to try to keep your blood sugars under control.    Sincerely,      Hudson Spring MD  Internal Medicine

## 2019-08-19 ENCOUNTER — DOCUMENTATION ONLY (OUTPATIENT)
Dept: OTHER | Facility: CLINIC | Age: 64
End: 2019-08-19

## 2019-08-30 ENCOUNTER — TELEPHONE (OUTPATIENT)
Dept: INTERNAL MEDICINE | Facility: CLINIC | Age: 64
End: 2019-08-30

## 2019-08-30 DIAGNOSIS — E11.40 TYPE 2 DIABETES MELLITUS WITH DIABETIC NEUROPATHY, WITH LONG-TERM CURRENT USE OF INSULIN (H): ICD-10-CM

## 2019-08-30 DIAGNOSIS — Z79.4 TYPE 2 DIABETES MELLITUS WITH DIABETIC NEUROPATHY, WITH LONG-TERM CURRENT USE OF INSULIN (H): ICD-10-CM

## 2019-08-30 RX ORDER — PREGABALIN 150 MG/1
CAPSULE ORAL
Qty: 60 CAPSULE | Refills: 1 | Status: SHIPPED | OUTPATIENT
Start: 2019-08-30 | End: 2019-01-01

## 2019-08-30 NOTE — TELEPHONE ENCOUNTER
Amy on the [facility] long term care nurse called to let us know a few things:    Pt needs new script for Lyrica called into the  long term care pharmacy [phone].    Med list is printed and in provider's folder for signature. Please fax to [phone].    FYI-pt states he has not taken his insulin for months. The long term care facility wanted you to know.

## 2019-09-05 NOTE — PROGRESS NOTES
[facility]  [facility] Record Number:  [id number]  Place of Service where encounter took place[facility] [[id number]]  Chief Complaint   Patient presents with     Establish Care       HPI:    Cooper Cabral  is a 63 year old (1955), who is being seen today to establish care, he is a new resident at [facility].  HPI information obtained from: facility chart records, facility staff, patient report, [facility] chart review and Care Everywhere [facility] chart review. He has an extensive and complex medical history including HX Stroke, HTN, Type 2 DM, significant PVD/AVD with very poor foot hygiene, Active smoker, chronic L/E edema with stasis dermatitis.  Today's concern is to establish plan of care where historically he has been non-compliant with diabetes management and personal hygiene:  Ischemic stroke (H)  Has affected his global reasoning and ability to care for himself independently therefore has been admitted to this [facility] with extensive nursing/care services.  Has some residual left sided weakness.     Hypertension goal BP (blood pressure) < 140/80  Blood pressure has been uncontrolled due to non compliance with taking his medications routinely.  Today he denies chest pain/pressure, dizziness, headache.  He knows he needs to take his medications and will try harder.  His AL service package includes medication administration which should help to keep blood pressures within above goal.    Tobacco abuse  He is not willing to quit smoking at this point in his life.      Type 2 diabetes mellitus with diabetic neuropathy, with long-term current use of insulin (H)  Polyneuropathy associated with underlying disease (H) DM  Feet are numb with tingling  Uncontrolled.  He came to this facility with Lantus orders, however, he has been refusing this because he doesn't like daily shots, said he would go back to Trulicity which is a weekly  injection.  With his BGT readings since admission to this facility have been 350 or greater, he has agreed he needs to accept the treatment plan.  We talked further that there may come a point where the oral medication would no longer be effective and may need daily insulin.    - dulaglutide (TRULICITY) 0.75 MG/0.5ML pen; Inject 0.75 mg Subcutaneous every 7 days  - DEBRIDEMENT OF NAILS, 6 OR MORE    PVD (peripheral vascular disease) (H) severe  Edema of both legs  Onychomycosis  His legs and feet are so swollen he has been sleeping with his shoes on.  They are rubber/Vinyl and are stuck to his feet.  With him being diabetic and severe PVD I had him take his Crocs off.  His feet have been sweating and are dirty with a strong fungal odor, there is slimy debris between his toes and all 10 toenails are thick, long, coated with dried skin packed under free edge and nail plate.  He is agreeable for me to trim them today.  Due to the thickness and extensive material to remove from both the top of the nail plate and under the nail plate the procedure took 47 Minutes.  His Right shin is open and draining clear fluid.  - DEBRIDEMENT OF NAILS, 6 OR MORE    Cellulitis of right lower extremity  Early stage with open area  - cephALEXin (KEFLEX) 500 MG capsule; Take 1 capsule (500 mg) by mouth 2 times daily for 10 days      PAST MEDICAL HISTORY:   Past Medical History:   Diagnosis Date     Cerebral artery occlusion with cerebral infarction (H)      COPD (chronic obstructive pulmonary disease) (H)      Diabetes (H)      Diabetes mellitus, type 2 (H)      Heart disease      HTN (hypertension)        PAST SURGICAL HISTORY:   Past Surgical History:   Procedure Laterality Date     broken ankle Right      CYSTOSCOPY         FAMILY HISTORY:   Family History   Problem Relation Age of Onset     Cancer Mother         Bone cancer      Diabetes Father      Cancer Father         bone cancer      Hypertension Brother      Hypertension Brother         SOCIAL HISTORY:   Social History     Tobacco Use     Smoking status: Current Every Day Smoker     Packs/day: 1.00     Years: 40.00     Pack years: 40.00     Types: Cigarettes     Smokeless tobacco: Never Used   Substance Use Topics     Alcohol use: No       MEDICATIONS:  Current Outpatient Medications   Medication Sig Dispense Refill     ACCU-CHEK MULTICLIX LANCETS MISC tests twice a day 100 3     blood glucose calibration (NO BRAND SPECIFIED) solution To accompany: Blood Glucose Monitor Brands: per insurance. 1 Bottle 3     blood glucose monitoring (NO BRAND SPECIFIED) meter device kit Use to test blood sugar 1 times daily or as directed. Preferred blood glucose meter OR supplies to accompany: Blood Glucose Monitor Brands: per insurance. 1 kit 0     blood glucose monitoring (NO BRAND SPECIFIED) test strip Use to test blood sugar 1 times daily or as directed. To accompany: Blood Glucose Monitor Brands: per insurance. 100 strip 6     cephALEXin (KEFLEX) 500 MG capsule Take 1 capsule (500 mg) by mouth 2 times daily for 10 days 20 capsule 0     clopidogrel (PLAVIX) 75 MG tablet Take 1 tablet (75 mg) by mouth daily 90 tablet 3     dulaglutide (TRULICITY) 0.75 MG/0.5ML pen Inject 0.75 mg Subcutaneous every 7 days 2 mL 3     insulin pen needle (B-D U/F) 31G X 8 MM Use once daily or as directed. 90 each 1     lisinopril-hydrochlorothiazide (PRINZIDE/ZESTORETIC) 20-12.5 MG tablet Take 2 tablets by mouth daily 180 tablet 3     metFORMIN (GLUCOPHAGE) 1000 MG tablet Take 1 tablet (1,000 mg) by mouth 2 times daily (with meals) 180 tablet 3     metoprolol succinate ER (TOPROL-XL) 100 MG 24 hr tablet Take 1 tablet (100 mg) by mouth daily 90 tablet 3     Multiple Vitamins-Minerals (MULTI FOR HIM PO) Take 1 capsule by mouth daily.       order for DME Equipment being ordered: seat lift 1 Device 0     order for DME Equipment being ordered: cane 1 Device 0     order for DME Diabetic shoes due to neuropathy 1 Device 0      pregabalin (LYRICA) 150 MG capsule TAKE 1 CAPSULE BY MOUTH 2 TIMES DAILY 60 capsule 1     simvastatin (ZOCOR) 20 MG tablet Take 1 tablet (20 mg) by mouth At Bedtime 90 tablet 3     tamsulosin (FLOMAX) 0.4 MG capsule Take 1 capsule (0.4 mg) by mouth daily 90 capsule 3         REVIEW OF SYSTEMS:  10 point ROS of systems including Constitutional, Eyes, Respiratory, Cardiovascular, Gastroenterology, Genitourinary, Integumentary, Musculoskeletal, Psychiatric were all negative except for pertinent positives noted in my HPI.    Objective:  BP (!) 161/94   Pulse 81   Temp 97.7  F (36.5  C)   Resp 18   Ht 1.829 m (6')   Wt 119.3 kg (263 lb)   SpO2 99%   BMI 35.67 kg/m    Exam:  GENERAL APPEARANCE:  Alert, in need of bathing, clothes soiled, fingernails dirty and long  ENT:  Mouth and posterior oropharynx normal, moist mucous membranes, normal hearing acuity  EYES:  EOM, conjunctivae, lids, pupils and irises normal  NECK:  No adenopathy,masses or thyromegaly  RESP:  respiratory effort and palpation of chest normal, no respiratory distress, rales chronic, fine  CV:  Palpation and auscultation of heart done , regular rate and rhythm, no murmur, rub, or gallop, Location; Lower extremity, Skin:  Open area right shin, Temperature: Cool, Color:  Dusky and Ruborous, Pulses:  Dorsalis pedal pulse: Bilateral  0 nonpalpable, Nails:  Onychomycosis all 10 nails , Hair:  Absent, Edema:  +3 and Pitting, Sensation:  Diminished and Footwear:  inappropriate, too tight, unable to wear socks, edema swelling up through vent holes with superficial skin breakdown  ABDOMEN:  normal bowel sounds, soft, nontender, no hepatosplenomegaly or other masses, obese  M/S:   Gait and station abnormal wide bases, left sided weakness  Digits and nails abnormal toenails thick long dermatophyte  SKIN:  Inspection of skin and subcutaneous tissue baseline, Palpation of skin and subcutaneous tissue baseline  NEURO:   Examination of sensation by touch is  diminished bilateral legs below knees  PSYCH:  oriented X 3, insight and judgement impaired, memory impaired     Labs:   Recent labs in [facility] reviewed by me today.     ASSESSMENT/PLAN:  (I63.9) Ischemic stroke (H)  (primary encounter diagnosis)  (I10) Hypertension goal BP (blood pressure) < 140/80  Comment: high risk for another stroke due to non compliance with blood pressure medication and blood sugar control  Plan: continue Clopidogrel for stoke Prophylaxis  Monitor blood pressures weekly  Continue Metoprolol   Discontinue Lisinopril/HCTZ start Lisinopril 20 mg po daily start Torsemide 10 mg daily CMP 10/2/19   Home care RN monitor edema and blood pressures    (Z72.0) Tobacco abuse  Comment: Not ready to quit  Plan: no smoking in his apartment only on patio    (E11.40,  Z79.4) Type 2 diabetes mellitus with diabetic neuropathy, with long-term current use of insulin (H)  (G63) Polyneuropathy associated with underlying disease (H) DM2  Comment: Uncontrolled  Plan: dulaglutide (TRULICITY) 0.75 MG/0.5ML pen, Start at 0.75 mg weekly and will discontinue daily Lantus  Will follow up in 3 weeks and increase Trulicity if needed.        DEBRIDEMENT OF NAILS, 6 OR MORE        Placed call to brother to discuss getting new shoes that are bigger to accommodate the edema, decrease pressure  HGB A1c next lab day      (I73.9) PVD (peripheral vascular disease) (H) severe  (B35.1) Onychomycosis  (R60.0) Edema of both legs  Comment: severe circulatory problems high risk for pressure sore development   Plan: DEBRIDEMENT OF NAILS, 6 OR MORE        Elevate legs when watching TV  Get new shoes that will accommodate socks and protect his feet from the elements.  Remove shoes when going to bed  Shower at least 1 time weekly making sure feet are cleaned between toes  Nail trimming every 60-90 days    (L03.115) Cellulitis of right lower extremity  Comment: Acute  Plan: cephALEXin (KEFLEX) 500 MG capsule        Home care RN for wound care of  open area on Right shin          Total time with a new patient visit in the assisted livin minutes including discussions about the POC, extensive history taking in a new patient, and care coordination with patient, nursing staff and brother 25 minutes of total time spent with counseling and coordinating care due to smoking and non compliance with taking blood pressure medications, and getting new shoes.  47 minutes spent with toenail debridement.     Electronically signed by:  ZOHREH Witt CNP

## 2019-09-06 ENCOUNTER — ASSISTED LIVING VISIT (OUTPATIENT)
Dept: GERIATRICS | Facility: CLINIC | Age: 64
End: 2019-09-06
Payer: MEDICARE

## 2019-09-06 ENCOUNTER — DOCUMENTATION ONLY (OUTPATIENT)
Dept: OTHER | Facility: CLINIC | Age: 64
End: 2019-09-06

## 2019-09-06 VITALS
DIASTOLIC BLOOD PRESSURE: 94 MMHG | WEIGHT: 263 LBS | SYSTOLIC BLOOD PRESSURE: 161 MMHG | HEIGHT: 72 IN | BODY MASS INDEX: 35.62 KG/M2 | OXYGEN SATURATION: 99 % | HEART RATE: 81 BPM | TEMPERATURE: 97.7 F | RESPIRATION RATE: 18 BRPM

## 2019-09-06 DIAGNOSIS — Z72.0 TOBACCO ABUSE: ICD-10-CM

## 2019-09-06 DIAGNOSIS — I63.9 ISCHEMIC STROKE (H): Primary | ICD-10-CM

## 2019-09-06 DIAGNOSIS — I10 HYPERTENSION GOAL BP (BLOOD PRESSURE) < 140/80: ICD-10-CM

## 2019-09-06 DIAGNOSIS — Z79.4 TYPE 2 DIABETES MELLITUS WITH DIABETIC NEUROPATHY, WITH LONG-TERM CURRENT USE OF INSULIN (H): ICD-10-CM

## 2019-09-06 DIAGNOSIS — I73.9 PVD (PERIPHERAL VASCULAR DISEASE) (H): ICD-10-CM

## 2019-09-06 DIAGNOSIS — G63 POLYNEUROPATHY ASSOCIATED WITH UNDERLYING DISEASE (H): ICD-10-CM

## 2019-09-06 DIAGNOSIS — R60.0 EDEMA OF BOTH LEGS: ICD-10-CM

## 2019-09-06 DIAGNOSIS — B35.1 ONYCHOMYCOSIS: ICD-10-CM

## 2019-09-06 DIAGNOSIS — E11.40 TYPE 2 DIABETES MELLITUS WITH DIABETIC NEUROPATHY, WITH LONG-TERM CURRENT USE OF INSULIN (H): ICD-10-CM

## 2019-09-06 DIAGNOSIS — L03.115 CELLULITIS OF RIGHT LOWER EXTREMITY: ICD-10-CM

## 2019-09-06 PROCEDURE — 11721 DEBRIDE NAIL 6 OR MORE: CPT | Mod: Q8 | Performed by: NURSE PRACTITIONER

## 2019-09-06 ASSESSMENT — MIFFLIN-ST. JEOR: SCORE: 2025.96

## 2019-09-06 NOTE — LETTER
9/6/2019        RE: Cooper Cabral  [facility]  [address] [facility] [address]        [place] [facility]  [place] Medical Record Number:  [id number]  Place of Service where encounter took place:  [facility] ([facility]) [[id number]]  Chief Complaint   Patient presents with     Establish Care       HPI:    Cooper Cabral  is a 63 year old (1955), who is being seen today to establish care, he is a new resident at [facility].  HPI information obtained from: facility chart records, facility staff, patient report, [place] Epic chart review and Care Everywhere [facility] chart review. He has an extensive and complex medical history including HX Stroke, HTN, Type 2 DM, significant PVD/AVD with very poor foot hygiene, Active smoker, chronic L/E edema with stasis dermatitis.  Today's concern is to establish plan of care where historically he has been non-compliant with diabetes management and personal hygiene:  Ischemic stroke (H)  Has affected his global reasoning and ability to care for himself independently therefore has been admitted to this [facility] with extensive nursing/care services.  Has some residual left sided weakness.     Hypertension goal BP (blood pressure) < 140/80  Blood pressure has been uncontrolled due to non compliance with taking his medications routinely.  Today he denies chest pain/pressure, dizziness, headache.  He knows he needs to take his medications and will try harder.  His AL service package includes medication administration which should help to keep blood pressures within above goal.    Tobacco abuse  He is not willing to quit smoking at this point in his life.      Type 2 diabetes mellitus with diabetic neuropathy, with long-term current use of insulin (H)  Polyneuropathy associated with underlying disease (H) DM  Feet are numb with tingling  Uncontrolled.  He came to this facility with Lantus orders, however, he has been  refusing this because he doesn't like daily shots, said he would go back to Trulicity which is a weekly injection.  With his BGT readings since admission to this facility have been 350 or greater, he has agreed he needs to accept the treatment plan.  We talked further that there may come a point where the oral medication would no longer be effective and may need daily insulin.    - dulaglutide (TRULICITY) 0.75 MG/0.5ML pen; Inject 0.75 mg Subcutaneous every 7 days  - DEBRIDEMENT OF NAILS, 6 OR MORE    PVD (peripheral vascular disease) (H) severe  Edema of both legs  Onychomycosis  His legs and feet are so swollen he has been sleeping with his shoes on.  They are rubber/Vinyl and are stuck to his feet.  With him being diabetic and severe PVD I had him take his Crocs off.  His feet have been sweating and are dirty with a strong fungal odor, there is slimy debris between his toes and all 10 toenails are thick, long, coated with dried skin packed under free edge and nail plate.  He is agreeable for me to trim them today.  Due to the thickness and extensive material to remove from both the top of the nail plate and under the nail plate the procedure took 47 Minutes.  His Right shin is open and draining clear fluid.  - DEBRIDEMENT OF NAILS, 6 OR MORE    Cellulitis of right lower extremity  Early stage with open area  - cephALEXin (KEFLEX) 500 MG capsule; Take 1 capsule (500 mg) by mouth 2 times daily for 10 days      PAST MEDICAL HISTORY:   Past Medical History:   Diagnosis Date     Cerebral artery occlusion with cerebral infarction (H)      COPD (chronic obstructive pulmonary disease) (H)      Diabetes (H)      Diabetes mellitus, type 2 (H)      Heart disease      HTN (hypertension)        PAST SURGICAL HISTORY:   Past Surgical History:   Procedure Laterality Date     broken ankle Right      CYSTOSCOPY         FAMILY HISTORY:   Family History   Problem Relation Age of Onset     Cancer Mother         Bone cancer       Diabetes Father      Cancer Father         bone cancer      Hypertension Brother      Hypertension Brother        SOCIAL HISTORY:   Social History     Tobacco Use     Smoking status: Current Every Day Smoker     Packs/day: 1.00     Years: 40.00     Pack years: 40.00     Types: Cigarettes     Smokeless tobacco: Never Used   Substance Use Topics     Alcohol use: No       MEDICATIONS:  Current Outpatient Medications   Medication Sig Dispense Refill     ACCU-CHEK MULTICLIX LANCETS MISC tests twice a day 100 3     blood glucose calibration (NO BRAND SPECIFIED) solution To accompany: Blood Glucose Monitor Brands: per insurance. 1 Bottle 3     blood glucose monitoring (NO BRAND SPECIFIED) meter device kit Use to test blood sugar 1 times daily or as directed. Preferred blood glucose meter OR supplies to accompany: Blood Glucose Monitor Brands: per insurance. 1 kit 0     blood glucose monitoring (NO BRAND SPECIFIED) test strip Use to test blood sugar 1 times daily or as directed. To accompany: Blood Glucose Monitor Brands: per insurance. 100 strip 6     cephALEXin (KEFLEX) 500 MG capsule Take 1 capsule (500 mg) by mouth 2 times daily for 10 days 20 capsule 0     clopidogrel (PLAVIX) 75 MG tablet Take 1 tablet (75 mg) by mouth daily 90 tablet 3     dulaglutide (TRULICITY) 0.75 MG/0.5ML pen Inject 0.75 mg Subcutaneous every 7 days 2 mL 3     insulin pen needle (B-D U/F) 31G X 8 MM Use once daily or as directed. 90 each 1     lisinopril-hydrochlorothiazide (PRINZIDE/ZESTORETIC) 20-12.5 MG tablet Take 2 tablets by mouth daily 180 tablet 3     metFORMIN (GLUCOPHAGE) 1000 MG tablet Take 1 tablet (1,000 mg) by mouth 2 times daily (with meals) 180 tablet 3     metoprolol succinate ER (TOPROL-XL) 100 MG 24 hr tablet Take 1 tablet (100 mg) by mouth daily 90 tablet 3     Multiple Vitamins-Minerals (MULTI FOR HIM PO) Take 1 capsule by mouth daily.       order for DME Equipment being ordered: seat lift 1 Device 0     order for DME  Equipment being ordered: cane 1 Device 0     order for DME Diabetic shoes due to neuropathy 1 Device 0     pregabalin (LYRICA) 150 MG capsule TAKE 1 CAPSULE BY MOUTH 2 TIMES DAILY 60 capsule 1     simvastatin (ZOCOR) 20 MG tablet Take 1 tablet (20 mg) by mouth At Bedtime 90 tablet 3     tamsulosin (FLOMAX) 0.4 MG capsule Take 1 capsule (0.4 mg) by mouth daily 90 capsule 3         REVIEW OF SYSTEMS:  10 point ROS of systems including Constitutional, Eyes, Respiratory, Cardiovascular, Gastroenterology, Genitourinary, Integumentary, Musculoskeletal, Psychiatric were all negative except for pertinent positives noted in my HPI.    Objective:  BP (!) 161/94   Pulse 81   Temp 97.7  F (36.5  C)   Resp 18   Ht 1.829 m (6')   Wt 119.3 kg (263 lb)   SpO2 99%   BMI 35.67 kg/m     Exam:  GENERAL APPEARANCE:  Alert, in need of bathing, clothes soiled, fingernails dirty and long  ENT:  Mouth and posterior oropharynx normal, moist mucous membranes, normal hearing acuity  EYES:  EOM, conjunctivae, lids, pupils and irises normal  NECK:  No adenopathy,masses or thyromegaly  RESP:  respiratory effort and palpation of chest normal, no respiratory distress, rales chronic, fine  CV:  Palpation and auscultation of heart done , regular rate and rhythm, no murmur, rub, or gallop, Location; Lower extremity, Skin:  Open area right shin, Temperature: Cool, Color:  Dusky and Ruborous, Pulses:  Dorsalis pedal pulse: Bilateral  0 nonpalpable, Nails:  Onychomycosis all 10 nails , Hair:  Absent, Edema:  +3 and Pitting, Sensation:  Diminished and Footwear:  inappropriate, too tight, unable to wear socks, edema swelling up through vent holes with superficial skin breakdown  ABDOMEN:  normal bowel sounds, soft, nontender, no hepatosplenomegaly or other masses, obese  M/S:   Gait and station abnormal wide bases, left sided weakness  Digits and nails abnormal toenails thick long dermatophyte  SKIN:  Inspection of skin and subcutaneous tissue  baseline, Palpation of skin and subcutaneous tissue baseline  NEURO:   Examination of sensation by touch is diminished bilateral legs below knees  PSYCH:  oriented X 3, insight and judgement impaired, memory impaired     Labs:   Recent labs in [facility] reviewed by me today.     ASSESSMENT/PLAN:  (I63.9) Ischemic stroke (H)  (primary encounter diagnosis)  (I10) Hypertension goal BP (blood pressure) < 140/80  Comment: high risk for another stroke due to non compliance with blood pressure medication and blood sugar control  Plan: continue Clopidogrel for stoke Prophylaxis  Monitor blood pressures weekly  Continue Metoprolol   Discontinue Lisinopril/HCTZ start Lisinopril 20 mg po daily start Torsemide 10 mg daily CMP 10/2/19   Home care RN monitor edema and blood pressures    (Z72.0) Tobacco abuse  Comment: Not ready to quit  Plan: no smoking in his apartment only on patio    (E11.40,  Z79.4) Type 2 diabetes mellitus with diabetic neuropathy, with long-term current use of insulin (H)  (G63) Polyneuropathy associated with underlying disease (H) DM2  Comment: Uncontrolled  Plan: dulaglutide (TRULICITY) 0.75 MG/0.5ML pen, Start at 0.75 mg weekly and will discontinue daily Lantus  Will follow up in 3 weeks and increase Trulicity if needed.        DEBRIDEMENT OF NAILS, 6 OR MORE        Placed call to brother to discuss getting new shoes that are bigger to accommodate the edema, decrease pressure  HGB A1c next lab day      (I73.9) PVD (peripheral vascular disease) (H) severe  (B35.1) Onychomycosis  (R60.0) Edema of both legs  Comment: severe circulatory problems high risk for pressure sore development   Plan: DEBRIDEMENT OF NAILS, 6 OR MORE        Elevate legs when watching TV  Get new shoes that will accommodate socks and protect his feet from the elements.  Remove shoes when going to bed  Shower at least 1 time weekly making sure feet are cleaned between toes  Nail trimming every 60-90 days    (L03.115) Cellulitis of right  lower extremity  Comment: Acute  Plan: cephALEXin (KEFLEX) 500 MG capsule        Home care RN for wound care of open area on Right shin          Total time with a new patient visit in the assisted livin minutes including discussions about the POC, extensive history taking in a new patient, and care coordination with patient, nursing staff and brother 25 minutes of total time spent with counseling and coordinating care due to smoking and non compliance with taking blood pressure medications, and getting new shoes.  47 minutes spent with toenail debridement.     Electronically signed by:  ZOHREH Witt CNP             Sincerely,        ZOHREH iWtt CNP

## 2019-09-10 ENCOUNTER — MEDICAL CORRESPONDENCE (OUTPATIENT)
Dept: HEALTH INFORMATION MANAGEMENT | Facility: CLINIC | Age: 64
End: 2019-09-10

## 2019-12-27 PROBLEM — L03.90 CELLULITIS: Status: ACTIVE | Noted: 2019-01-01

## 2019-12-27 NOTE — ED PROVIDER NOTES
History     Chief Complaint:    Leg Swelling    The history is provided by the patient.      Cooper Cabral is a 64 year old male smoker with a history of type 2 diabetes, CVA, hypertension, polyneuropathy, and COPD on Plavix who arrived via EMS and presents with bilateral lower leg swelling. The reports redness and swelling in both legs and feet more so than usual. He had a home wound care nurse who used to care for the wounds on his lower legs, but he seemed to be healing and they stopped visiting him 2 weeks ago. His leg redness and swelling has been worsening since then. He denies any shortness of breath or cough, but his oxygen was at 87% room air in the ED. He is not normally on O2 at home and denies any history of CHF. He denies any leg pain, but attributes this to he severe neuropathy.     Allergies:  No Known Drug Allergies     Medications:    Aspirin 81 mg  Plavix  Trulicity  Lisinopril  Metformin  Toprol-XL  Lyrica  Zocor  Flomax  Demadex    Past Medical History:    Cerebral artery occlusion with cerebral infarction  COPD  CVA  Ischemic stroke  Peripheral vascular disease  Polyneuropathy  Obesity  Edema of both legs  Type 2 diabetes  Coronary artery disease  Hypertension  Non-morbid obesity  Tobacco abuse  Hyperlipidemia  Bell's palsy    Past Surgical History:    Broken ankle repair  Cystoscopy    Family History:    Bone cancer - mother  Diabetes - father  Bone cancer - father  Hypertension - brother  Hypertension - brother    Social History:  Positive for tobacco use - 1 pack/day.  Negative for alcohol use.  Negative for drug use.  Marital Status:  Single [1]     Review of Systems   Respiratory: Negative for cough and shortness of breath.    Cardiovascular: Positive for leg swelling (bilateral).   Musculoskeletal:        Denies leg pain   Skin: Positive for color change (bilateral leg redness).   All other systems reviewed and are negative.      Physical Exam     Patient Vitals for the past 24  hrs:   BP Temp Temp src Pulse Heart Rate Resp SpO2 Height Weight   12/27/19 1445 (!) 143/94 -- -- 94 94 24 95 % -- --   12/27/19 1430 (!) 155/113 -- -- -- -- -- -- -- --   12/27/19 1400 (!) 122/90 -- -- 96 96 (!) 7 98 % -- --   12/27/19 1345 129/78 -- -- 97 96 16 99 % -- --   12/27/19 1330 114/77 -- -- -- -- -- -- -- --   12/27/19 1321 133/89 97.9  F (36.6  C) Oral 85 -- 18 (!) 88 % 1.829 m (6') 113.4 kg (250 lb)     Physical Exam    Nursing note and vitals reviewed.  Constitutional:  Appears well-developed and well-nourished.   HENT:   Head:    Atraumatic.   Mouth/Throat:   Oropharynx is clear and moist. No oropharyngeal exudate.   Eyes:    Pupils are equal, round, and reactive to light.   Neck:    Normal range of motion. Neck supple.      No tracheal deviation present. No thyromegaly present.   Cardiovascular:  Normal rate, regular rhythm, no murmur   Pulmonary/Chest: Breath sounds are clear and equal without wheezes or crackles. Few bibasilar crackles.  Abdominal:   Soft. Bowel sounds are normal. Exhibits no distension and      no mass. There is no tenderness.      There is no rebound and no guarding.   Musculoskeletal:  Dark erythema and warmth on dorsum of both feet and residential up the calves. 2 large yellow fluid-filled blisters to the distal aspect of the anterior right calf.  Lymphadenopathy:  No cervical adenopathy.   Neurological:   Alert and oriented to person, place, and time.   Skin:    Skin is warm and dry. No rash noted. No pallor.     Emergency Department Course     ECG (14:03:16):  Rate 95 bpm. MO interval 220. QRS duration 86. QT/QTc 368/462. P-R-T axes 55 -62 59. Sinus rhythm with 1st degree A-V block. Left anterior fascicular block. Inferior infarct, age undetermined. Anteroseptal infarct, age undetermined. Abnormal ECG. Previous EKG dated 01/12/2018.  Interpreted at 1687 by Nancy Lazar MD.    Imaging:  Radiology findings were communicated with the patient who voiced understanding of the  findings.    XR  Chest, PA & LAT:   AP and lateral views of the chest were obtained. Mild enlargement of the cardiac silhouette and mild pulmonary vascular congestion. No significant pleural effusion or pneumothorax, as per radiology.     Laboratory:  Laboratory findings were communicated with the patient who voiced understanding of the findings.    Blood culture: No growth after 1 hour  ISTAT gases lactate martinez POCT: PCO2 Venous 67 H, PO2 Venous 23 L, Bicarbonate Venous 39 H o/w WNL  CBC: AWNL (WBC 8.1, HGB 13.7, )  BMP: Carbon dioxide 37 H, anion gap 2 L, glucose 244 H, BUN 35 H, Creatinine 1.67 H, GFR estimate 43 L o/w WNL  Troponin I (1530): 0.249 H  Nt probnp inpatient: 451  Wound Culture Aerobic Bacterial: Pending    Interventions:  1407: Zosyn 4.5 g IV  1543: vancomycin (VANCOCIN) 2,000 mg in sodium chloride 0.9 % 500 mL intermittent infusion 2,000 mg IV    Emergency Department Course:  Past medical records, nursing notes, and vitals reviewed.    1337: I performed an exam of the patient as documented above.     EKG obtained in the ED, see results above.     IV was inserted and blood was drawn for laboratory testing, results above.    The patient was sent for a chest x-ray while in the emergency department, results above.     I personally reviewed the laboratory, EKG, and imaging results with the Patient and answered all related questions prior to admission.     Findings and plan explained to the Patient who consents to admission.     1506: Discussed the patient with Dr. Sandoval, who will admit the patient to an inpatient bed for further monitoring, evaluation, and treatment.     1529: Laboratory called to inform me of the patient's elevated troponin.    1532: I spoke with Dr. Sandoval regarding the patient's elevated troponin.    Impression & Plan     Medical Decision Making:  Cooper Cabral is a 64 year old male who I found to have acute bilateral leg cellulitis with possible early sepsis syndrome, so he  was treated with Zosyn and vancomycin IV for broad-coverage antibiotics and was admitted to the care of the hospitalist. I did consider a possibility of CHF. However, I felt this is unlikely considering his normal BMP. He was admitted although there was some increased pulmonary vasculature on his chest x-ray.    Diagnosis:    ICD-10-CM    1. Acute sepsis (H) A41.9 Blood culture   2. Bilateral lower leg cellulitis L03.116     L03.115    3. Elevated troponin R79.89        Disposition:  Admitted to inpatient.    Scribe Disclosure:  I, Juana Christopher, am serving as a scribe at 1:40 PM on 12/27/2019 to document services personally performed by Nancy Lazar MD based on my observations and the provider's statements to me.     Juana Christopher  12/27/2019    EMERGENCY DEPARTMENT       Nancy Lazar MD  12/27/19 1840       Nancy Lazar MD  12/27/19 1849

## 2019-12-27 NOTE — ED NOTES
"[facility]  ED Nurse Handoff Report    ED Chief complaint: Leg Swelling      ED Diagnosis:   Final diagnoses:   None       Code Status: not addressed     Allergies: No Known Allergies    Activity level - Baseline/Home:  Unable to Assess  Activity Level - Current:   Unable to Assess    Patient's Preferred language: ENglish   Needed?: No    Isolation: No  Infection: Not Applicable  Bariatric?: No    Vital Signs:   Vitals:    12/27/19 1321   BP: 133/89   Pulse: 85   Resp: 18   Temp: 97.9  F (36.6  C)   TempSrc: Oral   SpO2: (!) 88%   Weight: 113.4 kg (250 lb)   Height: 1.829 m (6')       Cardiac Rhythm: ,        Pain level: 0-10 Pain Scale: 0    Is this patient confused?: No , poor historian  Does this patient have a guardian?  No         If yes, is there guardianship documents in the Epic \"Code/ACP\" activity?  No         Guardian Notified?  No  [place] - Suicide Severity Rating Scale Completed?  Yes  If yes, what color did the patient score?  White    Patient Report: Initial Complaint: bilat lower extremity edema   Focused Assessment: bilat LE edema, red, has velcro leg wraps changed daily, has blisters and open wound under wraps, denies chf, on torsemide, sats low on room air, placed on nc 2 liters   Tests Performed: labs, cxr, ecg; bilateral US lower extremities  Abnormal Results: troponin 0.249; see EPIC  Treatments provided: zosyn , vanco; heparin gtt; 324mg ASA     Family Comments: none here     OBS brochure/video discussed/provided to patient/family: No              Name of person given brochure if not patient: na              Relationship to patient: na    ED Medications:   Medications   piperacillin-tazobactam (ZOSYN) 4.5 g vial to attach to  mL bag (4.5 g Intravenous New Bag 12/27/19 1407)   vancomycin (VANCOCIN) 2,000 mg in sodium chloride 0.9 % 500 mL intermittent infusion (has no administration in time range)       Drips infusing?:  No    For the majority of the shift " this patient was Green.   Interventions performed were na.    Severe Sepsis OR Septic Shock Diagnosis Present: No    To be done/followed up on inpatient unit:  unknown     ED NURSE PHONE NUMBER: [phone]

## 2019-12-27 NOTE — ED NOTES
Bed: ED25  Expected date:   Expected time:   Means of arrival:   Comments:  [facility] - 64M LE edema eta 1305

## 2019-12-27 NOTE — PHARMACY-ADMISSION MEDICATION HISTORY
Pharmacy Medication History  Admission medication history interview status for the 12/27/2019  admission is complete. See EPIC admission navigator for prior to admission medications     Medication history sources: Patient and Facility medication list  Medication history source reliability: Good  Adherence assessment: Good    Significant changes made to the medication list:  Torsemide changed from 40 po BID to 50 mg daily.    Additional medication history information:   none    Medication reconciliation completed by provider prior to medication history? No    Time spent in this activity: 30 minutes      Prior to Admission medications    Medication Sig Last Dose Taking? Auth Provider   ASPIRIN LOW DOSE 81 MG chewable tablet CHEW AND SWALLOW 1 TABLET BY MOUTH ONCE DAILY 12/27/2019 at 0800 Yes Nohelia Estrella APRN CNP   clopidogrel (PLAVIX) 75 MG tablet Take 1 tablet (75 mg) by mouth daily 12/27/2019 at 0800 Yes Hudson Spring MD   lisinopril (PRINIVIL/ZESTRIL) 20 MG tablet TAKE 1 TABLET BY MOUTH ONCE DAILY 12/27/2019 at 0800 Yes Nohelia Estrella APRN CNP   metFORMIN (GLUCOPHAGE) 1000 MG tablet Take 1 tablet (1,000 mg) by mouth 2 times daily (with meals) 12/27/2019 at 0800 Yes Hudson Spring MD   metoprolol succinate ER (TOPROL-XL) 100 MG 24 hr tablet Take 1 tablet (100 mg) by mouth daily 12/27/2019 at 0800 Yes Hudson Spring MD   Multiple Vitamins-Minerals (MULTI FOR HIM PO) Take 1 capsule by mouth daily. 12/27/2019 at 0800 Yes Reported, Patient   pregabalin (LYRICA) 150 MG capsule TAKE 1 CAPSULE BY MOUTH 2 TIMES DAILY 12/27/2019 at 0800 Yes Nohelia Estrella APRN CNP   simvastatin (ZOCOR) 20 MG tablet Take 1 tablet (20 mg) by mouth At Bedtime 12/26/2019 at 2000 Yes Hudson Spring MD   tamsulosin (FLOMAX) 0.4 MG capsule Take 1 capsule (0.4 mg) by mouth daily 12/27/2019 at 0800 Yes Hudson Spring MD   torsemide (DEMADEX) 100 MG tablet Take 50 mg by mouth daily 12/27/2019 at 0800 Yes Unknown, Entered By History    ACCU-CHEK MULTICLIX LANCETS MISC tests twice a day   Félix Flores MD   blood glucose calibration (NO BRAND SPECIFIED) solution To accompany: Blood Glucose Monitor Brands: per insurance.   Hudson Spring MD   blood glucose monitoring (NO BRAND SPECIFIED) meter device kit Use to test blood sugar 1 times daily or as directed. Preferred blood glucose meter OR supplies to accompany: Blood Glucose Monitor Brands: per insurance.   Hudson Spring MD   blood glucose monitoring (NO BRAND SPECIFIED) test strip Use to test blood sugar 1 times daily or as directed. To accompany: Blood Glucose Monitor Brands: per insurance.   Hudson Spring MD   dulaglutide (TRULICITY) 0.75 MG/0.5ML pen Inject 0.75 mg Subcutaneous every 7 days 12/25/2019 at 0800  Nohelia Estrella APRN CNP   insulin pen needle (B-D U/F) 31G X 8 MM Use once daily or as directed.   Dani Layton MD   order for DME Equipment being ordered: seat lift   Hudson Spring MD   order for DME Equipment being ordered: Hudson Rothman MD   order for DME Diabetic shoes due to neuropathy   Hudson Spring MD   pregabalin (LYRICA) 150 MG capsule TAKE 1 CAPSULE BY MOUTH TWICE DAILY   Nohelia Estrella, ZOHREH CNP

## 2019-12-27 NOTE — TELEPHONE ENCOUNTER
[place] [facility] TELEPHONE ENCOUNTER    Chief Complaint   Patient presents with     increased L/E edema       Cooper Cabral is a 64 year old  (1955),Nurse called today to report: patient's legs are even more edematous with blisters that are weeping and feet are dark purple.  He is diabetic and a smoker who is non-compliant with elevating his legs.  He is morbidly obese.  We did just increase his Torsemide about a week ago to 40 mg BID.  However the edema keeps increasing.  He likely needs IV diuresis at this time and closely monitor renal function.  Since he lives in an assisted living facility we do not have licensed staff available for close monitoring.  He may be too medically complex to thrive in this setting and may need Long term care as a result.    ASSESSMENT/PLAN  Treatment failure for worsening lower extremity edema    Sent to [facility] ER for evaluation and implementation of IV diuresis, Wound care for both legs, vascular consult, and close monitoring of renal function.      ZOHREH Witt CNP

## 2019-12-27 NOTE — H&P
Admitted:     12/27/2019      PRIMARY CARE PHYSICIAN:  ZOHREH Gordon, CNP      CHIEF COMPLAINT:  Bilateral lower extremity swelling and erythema.     HISTORY OF PRESENT ILLNESS:  Mr. Cooper Cabral is a 64-year-old male patient with history noted below including obesity, COPD, diabetes mellitus type 2 with neuropathy, hypertension, CHF, hyperlipidemia, and history of strokes, who presents from an assisted living facility with the above issues.  The patient has had swelling and redness of both legs and feet.  A home care nurse has been wrapping them, apparently using a much tighter wrap with a Velcro for the last few weeks.  He apparently had some wound cares which stopped about 2 weeks ago.  Given his issues, he was sent to [facility] for further evaluation.      The patient was seen in the ER and vitals that showed temperature 97.9, heart rate 85, blood pressure 133/89, respiration 18, O2 saturations 88% on room air.  He had laboratory evaluation which showed his white count was normal.  BMP was remarkable for creatinine 1.67, where had been 1.19 in 08/2019.  Lactic acid was normal.  His legs looked erythematous, right greater than left.  He was given doses of vancomycin and Zosyn.  He was placed on oxygen with improvement of O2 saturation.  Given his issues, request for admission was made.      The patient denies any chest pain or shortness of breath.  He does continue to smoke, but he has not been smoking recently due to cold weather has not been able to get outside.  No fevers, chills.  No abdominal pain, bloody stools, nausea or vomiting.      PAST MEDICAL HISTORY:   1.  Obesity.   2.  Chronic obstructive pulmonary disease.  Not on home oxygen.   3.  Diabetes mellitus type 2 with neuropathy.  Hemoglobin A1c 8.2 in 08/2019   4.  Hypertension.   5.  CHF. Echo 01/2018 showed LVEF 40-45%, ascending aorta mildly dilated.  6.  Stroke history.  He has history of strokes x 3, last one in 01/2018, which involved  left medulla, as well as some lacunar infarcts in cerebellum, brainstem and basal ganglia.   7.  Hyperlipidemia.   8.  Tobacco use disorder.  Smokes 1 pack per day.   9.  Chronic bilateral lower extremity edema.      PAST SURGICAL HISTORY:   1.  Status post-surgery for right broken ankle when he was in his teens.   2.  Status post cystoscopy.      ALLERGIES:  NO KNOWN DRUG ALLERGIES.      HOME MEDICATIONS:    Prior to Admission Medications   Prescriptions Last Dose Informant Patient Reported? Taking?   ACCU-CHEK MULTICLIX LANCETS MISC   No No   Sig: tests twice a day   ASPIRIN LOW DOSE 81 MG chewable tablet 12/27/2019 at 0800  No Yes   Sig: CHEW AND SWALLOW 1 TABLET BY MOUTH ONCE DAILY   Multiple Vitamins-Minerals (MULTI FOR HIM PO) 12/27/2019 at 0800  Yes Yes   Sig: Take 1 capsule by mouth daily.   blood glucose calibration (NO BRAND SPECIFIED) solution   No No   Sig: To accompany: Blood Glucose Monitor Brands: per insurance.   blood glucose monitoring (NO BRAND SPECIFIED) meter device kit   No No   Sig: Use to test blood sugar 1 times daily or as directed. Preferred blood glucose meter OR supplies to accompany: Blood Glucose Monitor Brands: per insurance.   blood glucose monitoring (NO BRAND SPECIFIED) test strip   No No   Sig: Use to test blood sugar 1 times daily or as directed. To accompany: Blood Glucose Monitor Brands: per insurance.   clopidogrel (PLAVIX) 75 MG tablet 12/27/2019 at 0800  No Yes   Sig: Take 1 tablet (75 mg) by mouth daily   dulaglutide (TRULICITY) 0.75 MG/0.5ML pen 12/25/2019 at 0800  No No   Sig: Inject 0.75 mg Subcutaneous every 7 days   insulin pen needle (B-D U/F) 31G X 8 MM   No No   Sig: Use once daily or as directed.   lisinopril (PRINIVIL/ZESTRIL) 20 MG tablet 12/27/2019 at 0800  No Yes   Sig: TAKE 1 TABLET BY MOUTH ONCE DAILY   metFORMIN (GLUCOPHAGE) 1000 MG tablet 12/27/2019 at 0800  No Yes   Sig: Take 1 tablet (1,000 mg) by mouth 2 times daily (with meals)   metoprolol succinate ER  (TOPROL-XL) 100 MG 24 hr tablet 12/27/2019 at 0800  No Yes   Sig: Take 1 tablet (100 mg) by mouth daily   order for DME   No No   Sig: Diabetic shoes due to neuropathy   order for DME   No No   Sig: Equipment being ordered: cane   order for DME   No No   Sig: Equipment being ordered: seat lift   pregabalin (LYRICA) 150 MG capsule 12/27/2019 at 0800  No Yes   Sig: TAKE 1 CAPSULE BY MOUTH 2 TIMES DAILY   pregabalin (LYRICA) 150 MG capsule   No No   Sig: TAKE 1 CAPSULE BY MOUTH TWICE DAILY   simvastatin (ZOCOR) 20 MG tablet 12/26/2019 at 2000  No Yes   Sig: Take 1 tablet (20 mg) by mouth At Bedtime   tamsulosin (FLOMAX) 0.4 MG capsule 12/27/2019 at 0800  No Yes   Sig: Take 1 capsule (0.4 mg) by mouth daily   torsemide (DEMADEX) 100 MG tablet 12/27/2019 at 0800  Yes Yes   Sig: Take 50 mg by mouth daily      Facility-Administered Medications: None      SOCIAL HISTORY:  The patient smokes a pack a day.  Estimates that he has been smoking for about 40 years.  Does not drink alcohol.  Single.  Does not have any children.  Retired .  He lives in an assisted living apartment facility.  He uses a walker for ambulation.      FAMILY HISTORY:  Reviewed and not found contributory.      REVIEW OF SYSTEMS:  As in HPI, otherwise 10 points negative.      PHYSICAL EXAMINATION:   VITAL SIGNS:  Temperature 97.9, heart rate 94, blood pressure 143/94, respiratory rate 24, O2 saturation 95% on oxygen.   GENERAL:  This is a 64-year-old male patient, lying in bed.  Speech is a little bit difficult to understand at times.  Otherwise, he is appropriate and follows commands.   HEENT:  Pupils equal, round, and reactive.  No scleral icterus or conjunctival injection.  Oropharynx reveals no gross erythema or exudate.  Dry mucous membranes.   NECK:  No bruits or JVD.   HEART:  Regular rhythm without murmurs, rubs, gallops.   LUNGS:  Diminished at the bases with scattered expiratory wheezes anteriorly, which are fairly mild.    ABDOMEN:  Obese, soft, nontender, nondistended.  Positive bowel sounds.  No femoral bruits.   EXTREMITIES:  About 1+ leg edema, firm.  Bilateral legs are erythematous, right greater than left.  Bilateral feet also pedal edema, which is erythematous, right greater than left.  Somewhat warm to palpation.   NEUROLOGIC:  Reveals no gross focal motor or sensory deficits.     Left leg/foot:      Right leg/foot:      Bilateral legs/feet:        LABORATORY DATA AND IMAGING:   Results for orders placed or performed during the hospital encounter of 12/27/19   CBC with platelets differential     Status: Abnormal   Result Value Ref Range    WBC 8.1 4.0 - 11.0 10e9/L    RBC Count 4.67 4.4 - 5.9 10e12/L    Hemoglobin 13.7 13.3 - 17.7 g/dL    Hematocrit 44.6 40.0 - 53.0 %    MCV 96 78 - 100 fl    MCH 29.3 26.5 - 33.0 pg    MCHC 30.7 (L) 31.5 - 36.5 g/dL    RDW 14.9 10.0 - 15.0 %    Platelet Count 231 150 - 450 10e9/L    Diff Method Automated Method     % Neutrophils 68.3 %    % Lymphocytes 17.7 %    % Monocytes 8.2 %    % Eosinophils 5.2 %    % Basophils 0.4 %    % Immature Granulocytes 0.2 %    Nucleated RBCs 0 0 /100    Absolute Neutrophil 5.6 1.6 - 8.3 10e9/L    Absolute Lymphocytes 1.4 0.8 - 5.3 10e9/L    Absolute Monocytes 0.7 0.0 - 1.3 10e9/L    Absolute Eosinophils 0.4 0.0 - 0.7 10e9/L    Absolute Basophils 0.0 0.0 - 0.2 10e9/L    Abs Immature Granulocytes 0.0 0 - 0.4 10e9/L    Absolute Nucleated RBC 0.0    Basic metabolic panel     Status: Abnormal   Result Value Ref Range    Sodium 142 133 - 144 mmol/L    Potassium 4.0 3.4 - 5.3 mmol/L    Chloride 103 94 - 109 mmol/L    Carbon Dioxide 37 (H) 20 - 32 mmol/L    Anion Gap 2 (L) 3 - 14 mmol/L    Glucose 244 (H) 70 - 99 mg/dL    Urea Nitrogen 35 (H) 7 - 30 mg/dL    Creatinine 1.67 (H) 0.66 - 1.25 mg/dL    GFR Estimate 43 (L) >60 mL/min/[1.73_m2]    GFR Estimate If Black 49 (L) >60 mL/min/[1.73_m2]    Calcium 8.8 8.5 - 10.1 mg/dL   Troponin I     Status: Abnormal   Result  Value Ref Range    Troponin I ES 0.249 (HH) 0.000 - 0.045 ug/L   Nt probnp inpatient (BNP)     Status: None   Result Value Ref Range    N-Terminal Pro BNP Inpatient 451 0 - 900 pg/mL   EKG 12 lead     Status: None   Result Value Ref Range    Interpretation ECG Click View Image link to view waveform and result    ISTAT gases lactate martinez POCT     Status: Abnormal   Result Value Ref Range    Ph Venous 7.37 7.32 - 7.43 pH    PCO2 Venous 67 (H) 40 - 50 mm Hg    PO2 Venous 23 (L) 25 - 47 mm Hg    Bicarbonate Venous 39 (H) 21 - 28 mmol/L    O2 Sat Venous 35 %    Lactic Acid 2.0 0.7 - 2.1 mmol/L   Blood culture     Status: None (Preliminary result)   Result Value Ref Range    Specimen Description Blood Right Arm     Special Requests Aerobic and anaerobic bottles received     Culture Micro No growth after 1 hour    Blood culture     Status: None (Preliminary result)   Result Value Ref Range    Specimen Description Blood Right Arm     Special Requests Aerobic and anaerobic bottles received     Culture Micro No growth after 1 hour        Recent Results (from the past 24 hour(s))   Chest XR,  PA & LAT    Narrative    CHEST TWO VIEWS  12/27/2019 2:27 PM     HISTORY:  Hypoxia; check for CHF.    COMPARISON: None available.      Impression    IMPRESSION: AP and lateral views of the chest were obtained. Mild  enlargement of the cardiac silhouette and mild pulmonary vascular  congestion. No significant pleural effusion or pneumothorax.    JOANNE ENG MD   US Lower Extremity Venous Duplex Bilateral    Narrative    US LOWER EXTREMITY VENOUS DUPLEX BILATERAL 12/27/2019 4:17 PM    CLINICAL HISTORY/INDICATION: check for DVT, bilateral leg swelling    COMPARISON: None relevant    TECHNIQUE:   Grayscale, color-flow, and spectral waveform analysis were performed  of the deep veins of the bilateral lower extremities    FINDINGS:   The right common femoral vein, femoral vein, popliteal vein and tibial  veins demonstrate normal  compressibility, spectral waveform, color  flow and augmentation.    The left common femoral vein, femoral vein, popliteal vein and tibial  veins demonstrate normal compressibility, spectral waveform, color  flow and augmentation.      Impression    IMPRESSION:   No deep vein thrombosis in either lower extremity.    ASIYA WAN DO     ECG showed sinus with nonspecific t-wave changes, q-waves in anterior precordial and inferior limb leads.     ASSESSMENT AND PLAN:  Mr. Cooper Cabral is a 64-year-old male patient with history including obesity, chronic obstructive pulmonary disease (COPD), diabetes mellitus type 2 with neuropathy, hypertension, CHF, stroke, hyperlipidemia, chronic bilateral chronic lower extremity edema and tobacco use disorder, who presents with bilateral lower extremity swelling and erythema.  He was found to have evidence of bilateral lower extremity cellulitis and acute kidney injury and elevated troponin.    1.  Bilateral lower extremity swelling with erythema, right greater than left, suspect cellulitis with underlying chronic bilateral extremity edema.  Apparently did have leg wounds but was discharged from [facility] a few weeks ago as these have presumably healed.  Currently, he is not showing any signs of sepsis and no major wounds.  We will continue antibiotics, which will consist of cefazolin.  If it does not show any significant improvement, then consider changing to vancomycin.  Keep right lower extremity elevated.  Resume leg wraps and consider lymphedema consult once the erythema improves.     2.  Troponin elevation, suspect demand ischemia, but other causes not ruled out.  Troponin elevated at 0.249.  No chest pain or dyspnea, but hypoxic; ECG without acute ischemic changes.  Has multiple cardiac risk factors.  Heparin drip for now.  Already on aspirin/clopidogrel, metoprolol and simvastatin which will be continued.  Follow troponins.  Check echocardiogram.  Hypoxia/VTE  "workup and treatment as below.  Cardiology consult.    3.  Hypoxia, possibly related to acute and/or chronic obstructive pulmonary disease (COPD), other causes not ruled out.  Has diagnosis of COPD but not on any bronchodilator therapy PTA.  The patient is hypoxic here; however, denies any dyspnea or chest pain.  Does have some wheezing on exam.  Normal WBC and NTP-BNP.  CXR did no show focal infiltrates or edema.  Bilateral lower extremity ultrasound negative for  DVT.  At this time, will schedule DuoNeb 4 times a day and have albuterol nebulizers available p.r.n.  I suspect needed discharge home on nebulizers/inhaler therapy.  Continue oxygen and wean off as able.  Check d-dimer and consider VQ scan if positive.  Strongly recommended recommended to stop smoking.    4.  Acute kidney injury, suspect prerenal.  Creatinine was normal as recently as last year, but creatinine was elevated earlier this year.  Creatinine is 1.67 today.  He does appear clinically \"dry.\"  He is also on a diuretic (torsemide).  At this time will hold torsemide.  Gently hydrate with normal saline 100 mL an hour for 12 hours.  Monitor BMP.  Avoid nephrotoxic medications.      5.  Diabetes mellitus type 2 with neuropathy.  Hemoglobin A1c was 8.2 in 08/2019.  Continue dulaglutide every day subcutaneously every 7 days.  Order low carbohydrate diet and moderate ISS.  Hold prior to admission pregabalin and change dose as needed given acute kidney injury.      6.  Benign essential hypertension; CHF.  Echo 01/2018 showed LVEF 40-45%, ascending aorta mildly dilated.  Continue prior to admission metoprolol XL.  Hold prior to admission lisinopril for now given GANESH.  Monitor I/O's, daily weights.    7.  Stroke history.  History of multiple strokes, most recently in 01/2018.  Continue aspirin and clopidogrel.     8.  Hyperlipidemia.  Continue prior to admission simvastatin.     9.  Prophylaxis.  PCD's and ambulation.  On heparin drip as above.     CODE " STATUS:  Full code.         NAHUN LEMUS JR., MD             D: 2019   T: 2019   MT:       Name:     YAMILET GIRON   MRN:      [id number]        Account:      [id number]   :      1955        Admitted:     2019                   Document: [id number]       cc: Nohelia BRUNER, CNP

## 2019-12-28 NOTE — PLAN OF CARE
A&Ox4, forgetful. VSS on 4L oxygen, desats while sleeping due to snoring/apnea, bumped up to 5-6L for comfort. Hx COPD. Tele NSR. Heparin gtt infusing at 13mL/hr, was given a bolus d/t 0.10 hep 10a level. Redraw at 0600. 0.9% NS infusing at 100mL/hr. 2 PIVs. Denies pain. Using urinal. Low carb diet. Neuropathy, stewart bilateral lower extremities. BLE weeping serous fluid. NM lung scan negative for PE. Up with ax1/gbw. Cards consult. EF 40-45% last year, awaiting echo. Encouraged to call with questions/needs/concerns. Continue to monitor.

## 2019-12-28 NOTE — PROGRESS NOTES
[facility]    Hospitalist Progress Note      Assessment & Plan   Cooper Cabral is a 64 year old male with COPD, DM2 with neuropathy, HTN, HLD, h/o CVA who was admitted on 12/27/2019 from [facility] for bilateral LE edema and erythema.    BLE cellulitis with chronic edema: Apparently did have leg wounds but was discharged from the [facility] a few weeks ago as these had presumably healed. Currently, he is not showing any signs of sepsis.   -  Right ankle wound with dressing in place; consult WOC for further eval/mgmt    -  Still with erythema, suspect a chronic venous stasis component   -  Cont ancef for now; if any worsening or lack of improvement in condition, will consider addition of vancomycin  - Keep right lower extremity elevated. Resume leg wraps once the erythema improves.    NSTEMI, suspect demand ischemia  - Initial troponin 0.249, trending down slowly  - no chest pain  - 2d echo results pending  - Cardiology consult pending  - remains on heparin gtt for now  - resumed on home ASA, Plavix, metoprolol, zocor; hold lisinopril given GANESH  - demodex on hold  - V/Q scan negative for PE and LE dopplers neg for DVT    Acute hypoxic resp failure  COPD:  Unclear etiology of ARF; may be related to COPD  -  PE/DVT workup negative    -  Currently on 3L O2, does not require it at home   -  Cont Duonebs four times daily and monitor; may need home inhalers    GANESH, suspected pre-renal:  - pt's torsemide is on hold and he did receive 12 hours of IVF (NS at 100ml/hr)  - cont to monitor BMP  - Avoid nephrotoxic agents  - if Cr improves in next 1-2 days will resume torsemide    DM2 with peripheral neuropathy: A1c was 8.2 in August 2019.  - on trulicity weekly at home and metformin daily  - hold both for now; mod sliding scale insulin  - resume lyrica given CrCl >60    HTN  - cont PTA metoprolol  - resume lisinopril once renal function improves    H/o CVA  - multiple stroke history; most recent one was in  01/2018.  - cont ASA, Plavix    HLD  - cont PTA zocor    DVT Prophylaxis: on heparin gtt, ambulation  Code Status: Full Code  Expected discharge: 2 - 3 days, recommended to TBD once renal function improved and cardiac workup complete, and infection clearing.    Marybeth Duarte MD  Text Page  (7am - 6pm)    Interval History   Pt reports due to neuropathy he is unable to feel his legs so he doesn't report any pain. He can't tell if redness is improving or not. He denies chest pain, SOB. He reports he doesn't wear O2 at home. Doesn't like the beds here and feels hungry all the time which makes him irritable.    -Data reviewed today: I reviewed all new labs and imaging results over the last 24 hours. I personally reviewed the EKG tracing showing first degree AV block, NSR.    Physical Exam   Temp: 97.7  F (36.5  C) Temp src: Oral BP: 136/80 Pulse: 82 Heart Rate: 71 Resp: 18 SpO2: 98 % O2 Device: Nasal cannula Oxygen Delivery: 4 LPM  Vitals:    12/27/19 1321 12/28/19 1137   Weight: 113.4 kg (250 lb) 130.4 kg (287 lb 6.4 oz)     Vital Signs with Ranges  Temp:  [97.6  F (36.4  C)-98.4  F (36.9  C)] 97.7  F (36.5  C)  Pulse:  [72-97] 82  Heart Rate:  [71-96] 71  Resp:  [7-25] 18  BP: (114-155)/() 136/80  SpO2:  [88 %-99 %] 98 %  I/O last 3 completed shifts:  In: 1599.33 [P.O.:240; I.V.:1359.33]  Out: 1065 [Urine:1065]    Constitutional: WDWN male lying in bed in NAD  HEENT: NC/AT, no scleral icterus, nl conjunctiva, moist oral mucosa, nl dentition  Respiratory: good inspiratory effort, lungs diminished at bases, but o/w clear bilaterally  Cardiovascular: RRR, no M/R/G, 2+ radial/DP pulses, 1+ peripheral edema in BLEs  GI: soft, nontender, distended, no rebound/guarding  Skin: bilateral shin and foot erythema, scattered excoriations, areas of eschar, dressing in place over lower right shin, has skin crack at right foot/ankle anterior fold with eschar, slight drainage, nonpurulent, mild increased warmth over areas of  erythema  Neuro: CN 2-12 grossly intact, decreased sensation in feet  MSK: nl strength, able to move all 4 exts, no cyanosis/clubbing  Psych: irritable mood, intact affect and judgment     Medications     HEParin 1,300 Units/hr (12/28/19 0852)     - MEDICATION INSTRUCTIONS -         aspirin  81 mg Oral Daily     ceFAZolin  2 g Intravenous Q8H     insulin aspart  1-7 Units Subcutaneous TID AC     insulin aspart  1-5 Units Subcutaneous At Bedtime     ipratropium - albuterol 0.5 mg/2.5 mg/3 mL  3 mL Nebulization 4x Daily     sodium chloride (PF)  3 mL Intracatheter Q8H       Data   Recent Labs   Lab 12/28/19  0545 12/27/19  2227 12/27/19  1339   WBC 7.9  --  8.1   HGB 13.6  --  13.7   MCV 96  --  96    213 231     --  142   POTASSIUM 3.7  --  4.0   CHLORIDE 106  --  103   CO2 37*  --  37*   BUN 29  --  35*   CR 1.62*  --  1.67*   ANIONGAP 1*  --  2*   MAINE 8.5  --  8.8   *  --  244*   TROPI  --  0.239*  0.241* 0.249*       Recent Results (from the past 24 hour(s))   Chest XR,  PA & LAT    Narrative    CHEST TWO VIEWS  12/27/2019 2:27 PM     HISTORY:  Hypoxia; check for CHF.    COMPARISON: None available.      Impression    IMPRESSION: AP and lateral views of the chest were obtained. Mild  enlargement of the cardiac silhouette and mild pulmonary vascular  congestion. No significant pleural effusion or pneumothorax.    JOANNE ENG MD   US Lower Extremity Venous Duplex Bilateral    Narrative    US LOWER EXTREMITY VENOUS DUPLEX BILATERAL 12/27/2019 4:17 PM    CLINICAL HISTORY/INDICATION: check for DVT, bilateral leg swelling    COMPARISON: None relevant    TECHNIQUE:   Grayscale, color-flow, and spectral waveform analysis were performed  of the deep veins of the bilateral lower extremities    FINDINGS:   The right common femoral vein, femoral vein, popliteal vein and tibial  veins demonstrate normal compressibility, spectral waveform, color  flow and augmentation.    The left common femoral vein,  femoral vein, popliteal vein and tibial  veins demonstrate normal compressibility, spectral waveform, color  flow and augmentation.      Impression    IMPRESSION:   No deep vein thrombosis in either lower extremity.    ASIYA WNA DO   NM Lung Scan Perfusion Particulate    Narrative    EXAM: NM LUNG SCAN PERFUSION PARTICULATE  LOCATION: [facility]  DATE/TIME: 12/27/2019 11:09 PM    INDICATION: COPD. Bilateral leg swelling. Decreased oxygen saturation. Cellulitis and sepsis syndrome. Rule out PE.  COMPARISON: Chest x-ray 12/27/2019.  TECHNIQUE: 3.3 mCi technetium-99m MAA, IV. Standard lung perfusion imaging.    FINDINGS: Normal perfusion to both lungs. No segmental perfusion defects.      Impression    IMPRESSION:   1.  Normal lung perfusion scan.

## 2019-12-28 NOTE — CONSULTS
CARDIOLOGY CONSULT    REASON FOR CONSULT: elevated troponin    PRIMARY CARE PHYSICIAN:  Nohelia Estrella    HISTORY OF PRESENT ILLNESS:  Mr. Cabral is a 63 y/o gentleman with PMH significant for obesity, COPD, diabetes type II, hypertension, hyperlipidemia, hx of CVA x3, ongoing tobacco abuse who was admitted yesterday with bilateral lower extremity edema and erythema.  He lives in an assisted [facility] and has been having a  Home care nurse for wrapping.  He was sent to the ED for further evaluation.  He was noted to be mildly hypoxic with sats 88% on RA.  His creatinine was 1.67.  Troponin 0.249 and flat.      PAST MEDICAL HISTORY:  1.  COPD  2.  Hx of CVA x3  3.  Tobacco abuse  4 . Diabetes type II  5.  ANDREW; noncompliant with CPAP  6.  Cardiomyopathy:  EF 40-45% by echocardiogram 1/2019  7.  Chronic bilateral lower extremity edema    MEDICATIONS:  Current Facility-Administered Medications   Medication     acetaminophen (TYLENOL) tablet 650 mg     albuterol (PROVENTIL) neb solution 2.5 mg     aspirin EC tablet 81 mg     ceFAZolin (ANCEF) intermittent infusion 2 g in 100 mL dextrose PRE-MIX     clopidogrel (PLAVIX) tablet 75 mg     glucose gel 15-30 g    Or     dextrose 50 % injection 25-50 mL    Or     glucagon injection 1 mg     insulin aspart (NovoLOG) inj (RAPID ACTING)     insulin aspart (NovoLOG) inj (RAPID ACTING)     ipratropium - albuterol 0.5 mg/2.5 mg/3 mL (DUONEB) neb solution 3 mL     lidocaine (LMX4) cream     lidocaine 1 % 0.1-1 mL     medication instruction     melatonin tablet 1 mg     metoprolol succinate ER (TOPROL-XL) 24 hr tablet 100 mg     naloxone (NARCAN) injection 0.1-0.4 mg     nitroGLYcerin (NITROSTAT) sublingual tablet 0.4 mg     ondansetron (ZOFRAN-ODT) ODT tab 4 mg    Or     ondansetron (ZOFRAN) injection 4 mg     polyethylene glycol (MIRALAX/GLYCOLAX) Packet 17 g     pregabalin (LYRICA) capsule 150 mg     prochlorperazine (COMPAZINE) injection 10 mg    Or     prochlorperazine  (COMPAZINE) tablet 10 mg    Or     prochlorperazine (COMPAZINE) Suppository 25 mg     simvastatin (ZOCOR) tablet 20 mg     sodium chloride (PF) 0.9% PF flush 3 mL     sodium chloride (PF) 0.9% PF flush 3 mL     tamsulosin (FLOMAX) capsule 0.4 mg       ALLERGIES:  No Known Allergies    SOCIAL HISTORY:  Continues to smoke at least 1/2 pack per day.  Previously smoked 2 packs per day.  No significant ETOH.      FAMILY HISTORY:  I have reviewed this patient's family history and updated it with pertinent information if needed.   Family History   Problem Relation Age of Onset     Cancer Mother         Bone cancer      Diabetes Father      Cancer Father         bone cancer      Hypertension Brother      Hypertension Brother        REVIEW OF SYSTEMS:  A complete ROS was obtained and the pertinent positives are outlined in the history of present illness above.  The remainder of systems is negative.      PHYSICAL EXAM:  Temp: 97.6  F (36.4  C) Temp src: Oral BP: (!) 147/86 Pulse: 83 Heart Rate: 71 Resp: 20 SpO2: 94 % O2 Device: Nasal cannula Oxygen Delivery: 3 LPM  Vital Signs with Ranges  Temp:  [97.6  F (36.4  C)-98.4  F (36.9  C)] 97.6  F (36.4  C)  Pulse:  [72-97] 83  Heart Rate:  [71-96] 71  Resp:  [7-25] 20  BP: (115-155)/() 147/86  SpO2:  [94 %-99 %] 94 %  287 lbs 6.4 oz    Constitutional: awake, alert, no distress. obese  Eyes: PERRL, sclera nonicteric  ENT: trachea midline  Respiratory:  Diminished breath sounds at bases bilaterally  Cardiovascular: RRR no m/r/g, JVD difficult to asssess  GI: nondistended, nontender, bowel sounds present  Lymph/Hematologic: no lymphadenopathy  Skin: dry, no rash  Musculoskeletal: good muscle tone, +1 edema bilaterally.  Shins are erythematous.     Neurologic: no focal deficits  Neuropsychiatric: appropriate affact    DATA:  Labs:   Reviewed in EPIC    EKG: Dated 12/27/19 reviewed personally.  Normal sinus rhythm with first degree AV block, poor R wave  progression          ASSESSMENT:  1.  Bilateral lower extremity edema:  Multifactorial.  Some increased erythema but not clearly cellulitis.  Received abx per primary service  2.  Cardiomyopathy:  EF previously 40-45% by echocardiogram in 1/2019.  I do not see any further evaluation of this.  He has numerous cardiovascular risk factors and likely has underlying CAD and ischemic evaluation should be pursued at some point.  Will repeat echocardiogram this admission.  Consider diagnostic coronary angiogram versus noninvasive stress testing based on results.    3.  GANESH: Received IV fluids.  Volume status difficult to assess although does not appear overtly volume overloaded.  4.  Troponin elevation:  Flat and not suggestive of ACS.  Okay to discontinue heparin gtt.        RECOMMENDATIONS:  1.  Discontinue heparin gtt  2.  Echocardiogram pending  3.  Stop simvastatin.  Start atorvastatin 40 mg daily.  3.  Will hold of on ACE for now given mild GANESH.  Continue metoprolol Xl 100 mg daily.  4.  Further recommendations to follow.      Shira Hunt MD  Cardiology - [facility]  Pager:  [phone]  December 28, 2019

## 2019-12-28 NOTE — PLAN OF CARE
A/O. Up with SBA. Ex wheezes. On 4L of O2. BS active. CMS intact ex baseline neuropathy. VSS. Tele: NSR with PVC. Blood sugar 130. PIV SL. Other PIV infusing 13 of heparin. Bilateral lower extremities Red, dry, cracked, slightly weeping. Skin is open on the right ankle. Mepilex lite applied. Brother was called for an update.

## 2019-12-28 NOTE — PLAN OF CARE
Vitals: Stable. WNL   Lungs: Clear. 3L Oxygen (baseline room air).   CV: Tele NSR   GI: Obese, rounded  Uro: WNL  CMS: doppler for LE. Chronic Neuropathy to hands and feet.   Pain: Denies  Neuro: Forgetful   Skin: William, red, dry and flaky, weeping and deep cut to lowe extremities.   Psych: WNL  MSK: Generalized weakness. SBA with walker.   Labs: Crt 1.67. Monitor. IV fluids. Torsemide on hold. Trending troponin. BC pending.   Tests/procedures: Chest xray and LE ultrasound done. VQ Lung scan pending. Echo pending.   Other: IV antibiotic for LE Cellulitis.

## 2019-12-29 NOTE — PLAN OF CARE
"VSS. No complaints of pain. Pt HR SR 1AVB. Pt A&Ox4, forgetful and impulsive, frequently pulling off tele and NC stating \"I don't need this\", \"there is nothing wrong with my breathing or heart\". Able at times to get pt agreeable to wearing O2. Sats mostly 90s on RA when awake but will drop down to high 80s at rest. Pt up SBA. Pt using urinal but spills w/ use. Refusing to call to have assistance with urinal. Refused weight this AM.   "

## 2019-12-29 NOTE — PLAN OF CARE
A&O. VSS, on 2 L O2 NC. Tele SR with 1st * AVB. Low consistent carb diet, did not eat dinner and didn't want the evening sliding scale insulin.  Up with standby assist and walker. Denies pain. Has eraser size wound on coccyx, fissure wound on right anterior foot, bilateral shin red with scabs, 2 skin tears on right shin.

## 2019-12-29 NOTE — PROGRESS NOTES
[facility]    Cardiology Progress Note    Date of Service (when I saw the patient): 12/29/2019            Assessment and Plan:   ASSESSMENT:  1.  Bilateral lower extremity edema:  Multifactorial.  Some increased erythema but not clearly cellulitis.  Received abx per primary service  2.  Cardiomyopathy:  Presumed ischemic, EF 40-45% with anterior/anteroseptal and apical regional wall motion abnormalities.  Troponin is flat and not suggestive of ACS.  He is entirely asymptomatic for obstructive CAD and euvolemic at the current weight.    3.  GANESH:  Improved.   Received IV fluids.  Volume status difficult to assess although does not appear overtly volume overloaded.  4.  Troponin elevation:  Secondary to demand ischemia, flat and not suggestive of ACS.       RECOMMENDATIONS:  1.  Discussed results of echocardiogram at length with Mr. Cabral.  Findings demonstrate mildly reduced LV function with wall motion abnormalities consistent with underlying CAD.  We discussed further options including diagnostic coronary angiogram or medical management.  He prefers strongly to avoid cardiac procedures and focus on medical therapy.     2. Continue ASA/plavix (on dual antiplatelet therapy long term given hx of CVA)  3.  Start lisinopril 5 mg daily.  Continue metoprolol XL.    4.  Cardiology will sign off.  Please call with questions.  Patient should follow up in Cardiology clinic 2 weeks after discharge.      Shira Hunt MD [facility]  Cardiology          Interval History:     No new issues overnight.  Denies chest pain, shortness of breath.              Medications:       aspirin  81 mg Oral Daily     atorvastatin  40 mg Oral QPM     ceFAZolin  2 g Intravenous Q8H     clopidogrel  75 mg Oral Daily     insulin aspart  1-7 Units Subcutaneous TID AC     insulin aspart  1-5 Units Subcutaneous At Bedtime     ipratropium - albuterol 0.5 mg/2.5 mg/3 mL  3 mL Nebulization 4x Daily     metoprolol succinate ER  100 mg Oral  Daily     pregabalin  150 mg Oral BID     sodium chloride (PF)  3 mL Intracatheter Q8H     tamsulosin  0.4 mg Oral Daily            Physical Exam:   Blood pressure (!) 150/94, pulse 87, temperature 98.7  F (37.1  C), temperature source Oral, resp. rate 16, height 1.829 m (6'), weight 130.4 kg (287 lb 6.4 oz), SpO2 95 %.  Vitals:    19 1321 19 1137   Weight: 113.4 kg (250 lb) 130.4 kg (287 lb 6.4 oz)       Intake/Output Summary (Last 24 hours) at 2019 1325  Last data filed at 2019 1100  Gross per 24 hour   Intake 123 ml   Output 525 ml   Net -402 ml           Vital Sign Ranges  Temperature Temp  Av.7  F (37.1  C)  Min: 98.7  F (37.1  C)  Max: 98.7  F (37.1  C)   Blood pressure Systolic (24hrs), Av , Min:141 , Max:156        Diastolic (24hrs), Av, Min:83, Max:98      Pulse Pulse  Av.7  Min: 83  Max: 87   Respirations Resp  Av  Min: 16  Max: 20   Pulse oximetry SpO2  Av %  Min: 90 %  Max: 96 %         EXAM:    Constitutional:    in no apparent distress    Skin:    normal    Head:    Normocephalic, atramatic    Eyes:    pupils equal, round and reactive to light, extra ocular muscles intact, sclera clear, conjunctiva normal    Neck:    No JVD   Lungs:    CTAB   Cardiovascular:    S1, S2 RRR no m/r/g, no JVD   Abdomen:    normal bowel sounds    Extremities and Back:    no cyanosis or clubbing and +1 edema bilaterally, erythematous shins bilaterally   Neurological:    No gross or focal neurologic abnormalities               Data:     Recent Labs   Lab Test 19  0542 19  0545  18  1135   WBC 8.7 7.9   < > 8.5   HGB 13.6 13.6   < > 16.5   MCV 95 96   < > 91    202   < > 247   INR  --   --   --  1.05    < > = values in this interval not displayed.      Recent Labs   Lab Test 19  0542 19  0545    144   POTASSIUM 4.0 3.7   CHLORIDE 104 106   BUN 21 29   CR 1.41* 1.62*     Recent Labs   Lab 19  0542 19  0545 19  1331    * 146* 244*     Recent Labs   Lab Test 01/29/19  1648 01/13/18  0747   ALT 24 31   AST 17 29     Troponin I ES   Date Value Ref Range Status   12/27/2019 0.241 (HH) 0.000 - 0.045 ug/L Final     Comment:     The 99th percentile for upper reference range is 0.045 ug/L.  Troponin values   in the range of 0.045 - 0.120 ug/L may be associated with risks of adverse   clinical events.  Critical result, provider not notified due to previous critical result   notification.     12/27/2019 0.239 (HH) 0.000 - 0.045 ug/L Final     Comment:     The 99th percentile for upper reference range is 0.045 ug/L.  Troponin values   in the range of 0.045 - 0.120 ug/L may be associated with risks of adverse   clinical events.  Critical result, provider not notified due to previous critical result   notification.     12/27/2019 0.249 (HH) 0.000 - 0.045 ug/L Final     Comment:     The 99th percentile for upper reference range is 0.045 ug/L.  Troponin values   in the range of 0.045 - 0.120 ug/L may be associated with risks of adverse   clinical events.  Critical Value called to and read back by  DR CLAYTON IN ER AT 1528 AN     01/13/2018 0.488 (HH) 0.000 - 0.045 ug/L Final     Comment:     The 99th percentile for upper reference range is 0.045 ug/L.  Troponin values   in the range of 0.045 - 0.120 ug/L may be associated with risks of adverse   clinical events.  Previous critical documented     01/12/2018 0.452 (HH) 0.000 - 0.045 ug/L Final     Comment:     The 99th percentile for upper reference range is 0.045 ug/L.  Troponin values   in the range of 0.045 - 0.120 ug/L may be associated with risks of adverse   clinical events.  AZUCENA PARMAR ON 73 AT 1703 MA           Recent Labs   Lab Test 01/18/18  1602   MAG 1.9       Lab Results   Component Value Date    CHOL 124 01/29/2019     Lab Results   Component Value Date    HDL 37 01/29/2019     Lab Results   Component Value Date    LDL 69 01/29/2019     Lab Results   Component Value Date     TRIG 88 01/29/2019     Lab Results   Component Value Date    CHOLHDLRATIO 4.9 10/14/2015          TSH   Date Value Ref Range Status   01/29/2019 1.18 0.40 - 4.00 mU/L Final       Shira Hunt MD, FACC  Cardiology

## 2019-12-29 NOTE — PROGRESS NOTES
[facility]    Hospitalist Progress Note      Assessment & Plan   Cooper Cabral is a 64 year old male with COPD, DM2 with neuropathy, HTN, HLD, h/o CVA who was admitted on 12/27/2019 from [facility] for bilateral LE edema and erythema.    BLE cellulitis with chronic edema: Apparently did have leg wounds but was [facility] a few weeks ago as these had presumably healed. Currently, he is not showing any signs of sepsis.   -  Right ankle wound with dressing in place; consulted WOC for further eval/mgmt    -  Erythema improving, suspect a chronic venous stasis component   -  Cont ancef for now; if any worsening or lack of improvement in condition, will consider addition of vancomycin  - Keep right lower extremity elevated. Resume leg wraps once the erythema improves.    NSTEMI, suspect demand ischemia  - Initial troponin 0.249, trending down slowly  - no chest pain  - 2d echo with EF 40-45%, new wall motion abnls in distal anterior, anteroseptal, apical and inferoapical walls, mildly dilated aortic root, ascending aorta (4.6cm)  - Cardiology consulted - appreciate their recs; waiting to decide on further ischemic workup based on echo results  - heparin gtt stopped by Cardiology  - resumed on home ASA, Plavix, metoprolol, zocor; cont to hold lisinopril given GANESH, although may be able to resume soon  - demodex on hold  - V/Q scan negative for PE and LE dopplers neg for DVT    Acute hypoxic resp failure  COPD:  Unclear etiology of ARF; may be related to COPD  -  PE/DVT workup negative    -  Currently on 3L O2, does not require it at home   -  Cont Duonebs four times daily and monitor; may need home inhalers    GANESH, suspected pre-renal:  - pt's torsemide is on hold and he did receive 12 hours of IVF (NS at 100ml/hr)  - cont to monitor BMP  - Avoid nephrotoxic agents  - Cr down to 1.41 today  - if Cr improves in next 1-2 days will resume torsemide    DM2 with peripheral neuropathy: A1c was  8.2 in August 2019.  - on trulicity weekly at home and metformin daily  - hold both for now; mod sliding scale insulin  - resumed lyrica given CrCl >60    HTN  - cont PTA metoprolol  - resume lisinopril once renal function improves    H/o CVA  - multiple stroke history; most recent one was in 01/2018.  - cont ASA, Plavix    HLD  - cont PTA zocor    DVT Prophylaxis: ambulation  Code Status: Full Code  Expected discharge: 2 - 3 days, recommended to TBD once renal function improved and cardiac workup complete, and infection clearing.    Marybteh Duarte MD  Text Page  (7am - 6pm)    Interval History   Pt feeling a little better today. Denies chest pain, SOB. Doesn't have any pain in his legs due to his chronic neuropathy. Reviewed echo results with him - he reported that he knows his heart is a little weak and that's okay with him. He isn't sure whether he would want any invasive testing.    -Data reviewed today: I reviewed all new labs and imaging results over the last 24 hours. I personally reviewed no images or EKG's today.    Physical Exam   Temp: 98.7  F (37.1  C) Temp src: Oral BP: (!) 150/94 Pulse: 87 Heart Rate: 87 Resp: 16 SpO2: 95 % O2 Device: Nasal cannula Oxygen Delivery: 3 LPM  Vitals:    12/27/19 1321 12/28/19 1137   Weight: 113.4 kg (250 lb) 130.4 kg (287 lb 6.4 oz)     Vital Signs with Ranges  Temp:  [98.7  F (37.1  C)] 98.7  F (37.1  C)  Pulse:  [83-87] 87  Heart Rate:  [82-87] 87  Resp:  [16-20] 16  BP: (141-156)/(83-98) 150/94  SpO2:  [90 %-96 %] 95 %  I/O last 3 completed shifts:  In: 240 [P.O.:240]  Out: 975 [Urine:975]    Constitutional: WDWN male lying in bed in NAD  HEENT: NC/AT, no scleral icterus, nl conjunctiva, moist oral mucosa, nl dentition  Respiratory: good inspiratory effort, lungs CTAB  Cardiovascular: RRR, no M/R/G, 2+ radial/DP pulses, 1+ peripheral edema in BLEs  GI: soft, distended  Skin: bilateral shin and foot erythema, improving today, scattered excoriations, areas of eschar,  dressing in place over lower right shin, has skin crack at right foot/ankle anterior fold with eschar, slight drainage, nonpurulent, mild increased warmth over areas of erythema  Neuro: CN 2-12 grossly intact, decreased sensation in feet  MSK: nl strength, able to move all 4 exts, no cyanosis/clubbing  Psych:nl mood/affect/judgment     Medications     - MEDICATION INSTRUCTIONS -         aspirin  81 mg Oral Daily     atorvastatin  40 mg Oral QPM     ceFAZolin  2 g Intravenous Q8H     clopidogrel  75 mg Oral Daily     insulin aspart  1-7 Units Subcutaneous TID AC     insulin aspart  1-5 Units Subcutaneous At Bedtime     ipratropium - albuterol 0.5 mg/2.5 mg/3 mL  3 mL Nebulization 4x Daily     metoprolol succinate ER  100 mg Oral Daily     pregabalin  150 mg Oral BID     sodium chloride (PF)  3 mL Intracatheter Q8H     tamsulosin  0.4 mg Oral Daily       Data   Recent Labs   Lab 19  0542 19  0545 19  2227 19  1339   WBC 8.7 7.9  --  8.1   HGB 13.6 13.6  --  13.7   MCV 95 96  --  96    202 213 231    144  --  142   POTASSIUM 4.0 3.7  --  4.0   CHLORIDE 104 106  --  103   CO2 35* 37*  --  37*   BUN 21 29  --  35*   CR 1.41* 1.62*  --  1.67*   ANIONGAP 2* 1*  --  2*   MAINE 9.1 8.5  --  8.8   * 146*  --  244*   TROPI  --   --  0.239*  0.241* 0.249*       Recent Results (from the past 24 hour(s))   Echocardiogram Complete    Narrative    [id number]  [id number]  [id number]  [id number]^ALLAN^NAHUN^[facility]  Echocardiography Laboratory  [address] [place] [address]        Name: YAMILET GIRON  MRN: [id number]  : 1955  Study Date: 2019 01:58 PM  Age: 64 yrs  Gender: Male  Patient [facility]  Reason For Study: MI - Acute  Ordering Physician: NAHUN LEMUS  Referring Physician: Nohelia Estrella  Performed By: Kimberly Brower RDCS     BSA: 2.3 m2  Height: 72 in  Weight: 250 lb  HR: 81  BP: 147/95  mmHg  _____________________________________________________________________________  __        Procedure  Complete Portable Echo Adult. Optison (NDC #[id number]) given intravenously.  _____________________________________________________________________________  __        Interpretation Summary     1. Left ventricular systolic function is mild to moderately reduced. The  visual ejection fraction is estimated at 40-45%.  2. Distal anterior, anterospetal, apical and inferoapical wall hypokinesis is  present.  3. The right ventricle is normal in structure, function and size.  4. The left atrium is moderately dilated.  5. Mildly dilated aortic root (sinus of valsalva) 4.2 cm and moderately  dilated ascending aorta, 4.6 cm.  6. In comparison with the prior study dated 1/13/2018, wall motion  abnormalities are new.  _____________________________________________________________________________  __        Left Ventricle  The left ventricle is normal in size. There is mild concentric left  ventricular hypertrophy. The visual ejection fraction is estimated at 40-45%.  Left ventricular systolic function is mild to moderately reduced. Left  ventricular diastolic function is normal. Distal anterior, anterospetal,  apical and inferoapical wall hypokinesis is present.     Right Ventricle  The right ventricle is normal in structure, function and size.     Atria  The left atrium is moderately dilated. Right atrial size is normal.     Mitral Valve  The mitral valve is normal in structure and function.        Tricuspid Valve  The tricuspid valve is normal in structure and function.     Aortic Valve  The aortic valve is normal in structure and function.     Pulmonic Valve  The pulmonic valve is normal in structure and function. There is trace  pulmonic valvular regurgitation.     Vessels  Mild aortic root dilatation. The ascending aorta is Moderately dilated. Mildy  dilated aortic root (sinus of valsalva) 4.2 cm and moderately  dilated  ascending aorta, 4.6 cm. IVC diameter <2.1 cm collapsing >50% with sniff  suggests a normal RA pressure of 3 mmHg.     Pericardium  There is no pericardial effusion.        Rhythm  Sinus rhythm was noted.  _____________________________________________________________________________  __  MMode/2D Measurements & Calculations  IVSd: 1.3 cm     LVIDd: 5.3 cm  LVIDs: 4.2 cm  LVPWd: 1.2 cm  FS: 21.9 %  LV mass(C)d: 273.0 grams  LV mass(C)dI: 116.5 grams/m2  Ao root diam: 4.2 cm  LA dimension: 4.0 cm  asc Aorta Diam: 4.6 cm  LA/Ao: 0.95  LA Volume (BP): 121.0 ml  LA Volume Index (BP): 51.7 ml/m2  RWT: 0.45  TAPSE: 2.8 cm           Doppler Measurements & Calculations  MV E max vianney: 71.8 cm/sec  MV A max vianney: 64.1 cm/sec  MV E/A: 1.1  MV max PG: 3.4 mmHg  MV mean P.8 mmHg  MV V2 VTI: 22.6 cm  MV dec time: 0.23 sec  E/E' av.2  Lateral E/e': 11.3  Medial E/e': 15.1           _____________________________________________________________________________  __           Report approved by: Geneva Echevarria 2019 03:17 PM

## 2019-12-30 NOTE — DISCHARGE SUMMARY
[facility]    Discharge Summary  Hospitalist    Date of Admission:  12/27/2019  Date of Discharge:  12/30/2019  Discharging Provider: Marybeth Duarte MD  Date of Service (when I saw the patient): 12/30/19    Discharge Diagnoses   Bilateral LE cellulitis with right ankle venous stasis wound  Presumed ischemic cardiomyopathy with EF 40-45%  Acute kidney injury, resolved  Troponin elevation    History of Present Illness   Cooper Cabral is a 64 year old male with COPD, DM2 with neuropathy, HTN, HLD, h/o CVA who was admitted on 12/27/2019 from [facility] for bilateral LE edema and erythema.    Hospital Course   Cooper Cabral was admitted on 12/27/2019.  The following problems were addressed during his hospitalization:    BLE cellulitis with chronic edema with right ankle chronic venous stasis wound: Apparently did have leg wounds but was discharged from the [facility] Clinic a few weeks ago as these had presumably healed. There was significant erythema on admission which led to initiation of antibiotics   -  Right ankle wound with dressing in place;will plan for [facility] wound care at home    -  Erythema improving on antibx and also suspect a chronic venous stasis component   - superficial wound cultures not reliable so treating clinically rather than based on culture results  -  Was on ancef initially and is being transitioned to augmentin 875mg BID for 5 more days  - Keep right lower extremity elevated. Resume leg wraps upon discharge.     NSTEMI, suspect demand ischemia  - Initial troponin 0.249, trending down  - Pt has had no chest pain  - 2d echo with EF 40-45%, new wall motion abnls in distal anterior, anteroseptal, apical and inferoapical walls, mildly dilated aortic root, ascending aorta (4.6cm)  - Cardiology consulted - per their discussion with the pt, it is likely ischemic etiology but the pt does not want to pursue invasive testing and would prefer medical mgmt  - resumed on home ASA, Plavix,  metoprolol; zocor switched to lipitor 40mg daily, lower dose lisinopril started at 5mg daily given GANESH, and torsemide PTA dose resumed   - follow up with Cardiology Clinic in 2 weeks     Acute hypoxic resp failure  COPD:  Unclear etiology of ARF; may be related to COPD  -  PE/DVT workup negative    - V/Q scan negative for PE and LE dopplers neg for DVT  -  Initially on 3L O2 but weaned down  -  Was on Duonebs four times daily while inpt, pt not interested in inhalers at home  - follow up with PCP  - pt does require O2 1L at rest and 3L with activity for homegoing     GANESH, suspected pre-renal:  - pt's torsemide was held and he did receive 12 hours of IVF (NS at 100ml/hr)  - BMP improving  - Avoid nephrotoxic agents  - Cr down to 1.41 today  - resume torsemide on discharge  - follow up with PCP     DM2 with peripheral neuropathy: A1c was 8.2 in August 2019.  - on trulicity weekly at home and metformin daily  - hold both for now; mod sliding scale insulin, resume on discharge since no IV dye procedures done  - resumed lyrica given CrCl >60     HTN  - cont PTA metoprolol  - lisinopril lower dose started in hospital - cont 5mg daily and f/u with PCP, Cardiology     H/o CVA  - multiple stroke history; most recent one was in 01/2018.  - cont ASA, Plavix     HLD  - cont PTA zocor    Marybeth Duarte MD    Significant Results and Procedures   2d echo 12/28/19  Interpretation Summary     1. Left ventricular systolic function is mild to moderately reduced. The  visual ejection fraction is estimated at 40-45%.  2. Distal anterior, anterospetal, apical and inferoapical wall hypokinesis is  present.  3. The right ventricle is normal in structure, function and size.  4. The left atrium is moderately dilated.  5. Mildly dilated aortic root (sinus of valsalva) 4.2 cm and moderately  dilated ascending aorta, 4.6 cm.  6. In comparison with the prior study dated 1/13/2018, wall motion  abnormalities are new.    Pending Results   These  results will be followed up by PCP  Unresulted Labs Ordered in the Past 30 Days of this Admission     Date and Time Order Name Status Description    12/27/2019 1355 Wound Culture Aerobic Bacterial Preliminary     12/27/2019 1355 Blood culture Preliminary     12/27/2019 1355 Blood culture Preliminary           Code Status   Full Code       Primary Care Physician   Nohelia Estrella    Physical Exam   Temp: 98.5  F (36.9  C) Temp src: Oral BP: (!) 157/103 Pulse: 81 Heart Rate: 108 Resp: 18 SpO2: 97 % O2 Device: Nasal cannula Oxygen Delivery: 2 LPM  Vitals:    12/27/19 1321 12/28/19 1137 12/30/19 0300   Weight: 113.4 kg (250 lb) 130.4 kg (287 lb 6.4 oz) 129 kg (284 lb 8 oz)     Vital Signs with Ranges  Temp:  [98.5  F (36.9  C)-99  F (37.2  C)] 98.5  F (36.9  C)  Pulse:  [81-83] 81  Heart Rate:  [] 108  Resp:  [16-18] 18  BP: (129-157)/() 157/103  SpO2:  [92 %-97 %] 97 %  I/O last 3 completed shifts:  In: 886 [P.O.:880; I.V.:6]  Out: 125 [Urine:125]    Constitutional: WDWN male lying in bed in NAD  HEENT: NC/AT, no scleral icterus, nl conjunctiva, moist oral mucosa, nl dentition  Respiratory: good inspiratory effort, lungs CTAB  Cardiovascular: RRR, no M/R/G, 2+ radial/DP pulses, 1+ peripheral edema in BLEs  GI: soft, distended  Skin: bilateral shin and foot erythema, improving today, scattered excoriations, areas of eschar, dressing in place over lower right shin, has skin crack at right foot/ankle anterior fold with eschar, slight drainage, nonpurulent, mild increased warmth over areas of erythema  Neuro: CN 2-12 grossly intact, decreased sensation in feet  MSK:  able to move all 4 exts, no cyanosis/clubbing  Psych:nl mood/affect/judgment     Discharge Disposition   Discharged to home  With home health care for wound care  Condition at discharge: Stable    Consultations This Hospital Stay   PHARMACY TO DOSE VANCO  PHARMACY TO DOSE HEPARIN  PHARMACY TO DOSE HEPARIN  CARDIOLOGY IP CONSULT  WOUND OSTOMY  CONTINENCE NURSE  IP CONSULT  SMOKING CESSATION PROGRAM IP CONSULT    Time Spent on this Encounter   I, Marybeth Duarte MD, personally saw the patient today and spent greater than 30 minutes discharging this patient.    Discharge Orders      Discharge Order: F/U with Cardiac  EM      Home care nursing referral      Reason for your hospital stay    Skin infection (cellulitis)  Weakened heart (congestive heart failure)     Follow-up and recommended labs and tests     Follow up with primary care provider, Nohelia Estrella, within 7 days to evaluate medication change and for hospital follow- up.  No follow up labs or test are needed.    Follow up with Cardiology Clinic, within 2 weeks  to evaluate medication change and for hospital follow- up. No follow up labs or test are needed.     Activity    Your activity upon discharge: activity as tolerated     Wound care and dressings    Instructions to care for your wound at home: daily dressing changes.     When to contact your care team    Call your primary doctor if you have any of the following: temperature greater than 100.6, increased drainage, increased swelling, increased pain or increased redness in your legs.     MD face to face encounter    Documentation of Face to Face and Certification for Home Health Services    I certify that patient: Cooper Cabral is under my care and that I, or a nurse practitioner or physician's assistant working with me, had a face-to-face encounter that meets the physician face-to-face encounter requirements with this patient on: 12/30/2019.    This encounter with the patient was in whole, or in part, for the following medical condition, which is the primary reason for home health care: Right lower extremity wounds.    I certify that, based on my findings, the following services are medically necessary home health services: Nursing.    My clinical findings support the need for the above services because: Nurse is needed: to assess and  complete dressing changes for right lower extremity wounds.    Further, I certify that my clinical findings support that this patient is homebound (i.e. absences from home require considerable and taxing effort and are for medical reasons or [employer] services or infrequently or of short duration when for other reasons) because: Requires assistance of another person or specialized equipment to access medical services because patient: Is unable to walk greater than 5 feet without rest.    Based on the above findings. I certify that this patient is confined to the home and needs intermittent skilled nursing care, physical therapy and/or speech therapy.  The patient is under my care, and I have initiated the establishment of the plan of care.  This patient will be followed by a physician who will periodically review the plan of care.  Physician/Provider to provide follow up care: Nohelia Estrella    Attending hospital physician (the Medicare certified [place] provider): Marybeth Duarte MD  Physician Signature: See electronic signature associated with these discharge orders.  Date: 12/30/2019     Full Code     Diet    Follow this diet upon discharge: Orders Placed This Encounter      Low Consistent CHO Diet     Discharge Medications   Current Discharge Medication List      START taking these medications    Details   amoxicillin-clavulanate (AUGMENTIN) 875-125 MG tablet Take 1 tablet by mouth every 12 hours  Qty: 10 tablet, Refills: 0    Associated Diagnoses: Cellulitis of lower extremity, unspecified laterality      atorvastatin (LIPITOR) 40 MG tablet Take 1 tablet (40 mg) by mouth every evening  Qty: 30 tablet, Refills: 1    Associated Diagnoses: Cardiomyopathy, unspecified type (H)         CONTINUE these medications which have CHANGED    Details   lisinopril (PRINIVIL/ZESTRIL) 5 MG tablet Take 1 tablet (5 mg) by mouth daily  Qty: 30 tablet, Refills: 1    Associated Diagnoses: Cardiomyopathy, unspecified type (H)          CONTINUE these medications which have NOT CHANGED    Details   ASPIRIN LOW DOSE 81 MG chewable tablet CHEW AND SWALLOW 1 TABLET BY MOUTH ONCE DAILY  Qty: 31 tablet, Refills: 98    Associated Diagnoses: Hypertension goal BP (blood pressure) < 140/80      clopidogrel (PLAVIX) 75 MG tablet Take 1 tablet (75 mg) by mouth daily  Qty: 90 tablet, Refills: 3    Associated Diagnoses: Cerebrovascular accident (CVA), unspecified mechanism (H)      metFORMIN (GLUCOPHAGE) 1000 MG tablet Take 1 tablet (1,000 mg) by mouth 2 times daily (with meals)  Qty: 180 tablet, Refills: 3    Associated Diagnoses: Type 2 diabetes mellitus with diabetic neuropathy, with long-term current use of insulin (H)      metoprolol succinate ER (TOPROL-XL) 100 MG 24 hr tablet Take 1 tablet (100 mg) by mouth daily  Qty: 90 tablet, Refills: 3    Associated Diagnoses: Hypertension goal BP (blood pressure) < 140/80      Multiple Vitamins-Minerals (MULTI FOR HIM PO) Take 1 capsule by mouth daily.      tamsulosin (FLOMAX) 0.4 MG capsule Take 1 capsule (0.4 mg) by mouth daily  Qty: 90 capsule, Refills: 3    Associated Diagnoses: Urinary retention      torsemide (DEMADEX) 100 MG tablet Take 50 mg by mouth daily      ACCU-CHEK MULTICLIX LANCETS MISC tests twice a day  Qty: 100, Refills: 3    Associated Diagnoses: Diabetes mellitus, type 2 (H)      blood glucose calibration (NO BRAND SPECIFIED) solution To accompany: Blood Glucose Monitor Brands: per insurance.  Qty: 1 Bottle, Refills: 3    Associated Diagnoses: Type 2 diabetes mellitus with diabetic neuropathy, with long-term current use of insulin (H)      blood glucose monitoring (NO BRAND SPECIFIED) meter device kit Use to test blood sugar 1 times daily or as directed. Preferred blood glucose meter OR supplies to accompany: Blood Glucose Monitor Brands: per insurance.  Qty: 1 kit, Refills: 0    Associated Diagnoses: Type 2 diabetes mellitus with diabetic neuropathy, with long-term current use of insulin (H)       blood glucose monitoring (NO BRAND SPECIFIED) test strip Use to test blood sugar 1 times daily or as directed. To accompany: Blood Glucose Monitor Brands: per insurance.  Qty: 100 strip, Refills: 6    Associated Diagnoses: Type 2 diabetes mellitus with diabetic neuropathy, with long-term current use of insulin (H)      dulaglutide (TRULICITY) 0.75 MG/0.5ML pen Inject 0.75 mg Subcutaneous every 7 days  Qty: 2 mL, Refills: 3    Associated Diagnoses: Type 2 diabetes mellitus with diabetic neuropathy, with long-term current use of insulin (H)      insulin pen needle (B-D U/F) 31G X 8 MM Use once daily or as directed.  Qty: 90 each, Refills: 1    Associated Diagnoses: Uncontrolled diabetes mellitus (H)      !! order for DME Equipment being ordered: seat lift  Qty: 1 Device, Refills: 0    Associated Diagnoses: Cerebrovascular accident (CVA), unspecified mechanism (H)      !! order for DME Equipment being ordered: cane  Qty: 1 Device, Refills: 0    Associated Diagnoses: Type 2 diabetes mellitus with diabetic neuropathy, with long-term current use of insulin (H)      !! order for DME Diabetic shoes due to neuropathy  Qty: 1 Device, Refills: 0    Associated Diagnoses: Type 2 diabetes mellitus with diabetic neuropathy, with long-term current use of insulin (H); Other diabetic neurological complication associated with type 2 diabetes mellitus (H)      pregabalin (LYRICA) 150 MG capsule TAKE 1 CAPSULE BY MOUTH TWICE DAILY  Qty: 60 capsule, Refills: 5    Associated Diagnoses: Type 2 diabetes mellitus with diabetic neuropathy, with long-term current use of insulin (H); Chronic pain syndrome       !! - Potential duplicate medications found. Please discuss with provider.      STOP taking these medications       simvastatin (ZOCOR) 20 MG tablet Comments:   Reason for Stopping:             Allergies   No Known Allergies  Data   Most Recent 3 CBC's:  Recent Labs   Lab Test 12/30/19  0639 12/29/19  0542 12/28/19  0522   12/27/19  1339   WBC  --  8.7 7.9  --  8.1   HGB  --  13.6 13.6  --  13.7   MCV  --  95 96  --  96    225 202   < > 231    < > = values in this interval not displayed.      Most Recent 3 BMP's:  Recent Labs   Lab Test 12/30/19  0639 12/29/19  0542 12/28/19  0545    141 144   POTASSIUM 4.1 4.0 3.7   CHLORIDE 104 104 106   CO2 34* 35* 37*   BUN 21 21 29   CR 1.35* 1.41* 1.62*   ANIONGAP 2* 2* 1*   MAINE 9.0 9.1 8.5   * 168* 146*     Most Recent 2 LFT's:  Recent Labs   Lab Test 01/29/19  1648 01/13/18  0747   AST 17 29   ALT 24 31   ALKPHOS 78 79   BILITOTAL 0.3 1.5*     Most Recent INR's and Anticoagulation Dosing History:  Anticoagulation Dose History     Recent Dosing and Labs Latest Ref Rng & Units 1/12/2018    INR 0.86 - 1.14 1.05        Most Recent 3 Troponin's:  Recent Labs   Lab Test 12/27/19  2227 12/27/19  1339 01/13/18  0040   TROPI 0.239*  0.241* 0.249* 0.488*     Most Recent Cholesterol Panel:  Recent Labs   Lab Test 01/29/19  1648   CHOL 124   LDL 69   HDL 37*   TRIG 88     Most Recent 6 Bacteria Isolates From Any Culture (See EPIC Reports for Culture Details):  Recent Labs   Lab Test 12/27/19  1406 12/27/19  1340 12/27/19  1339 03/01/18  1731   CULT No growth after 3 days No growth after 3 days Moderate growth  Enterobacter cloacae complex  *  Light growth  Gram negative rods  Susceptibility testing in progress  *  Heavy growth  Enterococcus faecalis  *  Plus  Heavy growth  Normal skin katina   Moderate growth  Coagulase negative Staphylococcus  *  Moderate growth  Strain 2  Coagulase negative Staphylococcus  *  Susceptibility testing not routinely done     Most Recent TSH, T4 and A1c Labs:  Recent Labs   Lab Test 12/28/19  0545  01/29/19  1648   TSH  --   --  1.18   A1C 8.2*   < > 7.5*    < > = values in this interval not displayed.

## 2019-12-30 NOTE — PROVIDER NOTIFICATION
MD Notification    Notified Person: MD    Notified Person Name: Dr. Duarte    Notification Date/Time: 12/30/19, 1218    Notification Interaction: MD web paged    Purpose of Notification: can you please put in the home O2 face to face note. Pt is asking if he can stay one more night. may not have a ride home.     Orders Received:    Comments:

## 2019-12-30 NOTE — PROGRESS NOTES
Care Coordination:    Patient is currently qualifying for Home Oxygen, placed call to  Home Medical.   Patient's current address is :      [address]    [address] [place], [place] [id number] [phone]      Home Medical will need Home O2 dot phrase form Provider.    Muriel Coyne RN  BSN, Care Coordinator    [facility]/ Care Transitions          [email or web address]                Phone [phone]

## 2019-12-30 NOTE — CONSULTS
"BRIEF NUTRITION ASSESSMENT      REASON FOR ASSESSMENT:  Cooper Cabral is a 64 year old male seen by Registered Dietitian for Admission Nutrition Risk Screen for stageable pressure injuries or large/non-healing wound or burn    NUTRITION HISTORY:  Deferred    CURRENT DIET AND INTAKE:  Diet:  Low consistent carb            Pt is eating 100% per flow sheets    ANTHROPOMETRICS:  Height: 6' 0\"  Weight:  284 lbs 8 oz  Body mass index is 38.59 kg/m .   Weight Status: Obesity Grade II BMI 35-39.9  IBW:  81 kg +/- 10%  Weight History:   Wt Readings from Last 10 Encounters:   12/30/19 129 kg (284 lb 8 oz)   09/06/19 119.3 kg (263 lb)   08/14/19 119.3 kg (263 lb)   01/29/19 108.1 kg (238 lb 6.4 oz)   03/13/18 111.1 kg (245 lb)   03/09/18 115.7 kg (255 lb)   03/01/18 105.6 kg (232 lb 14.4 oz)   02/13/18 113.4 kg (250 lb)   01/19/18 96.6 kg (213 lb)   01/12/18 113.4 kg (250 lb)       LABS:  Labs noted    NUTRITION INTERVENTION:  Nutrition Diagnosis:  No nutrition diagnosis at this time. Pt does not have stageable PI.    12/30 WOCN assessment:  Bilateral LE with +2 lymphedema on left and +2-3 on the right LE.  The right LE also has more skin issues with large peeling blisters across the dorsal to lateral/ medial ankle areas, very moist, serous-yellow drainage., no obvious s/s of infection      Implementation:  Nutrition Education:  Per Provider order if indicated    FOLLOW UP/MONITORING:   Will re-evaluate in 7 - 10 days, or sooner, if re-consulted.          "

## 2019-12-30 NOTE — PLAN OF CARE
A/Ox4, but forgetful. VSS. Denies pain. Lung sounds are diminished. +2 edema to BLE. Up with SBA using walker. WOCN saw patient. Tele SR with 1st deg AVB. Oxygen given to patient and educated on safe keeping and to keep fire and do not smoke within 15 feet of oxygen. Discharge instructions discussed with patient all questions and concerns addressed.

## 2019-12-30 NOTE — PROVIDER NOTIFICATION
MD Notification    Notified Person: MD    Notified Person Name:  Felicia    Notification Date/Time: 12/30/19 1114    Notification Interaction: MD web paged    Purpose of Notification: Pt O2 87% on RA. needing 1L to maintain sats. Does not currently use home O2. Can we get an order for home O2?    Orders Received:    Comments:

## 2019-12-30 NOTE — PLAN OF CARE
VSS. No complaints of pain. Pt HR SR 1AVB. Pt A&Ox4, forgetful and impulsive, frequently pulling off tele. On 2L per NC. Pt up SBA w/ walker. Pt refusing to use urinal, spills when using urinal.

## 2019-12-30 NOTE — PROGRESS NOTES
Patient has been assessed for Home Oxygen needs. Oxygen readings:    *Pulse oximetry (SpO2) = 87% on room air at rest while awake.    *SpO2 improved to 92% on 1liters/minute at rest.    *SpO2 = 86% on room air during activity/with exercise.    *SpO2 improved to 92% on 3liters/minute during activity/with exercise.

## 2019-12-30 NOTE — PROGRESS NOTES
I certify that this patient, Cooper Cabral has been under my care and that I, or a nurse practitioner or physician's assistant working with me, had a face-to-face encounter that meets face-to-face encounter requirements with this patient on December 30, 2019.    Cooper Cabral is now in a chronic stable state and continues to require supplemental oxygen due to continued oxygen desaturation.  This patient has been treated in part, or in whole for the following medical condition(s):  COPD J44.9  Treatments tried and failed or ruled out to treat hypoxemia include nebulizers.  If portability is ordered, is the patient mobile within the home? yes

## 2019-12-30 NOTE — PROGRESS NOTES
Received intake call for home oxygen at 11:53AM. Reviewed patient's chart; Patient qualifies under Medicare guidelines and all documentation is in the chart including a good order.   12:15PM- Called to offer choice and patient is okay with [facility] setting them up. Discussed equipment with patient and informed them that we would be to bedside with oxygen in the next 2 hours.   12PM- Spoke with care coordinator, Ya, confirmed we received the order, and provided them with ETA of oxygen.         ROS      Physical Exam

## 2019-12-30 NOTE — PLAN OF CARE
A&OX4. VSS on 2L O2. IV saline locked- intermittent IV anx. Tele SR w/ 1st degree AV block. BLE edema +2-3. BLE redness. Wounds x3 to RLE- mepilex changed, CDI. Moist/pink tissue with drainage underneath. SBA. Blood sugars stable 168, 159- coverage provided. Redness on coccyx- mepilex CDI weight shifting encouraged. Dim/coarse lungs. Denies chest pain, SOB. Denies pain.

## 2019-12-30 NOTE — DISCHARGE INSTRUCTIONS
"Plan of care for bilateral LE with wounds: daily and prn  1. Clean legs from toes to knees with gentle soap and water, making sure to actually clean between the toes!  Rinse, dry  2. Spray the skin from toes to knees, not between the toes, with a thick layer of Danielle Cleanse and Protect, massage into skin and allow sit on skin for 5-10 minutes then wipe or rinse off (the Danielle will \"condition\" the skin by loosening crusty debris, moisturizing and cleaning the skin)  3. Apply a good lotion from toes to knees, not between toes (lotion between toes will keep area too moist and set up for possible fungal rash)  4. If noticing redness, itching, odor from between toes then dust with antifungal powder x 5 days, then stop   5. To the wound(s) located on right lower leg   1.Clean wound(s) with MicroKlenz Spray, pat dry              2. Wipe periwound tissue with No Sting Skin prep              3. - press/ smooth a sheet of Xeroform Gauze to the wounds and skin       - for managing drainage- cut a piece of the blue Covidien Pad and wrap the area              4.  Spandigrip, size F    Time and date dressing change      6. Wear clean socks every day  7. Keep heels elevated at all times in the bed (elevate by placing one pillow under each calf, from knee to heel and assure heels are off loaded, may need more pillows)  NOTE- as wounds heal the the lymphedema is managed may consider going to 3x/ week skin/ leg cares.  But until wounds heal would like to see daily cares.  Additionally, in the hospital, Monday December 30, 2019, Spandigrip size F was placed.      Pt to be seen by lymphedema therapy and evaluated for compression- discussed with Dr Duarte    "

## 2019-12-30 NOTE — PROGRESS NOTES
"  [facility] Nurse Inpatient Wound Assessment   Initial Assessment of wound(s) on pt's:   Bilateral LE    WO NURSE ASSESSMENT    Bilateral LE with +2 lymphedema on left and +2-3 on the right LE.  The right LE also has more skin issues with large peeling blisters across the dorsal to lateral/ medial ankle areas, very moist, serous-yellow drainage., no obvious s/s of infection  Otherwise bilateral shins/ gaiter area with dried, adherent, non fluctuant, non draining scabs.   Will write orders for daily wound, skin and compression cares x about 2 weeks, until the lymphedema can really get under control and wounds settle down.  Then could likely got to MWF-type of bilateral LE cares.  Pt will always need to keep up with compression to heal and prevent wounds from developing, for good skin care.    [facility] NURSE TREATMENT PLAN    Plan of care for bilateral LE with wounds: daily and prn  1. Clean legs from toes to knees with gentle soap and water, making sure to actually clean between the toes!  Rinse, dry  2. Spray the skin from toes to knees, not between the toes, with a thick layer of Danielle Cleanse and Protect, massage into skin and allow sit on skin for 5-10 minutes then wipe or rinse off (the Danielle will \"condition\" the skin by loosening crusty debris, moisturizing and cleaning the skin)  3. Apply a good lotion from toes to knees, not between toes (lotion between toes will keep area too moist and set up for possible fungal rash)  4. If noticing redness, itching, odor from between toes then dust with antifungal powder x 5 days, then stop   5. To the wound(s) located on right lower leg   1.Clean wound(s) with MicroKlenz Spray, pat dry              2. Wipe periwound tissue with No Sting Skin prep              3. - press/ smooth a sheet of Xeroform Gauze to the wounds and skin       - for managing drainage- cut a piece of the blue Covidien Pad and wrap the area              4.  Spandigrip, size F    Time and date dressing " change  6. Wear clean socks every day  7. Keep heels elevated at all times in the bed (elevate by placing one pillow under each calf, from knee to heel and assure heels are off loaded, may need more pillows)  NOTE- as wounds heal the the lymphedema is managed may consider going to 3x/ week skin/ leg cares.  But until wounds heal would like to see daily cares.  Additionally, in the hospital, Monday December 30, 2019, Spandigrip size F was placed.      Pt to be seen by lymphedema therapy and evaluated for compression- discussed with Dr Felicia Payne to notify Provider(s) and re-consult the WOC Nurse if wound(s) deteriorate or new skin concerns    WOC Nurse follow-up plan: weekly  OBJECTIVE DATA    Patient history according to provider notes: 64 year old male with COPD, DM2 with neuropathy, HTN, HLD, h/o CVA who was admitted on 12/27/2019 from [facility] for bilateral LE edema and erythema.    Cody Risk Assessment  Sensory Perception: 3-->slightly limited    Moisture: 3-->occasionally moist   Activity: 3-->walks occasionally     Mobility: 4-->no limitation   Nutrition: 4-->excellent   Friction and Shear: 3-->no apparent problem      Positioning: Pillows,     Mattress:  Standard , Atmos Air mattress    Current diet  Orders Placed This Encounter      Low Consistent CHO Diet      Diet        Moisture Management:  continent  o Catheter secured? Not applicable     o I/O last 3 completed shifts:  o In: 886 [P.O.:880; I.V.:6]  o Out: 125 [Urine:125]    Labs:   Recent Labs   Lab Test 12/29/19  0542 12/28/19  0545  01/29/19  1648  01/12/18  1135   ALBUMIN  --   --   --  3.8   < > 3.3*   HGB 13.6 13.6   < >  --    < > 16.5   INR  --   --   --   --   --  1.05   WBC 8.7 7.9   < >  --    < > 8.5   A1C  --  8.2*   < > 7.5*  --  11.9*   CRP  --   --   --   --   --  17.2*    < > = values in this interval not displayed.         WOC NURSE PHYSICAL EXAM    Wound Assessment (location):   Bilateral LE  Wound History:  Pt reports he has been  getting compression wraps?/ sleeves?/ socks? At home.  First said was up until a month ago and also told me it had only been about 2 weeks  Pt reports no sensation from knees down  Date of last photo December 30, 2019    Posterior Left LE    Right lateral LE 12/30/19     Right posterior LE 12/30/19      Right LE with +2-3 edema, popped blisters with loose epidermis across dorsal to latera/ medial areas, each area measures 3cm x 5cmx 0.2cm, large serous yellow, clear drainage,  Left LE with dried, speckling, non fluctuant, non draining little scabs  WOC NURSE INTERVENTIONS    Assessed bilateral LE  Wound Care: done per plan of care  Supplies: reviewed, gathered, placed at the bedside, discussed with RN and discussed with patient  Discussed plan of care with Patient, Nurse and Dr. Alvaro parker for Spandigrip now and lymphedema assessmesnt/ compression later    Discussed with Dr. Duarte that I am recommending starting with Spandigrip compression, pt about to be discharged so lymphedema therapy will not start compression on the same day pt is leaving, and will start a more gentle compression for now, with a lymphedema evaluation upon discharge  Education provided: importance of repositioning, plan of care, wound progress, Infection prevention , Moisture management, Hygiene, Nail care and Off-loading pressure    Jess Pham RN

## 2019-12-31 NOTE — TELEPHONE ENCOUNTER
Patient was evaluated by cardiology while inpatient for BLE edema and elevated troponin (believed to be demand ischemia), GANESH, CM with EF 40% with WMA. Pt declined any invasive cardiology procedures and prefers medical management only. Asprin and Plavix continued and started on Lisinopril. Called patient to discuss any post hospital d/c questions he may have, review medication changes, and confirm f/u appts. Patient denied any questions regarding new medications or changes to PTA medications. Patient denied any SOB, chest pain, or light headedness. RN confirmed with patient that he has an apt scheduled on 1/10/20 with MAYANK Dionicio Hollins. Patient advised to call clinic with any cardiac related questions or concerns prior to this mayank't. Patient verbalized understanding and agreed with plan. SUDARSHAN Dhaliwal RN.

## 2019-12-31 NOTE — TELEPHONE ENCOUNTER
recommended labs and tests    Follow up with primary care provider, Nohelia Estrella, within 7 days to evaluate medication change and for hospital follow- up.  No follow up labs or test are needed.    Follow up with Cardiology Clinic, within 2 weeks  to evaluate medication change and for hospital follow- up. No follow up labs or test are needed.     ED / Discharge Outreach Protocol    Patient Contact    Attempt # 1    Was call answered?  No.  Left message on voicemail with information to call me back.    Elvia BUCHANAN, RN, PHN

## 2020-01-01 ENCOUNTER — TELEPHONE (OUTPATIENT)
Dept: GERIATRICS | Facility: CLINIC | Age: 65
End: 2020-01-01

## 2020-01-01 ENCOUNTER — HOSPITAL LABORATORY (OUTPATIENT)
Dept: OTHER | Facility: CLINIC | Age: 65
End: 2020-01-01

## 2020-01-01 ENCOUNTER — ASSISTED LIVING VISIT (OUTPATIENT)
Dept: GERIATRICS | Facility: CLINIC | Age: 65
End: 2020-01-01
Payer: MEDICARE

## 2020-01-01 ENCOUNTER — DOCUMENTATION ONLY (OUTPATIENT)
Dept: GERIATRICS | Facility: CLINIC | Age: 65
End: 2020-01-01

## 2020-01-01 ENCOUNTER — VIRTUAL VISIT (OUTPATIENT)
Dept: GERIATRICS | Facility: CLINIC | Age: 65
End: 2020-01-01
Payer: MEDICARE

## 2020-01-01 ENCOUNTER — DOCUMENTATION ONLY (OUTPATIENT)
Dept: CARE COORDINATION | Facility: CLINIC | Age: 65
End: 2020-01-01

## 2020-01-01 ENCOUNTER — HEALTH MAINTENANCE LETTER (OUTPATIENT)
Age: 65
End: 2020-01-01

## 2020-01-01 VITALS — TEMPERATURE: 96.1 F | OXYGEN SATURATION: 97 %

## 2020-01-01 VITALS
HEIGHT: 72 IN | TEMPERATURE: 98.6 F | SYSTOLIC BLOOD PRESSURE: 164 MMHG | DIASTOLIC BLOOD PRESSURE: 91 MMHG | WEIGHT: 284.8 LBS | HEART RATE: 81 BPM | RESPIRATION RATE: 18 BRPM | BODY MASS INDEX: 38.57 KG/M2 | OXYGEN SATURATION: 96 %

## 2020-01-01 VITALS
HEART RATE: 84 BPM | TEMPERATURE: 97.9 F | DIASTOLIC BLOOD PRESSURE: 76 MMHG | OXYGEN SATURATION: 91 % | SYSTOLIC BLOOD PRESSURE: 133 MMHG | RESPIRATION RATE: 19 BRPM

## 2020-01-01 VITALS — SYSTOLIC BLOOD PRESSURE: 132 MMHG | TEMPERATURE: 98.6 F | DIASTOLIC BLOOD PRESSURE: 80 MMHG | HEART RATE: 81 BPM

## 2020-01-01 VITALS — HEART RATE: 90 BPM | DIASTOLIC BLOOD PRESSURE: 80 MMHG | SYSTOLIC BLOOD PRESSURE: 132 MMHG | RESPIRATION RATE: 20 BRPM

## 2020-01-01 VITALS
SYSTOLIC BLOOD PRESSURE: 102 MMHG | TEMPERATURE: 97.6 F | OXYGEN SATURATION: 97 % | HEART RATE: 58 BPM | DIASTOLIC BLOOD PRESSURE: 54 MMHG

## 2020-01-01 DIAGNOSIS — J44.9 CHRONIC OBSTRUCTIVE PULMONARY DISEASE, UNSPECIFIED COPD TYPE (H): ICD-10-CM

## 2020-01-01 DIAGNOSIS — R60.0 EDEMA OF BOTH LEGS: ICD-10-CM

## 2020-01-01 DIAGNOSIS — E11.49 OTHER DIABETIC NEUROLOGICAL COMPLICATION ASSOCIATED WITH TYPE 2 DIABETES MELLITUS (H): ICD-10-CM

## 2020-01-01 DIAGNOSIS — N39.45 CONTINUOUS LEAKAGE OF URINE: ICD-10-CM

## 2020-01-01 DIAGNOSIS — I42.9 CARDIOMYOPATHY, UNSPECIFIED TYPE (H): ICD-10-CM

## 2020-01-01 DIAGNOSIS — E66.01 MORBID OBESITY (H): ICD-10-CM

## 2020-01-01 DIAGNOSIS — E11.40 TYPE 2 DIABETES MELLITUS WITH DIABETIC NEUROPATHY, WITH LONG-TERM CURRENT USE OF INSULIN (H): ICD-10-CM

## 2020-01-01 DIAGNOSIS — N18.6 ESRF (END STAGE RENAL FAILURE) (H): Primary | ICD-10-CM

## 2020-01-01 DIAGNOSIS — I50.22 CHRONIC SYSTOLIC CONGESTIVE HEART FAILURE (H): ICD-10-CM

## 2020-01-01 DIAGNOSIS — I10 UNCONTROLLED HYPERTENSION: Primary | ICD-10-CM

## 2020-01-01 DIAGNOSIS — I50.32 CHRONIC DIASTOLIC HEART FAILURE (H): ICD-10-CM

## 2020-01-01 DIAGNOSIS — R32 URINARY AND FECAL INCONTINENCE: ICD-10-CM

## 2020-01-01 DIAGNOSIS — R53.1 LEFT-SIDED WEAKNESS: ICD-10-CM

## 2020-01-01 DIAGNOSIS — I63.9 ISCHEMIC STROKE (H): ICD-10-CM

## 2020-01-01 DIAGNOSIS — Z79.4 TYPE 2 DIABETES MELLITUS WITH DIABETIC NEUROPATHY, WITH LONG-TERM CURRENT USE OF INSULIN (H): ICD-10-CM

## 2020-01-01 DIAGNOSIS — I87.2 VENOUS STASIS ULCER OF RIGHT ANKLE LIMITED TO BREAKDOWN OF SKIN WITHOUT VARICOSE VEINS (H): ICD-10-CM

## 2020-01-01 DIAGNOSIS — L03.116 BILATERAL CELLULITIS OF LOWER LEG: Primary | ICD-10-CM

## 2020-01-01 DIAGNOSIS — F33.1 MODERATE EPISODE OF RECURRENT MAJOR DEPRESSIVE DISORDER (H): Primary | ICD-10-CM

## 2020-01-01 DIAGNOSIS — I10 HYPERTENSION GOAL BP (BLOOD PRESSURE) < 140/80: ICD-10-CM

## 2020-01-01 DIAGNOSIS — L97.311 VENOUS STASIS ULCER OF RIGHT ANKLE LIMITED TO BREAKDOWN OF SKIN WITHOUT VARICOSE VEINS (H): ICD-10-CM

## 2020-01-01 DIAGNOSIS — L03.115 BILATERAL CELLULITIS OF LOWER LEG: Primary | ICD-10-CM

## 2020-01-01 DIAGNOSIS — N18.6 END-STAGE RENAL DISEASE (H): Primary | ICD-10-CM

## 2020-01-01 DIAGNOSIS — Z51.5 HOSPICE CARE PATIENT: ICD-10-CM

## 2020-01-01 DIAGNOSIS — R15.9 URINARY AND FECAL INCONTINENCE: ICD-10-CM

## 2020-01-01 DIAGNOSIS — L03.119 CELLULITIS OF LOWER EXTREMITY, UNSPECIFIED LATERALITY: ICD-10-CM

## 2020-01-01 DIAGNOSIS — F17.200 TOBACCO DEPENDENCE SYNDROME: ICD-10-CM

## 2020-01-01 DIAGNOSIS — G63 POLYNEUROPATHY ASSOCIATED WITH UNDERLYING DISEASE (H): ICD-10-CM

## 2020-01-01 DIAGNOSIS — Z72.0 TOBACCO ABUSE: ICD-10-CM

## 2020-01-01 DIAGNOSIS — R19.7 DIARRHEA, UNSPECIFIED TYPE: Primary | ICD-10-CM

## 2020-01-01 DIAGNOSIS — I73.9 PVD (PERIPHERAL VASCULAR DISEASE) (H): ICD-10-CM

## 2020-01-01 DIAGNOSIS — I73.9 PVD (PERIPHERAL VASCULAR DISEASE) (H): Primary | ICD-10-CM

## 2020-01-01 DIAGNOSIS — T14.8XXA SKIN EXCORIATION: ICD-10-CM

## 2020-01-01 DIAGNOSIS — B37.49 CANDIDIASIS OF SCROTUM: ICD-10-CM

## 2020-01-01 DIAGNOSIS — L03.119 CELLULITIS OF LOWER EXTREMITY, UNSPECIFIED LATERALITY: Primary | ICD-10-CM

## 2020-01-01 DIAGNOSIS — Z87.09 HX OF ACUTE RESPIRATORY FAILURE: ICD-10-CM

## 2020-01-01 DIAGNOSIS — N18.30 CKD (CHRONIC KIDNEY DISEASE) STAGE 3, GFR 30-59 ML/MIN (H): ICD-10-CM

## 2020-01-01 LAB
ANION GAP SERPL CALCULATED.3IONS-SCNC: 7 MMOL/L (ref 3–14)
ANION GAP SERPL CALCULATED.3IONS-SCNC: 7 MMOL/L (ref 3–14)
BACTERIA SPEC CULT: NO GROWTH
BACTERIA SPEC CULT: NO GROWTH
BUN SERPL-MCNC: 107 MG/DL (ref 7–30)
BUN SERPL-MCNC: 108 MG/DL (ref 7–30)
CALCIUM SERPL-MCNC: 8.5 MG/DL (ref 8.5–10.1)
CALCIUM SERPL-MCNC: 9 MG/DL (ref 8.5–10.1)
CHLORIDE SERPL-SCNC: 104 MMOL/L (ref 94–109)
CHLORIDE SERPL-SCNC: 104 MMOL/L (ref 94–109)
CO2 SERPL-SCNC: 31 MMOL/L (ref 20–32)
CO2 SERPL-SCNC: 32 MMOL/L (ref 20–32)
COVID-19 VIRUS PCR TO MAYO - RESULT: NORMAL
COVID-19 VIRUS PCR TO MAYO - RESULT: NORMAL
CREAT SERPL-MCNC: 7.18 MG/DL (ref 0.66–1.25)
CREAT SERPL-MCNC: 7.23 MG/DL (ref 0.66–1.25)
GFR SERPL CREATININE-BSD FRML MDRD: 7 ML/MIN/{1.73_M2}
GFR SERPL CREATININE-BSD FRML MDRD: 7 ML/MIN/{1.73_M2}
GLUCOSE SERPL-MCNC: 141 MG/DL (ref 70–99)
GLUCOSE SERPL-MCNC: 169 MG/DL (ref 70–99)
Lab: NORMAL
Lab: NORMAL
POTASSIUM SERPL-SCNC: 4.1 MMOL/L (ref 3.4–5.3)
POTASSIUM SERPL-SCNC: 4.8 MMOL/L (ref 3.4–5.3)
SARS-COV-2 RNA SPEC QL NAA+PROBE: NOT DETECTED
SODIUM SERPL-SCNC: 142 MMOL/L (ref 133–144)
SODIUM SERPL-SCNC: 143 MMOL/L (ref 133–144)
SPECIMEN SOURCE: NORMAL

## 2020-01-01 RX ORDER — NYSTATIN 100000 [USP'U]/G
POWDER TOPICAL 2 TIMES DAILY
Qty: 60 G | Refills: 3 | Status: SHIPPED | OUTPATIENT
Start: 2020-01-01

## 2020-01-01 RX ORDER — LISINOPRIL 10 MG/1
10 TABLET ORAL DAILY
Qty: 30 TABLET | Refills: 3 | Status: SHIPPED | OUTPATIENT
Start: 2020-01-01 | End: 2020-01-01 | Stop reason: DRUGHIGH

## 2020-01-01 RX ORDER — TORSEMIDE 20 MG/1
TABLET ORAL
Qty: 120 TABLET | Refills: 97 | Status: SHIPPED | OUTPATIENT
Start: 2020-01-01 | End: 2020-01-01 | Stop reason: DRUGHIGH

## 2020-01-01 RX ORDER — TORSEMIDE 10 MG/1
TABLET ORAL
Qty: 30 TABLET | Refills: 11 | Status: SHIPPED | OUTPATIENT
Start: 2020-01-01 | End: 2020-01-01 | Stop reason: DRUGHIGH

## 2020-01-01 RX ORDER — TORSEMIDE 20 MG/1
20 TABLET ORAL DAILY
Qty: 30 TABLET | Refills: 1 | Status: SHIPPED | OUTPATIENT
Start: 2020-01-01

## 2020-01-01 RX ORDER — LISINOPRIL 5 MG/1
TABLET ORAL
Qty: 30 TABLET | Refills: 11 | Status: SHIPPED | OUTPATIENT
Start: 2020-01-01 | End: 2020-01-01 | Stop reason: DRUGHIGH

## 2020-01-01 RX ORDER — ESCITALOPRAM OXALATE 20 MG/1
TABLET ORAL
Qty: 30 TABLET | Refills: 11 | Status: SHIPPED | OUTPATIENT
Start: 2020-01-01

## 2020-01-01 RX ORDER — ATORVASTATIN CALCIUM 40 MG/1
TABLET, FILM COATED ORAL
Qty: 30 TABLET | Refills: 11 | Status: SHIPPED | OUTPATIENT
Start: 2020-01-01 | End: 2020-01-01

## 2020-01-01 RX ORDER — PREGABALIN 75 MG/1
CAPSULE ORAL
Qty: 30 CAPSULE | Refills: 4 | Status: SHIPPED | OUTPATIENT
Start: 2020-01-01 | End: 2020-01-01

## 2020-01-01 RX ORDER — TORSEMIDE 20 MG/1
TABLET ORAL
Qty: 60 TABLET | Refills: 11 | Status: SHIPPED | OUTPATIENT
Start: 2020-01-01 | End: 2020-01-01

## 2020-01-01 RX ORDER — ESCITALOPRAM OXALATE 20 MG/1
20 TABLET ORAL DAILY
COMMUNITY
End: 2020-01-01

## 2020-01-01 RX ORDER — CEFTRIAXONE 1 G/1
1000 INJECTION, POWDER, FOR SOLUTION INTRAMUSCULAR; INTRAVENOUS ONCE
Qty: 10 ML | Refills: 0 | Status: CANCELLED | OUTPATIENT
Start: 2020-01-01 | End: 2020-01-01

## 2020-01-01 RX ORDER — CEPHALEXIN 500 MG/1
500 CAPSULE ORAL 2 TIMES DAILY
Qty: 20 CAPSULE | Refills: 0 | Status: SHIPPED | OUTPATIENT
Start: 2020-01-01 | End: 2020-01-01

## 2020-01-01 RX ORDER — PREGABALIN 75 MG/1
75 CAPSULE ORAL DAILY
Qty: 30 CAPSULE | Refills: 0 | Status: SHIPPED | OUTPATIENT
Start: 2020-01-01 | End: 2020-01-01

## 2020-01-01 RX ORDER — METOPROLOL SUCCINATE 50 MG/1
50 TABLET, EXTENDED RELEASE ORAL DAILY
Qty: 30 TABLET | Refills: 4 | Status: SHIPPED | OUTPATIENT
Start: 2020-01-01

## 2020-01-01 RX ORDER — PREGABALIN 75 MG/1
75 CAPSULE ORAL DAILY
Qty: 30 CAPSULE | Refills: 4 | Status: SHIPPED | OUTPATIENT
Start: 2020-01-01

## 2020-01-01 RX ORDER — DULAGLUTIDE 0.75 MG/.5ML
INJECTION, SOLUTION SUBCUTANEOUS
Qty: 2 ML | Refills: 97 | OUTPATIENT
Start: 2020-01-01

## 2020-01-01 RX ORDER — CHOLESTYRAMINE 4 G/9G
1 POWDER, FOR SUSPENSION ORAL AT BEDTIME
Qty: 60 PACKET | Refills: 3 | Status: SHIPPED | OUTPATIENT
Start: 2020-01-01 | End: 2020-01-01

## 2020-01-02 NOTE — PROGRESS NOTES
[place] [facility]  [place] Medical Record Number:  [id number]  Place of Service where encounter took place:  [facility] [[id number]]  Chief Complaint   Patient presents with     RECHECK       HPI:    Cooper Cabral  is a 64 year old (1955), who is being seen today for an episodic care visit.  HPI information obtained from: facility chart records, facility staff, patient report and [facility] chart review. Today's concern is:  Cellulitis of lower extremity, unspecified laterality  PVD (peripheral vascular disease) (H)  Type 2 diabetes mellitus with diabetic neuropathy, with long-term current use of insulin (H)  Polyneuropathy associated with underlying disease (H)  CKD (chronic kidney disease) stage 3, GFR 30-59 ml/min (H)  Morbid obesity (H)  Mr. Cabral is quite medically complex with multiple unstable co morbidities. Prior to his hospitalization we had doubled his diuretic, however his fluid overload continued to increase.  With his poor circulation complicated by uncontrolled DM2 he developed l/e cellulitis and was sent to the hospital where they could monitor his renal function with aggressive diuresis along with IV antibiotics.  He was assessed to return to his [facility] and I am seeing him today.  He continues on oral antibiotics, his l/e has improved however he could lose 30 # plus.  He does not seem to have the insight regarding the value of elevating his legs, smoking cessation, or keeping his diabetes better controlled.  He does endorse numbness and some tingling/crawling in his legs/feet.        Past Medical and Surgical History reviewed in Epic today.    MEDICATIONS:  Current Outpatient Medications   Medication Sig Dispense Refill     ACCU-CHEK MULTICLIX LANCETS MISC tests twice a day 100 3     amoxicillin-clavulanate (AUGMENTIN) 875-125 MG tablet Take 1 tablet by mouth every 12 hours 10 tablet 0     ASPIRIN LOW DOSE 81 MG chewable tablet CHEW AND SWALLOW 1  TABLET BY MOUTH ONCE DAILY 31 tablet 98     atorvastatin (LIPITOR) 40 MG tablet Take 1 tablet (40 mg) by mouth every evening 30 tablet 1     blood glucose calibration (NO BRAND SPECIFIED) solution To accompany: Blood Glucose Monitor Brands: per insurance. 1 Bottle 3     blood glucose monitoring (NO BRAND SPECIFIED) meter device kit Use to test blood sugar 1 times daily or as directed. Preferred blood glucose meter OR supplies to accompany: Blood Glucose Monitor Brands: per insurance. 1 kit 0     blood glucose monitoring (NO BRAND SPECIFIED) test strip Use to test blood sugar 1 times daily or as directed. To accompany: Blood Glucose Monitor Brands: per insurance. 100 strip 6     clopidogrel (PLAVIX) 75 MG tablet Take 1 tablet (75 mg) by mouth daily 90 tablet 3     lisinopril (PRINIVIL/ZESTRIL) 5 MG tablet Take 1 tablet (5 mg) by mouth daily 30 tablet 1     metFORMIN (GLUCOPHAGE) 1000 MG tablet Take 1 tablet (1,000 mg) by mouth 2 times daily (with meals) 180 tablet 3     metoprolol succinate ER (TOPROL-XL) 100 MG 24 hr tablet Take 1 tablet (100 mg) by mouth daily 90 tablet 3     Multiple Vitamins-Minerals (MULTI FOR HIM PO) Take 1 capsule by mouth daily.       order for DME Equipment being ordered: seat lift 1 Device 0     order for DME Equipment being ordered: cane 1 Device 0     order for DME Diabetic shoes due to neuropathy 1 Device 0     pregabalin (LYRICA) 150 MG capsule TAKE 1 CAPSULE BY MOUTH TWICE DAILY 60 capsule 5     tamsulosin (FLOMAX) 0.4 MG capsule Take 1 capsule (0.4 mg) by mouth daily 90 capsule 3     torsemide (DEMADEX) 100 MG tablet Take 50 mg by mouth daily       dulaglutide (TRULICITY) 0.75 MG/0.5ML pen Inject 0.75 mg Subcutaneous every 7 days 2 mL 11     insulin pen needle (B-D U/F) 31G X 8 MM miscellaneous Use once daily or as directed. 90 each 1       REVIEW OF SYSTEMS:  10 point ROS of systems including Constitutional, Eyes, Respiratory, Cardiovascular, Gastroenterology, Genitourinary,  Integumentary, Musculoskeletal, Psychiatric were all negative except for pertinent positives noted in my HPI.    Objective:  /80   Pulse 90   Resp 20   Exam:  GENERAL APPEARANCE:  Alert, in no distress, morbidly obese  RESP:  respiratory effort and palpation of chest normal, crackles scattered throughout  CV:  Palpation and auscultation of heart done , regular rate and rhythm, no murmur, rub, or gallop, peripheral edema 3+ in bilateral lower extremities and +4 bilateral feet  ABDOMEN:  normal bowel sounds, soft, nontender, no hepatosplenomegaly or other masses, obese  M/S:   Gait and station abnormal requires walker for stability and endurance  Digits and nails abnormal hypertrophic, yellow, long  SKIN:  Inspection of skin and subcutaneous tissue baseline, Palpation of skin and subcutaneous tissue baseline, severe stasis dermititis bilateral legs, ruberous color  NEURO:   Examination of sensation by touch is deminished bilateral legs  PSYCH:  insight and judgement impaired, memory impaired , affect abnormal flat, depressed    Labs:   Recent labs in [facility] reviewed by me today.     ASSESSMENT/PLAN:  (L03.119) Cellulitis of lower extremity, unspecified laterality  (primary encounter diagnosis)  Comment: moderate improvement  Plan: complete course of antibiotics  Keep legs clean and elevated    (I73.9) PVD (peripheral vascular disease) (H)  Comment: severe  Plan: keep legs clean, compression, restart Lymphedema treatments/wraps when cellulitis healed  Elevate legs  Continue Statin, ASA and Clopidogrel    (E11.40,  Z79.4) Type 2 diabetes mellitus with diabetic neuropathy, with long-term current use of insulin (H)  (G63) Polyneuropathy associated with underlying disease (H)  Comment: uncontrolled A1c 8.2 goal to be < 8  Plan: continue Pregabalin, Trulicity and Metformin, educate to follow diabetic heart healthy diet    (N18.3) CKD (chronic kidney disease) stage 3, GFR 30-59 ml/min (H)  Comment: baseline Creatinine  1.35  Plan: avoid nephrotoxic medications, follow BMP quarterly    (E66.01) Morbid obesity (H)  Comment: sedentary lifestyle  Plan: encourage activity      Electronically signed by:  ZOHREH Witt CNP

## 2020-01-02 NOTE — TELEPHONE ENCOUNTER
"Hospital/TCU/ED for chronic condition Discharge Protocol    \"Hi, my name is Elvia Du RN, a registered nurse, and I am calling from [facility].  I am calling to follow up and see how things are going for you after your recent emergency visit/hospital/TCU stay.\"    Tell me how you are doing now that you are home?\" Patient currently at [facility]. Staff administers medications and they lock them up. I'm doing ok. My brother should be stopping by today.      Discharge Instructions    \"Let's review your discharge instructions.  What is/are the follow-up recommendations?  Pt. Response: f/u with a provider. Patient unsure who he considers primary at this time.    \"Has an appointment with your primary care provider been scheduled?\"   \"No, I',m unsure who I will be seen\" Patient stated he will discuss with his brother regarding follow up appointments.    \"When you see the provider, I would recommend that you bring your medications with you.\"    Medications    \"Tell me what changed about your medicines when you discharged?\"    Changes to chronic meds?    0-1 started Atorvastatin. Lisinopril dose changed.    \"What questions do you have about your medications?\"    None     New diagnoses of heart failure, COPD, diabetes, or MI?    No     On insulin: \"Did you start on insulin in the hospital or did you have your insulin dose changed?\"  No         Post Discharge Medication Reconciliation Status: discharge medications reconciled and changed, per note/orders (see AVS).    Was MTM referral placed (*Make sure to put transitions as reason for referral)?   No    Call Summary    \"What questions or concerns do you have about your recent visit and your follow-up care?\"     none    \"If you have questions or things don't continue to improve, we encourage you contact us through the main clinic number (give number).  Even if the clinic is not open, triage nurses are available 24/7 to help you.     We would like " "you to know that our clinic has extended hours (provide information).  We also have urgent care (provide details on closest location and hours/contact info)\"      \"Thank you for your time and take care!\"             "

## 2020-01-04 NOTE — PROGRESS NOTES
Staff paged today with concerns regarding oxygen saturations. Patient comfort cares. Patient congested. Does not want treatment or hospital or any other measures, notes he s okay. Staff request Prn oxygen order should he require it for comfort     Orders:  O2 via NC 1-4lpm to keep SaO2 levels >86%

## 2020-01-05 NOTE — TELEPHONE ENCOUNTER
Nursing called to update that patient had blister that popped on the top of his second digit on his foot- about 1 cm in size. Has gross pitting edema- diuretics adjusted this week and home care ordered. Home care is coming out on tomorrow. Ok for dressing changes until home care comes out tomorrow.

## 2020-01-08 PROBLEM — N18.30 CKD (CHRONIC KIDNEY DISEASE) STAGE 3, GFR 30-59 ML/MIN (H): Status: ACTIVE | Noted: 2020-01-01

## 2020-01-08 NOTE — TELEPHONE ENCOUNTER
1. Type 2 diabetes mellitus with diabetic neuropathy, with long-term current use of insulin (H)  Ordered  today  - dulaglutide (TRULICITY) 0.75 MG/0.5ML pen; Inject 0.75 mg Subcutaneous every 7 days  Dispense: 2 mL; Refill: 11  - insulin pen needle (B-D U/F) 31G X 8 MM miscellaneous; Use once daily or as directed.  Dispense: 90 each; Refill: 1      ZOHREH Witt CNP on 1/8/2020 at 10:37 AM

## 2020-02-12 PROBLEM — N39.45 CONTINUOUS LEAKAGE OF URINE: Status: ACTIVE | Noted: 2020-01-01

## 2020-02-12 NOTE — PROGRESS NOTES
[place] [facility]  [place] Medical Record Number:  [id number]  Place of Service where encounter took place:  [facility] [place]  [facility] [[id number]]  Chief Complaint   Patient presents with     RECHECK       HPI:    Cooper Cabral  is a 64 year old (1955), who is being seen today for an episodic care visit.  HPI information obtained from: facility chart records, facility staff, patient report and [facility] chart review. Today's concern is:  Uncontrolled hypertension  PVD (peripheral vascular disease) (H) severe  Chronic systolic congestive heart failure (H)  Chronic obstructive pulmonary disease, unspecified COPD type (H)  Tobacco dependence syndrome  Hx of acute respiratory failure  He continue to smoke daily and drinks coffee all day.  He is not interested in quitting smoking or making changes to improve his health.  He at times will refuse to take medications.  However, he has taking his diuretic and is finally diuresing.  Which of course will help his severe lower extremity edema complicated with chronic stasis ulcers.  He is followed by home care for wound care and lymphedema tx.  His breathing is some better with the 10# of fluid off, he could lose 15-20 more # of fluid.  He has a history of respiratory failure and was started on oxygen, however he felt it was too big of a hassle when he wanted to go smoke, so he stopped using it and refuses to use it at night.       Past Medical and Surgical History reviewed in Epic today.    MEDICATIONS:  Current Outpatient Medications   Medication Sig Dispense Refill     ACCU-CHEK MULTICLIX LANCETS MISC tests twice a day 100 3     ASPIRIN LOW DOSE 81 MG chewable tablet CHEW AND SWALLOW 1 TABLET BY MOUTH ONCE DAILY 31 tablet 98     atorvastatin (LIPITOR) 40 MG tablet TAKE 1 TABLET BY MOUTH EVERY EVENING 30 tablet 11     blood glucose calibration (NO BRAND SPECIFIED) solution To accompany: Blood Glucose Monitor Brands: per insurance. 1  Bottle 3     blood glucose monitoring (NO BRAND SPECIFIED) meter device kit Use to test blood sugar 1 times daily or as directed. Preferred blood glucose meter OR supplies to accompany: Blood Glucose Monitor Brands: per insurance. 1 kit 0     blood glucose monitoring (NO BRAND SPECIFIED) test strip Use to test blood sugar 1 times daily or as directed. To accompany: Blood Glucose Monitor Brands: per insurance. 100 strip 6     clopidogrel (PLAVIX) 75 MG tablet Take 1 tablet (75 mg) by mouth daily 90 tablet 3     dulaglutide (TRULICITY) 0.75 MG/0.5ML pen Inject 0.75 mg Subcutaneous every 7 days 2 mL 11     escitalopram (LEXAPRO) 20 MG tablet TAKE 1 TABLET BY MOUTH ONCE DAILY (START AFTER 10MG DOSE IS COMPLETE) 30 tablet 11     insulin pen needle (B-D U/F) 31G X 8 MM miscellaneous Use once daily or as directed. 90 each 1     lisinopril (ZESTRIL) 10 MG tablet Take 1 tablet (10 mg) by mouth daily 30 tablet 3     metFORMIN (GLUCOPHAGE) 1000 MG tablet Take 1 tablet (1,000 mg) by mouth 2 times daily (with meals) 180 tablet 3     metoprolol succinate ER (TOPROL-XL) 100 MG 24 hr tablet Take 1 tablet (100 mg) by mouth daily 90 tablet 3     Multiple Vitamins-Minerals (MULTI FOR HIM PO) Take 1 capsule by mouth daily.       order for DME Equipment being ordered: seat lift 1 Device 0     order for DME Diabetic shoes due to neuropathy 1 Device 0     pregabalin (LYRICA) 150 MG capsule TAKE 1 CAPSULE BY MOUTH TWICE DAILY 60 capsule 5     tamsulosin (FLOMAX) 0.4 MG capsule Take 1 capsule (0.4 mg) by mouth daily 90 capsule 3     torsemide (DEMADEX) 10 MG tablet TAKE 1 TABLET BY MOUTH ONCE DAILY (TAKE WITH 40MG FOR A TOTAL OF 50MG) 30 tablet 11     torsemide (DEMADEX) 20 MG tablet TAKE TWO TABLETS (40MG) BY MOUTH ONCE DAILY (TAKE WITH 10MG FOR A TOTAL OF 50MG) 60 tablet 11     REVIEW OF SYSTEMS:  4 point ROS including Respiratory, CV, GI and , other than that noted in the HPI,  is negative    Objective:  BP (!) 164/91   Pulse 81    Temp 98.6  F (37  C)   Resp 18   Ht 1.829 m (6')   Wt 129.2 kg (284 lb 12.8 oz)   SpO2 96%   BMI 38.63 kg/m    Exam:  GENERAL APPEARANCE:  Alert, his pants are wet, evidence of urinary dribbling and or full incontinence.  RESP:  diminished breath sounds bilaterally bases, rhonchi mid upper lobes, cough non-productive, using accessory muscles  CV:  Palpation and auscultation of heart done , regular rate and rhythm, no murmur, rub, or gallop, peripheral edema 2-3+ in bilateral lower extremities up to knees  ABDOMEN:  normal bowel sounds, soft, nontender, no hepatosplenomegaly or other masses  M/S:   Gait and station abnormal wide based shuffling gait  Digits and nails abnormal hyprtrophic, dermatophite  SKIN:  Inspection of skin and subcutaneous tissue baseline, Palpation of skin and subcutaneous tissue baseline, bilateral legs wrapped and dressings are dry  PSYCH:  insight and judgement impaired, memory impaired     Labs:   Recent labs in [facility] reviewed by me today.     ASSESSMENT/PLAN:  (I10) Uncontrolled hypertension  (primary encounter diagnosis)  Comment: uncontrolled severe  Plan: lisinopril (ZESTRIL) 10 MG tablet this is an increase will f/u with 3 times weekly  Blood pressures  Likely will need to increase more.  Will continue current Metoprolol for now.  Monitor for s/sx chest pain, tightness, increased dyspnea, diaphoresis    (I73.9) PVD (peripheral vascular disease) (H) severe  Comment: severe with chronic open areas  Plan: continue Home Care for wound therapy and wraps     (I50.22) Chronic systolic congestive heart failure (H)  (Z87.09) Hx of acute respiratory failure  Comment: refuses oxygen  Plan: see hypertension management  Continue Metoprolol    (J44.9) Chronic obstructive pulmonary disease, unspecified COPD type (H)  (F17.200) Tobacco dependence syndrome  Comment: not interested in quitting smoking, refuses supplemental oxygen even at night  Plan: continue nebulizer treatments      Electronically  signed by:  ZOHREH Witt CNP

## 2020-02-26 PROBLEM — J96.01 ACUTE RESPIRATORY FAILURE WITH HYPOXIA (H): Status: ACTIVE | Noted: 2020-01-01

## 2020-02-26 PROBLEM — I50.9 CHF (CONGESTIVE HEART FAILURE) (H): Status: ACTIVE | Noted: 2020-01-01

## 2020-02-26 PROBLEM — J96.01 ACUTE RESPIRATORY FAILURE WITH HYPOXIA (H): Status: RESOLVED | Noted: 2020-01-01 | Resolved: 2020-01-01

## 2020-02-26 PROBLEM — J44.9 CHRONIC OBSTRUCTIVE PULMONARY DISEASE, UNSPECIFIED COPD TYPE (H): Status: ACTIVE | Noted: 2020-01-01

## 2020-03-04 NOTE — TELEPHONE ENCOUNTER
1. Bilateral cellulitis of lower leg  ORDERED STAT HE IS REFUSING TO GO TO THE HOSPITAL  - cephALEXin (KEFLEX) 500 MG capsule; Take 1 capsule (500 mg) by mouth 2 times daily for 10 days  Dispense: 20 capsule; Refill: 0  ZOHREH Witt CNP on 3/4/2020 at 4:56 PM

## 2020-03-05 PROBLEM — T14.8XXA SKIN EXCORIATION: Status: ACTIVE | Noted: 2020-01-01

## 2020-03-05 PROBLEM — B37.49 CANDIDIASIS OF SCROTUM: Status: ACTIVE | Noted: 2020-01-01

## 2020-03-05 PROBLEM — L97.311 VENOUS STASIS ULCER OF RIGHT ANKLE LIMITED TO BREAKDOWN OF SKIN WITHOUT VARICOSE VEINS (H): Status: ACTIVE | Noted: 2020-01-01

## 2020-03-05 PROBLEM — I87.2 VENOUS STASIS ULCER OF RIGHT ANKLE LIMITED TO BREAKDOWN OF SKIN WITHOUT VARICOSE VEINS (H): Status: ACTIVE | Noted: 2020-01-01

## 2020-03-05 NOTE — PROGRESS NOTES
[facility]  [place] Medical Record Number:  [id number]  Place of Service where encounter took place:  [facility] [[id number]]  Chief Complaint   Patient presents with     Chronic Disease Management       HPI:    Cooper Cabral  is a 64 year old (1955), who is being seen today for an episodic care visit.  HPI information obtained from: facility chart records, facility staff, patient report and [facility] Epic chart review. Today's concern is:  PVD (peripheral vascular disease) (H) severe  Venous stasis ulcer of right ankle limited to breakdown of skin without varicose veins (H)  Cellulitis of lower extremity, unspecified laterality  I am seeing Cooper today because there has been a change in his leg around the right ankle that has an ulcer - increased warmth and tenderness.  He is incontinent of bladder mostly but several times a week he will be incontinent of bowel.  States he doesn't care and will often not allow staff to help him clean up.  Unsure if he has sensation awareness or if it is he is unmotivated to make it to the toilet. His dressings and wounds are contaminated daily with urine.  Fortunately today the cellulitis is caught early and will not need IV antibiotics.  States he never wants to go to the hospital every again.  He also stated that if his heart should stop or he should stop breathing not to do CPR   [facility] home care RN does come 2-3 times weekly to do dressing changes on his legs.    Urinary and fecal incontinence  With non-compliance to change soiled pants.  I did discuss with him the consequence of this behavior will ultimately move him to a higher level of care meaning a nursing home.  He didn't like that answer, but was unwilling to say he would change his wet /soiled pants before he left the apartment.  He also declined wearing incontinent brief as well.      Skin excoriation  Candidiasis of scrotum  Due to poor hygiene and  incontinence.  He did agree to allowing staff to apply creams to help heal.      Past Medical and Surgical History reviewed in Epic today.    MEDICATIONS:    Current Outpatient Medications   Medication Sig Dispense Refill     ACCU-CHEK MULTICLIX LANCETS MISC tests twice a day 100 3     ASPIRIN LOW DOSE 81 MG chewable tablet CHEW AND SWALLOW 1 TABLET BY MOUTH ONCE DAILY 31 tablet 98     atorvastatin (LIPITOR) 40 MG tablet TAKE 1 TABLET BY MOUTH EVERY EVENING 30 tablet 11     Danielle Protect (EUCERIN) external cream Apply 2 times daily to perianal area 142 g 11     blood glucose calibration (NO BRAND SPECIFIED) solution To accompany: Blood Glucose Monitor Brands: per insurance. 1 Bottle 3     blood glucose monitoring (NO BRAND SPECIFIED) meter device kit Use to test blood sugar 1 times daily or as directed. Preferred blood glucose meter OR supplies to accompany: Blood Glucose Monitor Brands: per insurance. 1 kit 0     blood glucose monitoring (NO BRAND SPECIFIED) test strip Use to test blood sugar 1 times daily or as directed. To accompany: Blood Glucose Monitor Brands: per insurance. 100 strip 6     clopidogrel (PLAVIX) 75 MG tablet Take 1 tablet (75 mg) by mouth daily 90 tablet 3     dulaglutide (TRULICITY) 0.75 MG/0.5ML pen Inject 0.75 mg Subcutaneous every 7 days 2 mL 11     escitalopram (LEXAPRO) 20 MG tablet TAKE 1 TABLET BY MOUTH ONCE DAILY (START AFTER 10MG DOSE IS COMPLETE) 30 tablet 11     insulin pen needle (B-D U/F) 31G X 8 MM miscellaneous Use once daily or as directed. 90 each 1     lisinopril (ZESTRIL) 10 MG tablet Take 1 tablet (10 mg) by mouth daily 30 tablet 3     metFORMIN (GLUCOPHAGE) 1000 MG tablet Take 1 tablet (1,000 mg) by mouth 2 times daily (with meals) 180 tablet 3     metoprolol succinate ER (TOPROL-XL) 100 MG 24 hr tablet Take 1 tablet (100 mg) by mouth daily 90 tablet 3     Multiple Vitamins-Minerals (MULTI FOR HIM PO) Take 1 capsule by mouth daily.       nystatin (MYCOSTATIN) 631949 UNIT/GM  external powder Apply topically 2 times daily 60 g 3     order for DME Equipment being ordered: seat lift 1 Device 0     order for DME Diabetic shoes due to neuropathy 1 Device 0     pregabalin (LYRICA) 150 MG capsule TAKE 1 CAPSULE BY MOUTH TWICE DAILY 60 capsule 5     tamsulosin (FLOMAX) 0.4 MG capsule Take 1 capsule (0.4 mg) by mouth daily 90 capsule 3     torsemide (DEMADEX) 10 MG tablet TAKE 1 TABLET BY MOUTH ONCE DAILY (TAKE WITH 40MG FOR A TOTAL OF 50MG) 30 tablet 11     torsemide (DEMADEX) 20 MG tablet TAKE TWO TABLETS (40MG) BY MOUTH ONCE DAILY (TAKE WITH 10MG FOR A TOTAL OF 50MG) 60 tablet 11       REVIEW OF SYSTEMS:  10 point ROS of systems including Constitutional, Eyes, Respiratory, Cardiovascular, Gastroenterology, Genitourinary, Integumentary, Musculoskeletal, Psychiatric were all negative except for pertinent positives noted in my HPI.    Objective:  /80   Pulse 81   Temp 98.6  F (37  C)   Exam:  GENERAL APPEARANCE:  Alert, cooperative  RESP:  respiratory effort and palpation of chest normal, lungs clear to auscultation , no respiratory distress, crackles fine at bases  CV:  Palpation and auscultation of heart done , regular rate and rhythm, no murmur, rub, or gallop, peripheral edema 3+ in bilateral legs up to knees.  compression wraps on  ABDOMEN:  normal bowel sounds, soft, nontender, no hepatosplenomegaly or other masses  M/S:   Gait and station abnormal wide based gait, uses walker for stability and endurance  Digits and nails abnormal hyper trophic, fungal  SKIN:  Inspection of skin and subcutaneous tissue baseline, Palpation of skin and subcutaneous tissue baseline, scrotum bright red, groin folds bright red, buttocks excoriated  PSYCH:  oriented X 3, insight and judgement impaired, memory impaired     Labs:   Recent labs in [facility] reviewed by me today.     ASSESSMENT/PLAN:  (I73.9) PVD (peripheral vascular disease) (H) severe  (primary encounter diagnosis)  With (I87.2,  L97.311) Venous  stasis ulcer of right ankle limited to breakdown of skin without varicose veins (H)  (L03.119) Cellulitis of lower extremity, unspecified laterality  Comment: acute exacerbation  Plan: will start him on Keflex 500 mg BID x 7 days and see how he does.  We treat early and aggressively with diabetic patients.   Need to look at getting more compression, discussed with home care RN and she wi    (R32,  R15.9) Urinary and fecal incontinence  Comment: unsure if it is functional or behavioral  Plan: he declines wearing incontinent briefs  If behavior continues he will need to go to an higher skilled level of care where they have more staff to help toilet more frequently .    (T14.8XXA) Skin excoriation  Comment: perianal area acute exacerbation  Plan: Danielle Protect (EUCERIN) external cream AT least once daily            (B37.49) Candidiasis of scrotum  Comment: acute exacerbation  Plan: nystatin (MYCOSTATIN) 961777 UNIT/GM external         Powder BID            Electronically signed by:  ZOHREH Witt CNP

## 2020-04-05 NOTE — PROGRESS NOTES
[facility] now requests orders and shares plan of care/discharge summaries for some patients through [employer].  Please REPLY TO THIS MESSAGE OR ROUTE BACK TO THE AUTHOR in order to give authorization for orders when needed.  This is considered a verbal order, you will still receive a faxed copy of orders for signature.  Thank you for your assistance in improving collaboration for our patients.    ORDER REQUEST:  SN: 1 wk 5, 3 wk 4, 2 PRN  For wound assessment, education and cares  OT: 1 visit to evaluate and treat  For DME and cognitive assessment  PT: pt refused services    Stephanie Childs RN  [phone]

## 2020-04-05 NOTE — PROGRESS NOTES
Request for wound care order     On 4/5/2020 writer performed the following wound care on BLE:  1. Legs cleansed with wound cleanser  2. Adaptic applied to open ulcers on legs  3. ABD pads placed and covered with kerlix  4. Secured with tape  5. Placed tubi- on both legs  6. Change dressings daily due to heavy drainage. Facility nursing to complete on non-home care days and PRN.    Please advise if wound orders should be altered. Requesting WOCN to assess pt as soon as possible.    Thank you,  Stephanie Childs RN  [phone]

## 2020-04-09 NOTE — TELEPHONE ENCOUNTER
VM left on 4/9 at 11:33.    Brayan from [facility] is requesting new orders. Modified alina boot both legs and apply gel to toes and knees cover with gauze dressing three times weekly or daily PRN.   Call back number: [phone]    Will forward to patient's PCP.    Rema Bee    [facility]

## 2020-04-15 NOTE — TELEPHONE ENCOUNTER
[facility] now requests orders and shares plan of care/discharge summaries for some patients through [employer].  Please REPLY TO THIS MESSAGE OR ROUTE BACK TO THE AUTHOR in order to give authorization for orders when needed.  This is considered a verbal order, you will still receive a faxed copy of orders for signature.  Thank you for your assistance in improving collaboration for our patients.    ORDER  OT lymphedema therapy 1w1, 2w4    MD SUMMARY/PLAN OF CARE  Plan to reduce BLE edema and assist with wound cares using gradient compression bandaging, transition back into compression garments and provide long term management education

## 2020-04-17 NOTE — TELEPHONE ENCOUNTER
1. Diarrhea, unspecified type   is on an antibiotic and has required a lot of antibiotics due to l/e infected wounds.  The current diarrhea is concerning for C Diff, will need to rule it out before trying Imodium    - cholestyramine (QUESTRAN) 4 g packet; Take 1 packet (4 g) by mouth At Bedtime  Dispense: 60 packet; Refill: 3      ZOHREH Witt CNP on 4/17/2020 at 1:10 PM

## 2020-05-04 NOTE — PROGRESS NOTES
Nohelia,    Requesting your response of approval for the following wound care changes. FYI Left leg almost healed, RLE is getting better but would like to start a antibacterial dressing as follows.     BLE wound cares.    Cleanse all wounds with NS or Wound Cleanser, dry well.  Apply lotion all intact skin  Apply Silvasorb gel to knee wounds, then primapore dressings.  LLE wounds, apply silvasorb and primapore dressings.  RLE, Apply silvasorb gel, then Hydrofera blue ready dressings (Also use ABD pad as needed for drainage). Secure with kerlix, tape.   Compression wraps Per OT recs.   Apply silvasorb gel to toe wounds, then adaptic, gauze.    Change 2 week and PRN.    Also,    Starting next week. SN visits 2x week x 3 weeks, then 1 week for 1 week, discontinue or RCT visit. 3 PRN visits    Thank you Leeann, Michael Stromberg BSN, RN, CWOCN  [phone]  [email or web address]

## 2020-05-14 NOTE — TELEPHONE ENCOUNTER
Labs drawn today: Cr 7.23, redraw Cr >7, baseline Cr generally 1.40-1.60. Question accuracy: all lytes normal; Cr, GFR & BUN drastically different from baseline. Nurse no longer onsite at [facility] as it's after 5pm. Call placed to [facility] on-call nurse to discuss results. Plan to have BMP redrawn tomorrow morning. She is unable to place order this evening but will leave message for staff. If unable to get results tomorrow, consider sending to ED for labs rather than waiting until Monday.     HEATHER Colon  5/14/20

## 2020-05-14 NOTE — RESULT ENCOUNTER NOTE
Provider noting oddly elevated creatinine/BUN and significantly low GFR with normal electrolytes. These results are significantly abnormal, were not flagged as critical, and are vastly different from patient's baseline.     Provider contacted the managing lab partner, and spoke with  David, who will be re-running their current sample. PCP team to follow-up and consider rechecks if results are still dramatically off from baseline.

## 2020-05-15 NOTE — TELEPHONE ENCOUNTER
[facility] now requests orders and shares plan of care/discharge summaries for some patients through [employer].  Please REPLY TO THIS MESSAGE OR ROUTE BACK TO THE AUTHOR in order to give authorization for orders when needed.  This is considered a verbal order, you will still receive a faxed copy of orders for signature.  Thank you for your assistance in improving collaboration for our patients.    ORDER  OT lymphedema therapy 2w3    MD SUMMARY/PLAN OF CARE  Continued treatment to address BLE edema

## 2020-05-18 PROBLEM — N18.6 ESRF (END STAGE RENAL FAILURE) (H): Status: ACTIVE | Noted: 2020-01-01

## 2020-05-18 NOTE — LETTER
"    5/18/2020        RE: Cooper Cabral  [facility]  [address] [employer] [id number]        [facility]   Cooper Cabral is being evaluated via a billable video visit due to the restrictions of the Covid-19 pandemic.   The patient has been notified of following:  \"This video visit will be conducted via a call between you and your provider. We have found that certain health care needs can be provided without the need for an in-person physical exam.  This service lets us provide the care you need with a video conversation. If during the course of the call the provider feels a video visit is not appropriate, you will not be charged for this service.\"   The provider has received verbal consent for a Video Visit from the patient or first contact? Yes  Patient  or facility staff would like the video invitation sent by: Text to cell phone: [phone]  Video Start Time: 12:01    [facility] Medical Record Number:  [id number]  Place of Location at the time of visit: [facility]   Chief Complaint   Patient presents with     kidney failure     hospice     HPI:  Cooper Cabral  is a 64 year old (1955), who is being seen today for a visit.  HPI information obtained from: facility chart records, facility staff, patient report and [facility] Epic chart review. Today's concern is:     ESRF (end stage renal failure) (H)  Type 2 diabetes mellitus with diabetic neuropathy, with long-term current use of insulin (H)  Left-sided weakness  Ischemic stroke (H)  Chronic obstructive pulmonary disease, unspecified COPD type (H)  PVD (peripheral vascular disease) (H)  Chronic systolic congestive heart failure (H)  Edema of both legs  Venous stasis ulcer of right ankle limited to breakdown of skin without varicose veins (H)  Continuous leakage of urine   Received BMP results to find that Mr. Cabral's Cr is > 7.  He still makes urine.  He has make it very clear since his last " hospitalization that he never wants to return to the hospital.  He also does not want dialysis.  We do have him on aggressive diuretic, however his legs continue to have firm +4 edema and forming blisters that weep.  He is non-compliant with elevating his legs, he is unclean and refuses to be cleaned up even when his pants are soiled with urine and feces.  Of late, he has been too weak to get down to the smoking area to smoke which has decreased his quality of life which have been to smoke, eat, and sit and watch TV 12 - 16 hours a day.  I did speak with his brother Beau regarding the Kidney failure and suggested Hospice, he agreed that is is time.  Wondering if Cooper will stay in his [place] apartment or need to move because of more care needed.  Due to the Covid 19 Pandemic, it has been difficulty finding a LTC that is admitting new residents.  Cooper is negative as per test done on 5/7 when he was weak, lethargic, hypoxic,  and low grade fever.  It was at that time we did the BMP and found him in acute renal failure.  On this day of the video visit, he is actually looking  pretty good, eyes are bright.  I do see facial edema and of course a smokers cough.      Past Medical and Surgical History reviewed in Epic today.  MEDICATIONS:    Current Outpatient Medications   Medication Sig Dispense Refill     ACCU-CHEK MULTICLIX LANCETS MISC tests twice a day 100 3     ASPIRIN LOW DOSE 81 MG chewable tablet CHEW AND SWALLOW 1 TABLET BY MOUTH ONCE DAILY 31 tablet 98     Danielle Protect (EUCERIN) external cream Apply 2 times daily to perianal area 142 g 11     blood glucose calibration (NO BRAND SPECIFIED) solution To accompany: Blood Glucose Monitor Brands: per insurance. 1 Bottle 3     blood glucose monitoring (NO BRAND SPECIFIED) meter device kit Use to test blood sugar 1 times daily or as directed. Preferred blood glucose meter OR supplies to accompany: Blood Glucose Monitor Brands: per insurance. 1 kit 0     blood glucose  monitoring (NO BRAND SPECIFIED) test strip Use to test blood sugar 1 times daily or as directed. To accompany: Blood Glucose Monitor Brands: per insurance. 100 strip 6     cholestyramine (QUESTRAN) 4 g packet Take 1 packet (4 g) by mouth At Bedtime 60 packet 3     clopidogrel (PLAVIX) 75 MG tablet Take 1 tablet (75 mg) by mouth daily 90 tablet 3     dulaglutide (TRULICITY) 0.75 MG/0.5ML pen Inject 0.75 mg Subcutaneous every 7 days 2 mL 11     escitalopram (LEXAPRO) 20 MG tablet TAKE 1 TABLET BY MOUTH ONCE DAILY (START AFTER 10MG DOSE IS COMPLETE) 30 tablet 11     insulin pen needle (B-D U/F) 31G X 8 MM miscellaneous Use once daily or as directed. 90 each 1     metoprolol succinate ER (TOPROL-XL) 100 MG 24 hr tablet Take 1 tablet (100 mg) by mouth daily 90 tablet 3     Multiple Vitamins-Minerals (MULTI FOR HIM PO) Take 1 capsule by mouth daily.       nystatin (MYCOSTATIN) 016005 UNIT/GM external powder Apply topically 2 times daily 60 g 3     order for DME Diabetic shoes due to neuropathy 1 Device 0     tamsulosin (FLOMAX) 0.4 MG capsule Take 1 capsule (0.4 mg) by mouth daily 90 capsule 3     torsemide (DEMADEX) 10 MG tablet TAKE 1 TABLET BY MOUTH ONCE DAILY (TAKE WITH 40MG FOR A TOTAL OF 50MG) 30 tablet 11     order for DME Equipment being ordered: seat lift 1 Device 0     pregabalin (LYRICA) 150 MG capsule TAKE 1 CAPSULE BY MOUTH TWICE DAILY 60 capsule 5     REVIEW OF SYSTEMS: 4 point ROS including Respiratory, CV, GI and , other than that noted in the HPI,  is negative  Objective: Temp 96.1  F (35.6  C)   SpO2 97%   Limited visit exam done given COVID-19 precautions.   GENERAL APPEARANCE:  Alert, in no distress, morbidly obese  EYES:  Conjunctiva and lids normal  RESP:  cough dry, using accessory muscles  CV:  peripheral edema 4+ in bilateral lower extremities up into thighs  ABDOMEN:  obese  M/S:   Gait and station abnormal wheelchair, walker ,  Toenails are hypertrophic - fungal  SKIN:  bilateral severe  venous stasis changes with ulcerations and blistering, shins are ruberous in color  PSYCH:  oriented X 3, insight and judgement impaired  Labs:     Most Recent 3 BMP's:  Recent Labs   Lab Test 05/15/20  1635 05/13/20  1130 12/30/19  0639    142 140   POTASSIUM 4.1 4.8 4.1   CHLORIDE 104 104 104   CO2 32 31 34*   * 108* 21   CR 7.18* 7.23* 1.35*   ANIONGAP 7 7 2*   MAINE 8.5 9.0 9.0   * 141* 153*       ASSESSMENT/PLAN:  (N18.6) ESRF (end stage renal failure) (H)  (primary encounter diagnosis)  Comment: GFR 7.  Declines dialysis and hospitalization  Plan: refer to Hospice.  Focus of care now comfort.    (E11.40,  Z79.4) Type 2 diabetes mellitus with diabetic neuropathy, with long-term current use of insulin (H)  Comment: uncontrolled due to patient choice of diet non-compliance  Plan: contributing to renal failure    (R53.1) Left-sided weakness  (I63.9) Ischemic stroke (H)  Comment: requires assistance with all ADLs, mobility  Plan: will continue Plavix and ASA which also help with l/e circulation    (J44.9) Chronic obstructive pulmonary disease, unspecified COPD type (H)  Comment: active smoker - has gone 2 weeks without smoking due to declining functional status  Plan: will continue with inhalers.  He refuses to wear oxygen even when O2 say drop below 80.      (I73.9) PVD (peripheral vascular disease) (H) severe  Comment: we will not heal the wounds, goal is to keep from becoming infected  Plan: continue wound care as per home care wound nurse who will continue to follow along with Hospice    (I50.22) Chronic systolic congestive heart failure (H)  Comment: uncompensated   Plan: continue aggressive diuretics  Will no longer follow labs unless ok'd by hospice.  Goals of care have changed.  We will treat symptoms versus numbers    (R60.0) Edema of both legs  (I87.2,  L97.311) Venous stasis ulcer of right ankle limited to breakdown of skin without varicose veins (H)  Comment: chronic with  exacerbation  Plan: see above    (N39.45) Continuous leakage of urine  Comment: he is to wear incontinent brief - known history of non-compliance   Plan: staff assist with brief changes       Electronically signed by:  ZOHREH Witt CNP     Video-Visit Details  Type of service:  Video Visit  Video End Time (time video stopped): 12:17  Distant Location (provider location):  [facility]           Sincerely,        ZOHREH Witt CNP

## 2020-05-18 NOTE — PROGRESS NOTES
"[place] [facility]   Cooper Cabral is being evaluated via a billable video visit due to the restrictions of the Covid-19 pandemic.   The patient has been notified of following:  \"This video visit will be conducted via a call between you and your provider. We have found that certain health care needs can be provided without the need for an in-person physical exam.  This service lets us provide the care you need with a video conversation. If during the course of the call the provider feels a video visit is not appropriate, you will not be charged for this service.\"   The provider has received verbal consent for a Video Visit from the patient or first contact? Yes  Patient  or facility staff would like the video invitation sent by: Text to cell phone: [phone]  Video Start Time: 12:01    [place] Medical Record Number:  [id number]  Place of Location at the time of visit: [facility]   Chief Complaint   Patient presents with     kidney failure     hospice     HPI:  Cooper Cabral  is a 64 year old (1955), who is being seen today for a visit.  HPI information obtained from: facility chart records, facility staff, patient report and [facility] chart review. Today's concern is:     ESRF (end stage renal failure) (H)  Type 2 diabetes mellitus with diabetic neuropathy, with long-term current use of insulin (H)  Left-sided weakness  Ischemic stroke (H)  Chronic obstructive pulmonary disease, unspecified COPD type (H)  PVD (peripheral vascular disease) (H)  Chronic systolic congestive heart failure (H)  Edema of both legs  Venous stasis ulcer of right ankle limited to breakdown of skin without varicose veins (H)  Continuous leakage of urine   Received BMP results to find that Mr. Cabral's Cr is > 7.  He still makes urine.  He has make it very clear since his last hospitalization that he never wants to return to the hospital.  He also does not want dialysis.  We do have him on " aggressive diuretic, however his legs continue to have firm +4 edema and forming blisters that weep.  He is non-compliant with elevating his legs, he is unclean and refuses to be cleaned up even when his pants are soiled with urine and feces.  Of late, he has been too weak to get down to the smoking area to smoke which has decreased his quality of life which have been to smoke, eat, and sit and watch TV 12 - 16 hours a day.  I did speak with his brother Beau regarding the Kidney failure and suggested Hospice, he agreed that is is time.  Wondering if Cooper will stay in his AL apartment or need to move because of more care needed.  Due to the Covid 19 Pandemic, it has been difficulty finding a LTC that is admitting new residents.  Cooper is negative as per test done on 5/7 when he was weak, lethargic, hypoxic,  and low grade fever.  It was at that time we did the BMP and found him in acute renal failure.  On this day of the video visit, he is actually looking  pretty good, eyes are bright.  I do see facial edema and of course a smokers cough.      Past Medical and Surgical History reviewed in Epic today.  MEDICATIONS:    Current Outpatient Medications   Medication Sig Dispense Refill     ACCU-CHEK MULTICLIX LANCETS MISC tests twice a day 100 3     ASPIRIN LOW DOSE 81 MG chewable tablet CHEW AND SWALLOW 1 TABLET BY MOUTH ONCE DAILY 31 tablet 98     Danielle Protect (EUCERIN) external cream Apply 2 times daily to perianal area 142 g 11     blood glucose calibration (NO BRAND SPECIFIED) solution To accompany: Blood Glucose Monitor Brands: per insurance. 1 Bottle 3     blood glucose monitoring (NO BRAND SPECIFIED) meter device kit Use to test blood sugar 1 times daily or as directed. Preferred blood glucose meter OR supplies to accompany: Blood Glucose Monitor Brands: per insurance. 1 kit 0     blood glucose monitoring (NO BRAND SPECIFIED) test strip Use to test blood sugar 1 times daily or as directed. To accompany: Blood  Glucose Monitor Brands: per insurance. 100 strip 6     cholestyramine (QUESTRAN) 4 g packet Take 1 packet (4 g) by mouth At Bedtime 60 packet 3     clopidogrel (PLAVIX) 75 MG tablet Take 1 tablet (75 mg) by mouth daily 90 tablet 3     dulaglutide (TRULICITY) 0.75 MG/0.5ML pen Inject 0.75 mg Subcutaneous every 7 days 2 mL 11     escitalopram (LEXAPRO) 20 MG tablet TAKE 1 TABLET BY MOUTH ONCE DAILY (START AFTER 10MG DOSE IS COMPLETE) 30 tablet 11     insulin pen needle (B-D U/F) 31G X 8 MM miscellaneous Use once daily or as directed. 90 each 1     metoprolol succinate ER (TOPROL-XL) 100 MG 24 hr tablet Take 1 tablet (100 mg) by mouth daily 90 tablet 3     Multiple Vitamins-Minerals (MULTI FOR HIM PO) Take 1 capsule by mouth daily.       nystatin (MYCOSTATIN) 949669 UNIT/GM external powder Apply topically 2 times daily 60 g 3     order for DME Diabetic shoes due to neuropathy 1 Device 0     tamsulosin (FLOMAX) 0.4 MG capsule Take 1 capsule (0.4 mg) by mouth daily 90 capsule 3     torsemide (DEMADEX) 10 MG tablet TAKE 1 TABLET BY MOUTH ONCE DAILY (TAKE WITH 40MG FOR A TOTAL OF 50MG) 30 tablet 11     order for DME Equipment being ordered: seat lift 1 Device 0     pregabalin (LYRICA) 150 MG capsule TAKE 1 CAPSULE BY MOUTH TWICE DAILY 60 capsule 5     REVIEW OF SYSTEMS: 4 point ROS including Respiratory, CV, GI and , other than that noted in the HPI,  is negative  Objective: Temp 96.1  F (35.6  C)   SpO2 97%   Limited visit exam done given COVID-19 precautions.   GENERAL APPEARANCE:  Alert, in no distress, morbidly obese  EYES:  Conjunctiva and lids normal  RESP:  cough dry, using accessory muscles  CV:  peripheral edema 4+ in bilateral lower extremities up into thighs  ABDOMEN:  obese  M/S:   Gait and station abnormal wheelchair, walker ,  Toenails are hypertrophic - fungal  SKIN:  bilateral severe venous stasis changes with ulcerations and blistering, shins are ruberous in color  PSYCH:  oriented X 3, insight and  judgement impaired  Labs:     Most Recent 3 BMP's:  Recent Labs   Lab Test 05/15/20  1635 05/13/20  1130 12/30/19  0639    142 140   POTASSIUM 4.1 4.8 4.1   CHLORIDE 104 104 104   CO2 32 31 34*   * 108* 21   CR 7.18* 7.23* 1.35*   ANIONGAP 7 7 2*   MAINE 8.5 9.0 9.0   * 141* 153*       ASSESSMENT/PLAN:  (N18.6) ESRF (end stage renal failure) (H)  (primary encounter diagnosis)  Comment: GFR 7.  Declines dialysis and hospitalization  Plan: refer to Hospice.  Focus of care now comfort.    (E11.40,  Z79.4) Type 2 diabetes mellitus with diabetic neuropathy, with long-term current use of insulin (H)  Comment: uncontrolled due to patient choice of diet non-compliance  Plan: contributing to renal failure    (R53.1) Left-sided weakness  (I63.9) Ischemic stroke (H)  Comment: requires assistance with all ADLs, mobility  Plan: will continue Plavix and ASA which also help with l/e circulation    (J44.9) Chronic obstructive pulmonary disease, unspecified COPD type (H)  Comment: active smoker - has gone 2 weeks without smoking due to declining functional status  Plan: will continue with inhalers.  He refuses to wear oxygen even when O2 say drop below 80.      (I73.9) PVD (peripheral vascular disease) (H) severe  Comment: we will not heal the wounds, goal is to keep from becoming infected  Plan: continue wound care as per home care wound nurse who will continue to follow along with Hospice    (I50.22) Chronic systolic congestive heart failure (H)  Comment: uncompensated   Plan: continue aggressive diuretics  Will no longer follow labs unless ok'd by hospice.  Goals of care have changed.  We will treat symptoms versus numbers    (R60.0) Edema of both legs  (I87.2,  L97.311) Venous stasis ulcer of right ankle limited to breakdown of skin without varicose veins (H)  Comment: chronic with exacerbation  Plan: see above    (N39.45) Continuous leakage of urine  Comment: he is to wear incontinent brief - known history of  non-compliance   Plan: staff assist with brief changes       Electronically signed by:  ZOHREH Witt CNP     Video-Visit Details  Type of service:  Video Visit  Video End Time (time video stopped): 12:17  Distant Location (provider location):  [facility]

## 2020-05-18 NOTE — PROGRESS NOTES
[place] [facility] TELEPHONE ENCOUNTER    Chief complaint:  Hospice referral      Cooper Cabral is a 64 year old  (1955), who is in end stage renal failure, he has stated he does not want dialysis, he does not want to go to the hospital, and he is ready to die.  I spoke with his brother Kwabena this morning who agrees that Hospice is the next step.  I also talked about his care needs and especially when he becomes more lethargic than he is right now he will need to move to the care center.      ASSESSMENT/PLAN  1. End-stage renal disease (H) (PRIMARY DIAGNOSIS)  - HOSPICE REFERRAL   2. Uncontrolled insulin-treated type 2 diabetes mellitus (H)  - HOSPICE REFERRAL  3. Chronic systolic congestive heart failure (H)  - HOSPICE REFERRAL      Will need to make arrangements to send him to [facility] if they have a bed.    Electronically signed by:   ZOHREH Witt CNP

## 2020-05-18 NOTE — PROGRESS NOTES
"[place] [facility]   Cooper Cabral is being evaluated via a billable video visit due to the restrictions of the Covid-19 pandemic.   The patient has been notified of following:  ***\"This video visit will be conducted via a call between you and your provider. We have found that certain health care needs can be provided without the need for an in-person physical exam.  This service lets us provide the care you need with a video conversation. If during the course of the call the provider feels a video visit is not appropriate, you will not be charged for this service.\"   The provider has received verbal consent for a Video Visit from the patient or first contact? {YES-NO  Default Yes:4444::\"Yes\"}  Patient  or facility staff would like the video invitation sent by: {[facility] video visit invitation:899240::\"N/A \"}  Video Start Time: {video visit start time:133298}  {if you used Care Everywhere, delete this and enter \".fgscarewhere\"}  [place] Medical Record Number:  [id number]  Place of Location at the time of visit: [facility]  Chief Complaint   Patient presents with     Nursing Home Acute     hospice     HPI:  Cooper Cabral  is a 64 year old (1955), who is being seen today for a visit.  HPI information obtained from: {FGS HPI:156877}. Today's concern is:  {FGS DX:878344}    Past Medical and Surgical History reviewed in Epic today.  MEDICATIONS:  {Providers Please double check the med list (in the plan section >> meds & orders tab) and Discontinue any of the meds flagged by the TC to be discontinued}  Current Outpatient Medications   Medication Sig Dispense Refill     ACCU-CHEK MULTICLIX LANCETS MISC tests twice a day 100 3     ASPIRIN LOW DOSE 81 MG chewable tablet CHEW AND SWALLOW 1 TABLET BY MOUTH ONCE DAILY 31 tablet 98     Danielle Protect (EUCERIN) external cream Apply 2 times daily to perianal area 142 g 11     blood glucose calibration (NO BRAND SPECIFIED) solution To accompany: " Blood Glucose Monitor Brands: per insurance. 1 Bottle 3     blood glucose monitoring (NO BRAND SPECIFIED) meter device kit Use to test blood sugar 1 times daily or as directed. Preferred blood glucose meter OR supplies to accompany: Blood Glucose Monitor Brands: per insurance. 1 kit 0     blood glucose monitoring (NO BRAND SPECIFIED) test strip Use to test blood sugar 1 times daily or as directed. To accompany: Blood Glucose Monitor Brands: per insurance. 100 strip 6     cholestyramine (QUESTRAN) 4 g packet Take 1 packet (4 g) by mouth At Bedtime 60 packet 3     clopidogrel (PLAVIX) 75 MG tablet Take 1 tablet (75 mg) by mouth daily 90 tablet 3     dulaglutide (TRULICITY) 0.75 MG/0.5ML pen Inject 0.75 mg Subcutaneous every 7 days 2 mL 11     escitalopram (LEXAPRO) 20 MG tablet TAKE 1 TABLET BY MOUTH ONCE DAILY (START AFTER 10MG DOSE IS COMPLETE) 30 tablet 11     insulin pen needle (B-D U/F) 31G X 8 MM miscellaneous Use once daily or as directed. 90 each 1     metoprolol succinate ER (TOPROL-XL) 100 MG 24 hr tablet Take 1 tablet (100 mg) by mouth daily 90 tablet 3     Multiple Vitamins-Minerals (MULTI FOR HIM PO) Take 1 capsule by mouth daily.       nystatin (MYCOSTATIN) 710181 UNIT/GM external powder Apply topically 2 times daily 60 g 3     order for DME Equipment being ordered: seat lift 1 Device 0     order for DME Diabetic shoes due to neuropathy 1 Device 0     pregabalin (LYRICA) 150 MG capsule TAKE 1 CAPSULE BY MOUTH TWICE DAILY 60 capsule 5     tamsulosin (FLOMAX) 0.4 MG capsule Take 1 capsule (0.4 mg) by mouth daily 90 capsule 3     torsemide (DEMADEX) 10 MG tablet TAKE 1 TABLET BY MOUTH ONCE DAILY (TAKE WITH 40MG FOR A TOTAL OF 50MG) 30 tablet 11     torsemide (DEMADEX) 20 MG tablet TAKE TWO TABLETS (40MG) BY MOUTH ONCE DAILY (TAKE WITH 10MG FOR A TOTAL OF 50MG) 60 tablet 11   ***  REVIEW OF SYSTEMS: {CALLIE FGS:230137}  Objective: There were no vitals taken for this visit.  Limited visit exam done given  COVID-19 precautions.   {Nursing home physical exam :555640}  Labs:   {fgslab:665039}    ASSESSMENT/PLAN:  {FGS DX:565318}    {fgsorders:402301}  ***  Electronically signed by:  Agnes Arizmendi MA ***  {Providers Please double check the med list (in the plan section >> meds & orders tab) and Discontinue any of the meds flagged by the TC to be discontinued}  Video-Visit Details  Type of service:  Video Visit  Video End Time (time video stopped): ***  Distant Location (provider location):  [facility]

## 2020-05-20 NOTE — PROGRESS NOTES
Nohelia,    Wound drainage improved so we can stop the Hydrofera blue. New wound care approval need    BLE wound cares.     Cleanse all wounds with NS or Wound Cleanser, dry well.  Apply lotion all intact skin  Apply Silvasorb gel to all wounds on legs and toes, then adaptic, then kerlix, and ABD as needed. .  Compression wraps.    Change 2-3 week and PRN to control drainage until healed.    Thank you,    Michael Stromberg BSN, RN, CWOCN  [phone]  [email or web address]

## 2020-06-11 NOTE — PROGRESS NOTES
"[place] [facility]   Cooper Cabral is being evaluated via a billable video visit due to the restrictions of the Covid-19 pandemic.   The patient has been notified of following:    \"This video visit will be conducted via a call between you and your provider. We have found that certain health care needs can be provided without the need for an in-person physical exam.  This service lets us provide the care you need with a video conversation. If during the course of the call the provider feels a video visit is not appropriate, you will not be charged for this service.\"   The provider has received verbal consent for a Video Visit from the patient or first contact? Yes  Patient  or facility staff would like the video invitation sent by: N/A   Video Start Time: 2:13    [place] Medical Record Number:  [id number]  [facility] of [facility] at the time of visit: [facility]   Chief Complaint   Patient presents with     RECHECK     fall     HPI:  Cooper Cabral  is a 64 year old (1955), who is being seen today for a visit.  HPI information obtained from: facility chart records, facility staff, patient report and [facility] chart review. Today's concern is:    End Stage Renal Failure (H) - Hospice diagnosis  Chronic diastolic heart failure (H)  Hypertension goal BP (blood pressure) < 140/80  Has had falls with BP and Pulses low.  Is on a Beta blocker and over-controlled.  We are unable to diurese him effectively and his legs are rock hard with edema, blistering and weeping.  He does not want dialysis nor hospitalization, has chosen hospice.  We do encourage him to elevate his legs, however he forgets or just refuses.      Tobacco abuse  Has been unable to leave his apartment therefore forced to quit smoking.  It has been 2 weeks.  He isn't happy about that because his previous goal was to watch TV for at least 12 hrs a day and smoke.  They have closed the out door patio space to smoking, now " [facility] is a smoke free building.      Type 2 diabetes mellitus with diabetic neuropathy, with long-term current use of insulin (H)  PVD (peripheral vascular disease) (H) severe  Polyneuropathy associated with underlying disease (H) DM2  He is non-compliant with a heart healthy diabetic diet.  His brother sends him salty chips and cookies.  He has no desire to be compliant and reason he is on Hospice.  We are unable to manage his fluid volume, see above.      Obesity (BMI 35.0-39.9) with comorbidity (H)  Lifestyle choices as well as severe fluid retention.      Past Medical and Surgical History reviewed in Epic today.  MEDICATIONS:    Current Outpatient Medications   Medication Sig Dispense Refill     ACCU-CHEK MULTICLIX LANCETS MISC tests twice a day 100 3     Danielle Protect (EUCERIN) external cream Apply 2 times daily to perianal area 142 g 11     blood glucose calibration (NO BRAND SPECIFIED) solution To accompany: Blood Glucose Monitor Brands: per insurance. 1 Bottle 3     blood glucose monitoring (NO BRAND SPECIFIED) meter device kit Use to test blood sugar 1 times daily or as directed. Preferred blood glucose meter OR supplies to accompany: Blood Glucose Monitor Brands: per insurance. 1 kit 0     blood glucose monitoring (NO BRAND SPECIFIED) test strip Use to test blood sugar 1 times daily or as directed. To accompany: Blood Glucose Monitor Brands: per insurance. 100 strip 6     cholestyramine (QUESTRAN) 4 g packet Take 1 packet (4 g) by mouth At Bedtime 60 packet 3     clopidogrel (PLAVIX) 75 MG tablet Take 1 tablet (75 mg) by mouth daily 90 tablet 3     dulaglutide (TRULICITY) 0.75 MG/0.5ML pen Inject 0.75 mg Subcutaneous every 7 days 2 mL 11     escitalopram (LEXAPRO) 20 MG tablet TAKE 1 TABLET BY MOUTH ONCE DAILY (START AFTER 10MG DOSE IS COMPLETE) 30 tablet 11     insulin pen needle (B-D U/F) 31G X 8 MM miscellaneous Use once daily or as directed. 90 each 1     metoprolol succinate ER (TOPROL-XL) 50 MG 24 hr  tablet Take 1 tablet (50 mg) by mouth daily 30 tablet 4     nystatin (MYCOSTATIN) 687580 UNIT/GM external powder Apply topically 2 times daily 60 g 3     order for DME Equipment being ordered: seat lift 1 Device 0     order for DME Diabetic shoes due to neuropathy 1 Device 0     pregabalin (LYRICA) 150 MG capsule TAKE 1 CAPSULE BY MOUTH TWICE DAILY 60 capsule 5     tamsulosin (FLOMAX) 0.4 MG capsule Take 1 capsule (0.4 mg) by mouth daily 90 capsule 3     torsemide (DEMADEX) 10 MG tablet TAKE 1 TABLET BY MOUTH ONCE DAILY (TAKE WITH 40MG FOR A TOTAL OF 50MG) 30 tablet 11     REVIEW OF SYSTEMS: 10 point ROS of systems including Constitutional, Eyes, Respiratory, Cardiovascular, Gastroenterology, Genitourinary, Integumentary, Musculoskeletal, Psychiatric were all negative except for pertinent positives noted in my HPI.  Objective: /54   Pulse 58   Temp 97.6  F (36.4  C)   SpO2 97%   Limited visit exam done given COVID-19 precautions.   GENERAL APPEARANCE:  Alert, morbidly obese  ENT:  normal hearing acuity  EYES:  Conjunctiva and lids normal  RESP:  using accessory muscles, inspiratory phase > expiratory phase  CV:  peripheral edema 3-4+ in bilateral legs/thighs  ABDOMEN:  obese  M/S:   Gait and station abnormal wheelchair due to ROBERT, weakness in legs  Digits and nails abnormal onychomychosis due to dermatophyte  SKIN:  severe venous stasis dermatitis both legs, feet, with blisters and ulcers  Labs:   no new labs on hospice    ASSESSMENT/PLAN:  (N18.6) ESRF (end stage renal failure) (H)  (primary encounter diagnosis)  Comment: worsening  Plan: continue Hospice, do not hospitalize, no dialysis    (I50.32) Chronic diastolic heart failure (H)  (I10) Hypertension goal BP (blood pressure) < 140/80  Comment: uncompensated  Plan: metoprolol succinate ER (TOPROL-XL) 50 MG 24 hr decreased today from 100 mg daily.  He declines using supplemental oxygen which would help ease cardiac workload.  Continue current Torsemide -  consider changing to Bumex, will discuss with Hospice team      (Z72.0) Tobacco abuse  Comment: due to his failing health/strength has been forced to quit  Plan: continue supportive care    (E11.40,  Z79.4) Type 2 diabetes mellitus with diabetic neuropathy, with long-term current use of insulin (H)  (I73.9) PVD (peripheral vascular disease) (H) severe  (G63) Polyneuropathy associated with underlying disease (H) DM2  Comment: contributing to falls and functional decline  Plan: wheelchair as primary mode of transportation  Assist from staff to meet mobility needs, ADL needs.    (E66.01) Obesity (BMI 35.0-39.9) with comorbidity (H)  Comment: lifelong choices  Plan: supportive care      Electronically signed by:  ZOHREH Witt CNP     Video-Visit Details  Type of service:  Video Visit  Video End Time (time video stopped): 2:22  Distant Location (provider location):  [place] [facility]

## 2020-06-19 NOTE — TELEPHONE ENCOUNTER
Wayne Pozo,    I was asked to see Cooper today for worsening wounds on right foot/toes. He is very edematous, his legs are blistering again. He is very lethargic today. I emailed the hospice team, so that they go see him.    In regards to his toes. Wound care recs. Apply betadine to toe wounds. Then Aquacel AG, then gauze, tape. Change 2-3x week and PRN drainage.    Thank you,    Michael Stromberg BSN, RN, CWOCN  [phone]  [email or web address]

## 2020-06-22 PROBLEM — I50.32 CHRONIC DIASTOLIC HEART FAILURE (H): Status: ACTIVE | Noted: 2020-01-01

## 2020-08-13 NOTE — LETTER
8/13/2020        RE: Cooper Cabral  [facility]  [address] [facility] [address]        [facility]  [place] Medical Record Number:  [id number]  Place of Service where encounter took place:  [facility] [[id number]]  Chief Complaint   Patient presents with     RECHECK       HPI:    Cooper Cabral  is a 64 year old (1955), who is being seen today for an episodic care visit.  HPI information obtained from: facility chart records, facility staff, patient report, [facility] chart review and Hospice nurse. Today's concern is:  ESRF (end stage renal failure) (H)  Chronic diastolic heart failure (H)  Discussed case with Christiano, hospice nurse who reports that Cooper is having more sleepy days than alert days.  Starting to look dry.  So today I am seeing Mr. Cabral in his apartment sitting in his reclining lift chair.  States he is doing good.  However his TV is not on and his stated goal since I have been his provider is to watch at least 16 hours of TV a day.  States he lost his remote so he can't turn the TV on.  His global reasoning and problem solving mixed with fatigue is play a large role in obvious functional decline.  His Torsemide had been decreased due to looking dry.  However, with his legs weeping and what appears to be increased dyspnea at rest, he needs to go back up on his Torsemide.        Chronic obstructive pulmonary disease, unspecified COPD type (H)  Has not been able to smoke for nearly a month due to weakness and functional decline.  Not sure even with a w/c if he would be able to get to to elevator on his own.  I offered the Nicoderm patch or gum, he declined    Ischemic stroke (H)  Left sided weakness.  Continues on Plavix.    Type 2 diabetes mellitus with diabetic neuropathy, with long-term current use of insulin (H)  Other diabetic neurological complication associated with type 2 diabetes mellitus (H)  Needed to  reorder Lyrica today.  Denies discomfort or pain.  Will have a fall once about every 10 days or so.    - pregabalin (LYRICA) 75 MG capsule; Take 1 capsule (75 mg) by mouth daily    Venous stasis ulcer of right ankle limited to breakdown of skin without varicose veins (H)  Chronic ulcers that are not healing.  Wounds become contaminated easily due to both urine and feces.  He has begun to be compliant with wearing incontinent briefs, however he doesn't always comply with changing and products become over saturated and leak down his legs.    Hospice care patient        Past Medical and Surgical History reviewed in Epic today.    MEDICATIONS:    Current Outpatient Medications   Medication Sig Dispense Refill     ACCU-CHEK MULTICLIX LANCETS MISC tests twice a day 100 3     Danielle Protect (EUCERIN) external cream Apply 2 times daily to perianal area 142 g 11     blood glucose calibration (NO BRAND SPECIFIED) solution To accompany: Blood Glucose Monitor Brands: per insurance. 1 Bottle 3     blood glucose monitoring (NO BRAND SPECIFIED) meter device kit Use to test blood sugar 1 times daily or as directed. Preferred blood glucose meter OR supplies to accompany: Blood Glucose Monitor Brands: per insurance. 1 kit 0     blood glucose monitoring (NO BRAND SPECIFIED) test strip Use to test blood sugar 1 times daily or as directed. To accompany: Blood Glucose Monitor Brands: per insurance. 100 strip 6     clopidogrel (PLAVIX) 75 MG tablet Take 1 tablet (75 mg) by mouth daily 90 tablet 3     dulaglutide (TRULICITY) 0.75 MG/0.5ML pen Inject 0.75 mg Subcutaneous every 7 days 2 mL 11     escitalopram (LEXAPRO) 20 MG tablet TAKE 1 TABLET BY MOUTH ONCE DAILY (START AFTER 10MG DOSE IS COMPLETE) 30 tablet 11     insulin pen needle (B-D U/F) 31G X 8 MM miscellaneous Use once daily or as directed. 90 each 1     metoprolol succinate ER (TOPROL-XL) 50 MG 24 hr tablet Take 1 tablet (50 mg) by mouth daily 30 tablet 4     nystatin (MYCOSTATIN)  440132 UNIT/GM external powder Apply topically 2 times daily 60 g 3     order for DME Equipment being ordered: seat lift 1 Device 0     order for DME Diabetic shoes due to neuropathy 1 Device 0     pregabalin (LYRICA) 75 MG capsule Take 1 capsule (75 mg) by mouth daily 30 capsule 0     tamsulosin (FLOMAX) 0.4 MG capsule Take 1 capsule (0.4 mg) by mouth daily 90 capsule 3     torsemide (DEMADEX) 20 MG tablet Take 1 tablet (20 mg) by mouth daily 30 tablet 1       REVIEW OF SYSTEMS:  10 point ROS of systems including Constitutional, Eyes, Respiratory, Cardiovascular, Gastroenterology, Genitourinary, Integumentary, Musculoskeletal, Psychiatric were all negative except for pertinent positives noted in my HPI.    Objective:  /76   Pulse 84   Temp 97.9  F (36.6  C)   Resp 19   SpO2 91%   Exam:  GENERAL APPEARANCE:  in no distress, morbidly obese  ENT:  Mouth and posterior oropharynx normal, moist mucous membranes, [place]  RESP:  respiratory effort and palpation of chest normal, no respiratory distress, crackles chronic fibrotic changes  CV:  Palpation and auscultation of heart done , regular rate and rhythm, no murmur, rub, or gallop, peripheral edema 3+ in bilateral l/e up to thighs  ABDOMEN:  normal bowel sounds, soft, nontender, no hepatosplenomegaly or other masses, obese  M/S:   Gait and station abnormal wheelchair, occasionally able to walk  Digits and nails abnormal hypertrophic onychomydhotic  SKIN:  Inspection of skin and subcutaneous tissue baseline, Palpation of skin and subcutaneous tissue baseline, bilateral legs slow to non-healing stais ulcers  PSYCH:  insight and judgement impaired, memory impaired , sad    Labs:   no new labs, hospice care patient    ASSESSMENT/PLAN:  (N18.6) ESRF (end stage renal failure) (H)  (primary encounter diagnosis)  (I50.32) Chronic diastolic heart failure (H)  Comment: last Cr >7  Plan: torsemide (DEMADEX) 20 MG tablet increase to 2 tab (40 mg) daily  Treat according to  symptoms versus lab tests.    Cooper does not want dialysis and is agreeable to the hospice philosophy not to be hospitalized and treat to keep him comfortable    (J44.9) Chronic obstructive pulmonary disease, unspecified COPD type (H)  Comment: active smoker if someone could take him outside to smoke  Plan: inhalers as orderd    (I63.9) Ischemic stroke (H)  Comment: left sided weakness  Plan: continue Plavix for now, helps with venous stasis    (E11.40,  Z79.4) Type 2 diabetes mellitus with diabetic neuropathy, with long-term current use of insulin (H)  (E11.49) Other diabetic neurological complication associated with type 2 diabetes mellitus (H)  Comment: altered balance and ability to walk  Plan: pregabalin (LYRICA) 75 MG capsule reordered for pain managment        Continue supportive care    (I87.2,  L97.311) Venous stasis ulcer of right ankle limited to breakdown of skin without varicose veins (H)  Comment: deteriorating wounds due to increased edema  Plan: increase diuretic as above  He is high level of care requires LTC    (Z51.5) Hospice care patient  Comment: end stage renal disease  Plan: continue  Family conference to discuss higher level of care      Electronically signed by:  ZOHREH Witt CNP               Sincerely,        ZOHREH Witt CNP

## 2020-08-13 NOTE — PROGRESS NOTES
[facility]  [place] Medical Record Number:  [id number]  Place of Service where encounter took place:  [facility] [[id number]]  Chief Complaint   Patient presents with     RECHECK       HPI:    Cooper Cabral  is a 64 year old (1955), who is being seen today for an episodic care visit.  HPI information obtained from: facility chart records, facility staff, patient report, [facility] chart review and Hospice nurse. Today's concern is:  ESRF (end stage renal failure) (H)  Chronic diastolic heart failure (H)  Discussed case with Christiano, hospice nurse who reports that Cooper is having more sleepy days than alert days.  Starting to look dry.  So today I am seeing Mr. Cabral in his apartment sitting in his reclining lift chair.  States he is doing good.  However his TV is not on and his stated goal since I have been his provider is to watch at least 16 hours of TV a day.  States he lost his remote so he can't turn the TV on.  His global reasoning and problem solving mixed with fatigue is play a large role in obvious functional decline.  His Torsemide had been decreased due to looking dry.  However, with his legs weeping and what appears to be increased dyspnea at rest, he needs to go back up on his Torsemide.        Chronic obstructive pulmonary disease, unspecified COPD type (H)  Has not been able to smoke for nearly a month due to weakness and functional decline.  Not sure even with a w/c if he would be able to get to to elevator on his own.  I offered the Nicoderm patch or gum, he declined    Ischemic stroke (H)  Left sided weakness.  Continues on Plavix.    Type 2 diabetes mellitus with diabetic neuropathy, with long-term current use of insulin (H)  Other diabetic neurological complication associated with type 2 diabetes mellitus (H)  Needed to reorder Lyrica today.  Denies discomfort or pain.  Will have a fall once about every 10 days or so.    - pregabalin  (LYRICA) 75 MG capsule; Take 1 capsule (75 mg) by mouth daily    Venous stasis ulcer of right ankle limited to breakdown of skin without varicose veins (H)  Chronic ulcers that are not healing.  Wounds become contaminated easily due to both urine and feces.  He has begun to be compliant with wearing incontinent briefs, however he doesn't always comply with changing and products become over saturated and leak down his legs.    Hospice care patient        Past Medical and Surgical History reviewed in Epic today.    MEDICATIONS:    Current Outpatient Medications   Medication Sig Dispense Refill     ACCU-CHEK MULTICLIX LANCETS MISC tests twice a day 100 3     Danielle Protect (EUCERIN) external cream Apply 2 times daily to perianal area 142 g 11     blood glucose calibration (NO BRAND SPECIFIED) solution To accompany: Blood Glucose Monitor Brands: per insurance. 1 Bottle 3     blood glucose monitoring (NO BRAND SPECIFIED) meter device kit Use to test blood sugar 1 times daily or as directed. Preferred blood glucose meter OR supplies to accompany: Blood Glucose Monitor Brands: per insurance. 1 kit 0     blood glucose monitoring (NO BRAND SPECIFIED) test strip Use to test blood sugar 1 times daily or as directed. To accompany: Blood Glucose Monitor Brands: per insurance. 100 strip 6     clopidogrel (PLAVIX) 75 MG tablet Take 1 tablet (75 mg) by mouth daily 90 tablet 3     dulaglutide (TRULICITY) 0.75 MG/0.5ML pen Inject 0.75 mg Subcutaneous every 7 days 2 mL 11     escitalopram (LEXAPRO) 20 MG tablet TAKE 1 TABLET BY MOUTH ONCE DAILY (START AFTER 10MG DOSE IS COMPLETE) 30 tablet 11     insulin pen needle (B-D U/F) 31G X 8 MM miscellaneous Use once daily or as directed. 90 each 1     metoprolol succinate ER (TOPROL-XL) 50 MG 24 hr tablet Take 1 tablet (50 mg) by mouth daily 30 tablet 4     nystatin (MYCOSTATIN) 458592 UNIT/GM external powder Apply topically 2 times daily 60 g 3     order for DME Equipment being ordered: seat  lift 1 Device 0     order for DME Diabetic shoes due to neuropathy 1 Device 0     pregabalin (LYRICA) 75 MG capsule Take 1 capsule (75 mg) by mouth daily 30 capsule 0     tamsulosin (FLOMAX) 0.4 MG capsule Take 1 capsule (0.4 mg) by mouth daily 90 capsule 3     torsemide (DEMADEX) 20 MG tablet Take 1 tablet (20 mg) by mouth daily 30 tablet 1       REVIEW OF SYSTEMS:  10 point ROS of systems including Constitutional, Eyes, Respiratory, Cardiovascular, Gastroenterology, Genitourinary, Integumentary, Musculoskeletal, Psychiatric were all negative except for pertinent positives noted in my HPI.    Objective:  /76   Pulse 84   Temp 97.9  F (36.6  C)   Resp 19   SpO2 91%   Exam:  GENERAL APPEARANCE:  in no distress, morbidly obese  ENT:  Mouth and posterior oropharynx normal, moist mucous membranes, [place]  RESP:  respiratory effort and palpation of chest normal, no respiratory distress, crackles chronic fibrotic changes  CV:  Palpation and auscultation of heart done , regular rate and rhythm, no murmur, rub, or gallop, peripheral edema 3+ in bilateral l/e up to thighs  ABDOMEN:  normal bowel sounds, soft, nontender, no hepatosplenomegaly or other masses, obese  M/S:   Gait and station abnormal wheelchair, occasionally able to walk  Digits and nails abnormal hypertrophic onychomydhotic  SKIN:  Inspection of skin and subcutaneous tissue baseline, Palpation of skin and subcutaneous tissue baseline, bilateral legs slow to non-healing stais ulcers  PSYCH:  insight and judgement impaired, memory impaired , sad    Labs:   no new labs, hospice care patient    ASSESSMENT/PLAN:  (N18.6) ESRF (end stage renal failure) (H)  (primary encounter diagnosis)  (I50.32) Chronic diastolic heart failure (H)  Comment: last Cr >7  Plan: torsemide (DEMADEX) 20 MG tablet increase to 2 tab (40 mg) daily  Treat according to symptoms versus lab tests.    Cooper does not want dialysis and is agreeable to the hospice philosophy not to be  hospitalized and treat to keep him comfortable    (J44.9) Chronic obstructive pulmonary disease, unspecified COPD type (H)  Comment: active smoker if someone could take him outside to smoke  Plan: inhalers as orderd    (I63.9) Ischemic stroke (H)  Comment: left sided weakness  Plan: continue Plavix for now, helps with venous stasis    (E11.40,  Z79.4) Type 2 diabetes mellitus with diabetic neuropathy, with long-term current use of insulin (H)  (E11.49) Other diabetic neurological complication associated with type 2 diabetes mellitus (H)  Comment: altered balance and ability to walk  Plan: pregabalin (LYRICA) 75 MG capsule reordered for pain managment        Continue supportive care    (I87.2,  L97.311) Venous stasis ulcer of right ankle limited to breakdown of skin without varicose veins (H)  Comment: deteriorating wounds due to increased edema  Plan: increase diuretic as above  He is high level of care requires LTC    (Z51.5) Hospice care patient  Comment: end stage renal disease  Plan: continue  Family conference to discuss higher level of care      Electronically signed by:  ZOHREH Witt CNP     Addendum:  This H&P remains current and up to date.  He is to discharge to [facility] due to requiring 24 hour care/observation.  ZOHREH Witt CNP on 9/4/2020 at 3:26 PM

## 2020-09-12 NOTE — PROGRESS NOTES
Notifying Nohelia Estrella NP and care team that Cooper passed away 9/12/20 at 1615 at [facility].  Thank you.

## 2020-09-14 ENCOUNTER — TELEPHONE (OUTPATIENT)
Dept: GERIATRICS | Facility: CLINIC | Age: 65
End: 2020-09-14

## 2022-07-22 NOTE — IP AVS SNAPSHOT
[facility]    [address]    [place] [address]    Phone:  [phone]                                       After Visit Summary   1/12/2018    Cooper Cabral    MRN: [id number]           After Visit Summary Signature Page     I have received my discharge instructions, and my questions have been answered. I have discussed any challenges I see with this plan with the nurse or doctor.    ..........................................................................................................................................  Patient/Patient Representative Signature      ..........................................................................................................................................  Patient Representative Print Name and Relationship to Patient    ..................................................               ................................................  Date                                            Time    ..........................................................................................................................................  Reviewed by Signature/Title    ...................................................              ..............................................  Date                                                            Time           Repair Type: Repair performed by another provider

## 2023-06-02 NOTE — PLAN OF CARE
Problem: Patient Care Overview  Goal: Plan of Care/Patient Progress Review  PT: progressing well with functional mobility using WW. Patient required min A d/t multiple LOB during ambulation in AM, but required only SBA by PM session. Recommending longer warmup prior to ambulation during morning sessions.        O-T Advancement Flap Text: The defect edges were debeveled with a #15 scalpel blade.  Given the location of the defect, shape of the defect and the proximity to free margins an O-T advancement flap was deemed most appropriate.  Using a sterile surgical marker, an appropriate advancement flap was drawn incorporating the defect and placing the expected incisions within the relaxed skin tension lines where possible.    The area thus outlined was incised deep to adipose tissue with a #15 scalpel blade.  The skin margins were undermined to an appropriate distance in all directions utilizing iris scissors.

## 2023-11-13 NOTE — TELEPHONE ENCOUNTER
I approve of requested home care orders.    ZOHREH Witt CNP     RELAY    Mounjaro has been sent into pharmacy today to replace the Ozempic